# Patient Record
Sex: MALE | Race: WHITE | Employment: OTHER | URBAN - METROPOLITAN AREA
[De-identification: names, ages, dates, MRNs, and addresses within clinical notes are randomized per-mention and may not be internally consistent; named-entity substitution may affect disease eponyms.]

---

## 2017-03-17 PROBLEM — R33.9 URINARY RETENTION: Status: ACTIVE | Noted: 2017-03-17

## 2017-03-20 ENCOUNTER — TELEPHONE (OUTPATIENT)
Dept: PHARMACY | Facility: CLINIC | Age: 72
End: 2017-03-20

## 2017-03-20 PROBLEM — N40.1 BENIGN NON-NODULAR PROSTATIC HYPERPLASIA WITH LOWER URINARY TRACT SYMPTOMS: Status: ACTIVE | Noted: 2017-03-20

## 2017-03-20 PROBLEM — I50.21 ACUTE SYSTOLIC HEART FAILURE (HCC): Status: ACTIVE | Noted: 2017-03-20

## 2017-03-22 PROBLEM — I50.31 ACUTE DIASTOLIC HEART FAILURE (HCC): Status: ACTIVE | Noted: 2017-03-20

## 2017-03-22 PROBLEM — I44.7 LEFT BUNDLE BRANCH BLOCK: Status: ACTIVE | Noted: 2017-03-22

## 2017-04-20 PROBLEM — N45.1 EPIDIDYMITIS: Status: ACTIVE | Noted: 2017-04-20

## 2017-04-20 PROBLEM — L03.90 CELLULITIS: Status: ACTIVE | Noted: 2017-04-20

## 2017-04-26 ENCOUNTER — TELEPHONE (OUTPATIENT)
Dept: PHARMACY | Facility: CLINIC | Age: 72
End: 2017-04-26

## 2017-08-13 PROBLEM — R07.9 CHEST PAIN: Status: ACTIVE | Noted: 2017-08-13

## 2017-08-15 ENCOUNTER — TELEPHONE (OUTPATIENT)
Dept: PHARMACY | Facility: CLINIC | Age: 72
End: 2017-08-15

## 2017-09-03 PROBLEM — R33.9 URINARY RETENTION: Status: RESOLVED | Noted: 2017-03-17 | Resolved: 2017-09-03

## 2017-09-03 PROBLEM — N45.1 EPIDIDYMITIS: Status: RESOLVED | Noted: 2017-04-20 | Resolved: 2017-09-03

## 2017-09-03 PROBLEM — I50.31 ACUTE DIASTOLIC HEART FAILURE (HCC): Status: RESOLVED | Noted: 2017-03-20 | Resolved: 2017-09-03

## 2017-09-03 PROBLEM — L03.90 CELLULITIS: Status: RESOLVED | Noted: 2017-04-20 | Resolved: 2017-09-03

## 2017-09-03 PROBLEM — R07.9 CHEST PAIN: Status: RESOLVED | Noted: 2017-08-13 | Resolved: 2017-09-03

## 2017-09-03 PROBLEM — N40.1 BENIGN NON-NODULAR PROSTATIC HYPERPLASIA WITH LOWER URINARY TRACT SYMPTOMS: Status: RESOLVED | Noted: 2017-03-20 | Resolved: 2017-09-03

## 2017-11-15 PROBLEM — Z01.818 PRE-OP EXAM: Status: ACTIVE | Noted: 2017-11-15

## 2018-02-23 PROBLEM — Z01.818 PRE-OP EXAM: Status: RESOLVED | Noted: 2017-11-15 | Resolved: 2018-02-23

## 2018-03-18 ENCOUNTER — APPOINTMENT (OUTPATIENT)
Dept: GENERAL RADIOLOGY | Age: 73
End: 2018-03-18
Payer: MEDICARE

## 2018-03-18 ENCOUNTER — HOSPITAL ENCOUNTER (EMERGENCY)
Age: 73
Discharge: HOME | End: 2018-03-18
Attending: EMERGENCY MEDICINE
Payer: MEDICARE

## 2018-03-18 VITALS
DIASTOLIC BLOOD PRESSURE: 77 MMHG | BODY MASS INDEX: 30.07 KG/M2 | WEIGHT: 203 LBS | HEART RATE: 68 BPM | TEMPERATURE: 98.2 F | SYSTOLIC BLOOD PRESSURE: 157 MMHG | OXYGEN SATURATION: 97 % | HEIGHT: 69 IN

## 2018-03-18 DIAGNOSIS — I10 ESSENTIAL HYPERTENSION: ICD-10-CM

## 2018-03-18 DIAGNOSIS — R42 DIZZINESS: Primary | ICD-10-CM

## 2018-03-18 DIAGNOSIS — R19.5 GUAIAC POSITIVE STOOLS: ICD-10-CM

## 2018-03-18 LAB
ALBUMIN SERPL-MCNC: 4.1 G/DL (ref 3.5–5.2)
ALP BLD-CCNC: 101 U/L (ref 40–129)
ALT SERPL-CCNC: 29 U/L (ref 0–40)
ANION GAP SERPL CALCULATED.3IONS-SCNC: 12 MMOL/L (ref 7–16)
AST SERPL-CCNC: 34 U/L (ref 0–39)
BASOPHILS ABSOLUTE: 0.05 E9/L (ref 0–0.2)
BASOPHILS RELATIVE PERCENT: 0.6 % (ref 0–2)
BILIRUB SERPL-MCNC: 0.4 MG/DL (ref 0–1.2)
BUN BLDV-MCNC: 14 MG/DL (ref 8–23)
CALCIUM SERPL-MCNC: 9.7 MG/DL (ref 8.6–10.2)
CHLORIDE BLD-SCNC: 100 MMOL/L (ref 98–107)
CO2: 25 MMOL/L (ref 22–29)
CREAT SERPL-MCNC: 0.9 MG/DL (ref 0.7–1.2)
EKG ATRIAL RATE: 64 BPM
EKG P AXIS: 20 DEGREES
EKG P-R INTERVAL: 180 MS
EKG Q-T INTERVAL: 454 MS
EKG QRS DURATION: 144 MS
EKG QTC CALCULATION (BAZETT): 468 MS
EKG R AXIS: -69 DEGREES
EKG T AXIS: 34 DEGREES
EKG VENTRICULAR RATE: 64 BPM
EOSINOPHILS ABSOLUTE: 0.23 E9/L (ref 0.05–0.5)
EOSINOPHILS RELATIVE PERCENT: 2.6 % (ref 0–6)
GFR AFRICAN AMERICAN: >60
GFR NON-AFRICAN AMERICAN: >60 ML/MIN/1.73
GLUCOSE BLD-MCNC: 114 MG/DL (ref 74–109)
HCT VFR BLD CALC: 47.1 % (ref 37–54)
HEMOGLOBIN: 16.2 G/DL (ref 12.5–16.5)
IMMATURE GRANULOCYTES #: 0.03 E9/L
IMMATURE GRANULOCYTES %: 0.3 % (ref 0–5)
LACTIC ACID: 1.3 MMOL/L (ref 0.5–2.2)
LYMPHOCYTES ABSOLUTE: 3.33 E9/L (ref 1.5–4)
LYMPHOCYTES RELATIVE PERCENT: 38.1 % (ref 20–42)
MCH RBC QN AUTO: 31 PG (ref 26–35)
MCHC RBC AUTO-ENTMCNC: 34.4 % (ref 32–34.5)
MCV RBC AUTO: 90.2 FL (ref 80–99.9)
MONOCYTES ABSOLUTE: 0.75 E9/L (ref 0.1–0.95)
MONOCYTES RELATIVE PERCENT: 8.6 % (ref 2–12)
NEUTROPHILS ABSOLUTE: 4.34 E9/L (ref 1.8–7.3)
NEUTROPHILS RELATIVE PERCENT: 49.8 % (ref 43–80)
PDW BLD-RTO: 13.6 FL (ref 11.5–15)
PLATELET # BLD: 243 E9/L (ref 130–450)
PMV BLD AUTO: 10 FL (ref 7–12)
POTASSIUM SERPL-SCNC: 4.1 MMOL/L (ref 3.5–5)
RBC # BLD: 5.22 E12/L (ref 3.8–5.8)
SODIUM BLD-SCNC: 137 MMOL/L (ref 132–146)
TOTAL PROTEIN: 7.8 G/DL (ref 6.4–8.3)
TROPONIN: <0.01 NG/ML (ref 0–0.03)
WBC # BLD: 8.7 E9/L (ref 4.5–11.5)

## 2018-03-18 PROCEDURE — 80053 COMPREHEN METABOLIC PANEL: CPT

## 2018-03-18 PROCEDURE — 36415 COLL VENOUS BLD VENIPUNCTURE: CPT

## 2018-03-18 PROCEDURE — 85025 COMPLETE CBC W/AUTO DIFF WBC: CPT

## 2018-03-18 PROCEDURE — 99284 EMERGENCY DEPT VISIT MOD MDM: CPT

## 2018-03-18 PROCEDURE — 93005 ELECTROCARDIOGRAM TRACING: CPT | Performed by: EMERGENCY MEDICINE

## 2018-03-18 PROCEDURE — 83605 ASSAY OF LACTIC ACID: CPT

## 2018-03-18 PROCEDURE — 74022 RADEX COMPL AQT ABD SERIES: CPT

## 2018-03-18 PROCEDURE — 2580000003 HC RX 258: Performed by: EMERGENCY MEDICINE

## 2018-03-18 PROCEDURE — 84484 ASSAY OF TROPONIN QUANT: CPT

## 2018-03-18 PROCEDURE — 6370000000 HC RX 637 (ALT 250 FOR IP): Performed by: EMERGENCY MEDICINE

## 2018-03-18 RX ORDER — 0.9 % SODIUM CHLORIDE 0.9 %
1000 INTRAVENOUS SOLUTION INTRAVENOUS ONCE
Status: COMPLETED | OUTPATIENT
Start: 2018-03-18 | End: 2018-03-18

## 2018-03-18 RX ORDER — MECLIZINE HCL 12.5 MG/1
25 TABLET ORAL ONCE
Status: COMPLETED | OUTPATIENT
Start: 2018-03-18 | End: 2018-03-18

## 2018-03-18 RX ORDER — MECLIZINE HCL 12.5 MG/1
12.5 TABLET ORAL 3 TIMES DAILY PRN
Qty: 15 TABLET | Refills: 0 | Status: SHIPPED | OUTPATIENT
Start: 2018-03-18 | End: 2018-03-28

## 2018-03-18 RX ADMIN — MECLIZINE 25 MG: 12.5 TABLET ORAL at 15:20

## 2018-03-18 RX ADMIN — SODIUM CHLORIDE 1000 ML: 9 INJECTION, SOLUTION INTRAVENOUS at 15:19

## 2018-03-18 ASSESSMENT — ENCOUNTER SYMPTOMS
VISUAL CHANGE: 0
NAUSEA: 1
DIARRHEA: 0
COLOR CHANGE: 0
BLOOD IN STOOL: 1
VOMITING: 0
ABDOMINAL PAIN: 0
ABDOMINAL DISTENTION: 0
CHEST TIGHTNESS: 0
SHORTNESS OF BREATH: 0

## 2018-03-18 ASSESSMENT — PAIN DESCRIPTION - ORIENTATION: ORIENTATION: MID

## 2018-03-18 ASSESSMENT — PAIN SCALES - WONG BAKER: WONGBAKER_NUMERICALRESPONSE: 4

## 2018-03-18 ASSESSMENT — PAIN SCALES - GENERAL: PAINLEVEL_OUTOF10: 4

## 2018-03-18 ASSESSMENT — PAIN DESCRIPTION - LOCATION: LOCATION: RECTUM;BACK

## 2018-03-18 ASSESSMENT — PAIN DESCRIPTION - DESCRIPTORS: DESCRIPTORS: ACHING;THROBBING

## 2018-03-18 ASSESSMENT — PAIN DESCRIPTION - PAIN TYPE: TYPE: ACUTE PAIN

## 2018-03-18 NOTE — ED PROVIDER NOTES
normal  Axis: left  Ectopy: none  Conduction: normal  ST Segments: no acute change  T Waves: non specific changes  Q Waves: II, III and aVf    Clinical Impression: right bundle branch block and left anterior fascicular block no change when compared to previous on 11/15/17    [place]    Lab results from last week reviewed. Stool cultures without positive findings. 15:00 Patient was able to provide a stool. No gross blood visible. Stool is narrow in caliber and solid. No evidence of mucous. Stool was guaiac positive. He is able to ambulate to and from the bathroom without assist.  At this time, I'll give him a liter of IVF and a dose of meclizine as he complains of \"dizziness\" with position changes. ED Course        --------------------------------------------- PAST HISTORY ---------------------------------------------  Past Medical History:  has a past medical history of Arthritis; Atrial fibrillation ([facility] [address] Burning pain; Degenerative lumbar disc; Diabetes ([facility] [address] Herniated cervical disc; History of echocardiogram; Hyperlipidemia; Hypertension; and Urinary retention. Past Surgical History:  has a past surgical history that includes colectomy; Hemorrhoid surgery; Prostate surgery; Cataract removal; Upper gastrointestinal endoscopy; Colonoscopy; Nerve Block (Bilateral, 10 12 2015); Nerve Block (Bilateral, 10/22/15); Nerve Block (Bilateral, 11 02 2015); Nerve Block (Left, 12/2/15); Nerve Block (Left, 12 16 15); Nerve Block (Left, 12 28 2015); Nerve Block (Left, 1 20 16); Endoscopy, colon, diagnostic; ablation of dysrhythmic focus; lumbar fusion (03/06/2017); and Nasal sinus surgery (12/07/2017). Social History:  reports that he quit smoking about 41 years ago. He has quit using smokeless tobacco. He reports that he does not drink alcohol or use drugs.     Family History: family history includes Cancer in his brother; Diabetes in an other family member; Hypertension in an other family member; Kidney Disease in his brother and sister; Other in his father; Stroke in his mother. The patients home medications have been reviewed.     Allergies: Oxycodone; Gabapentin; Hydrocodone; Norco [hydrocodone-acetaminophen]; Sulfamethoxazole-trimethoprim; and Bactrim [sulfamethoxazole-trimethoprim]    -------------------------------------------------- RESULTS -------------------------------------------------  Labs:  Results for orders placed or performed during the hospital encounter of 03/18/18   CBC Auto Differential   Result Value Ref Range    WBC 8.7 4.5 - 11.5 E9/L    RBC 5.22 3.80 - 5.80 E12/L    Hemoglobin 16.2 12.5 - 16.5 g/dL    Hematocrit 47.1 37.0 - 54.0 %    MCV 90.2 80.0 - 99.9 fL    MCH 31.0 26.0 - 35.0 pg    MCHC 34.4 32.0 - 34.5 %    RDW 13.6 11.5 - 15.0 fL    Platelets 098 938 - 868 E9/L    MPV 10.0 7.0 - 12.0 fL    Neutrophils % 49.8 43.0 - 80.0 %    Immature Granulocytes % 0.3 0.0 - 5.0 %    Lymphocytes % 38.1 20.0 - 42.0 %    Monocytes % 8.6 2.0 - 12.0 %    Eosinophils % 2.6 0.0 - 6.0 %    Basophils % 0.6 0.0 - 2.0 %    Neutrophils # 4.34 1.80 - 7.30 E9/L    Immature Granulocytes # 0.03 E9/L    Lymphocytes # 3.33 1.50 - 4.00 E9/L    Monocytes # 0.75 0.10 - 0.95 E9/L    Eosinophils # 0.23 0.05 - 0.50 E9/L    Basophils # 0.05 0.00 - 0.20 E9/L   Comprehensive Metabolic Panel   Result Value Ref Range    Sodium 137 132 - 146 mmol/L    Potassium 4.1 3.5 - 5.0 mmol/L    Chloride 100 98 - 107 mmol/L    CO2 25 22 - 29 mmol/L    Anion Gap 12 7 - 16 mmol/L    Glucose 114 (H) 74 - 109 mg/dL    BUN 14 8 - 23 mg/dL    CREATININE 0.9 0.7 - 1.2 mg/dL    GFR Non-African American >60 >=60 mL/min/1.73    GFR African American >60     Calcium 9.7 8.6 - 10.2 mg/dL    Total Protein 7.8 6.4 - 8.3 g/dL    Alb 4.1 3.5 - 5.2 g/dL    Total Bilirubin 0.4 0.0 - 1.2 mg/dL    Alkaline Phosphatase 101 40 - 129 U/L    ALT 29 0 - 40 U/L    AST 34 0 - 39 U/L   Lactic Acid, Plasma   Result Value Ref Range    Lactic Acid 1.3

## 2018-03-23 ENCOUNTER — OFFICE VISIT (OUTPATIENT)
Dept: FAMILY MEDICINE CLINIC | Age: 73
End: 2018-03-23
Payer: MEDICARE

## 2018-03-23 VITALS
OXYGEN SATURATION: 96 % | SYSTOLIC BLOOD PRESSURE: 120 MMHG | WEIGHT: 209 LBS | BODY MASS INDEX: 30.86 KG/M2 | HEART RATE: 68 BPM | DIASTOLIC BLOOD PRESSURE: 80 MMHG

## 2018-03-23 DIAGNOSIS — R19.7 DIARRHEA, UNSPECIFIED TYPE: Primary | ICD-10-CM

## 2018-03-23 PROCEDURE — 1123F ACP DISCUSS/DSCN MKR DOCD: CPT | Performed by: FAMILY MEDICINE

## 2018-03-23 PROCEDURE — G8482 FLU IMMUNIZE ORDER/ADMIN: HCPCS | Performed by: FAMILY MEDICINE

## 2018-03-23 PROCEDURE — 3017F COLORECTAL CA SCREEN DOC REV: CPT | Performed by: FAMILY MEDICINE

## 2018-03-23 PROCEDURE — G8417 CALC BMI ABV UP PARAM F/U: HCPCS | Performed by: FAMILY MEDICINE

## 2018-03-23 PROCEDURE — G8427 DOCREV CUR MEDS BY ELIG CLIN: HCPCS | Performed by: FAMILY MEDICINE

## 2018-03-23 PROCEDURE — 99213 OFFICE O/P EST LOW 20 MIN: CPT | Performed by: FAMILY MEDICINE

## 2018-03-23 PROCEDURE — 1036F TOBACCO NON-USER: CPT | Performed by: FAMILY MEDICINE

## 2018-03-23 PROCEDURE — 4040F PNEUMOC VAC/ADMIN/RCVD: CPT | Performed by: FAMILY MEDICINE

## 2018-03-23 RX ORDER — DIPHENOXYLATE HYDROCHLORIDE AND ATROPINE SULFATE 2.5; .025 MG/1; MG/1
1 TABLET ORAL 4 TIMES DAILY PRN
Qty: 120 TABLET | Refills: 3 | Status: SHIPPED | OUTPATIENT
Start: 2018-03-23 | End: 2018-04-22

## 2018-03-24 ASSESSMENT — ENCOUNTER SYMPTOMS
WHEEZING: 0
SINUS PRESSURE: 1
BACK PAIN: 0
CHOKING: 0
SHORTNESS OF BREATH: 0
ABDOMINAL DISTENTION: 0
SINUS PAIN: 1
ABDOMINAL PAIN: 0
CONSTIPATION: 0
VOMITING: 0
EYE DISCHARGE: 0
EYE REDNESS: 0
ANAL BLEEDING: 0
DIARRHEA: 1
STRIDOR: 0
COUGH: 0
SORE THROAT: 0
FACIAL SWELLING: 0
RECTAL PAIN: 0
CHEST TIGHTNESS: 0
APNEA: 0
EYE PAIN: 0
PHOTOPHOBIA: 0
NAUSEA: 0
RHINORRHEA: 1
VOICE CHANGE: 0
BLOOD IN STOOL: 0
COLOR CHANGE: 0
TROUBLE SWALLOWING: 0
EYE ITCHING: 0

## 2018-03-24 NOTE — PROGRESS NOTES
Subjective:      Patient ID: Solange Flannery is a 74 y.o. male. States sinuses are still not clear. Complaining of large amounts of mucus in stool. He is requesting blood cultures because he has been having fever and chills and due to a remote history of sepsis he is worried. I assured him that this is not likely due to his lack of symptomology, however we will check. He is having again having loose mucoid stools , no blood in stools. No N/V/D/C . No urinary complaints. No SOB or Chest pain. He was unable to obtain medications for diabetes due to no  medication coverage. He does not tolerate metformin          Review of Systems   Constitutional: Positive for chills, fatigue and fever. Negative for activity change, appetite change, diaphoresis and unexpected weight change. HENT: Positive for postnasal drip, rhinorrhea, sinus pain and sinus pressure. Negative for congestion, dental problem, drooling, ear discharge, ear pain, facial swelling, hearing loss, mouth sores, nosebleeds, sneezing, sore throat, tinnitus, trouble swallowing and voice change. Eyes: Negative for photophobia, pain, discharge, redness, itching and visual disturbance. Respiratory: Negative for apnea, cough, choking, chest tightness, shortness of breath, wheezing and stridor. Cardiovascular: Negative for chest pain, palpitations and leg swelling. Gastrointestinal: Positive for diarrhea. Negative for abdominal distention, abdominal pain, anal bleeding, blood in stool, constipation, nausea, rectal pain and vomiting. Mucoid stools   Endocrine: Negative for cold intolerance, heat intolerance, polydipsia, polyphagia and polyuria. Genitourinary: Negative for decreased urine volume, difficulty urinating, discharge, dysuria, enuresis, flank pain, frequency, genital sores, hematuria, penile pain, penile swelling, scrotal swelling, testicular pain and urgency.    Musculoskeletal: Negative for arthralgias, back pain, gait problem, joint tablet Take 1 tablet by mouth every morning (before breakfast) 30 tablet 5    metoprolol succinate (TOPROL XL) 50 MG extended release tablet TAKE 1 TABLET BY MOUTH DAILY 90 tablet 1    lisinopril (PRINIVIL;ZESTRIL) 20 MG tablet TAKE 1 TABLET BY MOUTH DAILY 90 tablet 1    saxagliptin (ONGLYZA) 5 MG TABS tablet Take 1 tablet by mouth daily 30 tablet 5    alogliptin (NESINA) 25 MG TABS tablet Take 1 tablet by mouth daily 30 tablet 5    fluticasone (FLONASE) 50 MCG/ACT nasal spray 1 spray by Nasal route daily 1 Bottle 3    ipratropium (ATROVENT) 0.06 % nasal spray 2 sprays by Nasal route 3 times daily 1 Bottle 3    traMADol (ULTRAM) 50 MG tablet Take 2 tablets by mouth every 6 hours as needed for Pain . 120 tablet 5    simvastatin (ZOCOR) 40 MG tablet Take 1 tablet by mouth nightly 90 tablet 5    cyclobenzaprine (FLEXERIL) 10 MG tablet Take 1 tablet by mouth 3 times daily as needed for Muscle spasms 90 tablet 5    aspirin 81 MG tablet Take 81 mg by mouth daily      budesonide-formoterol (SYMBICORT) 160-4.5 MCG/ACT AERO Inhale 2 puffs into the lungs 2 times daily 1 Inhaler 3    oxybutynin (DITROPAN) 5 MG tablet Take 5 mg by mouth 3 times daily      psyllium (KONSYL) 28.3 % PACK Take 1 packet by mouth daily       No current facility-administered medications for this visit. Allergies   Allergen Reactions    Oxycodone Rash    Gabapentin Other (See Comments)     Constipation      Hydrocodone     Norco [Hydrocodone-Acetaminophen]      Causes sick feeling in head      Sulfamethoxazole-Trimethoprim     Bactrim [Sulfamethoxazole-Trimethoprim] Nausea Only       I have reviewed Markel's allergies, medications, problem list, medical, social and family history and have updated as needed in the electronic medical record. ROS: negative other what was mentioned above. Objective:   Physical Exam   Constitutional: He is oriented to person, place, and time. He appears well-developed and well-nourished.  No distress. HENT:   Head: Normocephalic and atraumatic. Right Ear: External ear normal.   Left Ear: External ear normal.   Nose: Nose normal.   Mouth/Throat: Oropharynx is clear and moist. No oropharyngeal exudate. +turb congestion and errythemia + thick green rhinorrhea   + thick green post nasal drip, mild posterior casimiro-phyangeal injection. Mild soft anterior cervical adenopathy    Eyes: Conjunctivae and EOM are normal. Pupils are equal, round, and reactive to light. Right eye exhibits no discharge. Left eye exhibits no discharge. No scleral icterus. Neck: Normal range of motion. Neck supple. No JVD present. No tracheal deviation present. No thyromegaly present. Cardiovascular: Normal rate, regular rhythm, normal heart sounds and intact distal pulses. Exam reveals no gallop and no friction rub. No murmur heard. Pulmonary/Chest: Effort normal and breath sounds normal. No stridor. No respiratory distress. He has no wheezes. He has no rales. He exhibits no tenderness. Abdominal: Soft. Bowel sounds are normal. He exhibits no distension and no mass. There is no tenderness. There is no rebound and no guarding. Genitourinary:   Genitourinary Comments: Deferred by patient   Musculoskeletal: Normal range of motion. He exhibits no edema or tenderness. Lymphadenopathy:     He has no cervical adenopathy. Neurological: He is alert and oriented to person, place, and time. He has normal reflexes. No cranial nerve deficit. He exhibits normal muscle tone. Coordination normal.   Skin: Skin is warm and dry. No rash noted. He is not diaphoretic. No erythema. No pallor. Psychiatric: He has a normal mood and affect. His behavior is normal. Judgment and thought content normal.   Nursing note and vitals reviewed. Assessment / Plan:      Bj Lee was seen today for follow-up and results.     Diagnoses and all orders for this visit:    Diarrhea, unspecified type  -     diphenoxylate-atropine (DIPHENATOL) 2.5-0.025 MG

## 2018-03-26 ASSESSMENT — PATIENT HEALTH QUESTIONNAIRE - PHQ9
1. LITTLE INTEREST OR PLEASURE IN DOING THINGS: 0
SUM OF ALL RESPONSES TO PHQ9 QUESTIONS 1 & 2: 0
2. FEELING DOWN, DEPRESSED OR HOPELESS: 0
SUM OF ALL RESPONSES TO PHQ QUESTIONS 1-9: 0

## 2018-04-11 ENCOUNTER — OFFICE VISIT (OUTPATIENT)
Dept: ENT CLINIC | Age: 73
End: 2018-04-11
Payer: MEDICARE

## 2018-04-11 VITALS
SYSTOLIC BLOOD PRESSURE: 136 MMHG | DIASTOLIC BLOOD PRESSURE: 79 MMHG | BODY MASS INDEX: 31.21 KG/M2 | HEART RATE: 67 BPM | WEIGHT: 210.7 LBS | HEIGHT: 69 IN

## 2018-04-11 DIAGNOSIS — J34.89 RHINORRHEA: ICD-10-CM

## 2018-04-11 DIAGNOSIS — G96.00 CSF LEAK: Primary | ICD-10-CM

## 2018-04-11 PROCEDURE — 1036F TOBACCO NON-USER: CPT | Performed by: OTOLARYNGOLOGY

## 2018-04-11 PROCEDURE — 4040F PNEUMOC VAC/ADMIN/RCVD: CPT | Performed by: OTOLARYNGOLOGY

## 2018-04-11 PROCEDURE — 1123F ACP DISCUSS/DSCN MKR DOCD: CPT | Performed by: OTOLARYNGOLOGY

## 2018-04-11 PROCEDURE — G8427 DOCREV CUR MEDS BY ELIG CLIN: HCPCS | Performed by: OTOLARYNGOLOGY

## 2018-04-11 PROCEDURE — 99213 OFFICE O/P EST LOW 20 MIN: CPT | Performed by: OTOLARYNGOLOGY

## 2018-04-11 PROCEDURE — G8417 CALC BMI ABV UP PARAM F/U: HCPCS | Performed by: OTOLARYNGOLOGY

## 2018-04-11 PROCEDURE — 3017F COLORECTAL CA SCREEN DOC REV: CPT | Performed by: OTOLARYNGOLOGY

## 2018-04-13 ENCOUNTER — HOSPITAL ENCOUNTER (OUTPATIENT)
Age: 73
Discharge: HOME | End: 2018-04-13
Payer: MEDICARE

## 2018-04-13 PROCEDURE — 89055 LEUKOCYTE ASSESSMENT FECAL: CPT

## 2018-04-13 PROCEDURE — 87324 CLOSTRIDIUM AG IA: CPT

## 2018-04-13 PROCEDURE — 87045 FECES CULTURE AEROBIC BACT: CPT

## 2018-04-14 ENCOUNTER — HOSPITAL ENCOUNTER (OUTPATIENT)
Age: 73
Discharge: HOME | End: 2018-04-14
Payer: MEDICARE

## 2018-04-14 LAB
ALBUMIN SERPL-MCNC: 4 G/DL (ref 3.5–5.2)
ALP BLD-CCNC: 93 U/L (ref 40–129)
ALT SERPL-CCNC: 18 U/L (ref 0–40)
ANION GAP SERPL CALCULATED.3IONS-SCNC: 11 MMOL/L (ref 7–16)
AST SERPL-CCNC: 20 U/L (ref 0–39)
BASOPHILS ABSOLUTE: 0.04 E9/L (ref 0–0.2)
BASOPHILS RELATIVE PERCENT: 0.5 % (ref 0–2)
BILIRUB SERPL-MCNC: 0.5 MG/DL (ref 0–1.2)
BUN BLDV-MCNC: 11 MG/DL (ref 8–23)
CALCIUM SERPL-MCNC: 9.8 MG/DL (ref 8.6–10.2)
CHLORIDE BLD-SCNC: 103 MMOL/L (ref 98–107)
CO2: 28 MMOL/L (ref 22–29)
CREAT SERPL-MCNC: 1.1 MG/DL (ref 0.7–1.2)
EOSINOPHILS ABSOLUTE: 0.23 E9/L (ref 0.05–0.5)
EOSINOPHILS RELATIVE PERCENT: 2.6 % (ref 0–6)
GFR AFRICAN AMERICAN: >60
GFR NON-AFRICAN AMERICAN: >60 ML/MIN/1.73
GLUCOSE FASTING: 138 MG/DL (ref 74–109)
HCT VFR BLD CALC: 49.6 % (ref 37–54)
HEMOGLOBIN: 16.7 G/DL (ref 12.5–16.5)
IMMATURE GRANULOCYTES #: 0.04 E9/L
IMMATURE GRANULOCYTES %: 0.5 % (ref 0–5)
LYMPHOCYTES ABSOLUTE: 2.86 E9/L (ref 1.5–4)
LYMPHOCYTES RELATIVE PERCENT: 32.4 % (ref 20–42)
MCH RBC QN AUTO: 31.3 PG (ref 26–35)
MCHC RBC AUTO-ENTMCNC: 33.7 % (ref 32–34.5)
MCV RBC AUTO: 92.9 FL (ref 80–99.9)
MONOCYTES ABSOLUTE: 0.79 E9/L (ref 0.1–0.95)
MONOCYTES RELATIVE PERCENT: 8.9 % (ref 2–12)
NEUTROPHILS ABSOLUTE: 4.88 E9/L (ref 1.8–7.3)
NEUTROPHILS RELATIVE PERCENT: 55.1 % (ref 43–80)
ORGANISM: ABNORMAL
PDW BLD-RTO: 13.7 FL (ref 11.5–15)
PLATELET # BLD: 205 E9/L (ref 130–450)
PMV BLD AUTO: 10.2 FL (ref 7–12)
POTASSIUM SERPL-SCNC: 5.2 MMOL/L (ref 3.5–5)
RBC # BLD: 5.34 E12/L (ref 3.8–5.8)
SODIUM BLD-SCNC: 142 MMOL/L (ref 132–146)
TOTAL PROTEIN: 7.5 G/DL (ref 6.4–8.3)
WBC # BLD: 8.8 E9/L (ref 4.5–11.5)
WHITE BLOOD CELLS (WBC), STOOL: NORMAL

## 2018-04-14 PROCEDURE — 80053 COMPREHEN METABOLIC PANEL: CPT

## 2018-04-14 PROCEDURE — 85025 COMPLETE CBC W/AUTO DIFF WBC: CPT

## 2018-04-14 PROCEDURE — 36415 COLL VENOUS BLD VENIPUNCTURE: CPT

## 2018-04-15 LAB — CULTURE, STOOL: NORMAL

## 2018-04-18 ENCOUNTER — TELEPHONE (OUTPATIENT)
Dept: ENT CLINIC | Age: 73
End: 2018-04-18

## 2018-04-19 ENCOUNTER — TELEPHONE (OUTPATIENT)
Dept: ENT CLINIC | Age: 73
End: 2018-04-19

## 2018-04-21 ENCOUNTER — HOSPITAL ENCOUNTER (OUTPATIENT)
Age: 73
Setting detail: SPECIMEN
Discharge: HOME | End: 2018-04-21
Payer: MEDICARE

## 2018-04-22 ENCOUNTER — HOSPITAL ENCOUNTER (OUTPATIENT)
Age: 73
Discharge: HOME | End: 2018-04-22
Payer: MEDICARE

## 2018-04-22 DIAGNOSIS — G96.00 CSF LEAK: ICD-10-CM

## 2018-04-22 PROCEDURE — 82232 ASSAY OF BETA-2 PROTEIN: CPT

## 2018-04-22 ASSESSMENT — ENCOUNTER SYMPTOMS
HEARTBURN: 0
COUGH: 0
BLURRED VISION: 0
SHORTNESS OF BREATH: 0
DOUBLE VISION: 0
VOMITING: 0

## 2018-04-24 DIAGNOSIS — G96.00 CSF LEAK: ICD-10-CM

## 2018-04-25 LAB — BETA-2 MICROGLOBULIN: 1.8 MG/L (ref 0.6–2.4)

## 2018-04-26 LAB — MISCELLANEOUS LAB TEST RESULT: NORMAL

## 2018-06-04 ENCOUNTER — OFFICE VISIT (OUTPATIENT)
Dept: FAMILY MEDICINE CLINIC | Age: 73
End: 2018-06-04
Payer: MEDICARE

## 2018-06-04 VITALS
DIASTOLIC BLOOD PRESSURE: 80 MMHG | WEIGHT: 208.4 LBS | SYSTOLIC BLOOD PRESSURE: 130 MMHG | BODY MASS INDEX: 30.78 KG/M2 | HEART RATE: 68 BPM | OXYGEN SATURATION: 97 %

## 2018-06-04 DIAGNOSIS — R73.01 IFG (IMPAIRED FASTING GLUCOSE): ICD-10-CM

## 2018-06-04 DIAGNOSIS — Z12.5 ENCOUNTER FOR SCREENING FOR MALIGNANT NEOPLASM OF PROSTATE: ICD-10-CM

## 2018-06-04 DIAGNOSIS — I10 BENIGN ESSENTIAL HTN: Primary | ICD-10-CM

## 2018-06-04 DIAGNOSIS — E78.2 MIXED HYPERLIPIDEMIA: ICD-10-CM

## 2018-06-04 PROCEDURE — G8427 DOCREV CUR MEDS BY ELIG CLIN: HCPCS | Performed by: FAMILY MEDICINE

## 2018-06-04 PROCEDURE — 1123F ACP DISCUSS/DSCN MKR DOCD: CPT | Performed by: FAMILY MEDICINE

## 2018-06-04 PROCEDURE — 3017F COLORECTAL CA SCREEN DOC REV: CPT | Performed by: FAMILY MEDICINE

## 2018-06-04 PROCEDURE — G8417 CALC BMI ABV UP PARAM F/U: HCPCS | Performed by: FAMILY MEDICINE

## 2018-06-04 PROCEDURE — 99213 OFFICE O/P EST LOW 20 MIN: CPT | Performed by: FAMILY MEDICINE

## 2018-06-04 PROCEDURE — 4040F PNEUMOC VAC/ADMIN/RCVD: CPT | Performed by: FAMILY MEDICINE

## 2018-06-04 PROCEDURE — 1036F TOBACCO NON-USER: CPT | Performed by: FAMILY MEDICINE

## 2018-06-04 RX ORDER — SIMVASTATIN 40 MG
20 TABLET ORAL NIGHTLY
Qty: 90 TABLET | Refills: 5 | Status: CANCELLED | OUTPATIENT
Start: 2018-06-04

## 2018-06-04 RX ORDER — LISINOPRIL 20 MG/1
20 TABLET ORAL DAILY
Qty: 90 TABLET | Refills: 11 | Status: SHIPPED | OUTPATIENT
Start: 2018-06-04 | End: 2019-02-11

## 2018-06-04 RX ORDER — METOPROLOL SUCCINATE 50 MG/1
50 TABLET, EXTENDED RELEASE ORAL DAILY
Qty: 90 TABLET | Refills: 11 | Status: SHIPPED | OUTPATIENT
Start: 2018-06-04 | End: 2019-07-16 | Stop reason: SDUPTHER

## 2018-06-04 RX ORDER — SIMVASTATIN 20 MG
20 TABLET ORAL NIGHTLY
Qty: 90 TABLET | Refills: 11 | Status: SHIPPED | OUTPATIENT
Start: 2018-06-04 | End: 2019-05-10

## 2018-06-08 ASSESSMENT — ENCOUNTER SYMPTOMS
VOMITING: 0
NAUSEA: 0
SORE THROAT: 0
ABDOMINAL PAIN: 0
COUGH: 0

## 2018-06-08 ASSESSMENT — PATIENT HEALTH QUESTIONNAIRE - PHQ9
SUM OF ALL RESPONSES TO PHQ QUESTIONS 1-9: 0
1. LITTLE INTEREST OR PLEASURE IN DOING THINGS: 0
SUM OF ALL RESPONSES TO PHQ9 QUESTIONS 1 & 2: 0
2. FEELING DOWN, DEPRESSED OR HOPELESS: 0

## 2018-06-22 ENCOUNTER — TELEPHONE (OUTPATIENT)
Dept: SURGERY | Age: 73
End: 2018-06-22

## 2018-06-22 ENCOUNTER — INITIAL CONSULT (OUTPATIENT)
Dept: SURGERY | Age: 73
End: 2018-06-22
Payer: MEDICARE

## 2018-06-22 VITALS
HEIGHT: 69 IN | RESPIRATION RATE: 12 BRPM | HEART RATE: 62 BPM | DIASTOLIC BLOOD PRESSURE: 83 MMHG | TEMPERATURE: 98.6 F | SYSTOLIC BLOOD PRESSURE: 166 MMHG | BODY MASS INDEX: 30.21 KG/M2 | WEIGHT: 204 LBS

## 2018-06-22 DIAGNOSIS — R19.7 DIARRHEA OF PRESUMED INFECTIOUS ORIGIN: ICD-10-CM

## 2018-06-22 PROCEDURE — 99204 OFFICE O/P NEW MOD 45 MIN: CPT | Performed by: SURGERY

## 2018-06-22 PROCEDURE — G8427 DOCREV CUR MEDS BY ELIG CLIN: HCPCS | Performed by: SURGERY

## 2018-06-22 PROCEDURE — G8417 CALC BMI ABV UP PARAM F/U: HCPCS | Performed by: SURGERY

## 2018-06-22 PROCEDURE — 3017F COLORECTAL CA SCREEN DOC REV: CPT | Performed by: SURGERY

## 2018-06-29 ENCOUNTER — HOSPITAL ENCOUNTER (OUTPATIENT)
Dept: GENERAL RADIOLOGY | Age: 73
Discharge: HOME | End: 2018-07-01
Payer: MEDICARE

## 2018-06-29 DIAGNOSIS — R19.7 DIARRHEA OF PRESUMED INFECTIOUS ORIGIN: ICD-10-CM

## 2018-06-29 PROCEDURE — 74270 X-RAY XM COLON 1CNTRST STD: CPT

## 2018-06-29 PROCEDURE — 2500000003 HC RX 250 WO HCPCS: Performed by: RADIOLOGY

## 2018-06-29 RX ADMIN — BARIUM SULFATE: 965 POWDER, FOR SUSPENSION RECTAL at 09:20

## 2018-07-05 ENCOUNTER — CARE COORDINATION (OUTPATIENT)
Dept: CARE COORDINATION | Age: 73
End: 2018-07-05

## 2018-07-10 ENCOUNTER — CARE COORDINATION (OUTPATIENT)
Dept: CARE COORDINATION | Age: 73
End: 2018-07-10

## 2018-07-13 ENCOUNTER — OFFICE VISIT (OUTPATIENT)
Dept: SURGERY | Age: 73
End: 2018-07-13
Payer: MEDICARE

## 2018-07-13 VITALS
TEMPERATURE: 98.6 F | HEIGHT: 69 IN | DIASTOLIC BLOOD PRESSURE: 80 MMHG | SYSTOLIC BLOOD PRESSURE: 142 MMHG | WEIGHT: 200 LBS | HEART RATE: 64 BPM | BODY MASS INDEX: 29.62 KG/M2 | RESPIRATION RATE: 12 BRPM

## 2018-07-13 DIAGNOSIS — R19.7 DIARRHEA OF PRESUMED INFECTIOUS ORIGIN: Primary | ICD-10-CM

## 2018-07-13 PROCEDURE — 1123F ACP DISCUSS/DSCN MKR DOCD: CPT | Performed by: SURGERY

## 2018-07-13 PROCEDURE — G8417 CALC BMI ABV UP PARAM F/U: HCPCS | Performed by: SURGERY

## 2018-07-13 PROCEDURE — 4040F PNEUMOC VAC/ADMIN/RCVD: CPT | Performed by: SURGERY

## 2018-07-13 PROCEDURE — G8427 DOCREV CUR MEDS BY ELIG CLIN: HCPCS | Performed by: SURGERY

## 2018-07-13 PROCEDURE — 99213 OFFICE O/P EST LOW 20 MIN: CPT | Performed by: SURGERY

## 2018-07-13 PROCEDURE — 1036F TOBACCO NON-USER: CPT | Performed by: SURGERY

## 2018-07-13 PROCEDURE — 3017F COLORECTAL CA SCREEN DOC REV: CPT | Performed by: SURGERY

## 2018-07-13 PROCEDURE — 1101F PT FALLS ASSESS-DOCD LE1/YR: CPT | Performed by: SURGERY

## 2018-07-13 RX ORDER — GLYCOPYRROLATE 1 MG/1
1 TABLET ORAL 3 TIMES DAILY
Qty: 90 TABLET | Refills: 3 | Status: SHIPPED | OUTPATIENT
Start: 2018-07-13 | End: 2018-08-03 | Stop reason: SDUPTHER

## 2018-07-13 NOTE — PROGRESS NOTES
extended release tablet Take 1 tablet by mouth daily  Jessie Hardy DO   lisinopril (PRINIVIL;ZESTRIL) 20 MG tablet Take 1 tablet by mouth daily  Jessie Hardy, DO   simvastatin (ZOCOR) 20 MG tablet Take 1 tablet by mouth nightly  Jessie Hardy DO   cromolyn (NASALCROM) 5.2 MG/ACT nasal spray 1 spray by Nasal route 3 times daily  Bao Morfin, DO   glimepiride (AMARYL) 2 MG tablet Take 1 tablet by mouth every morning (before breakfast)  Jessie Hardy, DO   fluticasone (FLONASE) 50 MCG/ACT nasal spray 1 spray by Nasal route daily  Jessie Hardy DO   ipratropium (ATROVENT) 0.06 % nasal spray 2 sprays by Nasal route 3 times daily  Samir Manrique, DO   traMADol (ULTRAM) 50 MG tablet Take 2 tablets by mouth every 6 hours as needed for Pain . Jessie Hardy DO   cyclobenzaprine (FLEXERIL) 10 MG tablet Take 1 tablet by mouth 3 times daily as needed for Muscle spasms  Jessie Hardy DO   aspirin 81 MG tablet Take 81 mg by mouth daily  Historical Provider, MD   budesonide-formoterol (SYMBICORT) 160-4.5 MCG/ACT AERO Inhale 2 puffs into the lungs 2 times daily  Pam Knapp PA-C   psyllium (KONSYL) 28.3 % PACK Take 1 packet by mouth daily  Historical Provider, MD       Social History:   TOBACCO:   reports that he quit smoking about 41 years ago. He has quit using smokeless tobacco.  ETOH:    reports that he does not drink alcohol. REVIEW OF SYSTEMS:  Respiratory: negative  Cardiovascular: negative  Gastrointestinal: diarrhea  Musculoskeletal:negative  Behavioral/Psych: negative    Physical exam:   VITALS:  Blood pressure (!) 142/80, pulse 64, temperature 98.6 °F (37 °C), temperature source Temporal, resp. rate 12, height 5' 9\" (1.753 m), weight 200 lb (90.7 kg).   General appearance: alert, appears stated age and cooperative  Head: Normocephalic, without obvious abnormality, atraumatic  Neck: no adenopathy, no carotid bruit, no JVD, supple, symmetrical, trachea midline and thyroid not enlarged, symmetric, no tenderness/mass/nodules  Lungs: clear to auscultation bilaterally  Heart: regular rate and rhythm,   Abdomen: soft, non tender; bowel sounds normal, small midline incision above the pubis and several small laparoscopic scars; no hernias palpable  Extremities: extremities normal, atraumatic, no cyanosis or edema    ASSESSMENT: diarrhea. Currently on Flagyl and Amoxicillin with no improvement after 3 months. Barium enema showed extensive diverticula of the entire left and sigmoid colon with a 5 cm blind pouch at the low rectal anastomosis. Diverticula pouches are seen on the blind pouch. Did not increase the Metamucil fiber. PLAN:   Stop the antibiotics  Robinol 1 mg  Need to take Metamucil, three large doses daily.     Signed: Dr. Jeanette Hubbard M.D.

## 2018-08-03 ENCOUNTER — OFFICE VISIT (OUTPATIENT)
Dept: SURGERY | Age: 73
End: 2018-08-03
Payer: MEDICARE

## 2018-08-03 VITALS
WEIGHT: 202 LBS | DIASTOLIC BLOOD PRESSURE: 76 MMHG | RESPIRATION RATE: 14 BRPM | HEART RATE: 65 BPM | BODY MASS INDEX: 29.92 KG/M2 | TEMPERATURE: 98.9 F | SYSTOLIC BLOOD PRESSURE: 145 MMHG | HEIGHT: 69 IN

## 2018-08-03 DIAGNOSIS — R19.7 DIARRHEA OF PRESUMED INFECTIOUS ORIGIN: Primary | ICD-10-CM

## 2018-08-03 PROCEDURE — 3017F COLORECTAL CA SCREEN DOC REV: CPT | Performed by: SURGERY

## 2018-08-03 PROCEDURE — 99213 OFFICE O/P EST LOW 20 MIN: CPT | Performed by: SURGERY

## 2018-08-03 PROCEDURE — G8428 CUR MEDS NOT DOCUMENT: HCPCS | Performed by: SURGERY

## 2018-08-03 PROCEDURE — 1123F ACP DISCUSS/DSCN MKR DOCD: CPT | Performed by: SURGERY

## 2018-08-03 PROCEDURE — 1101F PT FALLS ASSESS-DOCD LE1/YR: CPT | Performed by: SURGERY

## 2018-08-03 PROCEDURE — 4040F PNEUMOC VAC/ADMIN/RCVD: CPT | Performed by: SURGERY

## 2018-08-03 PROCEDURE — 1036F TOBACCO NON-USER: CPT | Performed by: SURGERY

## 2018-08-03 PROCEDURE — G8417 CALC BMI ABV UP PARAM F/U: HCPCS | Performed by: SURGERY

## 2018-08-03 RX ORDER — GLYCOPYRROLATE 1 MG/1
1 TABLET ORAL 3 TIMES DAILY
Qty: 90 TABLET | Refills: 3 | Status: SHIPPED | OUTPATIENT
Start: 2018-08-03 | End: 2019-02-08 | Stop reason: SDUPTHER

## 2018-08-03 NOTE — PROGRESS NOTES
diarrhea  Musculoskeletal:negative  Behavioral/Psych: negative    Physical exam:   VITALS:  Blood pressure (!) 145/76, pulse 65, temperature 98.9 °F (37.2 °C), temperature source Temporal, resp. rate 14, height 5' 9\" (1.753 m), weight 202 lb (91.6 kg). General appearance: alert, appears stated age and cooperative  Head: Normocephalic, without obvious abnormality, atraumatic  Neck: no adenopathy, no carotid bruit, no JVD, supple, symmetrical, trachea midline and thyroid not enlarged, symmetric, no tenderness/mass/nodules  Lungs: clear to auscultation bilaterally  Heart: regular rate and rhythm,   Abdomen: soft, non tender; bowel sounds normal, small midline incision above the pubis and several small laparoscopic scars; no hernias palpable  Extremities: extremities normal, atraumatic, no cyanosis or edema    ASSESSMENT: diarrhea and nasal discharge resolved on Robinul and Metamucil. PLAN:   Stay on Rominul and Metamucil. Keep the bowels soft, moving daily. Follow up 6 months, sooner if left sided pain occurs.     Signed: Dr. Martha Elkins M.D.

## 2018-09-18 ENCOUNTER — TELEPHONE (OUTPATIENT)
Dept: FAMILY MEDICINE CLINIC | Age: 73
End: 2018-09-18

## 2018-09-18 DIAGNOSIS — M51.36 DDD (DEGENERATIVE DISC DISEASE), LUMBAR: Chronic | ICD-10-CM

## 2018-09-18 DIAGNOSIS — M48.061 NEURAL FORAMINAL STENOSIS OF LUMBAR SPINE: Chronic | ICD-10-CM

## 2018-09-18 DIAGNOSIS — R20.2 PARESTHESIA OF BOTH LOWER EXTREMITIES: ICD-10-CM

## 2018-09-18 RX ORDER — CYCLOBENZAPRINE HCL 10 MG
10 TABLET ORAL 3 TIMES DAILY PRN
Qty: 90 TABLET | Refills: 5 | Status: SHIPPED | OUTPATIENT
Start: 2018-09-18 | End: 2018-09-19 | Stop reason: SDUPTHER

## 2018-09-19 ENCOUNTER — OFFICE VISIT (OUTPATIENT)
Dept: FAMILY MEDICINE CLINIC | Age: 73
End: 2018-09-19
Payer: MEDICARE

## 2018-09-19 VITALS
BODY MASS INDEX: 29.68 KG/M2 | HEART RATE: 65 BPM | OXYGEN SATURATION: 95 % | SYSTOLIC BLOOD PRESSURE: 118 MMHG | WEIGHT: 201 LBS | DIASTOLIC BLOOD PRESSURE: 70 MMHG

## 2018-09-19 DIAGNOSIS — K21.9 GASTROESOPHAGEAL REFLUX DISEASE WITHOUT ESOPHAGITIS: ICD-10-CM

## 2018-09-19 DIAGNOSIS — R20.2 PARESTHESIA OF BOTH LOWER EXTREMITIES: ICD-10-CM

## 2018-09-19 DIAGNOSIS — M51.16 LUMBAR DISC DISEASE WITH RADICULOPATHY: ICD-10-CM

## 2018-09-19 DIAGNOSIS — M48.061 NEURAL FORAMINAL STENOSIS OF LUMBAR SPINE: Chronic | ICD-10-CM

## 2018-09-19 DIAGNOSIS — M51.36 DDD (DEGENERATIVE DISC DISEASE), LUMBAR: Chronic | ICD-10-CM

## 2018-09-19 DIAGNOSIS — M50.00 CERVICAL DISC DISEASE WITH MYELOPATHY: Primary | ICD-10-CM

## 2018-09-19 PROCEDURE — 1036F TOBACCO NON-USER: CPT | Performed by: FAMILY MEDICINE

## 2018-09-19 PROCEDURE — 4040F PNEUMOC VAC/ADMIN/RCVD: CPT | Performed by: FAMILY MEDICINE

## 2018-09-19 PROCEDURE — 99214 OFFICE O/P EST MOD 30 MIN: CPT | Performed by: FAMILY MEDICINE

## 2018-09-19 PROCEDURE — G8427 DOCREV CUR MEDS BY ELIG CLIN: HCPCS | Performed by: FAMILY MEDICINE

## 2018-09-19 PROCEDURE — G8417 CALC BMI ABV UP PARAM F/U: HCPCS | Performed by: FAMILY MEDICINE

## 2018-09-19 PROCEDURE — G8510 SCR DEP NEG, NO PLAN REQD: HCPCS | Performed by: FAMILY MEDICINE

## 2018-09-19 PROCEDURE — 1101F PT FALLS ASSESS-DOCD LE1/YR: CPT | Performed by: FAMILY MEDICINE

## 2018-09-19 PROCEDURE — 1123F ACP DISCUSS/DSCN MKR DOCD: CPT | Performed by: FAMILY MEDICINE

## 2018-09-19 PROCEDURE — 3017F COLORECTAL CA SCREEN DOC REV: CPT | Performed by: FAMILY MEDICINE

## 2018-09-19 RX ORDER — CYCLOBENZAPRINE HCL 10 MG
10 TABLET ORAL 3 TIMES DAILY PRN
Qty: 90 TABLET | Refills: 5 | Status: SHIPPED
Start: 2018-09-19 | End: 2020-03-09

## 2018-09-19 RX ORDER — PANTOPRAZOLE SODIUM 40 MG/1
40 TABLET, DELAYED RELEASE ORAL DAILY
Qty: 30 TABLET | Refills: 5 | Status: SHIPPED | OUTPATIENT
Start: 2018-09-19 | End: 2019-07-16 | Stop reason: SDUPTHER

## 2018-09-19 ASSESSMENT — PATIENT HEALTH QUESTIONNAIRE - PHQ9
SUM OF ALL RESPONSES TO PHQ9 QUESTIONS 1 & 2: 0
1. LITTLE INTEREST OR PLEASURE IN DOING THINGS: 0
SUM OF ALL RESPONSES TO PHQ QUESTIONS 1-9: 0
1. LITTLE INTEREST OR PLEASURE IN DOING THINGS: 0
2. FEELING DOWN, DEPRESSED OR HOPELESS: 0
SUM OF ALL RESPONSES TO PHQ QUESTIONS 1-9: 0
SUM OF ALL RESPONSES TO PHQ9 QUESTIONS 1 & 2: 0
SUM OF ALL RESPONSES TO PHQ QUESTIONS 1-9: 0
SUM OF ALL RESPONSES TO PHQ QUESTIONS 1-9: 0
2. FEELING DOWN, DEPRESSED OR HOPELESS: 0

## 2018-09-20 ASSESSMENT — ENCOUNTER SYMPTOMS
HEARTBURN: 1
BACK PAIN: 1
SORE THROAT: 1
HOARSE VOICE: 1

## 2018-09-20 NOTE — PROGRESS NOTES
(before breakfast) 30 tablet 5    traMADol (ULTRAM) 50 MG tablet Take 2 tablets by mouth every 6 hours as needed for Pain . 120 tablet 5    aspirin 81 MG tablet Take 81 mg by mouth daily      psyllium (KONSYL) 28.3 % PACK Take 1 packet by mouth daily      cromolyn (NASALCROM) 5.2 MG/ACT nasal spray 1 spray by Nasal route 3 times daily 1 Bottle 3    fluticasone (FLONASE) 50 MCG/ACT nasal spray 1 spray by Nasal route daily 1 Bottle 3    ipratropium (ATROVENT) 0.06 % nasal spray 2 sprays by Nasal route 3 times daily 1 Bottle 3    budesonide-formoterol (SYMBICORT) 160-4.5 MCG/ACT AERO Inhale 2 puffs into the lungs 2 times daily 1 Inhaler 3     No current facility-administered medications on file prior to visit. Allergies   Allergen Reactions    Oxycodone Rash    Gabapentin Other (See Comments)     Constipation      Hydrocodone     Norco [Hydrocodone-Acetaminophen]      Causes sick feeling in head      Sulfamethoxazole-Trimethoprim     Bactrim [Sulfamethoxazole-Trimethoprim] Nausea Only       I have reviewed his allergies, medications, problem list, medical, social and family history and have updated as needed in the electronic medical record. Objective:     Physical Exam    Assessment / Plan:   Jillian Brower was seen today for neck pain. Diagnoses and all orders for this visit:    Cervical disc disease with myelopathy  -     Cancel: [facility] Lashanda Hinojosa DO (ALYSA)  -     [address] Jasmine    Lumbar disc disease with radiculopathy  -     Cancel: [facility] Lashanda Hinojosa DO (ALYSA)  -     [facility]    Gastroesophageal reflux disease without esophagitis  -     pantoprazole (PROTONIX) 40 MG tablet; Take 1 tablet by mouth daily    Neural foraminal stenosis of lumbar spine  -     cyclobenzaprine (FLEXERIL) 10 MG tablet;  Take 1 tablet by mouth 3 times daily as needed for Muscle spasms  -     Cancel: [facility]

## 2018-11-16 ENCOUNTER — TELEPHONE (OUTPATIENT)
Dept: ADMINISTRATIVE | Age: 73
End: 2018-11-16

## 2018-11-16 ENCOUNTER — HOSPITAL ENCOUNTER (EMERGENCY)
Age: 73
Discharge: HOME | End: 2018-11-16
Attending: EMERGENCY MEDICINE
Payer: MEDICARE

## 2018-11-16 ENCOUNTER — APPOINTMENT (OUTPATIENT)
Dept: ULTRASOUND IMAGING | Age: 73
End: 2018-11-16
Payer: MEDICARE

## 2018-11-16 ENCOUNTER — TELEPHONE (OUTPATIENT)
Dept: FAMILY MEDICINE CLINIC | Age: 73
End: 2018-11-16

## 2018-11-16 VITALS
WEIGHT: 208 LBS | RESPIRATION RATE: 15 BRPM | BODY MASS INDEX: 30.81 KG/M2 | DIASTOLIC BLOOD PRESSURE: 73 MMHG | HEART RATE: 68 BPM | TEMPERATURE: 98.8 F | HEIGHT: 69 IN | OXYGEN SATURATION: 97 % | SYSTOLIC BLOOD PRESSURE: 157 MMHG

## 2018-11-16 DIAGNOSIS — E11.42 DIABETIC PERIPHERAL NEUROPATHY (HCC): Primary | ICD-10-CM

## 2018-11-16 PROBLEM — K62.5 RECTAL BLEEDING: Status: ACTIVE | Noted: 2018-11-16

## 2018-11-16 PROCEDURE — 99284 EMERGENCY DEPT VISIT MOD MDM: CPT

## 2018-11-16 PROCEDURE — 93970 EXTREMITY STUDY: CPT

## 2018-11-16 ASSESSMENT — ENCOUNTER SYMPTOMS
VOMITING: 0
BACK PAIN: 0
ABDOMINAL PAIN: 0
CHEST TIGHTNESS: 0
CONSTIPATION: 0
DIARRHEA: 0
COUGH: 0
NAUSEA: 0
COLOR CHANGE: 0
RHINORRHEA: 0
EYE REDNESS: 0
STRIDOR: 0
SHORTNESS OF BREATH: 0
WHEEZING: 0
EYE PAIN: 0

## 2018-11-16 ASSESSMENT — PAIN SCALES - GENERAL: PAINLEVEL_OUTOF10: 8

## 2018-11-16 NOTE — ED NOTES
Patient's son came in shortly after the patient arrived in the ER and said her psychiatrist has been trying to get the patient stabilized on her psych meds but the patient hd to come off her lithium because of her kidney function. According to the son- the patient has been acting impulsively and talking strangely and he believes she is headed towards manpreet. The patient's son wants to talk to the patient about getting admitted to the psych unit to get stabilized on her medications. Patient was asked her thoughts on going to the psych unit and she said she would rather go to retirement and that her family is using coersion to control her because she wouldn't \"give her  a blow job\". Patient does not want to be discharged home because she said she doesn't feel safe, her  wants her insurance money and he is not giving her her medications- patient is now requesting to go to [facility] psych unit. Patient's son called her psychiatrist who made arrangements for the patient to go to [facility]. [facility] called to say that the patient needs a screener to make the recommendation for the patient to go to [facility]. Medical tests were then ordered to facilitate the patient going to [facility]. Jimy Bowser called and will come to the patient's bedside to speak with her and help screen for admission.  They said that if the patient decides to go to [facility] and they will accept her in the meantime- we need to call them back         HARMAN Pizarro - NANCY  11/16/18 2011

## 2018-11-16 NOTE — TELEPHONE ENCOUNTER
Pt called in saying he's having bilateral foot pain/numbness and discoloration with occ open wounds. Pt also says he's now having pain bilateral legs below the knees. Pt advised to go to the ER. Pt verbalized understanding and was agreeable.     Maryan Boone MA

## 2018-11-16 NOTE — ED PROVIDER NOTES
Diagnoses or Management Options  Diabetic peripheral neuropathy [facility]):   Diagnosis management comments: Patient's venous ultrasound was negative. This is likely diabetic neuropathy. Patient will be discharged home at this time with instructions to follow up with primary care doctor. Patient was understanding of dependence. Plan this time. Labs      Radiology      EKG Interpretation.            --------------------------------------------- PAST HISTORY ---------------------------------------------  Past Medical History:  has a past medical history of Arthritis; Atrial fibrillation ([facility] [address] Burning pain; Degenerative lumbar disc; Diabetes ([facility] [address] Herniated cervical disc; History of echocardiogram; Hyperlipidemia; Hypertension; and Urinary retention. Past Surgical History:  has a past surgical history that includes colectomy (2010 sigmoid); Hemorrhoid surgery (2008); Prostate surgery (2017); Cataract removal (2013); Colonoscopy (5/2018 [facility]); Nerve Block (Bilateral, 10 12 2015); Nerve Block (Bilateral, 10/22/15); Nerve Block (Bilateral, 11 02 2015); Nerve Block (Left, 12/2/15); Nerve Block (Left, 12 16 15); Nerve Block (Left, 12 28 2015); Nerve Block (Left, 1 20 16); ablation of dysrhythmic focus (1980); lumbar fusion (03/06/2017); Nasal sinus surgery (12/07/2017); and Upper gastrointestinal endoscopy (5/2018 [facility]). Social History:  reports that he quit smoking about 41 years ago. He smoked 0.00 packs per day for 0.00 years. He has quit using smokeless tobacco. He reports that he does not drink alcohol or use drugs. Family History: family history includes Cancer in his brother; Diabetes in an other family member; Hypertension in an other family member; Kidney Disease in his brother and sister; Other in his father; Stroke in his mother. The patients home medications have been reviewed.     Allergies: Oxycodone; Ciprofloxacin hcl; Gabapentin; Hydrocodone; Norco [hydrocodone-acetaminophen];

## 2018-11-20 ENCOUNTER — HOSPITAL ENCOUNTER (OUTPATIENT)
Age: 73
Discharge: HOME | End: 2018-11-22
Payer: MEDICARE

## 2018-11-20 ENCOUNTER — OFFICE VISIT (OUTPATIENT)
Dept: PAIN MANAGEMENT | Age: 73
End: 2018-11-20
Payer: MEDICARE

## 2018-11-20 VITALS
BODY MASS INDEX: 30.07 KG/M2 | HEART RATE: 70 BPM | HEIGHT: 69 IN | WEIGHT: 203 LBS | SYSTOLIC BLOOD PRESSURE: 138 MMHG | RESPIRATION RATE: 18 BRPM | DIASTOLIC BLOOD PRESSURE: 78 MMHG | TEMPERATURE: 98.6 F | OXYGEN SATURATION: 94 %

## 2018-11-20 DIAGNOSIS — E11.9 TYPE 2 DIABETES MELLITUS WITHOUT COMPLICATION, WITHOUT LONG-TERM CURRENT USE OF INSULIN (HCC): ICD-10-CM

## 2018-11-20 DIAGNOSIS — G89.4 CHRONIC PAIN SYNDROME: Primary | ICD-10-CM

## 2018-11-20 DIAGNOSIS — E55.9 VITAMIN D DEFICIENCY: ICD-10-CM

## 2018-11-20 DIAGNOSIS — M50.90 CERVICAL DISC DISORDER: ICD-10-CM

## 2018-11-20 DIAGNOSIS — M54.12 CERVICAL RADICULOPATHY: ICD-10-CM

## 2018-11-20 DIAGNOSIS — E11.9 TYPE 2 DIABETES MELLITUS WITHOUT COMPLICATION, WITHOUT LONG-TERM CURRENT USE OF INSULIN (HCC): Primary | ICD-10-CM

## 2018-11-20 LAB
ALBUMIN SERPL-MCNC: 4.7 G/DL (ref 3.5–5.2)
ALP BLD-CCNC: 94 U/L (ref 40–129)
ALT SERPL-CCNC: 42 U/L (ref 0–40)
ANION GAP SERPL CALCULATED.3IONS-SCNC: 13 MMOL/L (ref 7–16)
AST SERPL-CCNC: 42 U/L (ref 0–39)
BASOPHILS ABSOLUTE: 0.04 E9/L (ref 0–0.2)
BASOPHILS RELATIVE PERCENT: 0.5 % (ref 0–2)
BILIRUB SERPL-MCNC: 0.4 MG/DL (ref 0–1.2)
BUN BLDV-MCNC: 15 MG/DL (ref 8–23)
CALCIUM SERPL-MCNC: 9.6 MG/DL (ref 8.6–10.2)
CHLORIDE BLD-SCNC: 101 MMOL/L (ref 98–107)
CHOLESTEROL, TOTAL: 125 MG/DL (ref 0–199)
CO2: 22 MMOL/L (ref 22–29)
CREAT SERPL-MCNC: 1.2 MG/DL (ref 0.7–1.2)
EOSINOPHILS ABSOLUTE: 0.26 E9/L (ref 0.05–0.5)
EOSINOPHILS RELATIVE PERCENT: 3.4 % (ref 0–6)
FOLATE: >20 NG/ML (ref 4.8–24.2)
GFR AFRICAN AMERICAN: >60
GFR NON-AFRICAN AMERICAN: 59 ML/MIN/1.73
GLUCOSE BLD-MCNC: 90 MG/DL (ref 74–99)
HBA1C MFR BLD: 7.3 % (ref 4–5.6)
HCT VFR BLD CALC: 51 % (ref 37–54)
HDLC SERPL-MCNC: 40 MG/DL
HEMOGLOBIN: 17 G/DL (ref 12.5–16.5)
IMMATURE GRANULOCYTES #: 0.02 E9/L
IMMATURE GRANULOCYTES %: 0.3 % (ref 0–5)
LDL CHOLESTEROL CALCULATED: 53 MG/DL (ref 0–99)
LYMPHOCYTES ABSOLUTE: 2.97 E9/L (ref 1.5–4)
LYMPHOCYTES RELATIVE PERCENT: 39.2 % (ref 20–42)
MCH RBC QN AUTO: 30.4 PG (ref 26–35)
MCHC RBC AUTO-ENTMCNC: 33.3 % (ref 32–34.5)
MCV RBC AUTO: 91.1 FL (ref 80–99.9)
MICROALBUMIN UR-MCNC: <12 MG/L
MONOCYTES ABSOLUTE: 0.57 E9/L (ref 0.1–0.95)
MONOCYTES RELATIVE PERCENT: 7.5 % (ref 2–12)
NEUTROPHILS ABSOLUTE: 3.71 E9/L (ref 1.8–7.3)
NEUTROPHILS RELATIVE PERCENT: 49.1 % (ref 43–80)
PDW BLD-RTO: 13.2 FL (ref 11.5–15)
PLATELET # BLD: 228 E9/L (ref 130–450)
PMV BLD AUTO: 10.7 FL (ref 7–12)
POTASSIUM SERPL-SCNC: 4.5 MMOL/L (ref 3.5–5)
PROSTATE SPECIFIC ANTIGEN: 1.2 NG/ML (ref 0–4)
RBC # BLD: 5.6 E12/L (ref 3.8–5.8)
SODIUM BLD-SCNC: 136 MMOL/L (ref 132–146)
TOTAL PROTEIN: 7.9 G/DL (ref 6.4–8.3)
TRIGL SERPL-MCNC: 159 MG/DL (ref 0–149)
TSH SERPL DL<=0.05 MIU/L-ACNC: 3.25 UIU/ML (ref 0.27–4.2)
VITAMIN B-12: 544 PG/ML (ref 211–946)
VITAMIN D 25-HYDROXY: 26 NG/ML (ref 30–100)
VLDLC SERPL CALC-MCNC: 32 MG/DL
WBC # BLD: 7.6 E9/L (ref 4.5–11.5)

## 2018-11-20 PROCEDURE — 99203 OFFICE O/P NEW LOW 30 MIN: CPT | Performed by: PAIN MEDICINE

## 2018-11-20 PROCEDURE — 82607 VITAMIN B-12: CPT

## 2018-11-20 PROCEDURE — 80061 LIPID PANEL: CPT

## 2018-11-20 PROCEDURE — 85025 COMPLETE CBC W/AUTO DIFF WBC: CPT

## 2018-11-20 PROCEDURE — 99204 OFFICE O/P NEW MOD 45 MIN: CPT | Performed by: PAIN MEDICINE

## 2018-11-20 PROCEDURE — 84443 ASSAY THYROID STIM HORMONE: CPT

## 2018-11-20 PROCEDURE — 83036 HEMOGLOBIN GLYCOSYLATED A1C: CPT

## 2018-11-20 PROCEDURE — 82746 ASSAY OF FOLIC ACID SERUM: CPT

## 2018-11-20 PROCEDURE — 82044 UR ALBUMIN SEMIQUANTITATIVE: CPT

## 2018-11-20 PROCEDURE — 36415 COLL VENOUS BLD VENIPUNCTURE: CPT

## 2018-11-20 PROCEDURE — G0103 PSA SCREENING: HCPCS

## 2018-11-20 PROCEDURE — 80053 COMPREHEN METABOLIC PANEL: CPT

## 2018-11-20 PROCEDURE — 82306 VITAMIN D 25 HYDROXY: CPT

## 2018-11-20 NOTE — PROGRESS NOTES
[address]        [address] [facility], [address] [facility], [address] [place] [address]      Patient:  SANYA Elkins 1945    Date of Service:  18    Requesting Physician:  Bobby Lauren DO    Reason for Consult:      Patient presents with complaints of bilateral neck pain radiating into the LUE that started 7 years ago    HISTORY OF PRESENT ILLNESS:      Pain is constant with varying degress of intensity and is described as steady, intense. Pain does radiate to left arm. He  does not have numbness, tingling, weakness of the left arm and does not have bladder or bowel dysfunction. Alleviating factors include: rest.  Aggravating factors include:  nothing. He has been on anticoagulation medications to include ASA 81 mg and has not been on herbal supplements. He is diabetic. Imaging:   Cervical MRI 2015 -   At C3-C4, there is a moderate degenerative disc osteophyte complex   with spurring, greater on the left with severe left and moderate   to severe right foraminal stenosis. There is moderate central   canal stenosis measuring 6 mm in AP dimension.        At C4-C5 degenerative disc osteophyte complex, greater on the   right with moderate right and mild left foraminal stenosis with   mild AP canal stenosis measuring 9 mm.       At C5-C6, mild degenerative bulging and small protrusion noted   flattening the ventral thecal sac. There is minimal central canal   stenosis without foraminal stenosis. Previous treatments: Physical Therapy years ago and medications.       Past Medical History:   Diagnosis Date    Arthritis     Atrial fibrillation (HCC)     Burning pain     Degenerative lumbar disc     Diabetes ([address]     Herniated cervical disc     History of echocardiogram 2017    EF 60-65%    Hyperlipidemia     Hypertension     Urinary retention 3/17/2017       Past Surgical History:   Procedure Laterality Date    ABLATION OF

## 2018-11-20 NOTE — PROGRESS NOTES
Promise Velazquez presents to the [address] on 11/20/2018. Lulu Dutta is complaining of pain in the neck   . The pain is constant. The pain is described as aching. Pain is rated today at a 7 on the VAS scale. He took his last dose of tramadol (Ultram)  on a few days 6-7 days . OTC if needed for pain sometimes.   He has been on anticoagulation medications to include ASA and is managed by .Mabelene Check, DO.      /78   Pulse 70   Temp 98.6 °F (37 °C)   Resp 18   Ht 5' 9\" (1.753 m)   Wt 203 lb (92.1 kg)   SpO2 94%   BMI 29.98 kg/m²

## 2018-11-27 ENCOUNTER — CARE COORDINATION (OUTPATIENT)
Dept: FAMILY MEDICINE CLINIC | Age: 73
End: 2018-11-27

## 2018-11-28 ENCOUNTER — HOSPITAL ENCOUNTER (OUTPATIENT)
Dept: MRI IMAGING | Age: 73
Discharge: HOME | End: 2018-11-30
Payer: MEDICARE

## 2018-11-28 DIAGNOSIS — G89.4 CHRONIC PAIN SYNDROME: ICD-10-CM

## 2018-11-28 DIAGNOSIS — M54.12 CERVICAL RADICULOPATHY: ICD-10-CM

## 2018-11-28 DIAGNOSIS — M50.90 CERVICAL DISC DISORDER: ICD-10-CM

## 2018-11-28 PROCEDURE — 72141 MRI NECK SPINE W/O DYE: CPT

## 2018-11-29 ENCOUNTER — OFFICE VISIT (OUTPATIENT)
Dept: FAMILY MEDICINE CLINIC | Age: 73
End: 2018-11-29
Payer: MEDICARE

## 2018-11-29 VITALS
WEIGHT: 201 LBS | DIASTOLIC BLOOD PRESSURE: 80 MMHG | BODY MASS INDEX: 29.68 KG/M2 | OXYGEN SATURATION: 97 % | SYSTOLIC BLOOD PRESSURE: 132 MMHG | HEART RATE: 74 BPM

## 2018-11-29 DIAGNOSIS — E11.42 TYPE 2 DIABETES MELLITUS WITH DIABETIC POLYNEUROPATHY, WITHOUT LONG-TERM CURRENT USE OF INSULIN (HCC): Primary | ICD-10-CM

## 2018-11-29 PROBLEM — K62.5 RECTAL BLEEDING: Status: RESOLVED | Noted: 2018-11-16 | Resolved: 2018-11-29

## 2018-11-29 PROCEDURE — 1123F ACP DISCUSS/DSCN MKR DOCD: CPT | Performed by: FAMILY MEDICINE

## 2018-11-29 PROCEDURE — 4040F PNEUMOC VAC/ADMIN/RCVD: CPT | Performed by: FAMILY MEDICINE

## 2018-11-29 PROCEDURE — 2022F DILAT RTA XM EVC RTNOPTHY: CPT | Performed by: FAMILY MEDICINE

## 2018-11-29 PROCEDURE — 3017F COLORECTAL CA SCREEN DOC REV: CPT | Performed by: FAMILY MEDICINE

## 2018-11-29 PROCEDURE — G8484 FLU IMMUNIZE NO ADMIN: HCPCS | Performed by: FAMILY MEDICINE

## 2018-11-29 PROCEDURE — 3045F PR MOST RECENT HEMOGLOBIN A1C LEVEL 7.0-9.0%: CPT | Performed by: FAMILY MEDICINE

## 2018-11-29 PROCEDURE — 1036F TOBACCO NON-USER: CPT | Performed by: FAMILY MEDICINE

## 2018-11-29 PROCEDURE — G8417 CALC BMI ABV UP PARAM F/U: HCPCS | Performed by: FAMILY MEDICINE

## 2018-11-29 PROCEDURE — 99213 OFFICE O/P EST LOW 20 MIN: CPT | Performed by: FAMILY MEDICINE

## 2018-11-29 PROCEDURE — 1101F PT FALLS ASSESS-DOCD LE1/YR: CPT | Performed by: FAMILY MEDICINE

## 2018-11-29 PROCEDURE — G8427 DOCREV CUR MEDS BY ELIG CLIN: HCPCS | Performed by: FAMILY MEDICINE

## 2018-11-29 RX ORDER — DULOXETIN HYDROCHLORIDE 30 MG/1
30 CAPSULE, DELAYED RELEASE ORAL DAILY
Qty: 30 CAPSULE | Refills: 5 | Status: SHIPPED | OUTPATIENT
Start: 2018-11-29 | End: 2019-01-10 | Stop reason: SDUPTHER

## 2018-11-29 RX ORDER — GLIMEPIRIDE 2 MG/1
2 TABLET ORAL 2 TIMES DAILY
Qty: 60 TABLET | Refills: 5 | Status: SHIPPED | OUTPATIENT
Start: 2018-11-29 | End: 2019-06-03

## 2018-11-29 RX ORDER — FOLIC ACID 1 MG/1
1 TABLET ORAL DAILY
Qty: 30 TABLET | Refills: 5 | Status: SHIPPED | OUTPATIENT
Start: 2018-11-29 | End: 2019-07-16 | Stop reason: SDUPTHER

## 2018-11-29 ASSESSMENT — PATIENT HEALTH QUESTIONNAIRE - PHQ9
SUM OF ALL RESPONSES TO PHQ QUESTIONS 1-9: 0
SUM OF ALL RESPONSES TO PHQ9 QUESTIONS 1 & 2: 0
1. LITTLE INTEREST OR PLEASURE IN DOING THINGS: 0
2. FEELING DOWN, DEPRESSED OR HOPELESS: 0
SUM OF ALL RESPONSES TO PHQ QUESTIONS 1-9: 0

## 2018-12-01 PROBLEM — R19.7 DIARRHEA OF PRESUMED INFECTIOUS ORIGIN: Status: RESOLVED | Noted: 2018-06-22 | Resolved: 2018-12-01

## 2018-12-01 ASSESSMENT — ENCOUNTER SYMPTOMS
CONSTIPATION: 0
BLOOD IN STOOL: 0
VOICE CHANGE: 0
EYE REDNESS: 0
EYE ITCHING: 0
COLOR CHANGE: 0
ABDOMINAL DISTENTION: 0
PHOTOPHOBIA: 0
RECTAL PAIN: 0
RHINORRHEA: 0
FACIAL SWELLING: 0
SHORTNESS OF BREATH: 0
APNEA: 0
ABDOMINAL PAIN: 0
WHEEZING: 0
NAUSEA: 0
ANAL BLEEDING: 0
VOMITING: 0
COUGH: 0
TROUBLE SWALLOWING: 0
DIARRHEA: 0
BACK PAIN: 0
SINUS PRESSURE: 0
SORE THROAT: 0
EYE DISCHARGE: 0
CHEST TIGHTNESS: 0
EYE PAIN: 0
STRIDOR: 0
CHOKING: 0

## 2018-12-04 ENCOUNTER — TELEPHONE (OUTPATIENT)
Dept: PAIN MANAGEMENT | Age: 73
End: 2018-12-04

## 2018-12-04 ENCOUNTER — PREP FOR PROCEDURE (OUTPATIENT)
Dept: PAIN MANAGEMENT | Age: 73
End: 2018-12-04

## 2018-12-11 ENCOUNTER — HOSPITAL ENCOUNTER (OUTPATIENT)
Dept: OPERATING ROOM | Age: 73
Setting detail: OUTPATIENT SURGERY
Discharge: HOME | End: 2018-12-11
Attending: PAIN MEDICINE
Payer: MEDICARE

## 2018-12-11 ENCOUNTER — HOSPITAL ENCOUNTER (OUTPATIENT)
Age: 73
Setting detail: OUTPATIENT SURGERY
Discharge: HOME | End: 2018-12-11
Attending: PAIN MEDICINE | Admitting: PAIN MEDICINE
Payer: MEDICARE

## 2018-12-11 VITALS
TEMPERATURE: 98 F | OXYGEN SATURATION: 98 % | RESPIRATION RATE: 14 BRPM | DIASTOLIC BLOOD PRESSURE: 71 MMHG | HEART RATE: 64 BPM | SYSTOLIC BLOOD PRESSURE: 159 MMHG

## 2018-12-11 DIAGNOSIS — M54.12 RADICULOPATHY, CERVICAL: ICD-10-CM

## 2018-12-11 PROCEDURE — 62321 NJX INTERLAMINAR CRV/THRC: CPT | Performed by: PAIN MEDICINE

## 2018-12-11 PROCEDURE — 2500000003 HC RX 250 WO HCPCS: Performed by: PAIN MEDICINE

## 2018-12-11 PROCEDURE — 3600000005 HC SURGERY LEVEL 5 BASE: Performed by: PAIN MEDICINE

## 2018-12-11 PROCEDURE — 6360000004 HC RX CONTRAST MEDICATION: Performed by: PAIN MEDICINE

## 2018-12-11 PROCEDURE — 3209999900 FLUORO FOR SURGICAL PROCEDURES

## 2018-12-11 PROCEDURE — 2580000003 HC RX 258: Performed by: PAIN MEDICINE

## 2018-12-11 PROCEDURE — 2709999900 HC NON-CHARGEABLE SUPPLY: Performed by: PAIN MEDICINE

## 2018-12-11 PROCEDURE — 7100000011 HC PHASE II RECOVERY - ADDTL 15 MIN: Performed by: PAIN MEDICINE

## 2018-12-11 PROCEDURE — 6360000002 HC RX W HCPCS: Performed by: PAIN MEDICINE

## 2018-12-11 PROCEDURE — 7100000010 HC PHASE II RECOVERY - FIRST 15 MIN: Performed by: PAIN MEDICINE

## 2018-12-11 RX ORDER — LIDOCAINE HYDROCHLORIDE 5 MG/ML
INJECTION, SOLUTION INFILTRATION; INTRAVENOUS PRN
Status: DISCONTINUED | OUTPATIENT
Start: 2018-12-11 | End: 2018-12-11 | Stop reason: HOSPADM

## 2018-12-11 ASSESSMENT — PAIN - FUNCTIONAL ASSESSMENT: PAIN_FUNCTIONAL_ASSESSMENT: 0-10

## 2018-12-11 ASSESSMENT — PAIN SCALES - GENERAL
PAINLEVEL_OUTOF10: 0
PAINLEVEL_OUTOF10: 0

## 2018-12-11 ASSESSMENT — PAIN DESCRIPTION - DESCRIPTORS: DESCRIPTORS: ACHING;DISCOMFORT

## 2018-12-11 NOTE — OP NOTE
the epidural space , negative aspiration for blood and CSF was confirmed . Needle tip placement was confirmed by visualizing epidural spread of 0.5 ml of omnipaque 240 visualized in both AP and lateral live fluoroscopic views. Then after negative aspiration, a solution of PF lidocaine, 2 ml, and 40 mg DepoMedrol was easily injected. The needle was gently removed intact. The patient neck was cleaned and a Band-Aid was placed over the needle insertion point. Disposition the patient tolerated the procedure well and there were no complications . Vital signs remained stable throughout the procedure. The patient was escorted to the recovery area where they remained until discharge and written discharge instructions for the procedure were given. Plan: Julio Roca will return to our pain management center as scheduled.      Julia Tipton, DO

## 2018-12-11 NOTE — H&P
distress, and appears stated age    EYES: PERRLA, EOMI    LUNGS:  No increased work of breathing, no audible wheezing    CARDIOVASCULAR:  regular rate and rhythm    ABDOMEN:  Soft non tender non distended     EXTREMITIES: no signs of clubbing or cyanosis. MUSCULOSKELETAL: negative for flaccid muscle tone or spastic movements. SKIN: gross examination reveals no signs of rashes, or diaphoresis. NEURO: Cranial nerves II-XII grossly intact. No signs of agitated mood.        Assessment/Plan:    Left cervical and LUE pain for C7-T1 MARIXA #1

## 2018-12-18 ENCOUNTER — OFFICE VISIT (OUTPATIENT)
Dept: PAIN MANAGEMENT | Age: 73
End: 2018-12-18
Payer: MEDICARE

## 2018-12-18 ENCOUNTER — PREP FOR PROCEDURE (OUTPATIENT)
Dept: PAIN MANAGEMENT | Age: 73
End: 2018-12-18

## 2018-12-18 ENCOUNTER — TELEPHONE (OUTPATIENT)
Dept: PAIN MANAGEMENT | Age: 73
End: 2018-12-18

## 2018-12-18 VITALS
SYSTOLIC BLOOD PRESSURE: 150 MMHG | RESPIRATION RATE: 18 BRPM | WEIGHT: 200 LBS | BODY MASS INDEX: 29.53 KG/M2 | HEART RATE: 72 BPM | DIASTOLIC BLOOD PRESSURE: 70 MMHG | TEMPERATURE: 98.4 F

## 2018-12-18 DIAGNOSIS — M54.12 CERVICAL RADICULOPATHY: Chronic | ICD-10-CM

## 2018-12-18 DIAGNOSIS — M50.90 CERVICAL DISC DISORDER: Chronic | ICD-10-CM

## 2018-12-18 DIAGNOSIS — G89.4 CHRONIC PAIN SYNDROME: Primary | ICD-10-CM

## 2018-12-18 DIAGNOSIS — M54.12 CERVICAL RADICULOPATHY: Primary | Chronic | ICD-10-CM

## 2018-12-18 PROCEDURE — 99213 OFFICE O/P EST LOW 20 MIN: CPT | Performed by: PAIN MEDICINE

## 2018-12-18 NOTE — TELEPHONE ENCOUNTER
12-18-18-while Trey Baptiste was in for his appointment with Dr Quincy Beth, spoke to him about his aspirin, he will stop it 1-1-19. Pt shows an understanding to not take it before his procedure.     Ally Enriquez RN  [facility]

## 2019-01-08 ENCOUNTER — HOSPITAL ENCOUNTER (OUTPATIENT)
Dept: OPERATING ROOM | Age: 74
Setting detail: OUTPATIENT SURGERY
Discharge: HOME | End: 2019-01-08
Attending: PAIN MEDICINE
Payer: MEDICARE

## 2019-01-08 ENCOUNTER — HOSPITAL ENCOUNTER (OUTPATIENT)
Age: 74
Setting detail: OUTPATIENT SURGERY
Discharge: HOME | End: 2019-01-08
Attending: PAIN MEDICINE | Admitting: PAIN MEDICINE
Payer: MEDICARE

## 2019-01-08 VITALS
DIASTOLIC BLOOD PRESSURE: 57 MMHG | OXYGEN SATURATION: 98 % | HEART RATE: 60 BPM | RESPIRATION RATE: 14 BRPM | SYSTOLIC BLOOD PRESSURE: 136 MMHG | TEMPERATURE: 98.6 F

## 2019-01-08 DIAGNOSIS — M54.12 RADICULOPATHY, CERVICAL: ICD-10-CM

## 2019-01-08 PROCEDURE — 2709999900 HC NON-CHARGEABLE SUPPLY: Performed by: PAIN MEDICINE

## 2019-01-08 PROCEDURE — 2500000003 HC RX 250 WO HCPCS: Performed by: PAIN MEDICINE

## 2019-01-08 PROCEDURE — 6360000002 HC RX W HCPCS: Performed by: PAIN MEDICINE

## 2019-01-08 PROCEDURE — 3600000005 HC SURGERY LEVEL 5 BASE: Performed by: PAIN MEDICINE

## 2019-01-08 PROCEDURE — 6360000004 HC RX CONTRAST MEDICATION: Performed by: PAIN MEDICINE

## 2019-01-08 PROCEDURE — 3209999900 FLUORO FOR SURGICAL PROCEDURES

## 2019-01-08 PROCEDURE — 62321 NJX INTERLAMINAR CRV/THRC: CPT | Performed by: PAIN MEDICINE

## 2019-01-08 PROCEDURE — 2580000003 HC RX 258: Performed by: PAIN MEDICINE

## 2019-01-08 PROCEDURE — 7100000010 HC PHASE II RECOVERY - FIRST 15 MIN: Performed by: PAIN MEDICINE

## 2019-01-08 RX ORDER — LIDOCAINE HYDROCHLORIDE 5 MG/ML
INJECTION, SOLUTION INFILTRATION; INTRAVENOUS PRN
Status: DISCONTINUED | OUTPATIENT
Start: 2019-01-08 | End: 2019-01-08 | Stop reason: HOSPADM

## 2019-01-08 ASSESSMENT — PAIN DESCRIPTION - DESCRIPTORS: DESCRIPTORS: ACHING

## 2019-01-08 ASSESSMENT — PAIN - FUNCTIONAL ASSESSMENT: PAIN_FUNCTIONAL_ASSESSMENT: 0-10

## 2019-01-08 ASSESSMENT — PAIN SCALES - GENERAL
PAINLEVEL_OUTOF10: 0
PAINLEVEL_OUTOF10: 0

## 2019-01-10 ENCOUNTER — OFFICE VISIT (OUTPATIENT)
Dept: FAMILY MEDICINE CLINIC | Age: 74
End: 2019-01-10
Payer: MEDICARE

## 2019-01-10 VITALS
DIASTOLIC BLOOD PRESSURE: 78 MMHG | HEART RATE: 65 BPM | BODY MASS INDEX: 30.07 KG/M2 | OXYGEN SATURATION: 95 % | HEIGHT: 69 IN | RESPIRATION RATE: 18 BRPM | SYSTOLIC BLOOD PRESSURE: 122 MMHG | WEIGHT: 203 LBS

## 2019-01-10 DIAGNOSIS — E11.42 TYPE 2 DIABETES MELLITUS WITH DIABETIC POLYNEUROPATHY, WITHOUT LONG-TERM CURRENT USE OF INSULIN (HCC): ICD-10-CM

## 2019-01-10 LAB — HBA1C MFR BLD: 6.3 %

## 2019-01-10 PROCEDURE — 99214 OFFICE O/P EST MOD 30 MIN: CPT | Performed by: FAMILY MEDICINE

## 2019-01-10 PROCEDURE — 83036 HEMOGLOBIN GLYCOSYLATED A1C: CPT | Performed by: FAMILY MEDICINE

## 2019-01-10 PROCEDURE — 3017F COLORECTAL CA SCREEN DOC REV: CPT | Performed by: FAMILY MEDICINE

## 2019-01-10 PROCEDURE — 1101F PT FALLS ASSESS-DOCD LE1/YR: CPT | Performed by: FAMILY MEDICINE

## 2019-01-10 PROCEDURE — 4040F PNEUMOC VAC/ADMIN/RCVD: CPT | Performed by: FAMILY MEDICINE

## 2019-01-10 PROCEDURE — 2022F DILAT RTA XM EVC RTNOPTHY: CPT | Performed by: FAMILY MEDICINE

## 2019-01-10 PROCEDURE — 1036F TOBACCO NON-USER: CPT | Performed by: FAMILY MEDICINE

## 2019-01-10 PROCEDURE — 1123F ACP DISCUSS/DSCN MKR DOCD: CPT | Performed by: FAMILY MEDICINE

## 2019-01-10 PROCEDURE — G8484 FLU IMMUNIZE NO ADMIN: HCPCS | Performed by: FAMILY MEDICINE

## 2019-01-10 PROCEDURE — 3044F HG A1C LEVEL LT 7.0%: CPT | Performed by: FAMILY MEDICINE

## 2019-01-10 PROCEDURE — G8427 DOCREV CUR MEDS BY ELIG CLIN: HCPCS | Performed by: FAMILY MEDICINE

## 2019-01-10 PROCEDURE — G8417 CALC BMI ABV UP PARAM F/U: HCPCS | Performed by: FAMILY MEDICINE

## 2019-01-10 RX ORDER — DULOXETIN HYDROCHLORIDE 60 MG/1
60 CAPSULE, DELAYED RELEASE ORAL DAILY
Qty: 30 CAPSULE | Refills: 5 | Status: SHIPPED | OUTPATIENT
Start: 2019-01-10 | End: 2019-06-03

## 2019-01-10 ASSESSMENT — PATIENT HEALTH QUESTIONNAIRE - PHQ9
SUM OF ALL RESPONSES TO PHQ QUESTIONS 1-9: 0
SUM OF ALL RESPONSES TO PHQ QUESTIONS 1-9: 0
SUM OF ALL RESPONSES TO PHQ9 QUESTIONS 1 & 2: 0
1. LITTLE INTEREST OR PLEASURE IN DOING THINGS: 0
SUM OF ALL RESPONSES TO PHQ QUESTIONS 1-9: 0
1. LITTLE INTEREST OR PLEASURE IN DOING THINGS: 0
2. FEELING DOWN, DEPRESSED OR HOPELESS: 0
SUM OF ALL RESPONSES TO PHQ9 QUESTIONS 1 & 2: 0
SUM OF ALL RESPONSES TO PHQ QUESTIONS 1-9: 0
2. FEELING DOWN, DEPRESSED OR HOPELESS: 0

## 2019-01-12 ASSESSMENT — ENCOUNTER SYMPTOMS
CONSTIPATION: 0
SORE THROAT: 0
EYE ITCHING: 0
FACIAL SWELLING: 0
PHOTOPHOBIA: 0
EYE PAIN: 0
CHEST TIGHTNESS: 0
VOICE CHANGE: 0
BACK PAIN: 0
EYE REDNESS: 0
ABDOMINAL DISTENTION: 0
RHINORRHEA: 0
APNEA: 0
COLOR CHANGE: 0
TROUBLE SWALLOWING: 0
SHORTNESS OF BREATH: 0
COUGH: 0
SINUS PRESSURE: 0
BLOOD IN STOOL: 0
WHEEZING: 0
ANAL BLEEDING: 0
ABDOMINAL PAIN: 0
VOMITING: 0
EYE DISCHARGE: 0
NAUSEA: 0
CHOKING: 0
RECTAL PAIN: 0
STRIDOR: 0
DIARRHEA: 0

## 2019-01-23 ENCOUNTER — PREP FOR PROCEDURE (OUTPATIENT)
Dept: PAIN MANAGEMENT | Age: 74
End: 2019-01-23

## 2019-01-23 ENCOUNTER — OFFICE VISIT (OUTPATIENT)
Dept: PAIN MANAGEMENT | Age: 74
End: 2019-01-23
Payer: MEDICARE

## 2019-01-23 VITALS
OXYGEN SATURATION: 96 % | RESPIRATION RATE: 18 BRPM | DIASTOLIC BLOOD PRESSURE: 72 MMHG | BODY MASS INDEX: 29.47 KG/M2 | HEART RATE: 84 BPM | TEMPERATURE: 98.4 F | WEIGHT: 199 LBS | SYSTOLIC BLOOD PRESSURE: 120 MMHG | HEIGHT: 69 IN

## 2019-01-23 DIAGNOSIS — M54.12 CERVICAL RADICULOPATHY: ICD-10-CM

## 2019-01-23 DIAGNOSIS — G89.4 CHRONIC PAIN SYNDROME: Primary | ICD-10-CM

## 2019-01-23 DIAGNOSIS — M54.12 CERVICAL RADICULOPATHY: Primary | Chronic | ICD-10-CM

## 2019-01-23 DIAGNOSIS — M50.90 CERVICAL DISC DISORDER: ICD-10-CM

## 2019-01-23 PROCEDURE — 1101F PT FALLS ASSESS-DOCD LE1/YR: CPT | Performed by: PAIN MEDICINE

## 2019-01-23 PROCEDURE — 3017F COLORECTAL CA SCREEN DOC REV: CPT | Performed by: PAIN MEDICINE

## 2019-01-23 PROCEDURE — G8484 FLU IMMUNIZE NO ADMIN: HCPCS | Performed by: PAIN MEDICINE

## 2019-01-23 PROCEDURE — 99213 OFFICE O/P EST LOW 20 MIN: CPT | Performed by: PAIN MEDICINE

## 2019-01-23 PROCEDURE — 4040F PNEUMOC VAC/ADMIN/RCVD: CPT | Performed by: PAIN MEDICINE

## 2019-01-23 PROCEDURE — G8427 DOCREV CUR MEDS BY ELIG CLIN: HCPCS | Performed by: PAIN MEDICINE

## 2019-01-23 PROCEDURE — 1036F TOBACCO NON-USER: CPT | Performed by: PAIN MEDICINE

## 2019-01-23 PROCEDURE — 1123F ACP DISCUSS/DSCN MKR DOCD: CPT | Performed by: PAIN MEDICINE

## 2019-01-23 PROCEDURE — G8417 CALC BMI ABV UP PARAM F/U: HCPCS | Performed by: PAIN MEDICINE

## 2019-02-08 ENCOUNTER — OFFICE VISIT (OUTPATIENT)
Dept: SURGERY | Age: 74
End: 2019-02-08
Payer: MEDICARE

## 2019-02-08 VITALS
BODY MASS INDEX: 29.47 KG/M2 | SYSTOLIC BLOOD PRESSURE: 157 MMHG | HEIGHT: 69 IN | HEART RATE: 60 BPM | DIASTOLIC BLOOD PRESSURE: 82 MMHG | WEIGHT: 199 LBS

## 2019-02-08 DIAGNOSIS — K57.92 DIVERTICULITIS: Primary | ICD-10-CM

## 2019-02-08 PROCEDURE — 99213 OFFICE O/P EST LOW 20 MIN: CPT | Performed by: SURGERY

## 2019-02-08 PROCEDURE — 4040F PNEUMOC VAC/ADMIN/RCVD: CPT | Performed by: SURGERY

## 2019-02-08 PROCEDURE — G8417 CALC BMI ABV UP PARAM F/U: HCPCS | Performed by: SURGERY

## 2019-02-08 PROCEDURE — 1101F PT FALLS ASSESS-DOCD LE1/YR: CPT | Performed by: SURGERY

## 2019-02-08 PROCEDURE — G8484 FLU IMMUNIZE NO ADMIN: HCPCS | Performed by: SURGERY

## 2019-02-08 PROCEDURE — 1123F ACP DISCUSS/DSCN MKR DOCD: CPT | Performed by: SURGERY

## 2019-02-08 PROCEDURE — 3017F COLORECTAL CA SCREEN DOC REV: CPT | Performed by: SURGERY

## 2019-02-08 PROCEDURE — 1036F TOBACCO NON-USER: CPT | Performed by: SURGERY

## 2019-02-08 PROCEDURE — G8427 DOCREV CUR MEDS BY ELIG CLIN: HCPCS | Performed by: SURGERY

## 2019-02-08 RX ORDER — GLYCOPYRROLATE 1 MG/1
1 TABLET ORAL 3 TIMES DAILY
Qty: 90 TABLET | Refills: 3 | Status: SHIPPED | OUTPATIENT
Start: 2019-02-08 | End: 2019-02-08 | Stop reason: SDUPTHER

## 2019-02-08 RX ORDER — GLYCOPYRROLATE 1 MG/1
1 TABLET ORAL 3 TIMES DAILY
Qty: 274 TABLET | Refills: 3 | Status: SHIPPED | OUTPATIENT
Start: 2019-02-08 | End: 2020-01-17 | Stop reason: SDUPTHER

## 2019-02-11 ENCOUNTER — TELEPHONE (OUTPATIENT)
Dept: FAMILY MEDICINE CLINIC | Age: 74
End: 2019-02-11

## 2019-02-11 DIAGNOSIS — I10 HYPERTENSION, UNSPECIFIED TYPE: Primary | ICD-10-CM

## 2019-02-11 RX ORDER — LOSARTAN POTASSIUM 100 MG/1
100 TABLET ORAL DAILY
Qty: 30 TABLET | Refills: 5 | Status: SHIPPED | OUTPATIENT
Start: 2019-02-11 | End: 2019-03-13

## 2019-03-13 ENCOUNTER — OFFICE VISIT (OUTPATIENT)
Dept: FAMILY MEDICINE CLINIC | Age: 74
End: 2019-03-13
Payer: MEDICARE

## 2019-03-13 VITALS
HEART RATE: 69 BPM | RESPIRATION RATE: 18 BRPM | DIASTOLIC BLOOD PRESSURE: 80 MMHG | WEIGHT: 197 LBS | HEIGHT: 69 IN | SYSTOLIC BLOOD PRESSURE: 132 MMHG | OXYGEN SATURATION: 96 % | BODY MASS INDEX: 29.18 KG/M2

## 2019-03-13 DIAGNOSIS — E78.2 MIXED HYPERLIPIDEMIA: ICD-10-CM

## 2019-03-13 DIAGNOSIS — M51.37 DEGENERATION OF LUMBAR OR LUMBOSACRAL INTERVERTEBRAL DISC: ICD-10-CM

## 2019-03-13 DIAGNOSIS — E55.9 VITAMIN D DEFICIENCY: ICD-10-CM

## 2019-03-13 DIAGNOSIS — L30.8 OTHER ECZEMA: ICD-10-CM

## 2019-03-13 DIAGNOSIS — E11.42 TYPE 2 DIABETES MELLITUS WITH DIABETIC POLYNEUROPATHY, WITHOUT LONG-TERM CURRENT USE OF INSULIN (HCC): Primary | ICD-10-CM

## 2019-03-13 LAB — HBA1C MFR BLD: 5.8 %

## 2019-03-13 PROCEDURE — 99214 OFFICE O/P EST MOD 30 MIN: CPT | Performed by: FAMILY MEDICINE

## 2019-03-13 PROCEDURE — G8427 DOCREV CUR MEDS BY ELIG CLIN: HCPCS | Performed by: FAMILY MEDICINE

## 2019-03-13 PROCEDURE — 2022F DILAT RTA XM EVC RTNOPTHY: CPT | Performed by: FAMILY MEDICINE

## 2019-03-13 PROCEDURE — 4040F PNEUMOC VAC/ADMIN/RCVD: CPT | Performed by: FAMILY MEDICINE

## 2019-03-13 PROCEDURE — 1036F TOBACCO NON-USER: CPT | Performed by: FAMILY MEDICINE

## 2019-03-13 PROCEDURE — G8417 CALC BMI ABV UP PARAM F/U: HCPCS | Performed by: FAMILY MEDICINE

## 2019-03-13 PROCEDURE — 3017F COLORECTAL CA SCREEN DOC REV: CPT | Performed by: FAMILY MEDICINE

## 2019-03-13 PROCEDURE — G8484 FLU IMMUNIZE NO ADMIN: HCPCS | Performed by: FAMILY MEDICINE

## 2019-03-13 PROCEDURE — 83036 HEMOGLOBIN GLYCOSYLATED A1C: CPT | Performed by: FAMILY MEDICINE

## 2019-03-13 PROCEDURE — 1101F PT FALLS ASSESS-DOCD LE1/YR: CPT | Performed by: FAMILY MEDICINE

## 2019-03-13 PROCEDURE — 1123F ACP DISCUSS/DSCN MKR DOCD: CPT | Performed by: FAMILY MEDICINE

## 2019-03-13 PROCEDURE — 3044F HG A1C LEVEL LT 7.0%: CPT | Performed by: FAMILY MEDICINE

## 2019-03-13 RX ORDER — AMMONIUM LACTATE 12 G/100G
LOTION TOPICAL
Qty: 500 G | Refills: 5 | Status: SHIPPED | OUTPATIENT
Start: 2019-03-13 | End: 2019-07-16 | Stop reason: SDUPTHER

## 2019-03-13 RX ORDER — TRAMADOL HYDROCHLORIDE 50 MG/1
100 TABLET ORAL EVERY 6 HOURS PRN
Qty: 120 TABLET | Refills: 5 | Status: CANCELLED | OUTPATIENT
Start: 2019-03-13

## 2019-03-13 ASSESSMENT — ENCOUNTER SYMPTOMS
APNEA: 0
DIARRHEA: 0
ANAL BLEEDING: 0
CHEST TIGHTNESS: 0
COUGH: 0
TROUBLE SWALLOWING: 0
CONSTIPATION: 0
RHINORRHEA: 0
WHEEZING: 0
ABDOMINAL DISTENTION: 0
COLOR CHANGE: 0
SHORTNESS OF BREATH: 0
BLOOD IN STOOL: 0
STRIDOR: 0
VOMITING: 0
SINUS PRESSURE: 0
CHOKING: 0
NAUSEA: 0
ABDOMINAL PAIN: 0
VOICE CHANGE: 0
FACIAL SWELLING: 0
SORE THROAT: 0
RECTAL PAIN: 0

## 2019-03-13 ASSESSMENT — PATIENT HEALTH QUESTIONNAIRE - PHQ9
2. FEELING DOWN, DEPRESSED OR HOPELESS: 0
SUM OF ALL RESPONSES TO PHQ QUESTIONS 1-9: 0
SUM OF ALL RESPONSES TO PHQ QUESTIONS 1-9: 0
SUM OF ALL RESPONSES TO PHQ9 QUESTIONS 1 & 2: 0
1. LITTLE INTEREST OR PLEASURE IN DOING THINGS: 0

## 2019-04-11 ENCOUNTER — TELEPHONE (OUTPATIENT)
Dept: FAMILY MEDICINE CLINIC | Age: 74
End: 2019-04-11

## 2019-04-11 NOTE — TELEPHONE ENCOUNTER
Patient called he is having pains in both of his knees and his son told him that he reach that his Simvastatin may cause joint pain. He is asking if he should go off for 2 weeks and see if this will help or do you want to do a medication change.     Pharmacy [facility]

## 2019-04-12 NOTE — TELEPHONE ENCOUNTER
Yes. Stop medication. Let it wash out of system for a few weeks.  If improves I will start him on alternate medication

## 2019-05-03 NOTE — TELEPHONE ENCOUNTER
Pt stopped in the office to let us know that his knees are feeling about 50-75% better since stopping the simvastatin on 4.12.19. Pt wondering if you'd like to restart him on an alternate (something generic because he doesn't have Rx coverage. Please advise.

## 2019-05-06 RX ORDER — LOVASTATIN 20 MG/1
20 TABLET ORAL NIGHTLY
Qty: 30 TABLET | Refills: 5 | Status: SHIPPED | OUTPATIENT
Start: 2019-05-06 | End: 2019-05-20 | Stop reason: SINTOL

## 2019-05-10 ENCOUNTER — OFFICE VISIT (OUTPATIENT)
Dept: PAIN MANAGEMENT | Age: 74
End: 2019-05-10
Payer: MEDICARE

## 2019-05-10 ENCOUNTER — PREP FOR PROCEDURE (OUTPATIENT)
Dept: PAIN MANAGEMENT | Age: 74
End: 2019-05-10

## 2019-05-10 VITALS
TEMPERATURE: 98.3 F | RESPIRATION RATE: 18 BRPM | OXYGEN SATURATION: 95 % | HEIGHT: 68 IN | DIASTOLIC BLOOD PRESSURE: 68 MMHG | SYSTOLIC BLOOD PRESSURE: 130 MMHG | HEART RATE: 62 BPM | BODY MASS INDEX: 31.22 KG/M2 | WEIGHT: 206 LBS

## 2019-05-10 DIAGNOSIS — M54.12 CERVICAL RADICULOPATHY: Primary | Chronic | ICD-10-CM

## 2019-05-10 DIAGNOSIS — M54.12 CERVICAL RADICULOPATHY: Primary | ICD-10-CM

## 2019-05-10 DIAGNOSIS — M50.30 DDD (DEGENERATIVE DISC DISEASE), CERVICAL: Chronic | ICD-10-CM

## 2019-05-10 DIAGNOSIS — M50.30 DDD (DEGENERATIVE DISC DISEASE), CERVICAL: ICD-10-CM

## 2019-05-10 DIAGNOSIS — M50.90 CERVICAL DISC DISORDER: Chronic | ICD-10-CM

## 2019-05-10 PROCEDURE — 99215 OFFICE O/P EST HI 40 MIN: CPT | Performed by: ANESTHESIOLOGY

## 2019-05-10 PROCEDURE — 99204 OFFICE O/P NEW MOD 45 MIN: CPT | Performed by: ANESTHESIOLOGY

## 2019-05-10 NOTE — PROGRESS NOTES
INJECTION #2 performed by Toño Isaacs DO at [address] [place] [address]  2008    LUMBAR FUSION  03/06/2017    NASAL SINUS SURGERY  12/07/2017    Submucosal Resection of Inferior Turbinate    NERVE BLOCK Bilateral 10 12 2015    bilateral sacroiliac joint injection #1    NERVE BLOCK Bilateral 10/22/15    sacroiliac joint #2    NERVE BLOCK Bilateral 11 02 2015    Sacroiliac joint bilateral #3    NERVE BLOCK Left 12/2/15    lumbar transforaminal #1    NERVE BLOCK Left 12 16 15    NERVE BLOCK Left 12 28 2015    transforaminal nerve block left lumbar #3    NERVE BLOCK Left 1 20 16    radiofrequency     NERVE BLOCK  12/11/2018    C7-T1 epidural    NERVE BLOCK N/A 01/08/2019    cervical epidural steroid injection    PROSTATE SURGERY  2017    UPPER GASTROINTESTINAL ENDOSCOPY  5/2018 [facility]       Prior to Admission medications    Medication Sig Start Date End Date Taking? Authorizing Provider   lovastatin (MEVACOR) 20 MG tablet Take 1 tablet by mouth nightly 5/6/19  Yes Grace Conner DO   ammonium lactate (LAC-HYDRIN) 12 % lotion Apply topically daily.  3/13/19  Yes Grace Conner DO   glycopyrrolate (ROBINUL) 1 MG tablet Take 1 tablet by mouth 3 times daily 2/8/19  Yes Jacqueline Ch MD   DULoxetine (CYMBALTA) 60 MG extended release capsule Take 1 capsule by mouth daily 1/10/19  Yes Grace Conner DO   folic acid (FOLVITE) 1 MG tablet Take 1 tablet by mouth daily 11/29/18  Yes Grace Conner DO   glimepiride (AMARYL) 2 MG tablet Take 1 tablet by mouth 2 times daily 11/29/18  Yes Grace Conner DO   Psyllium (METAMUCIL) 28.3 % POWD Take 1 each by mouth 4 times daily   Yes Historical Provider, MD   pantoprazole (PROTONIX) 40 MG tablet Take 1 tablet by mouth daily  Patient taking differently: Take 40 mg by mouth daily as needed (Upset Stomach)  9/19/18  Yes Grace Conner DO   cyclobenzaprine (FLEXERIL) 10 MG tablet Take 1 tablet by mouth 3 times daily as needed for Muscle spasms 9/19/18 Yes Cait Feliciano, DO   metoprolol succinate (TOPROL XL) 50 MG extended release tablet Take 1 tablet by mouth daily 18  Yes Cait Feliciano, DO   traMADol (ULTRAM) 50 MG tablet Take 2 tablets by mouth every 6 hours as needed for Pain .  11/15/17  Yes Cait Feliciano, DO   aspirin 81 MG tablet Take 81 mg by mouth daily   Yes Historical Provider, MD       Allergies   Allergen Reactions    Oxycodone Rash    Ciprofloxacin Hcl Other (See Comments)     \"Heel/ tendon pain\"    Gabapentin Other (See Comments)     Constipation      Hydrocodone Nausea Only    Norco [Hydrocodone-Acetaminophen] Nausea Only and Other (See Comments)     Causes sick feeling in head      Sulfamethoxazole-Trimethoprim     Bactrim [Sulfamethoxazole-Trimethoprim] Nausea Only       Social History     Socioeconomic History    Marital status:      Spouse name: Not on file    Number of children: Not on file    Years of education: Not on file    Highest education level: Not on file   Occupational History    Not on file   Social Needs    Financial resource strain: Not on file    Food insecurity:     Worry: Not on file     Inability: Not on file    Transportation needs:     Medical: Not on file     Non-medical: Not on file   Tobacco Use    Smoking status: Former Smoker     Packs/day: 0.00     Years: 0.00     Pack years: 0.00     Last attempt to quit: 1977     Years since quittin.3    Smokeless tobacco: Former User    Tobacco comment: quit in    Substance and Sexual Activity    Alcohol use: No     Alcohol/week: 0.0 oz    Drug use: No    Sexual activity: Never   Lifestyle    Physical activity:     Days per week: Not on file     Minutes per session: Not on file    Stress: Not on file   Relationships    Social connections:     Talks on phone: Not on file     Gets together: Not on file     Attends [employer] service: Not on file     Active member of club or organization: Not on file     Attends meetings of clubs or organizations: Not on file     Relationship status: Not on file    Intimate partner violence:     Fear of current or ex partner: Not on file     Emotionally abused: Not on file     Physically abused: Not on file     Forced sexual activity: Not on file   Other Topics Concern    Not on file   Social History Narrative    Not on file       Family History   Problem Relation Age of Onset    Stroke Mother     Other Father     Kidney Disease Brother     Cancer Brother     Kidney Disease Sister     Hypertension Other     Diabetes Other        REVIEW OF SYSTEMS:     Patient specifically denies fever/chills, chest pain, shortness of breath, new bowel or bladder complaints. All other review of systems was negative. PHYSICAL EXAMINATION:      /68   Pulse 62   Temp 98.3 °F (36.8 °C)   Resp 18   Ht 5' 8\" (1.727 m)   Wt 206 lb (93.4 kg)   SpO2 95%   BMI 31.32 kg/m²     General:      General appearance:  Pleasant and well-hydrated, in no distress and A & O x3  Build:Overweight  Function:Moves about room without difficulty. HEENT:    Head:normocephalic, atraumatic  Pupils:regular, round, equal  Sclera: icterus absent    Lungs:    Breathing:normal breathing pattern     CVS:   RRR    Abdomen:    Shape:non-distended and normal  Tenderness:none  Guarding:none    Cervical spine:    Inspection:normal  Palpation:tenderness paravertebral muscles, tenderness trapezium, left, right and positive. Range of motion:abnormal mildly flexion, extension rotation bilateral and is  Painful. Upper extremity sensorimotor examination is intact  Deep tendon reflexes in bilateral upper extremity and lower extremity equal    Thoracic spine:    Spine inspection:normal   Palpation:non-tender midline and paraspinals, left and right. Range of motion:normal in flexion, extension rotation bilateral and is not painful.     Lumbar spine:    Spine inspection:normal   CVA tenderness:No   Palpation:tenderness paravertebral muscles, left, right and positive. Range of motion:abnormal mildly Lateral bending, flexion, extension rotation bilateral and is  painful. Musculoskeletal:    No  points  No SIJ signs    Extremities:    Tremors:None bilaterally upper and lower  Range of motion:Generally normal shoulders, pain with internal rotation of hips negative. Intact:Yes  Varicose veins:absent   Pulses:present Lt radial  Cyanosis:none  Edema:none x all 4 extremities    Knee:    NO Pain on ROM    Neurological:    Sensory:normal to light touch     Motor:   Right Grip5/5              Left Grip5/5               Right Bicep5/5           Left Bicep5/5              Right Triceps5/5       Left Triceps5/5          Right Deltoid5/5     Left Deltoid5/5                  Right Quadriceps5/5          Left Quadriceps5/5           Right Gastrocnemius5/5    Left Gastrocnemius5/5  Right Ant Tibialis5/5  Left Ant Tibialis5/5    Reflexes:    Bilateral equal 1-2+    Gait:normal    Dermatology:    Skin:no rashes or lesions noted    Assessment/Plan:     Diagnosis Orders   1. Cervical radiculopathy     2. Cervical disc disorder     3. DDD (degenerative disc disease), cervical       Neck pain and left upper extremity pain- Chronic    Failed conservative RX- Priro PT/ meds/ ongoing HEP. Had excellent pain relief from the prior cervical epidural steroid injection with  > 80% pain relief the last one done in Jan 2019. Now has recurrence of similar pain. Will plan cervical epidural steroid injection under fluoroscopy at C7-T1 Left paramedian approach. RBA discussed. Patient agreed to proceed. He has history of for diabetes. The implications of interventional procedures on diabetes and a blood sugar level was discussed with the patient. Continue PT/ HEP    Reviewed referral documents and imaging. Details of the image findings discussed with the patient.     Urine screen today- no    OARRS report reviewed- yes  Controlled Substances Monitoring:     MARISOL Monitoring 5/13/2019   Attestation The Prescription Monitoring Report for this patient was reviewed today.    Chronic Pain Routine Monitoring -     Patient encouraged to stay active and to lose weight    Treatment plan discussed with the patient including medication and procedure side effects       Red Vazquez MD

## 2019-05-13 ENCOUNTER — HOSPITAL ENCOUNTER (OUTPATIENT)
Dept: PHYSICAL THERAPY | Age: 74
Setting detail: THERAPIES SERIES
Discharge: HOME | End: 2019-05-13
Payer: MEDICARE

## 2019-05-13 PROCEDURE — 97162 PT EVAL MOD COMPLEX 30 MIN: CPT | Performed by: PHYSICAL THERAPIST

## 2019-05-13 PROCEDURE — 97110 THERAPEUTIC EXERCISES: CPT | Performed by: PHYSICAL THERAPIST

## 2019-05-13 NOTE — PROGRESS NOTES
[facility] OUTPATIENT REHABILITATION  PHYSICAL THERAPY INITIAL EVALUATION         Date:  2019   Patient: Reji Roth  : 1945  MRN: [id number]  Referring Provider: Alex Phelan MD  [address]     Medical Diagnosis:  Cervical disc disorder  M50.90,  Cervical radiculopathy  Onset date: 8-9 years ago  Mechanism of Injury:  Insidious onset 8-9 yrs ago  Chief complaint:  Constant neck pain that radiates into left shoulder.      SUBJECTIVE:     Past Medical History  Past Medical History:   Diagnosis Date    Arthritis     Atrial fibrillation ([address]     Burning pain     Degenerative lumbar disc     Diabetes ([address]     Herniated cervical disc     History of echocardiogram 2017    EF 60-65%    Hyperlipidemia     Hypertension     Urinary retention 3/17/2017     Past Surgical History:   Procedure Laterality Date    ABLATION OF DYSRHYTHMIC FOCUS      APPROX 30-35 YRS AGO    CATARACT REMOVAL      COLECTOMY  2010 sigmoid    COLONOSCOPY  2018 [place]    EPIDURAL STEROID INJECTION N/A 2018    C7-T1 EPIDURAL STEROID INJECTION performed by Caden Smith DO at [address] 2019    C7 - T1 EPIDURAL STEROID INJECTION #2 performed by Caden Smith DO at [address]      LUMBAR FUSION  2017    NASAL SINUS SURGERY  2017    Submucosal Resection of Inferior Turbinate    NERVE BLOCK Bilateral 10 12 2015    bilateral sacroiliac joint injection #1    NERVE BLOCK Bilateral 10/22/15    sacroiliac joint #2    NERVE BLOCK Bilateral 11 02 2015    Sacroiliac joint bilateral #3    NERVE BLOCK Left 12/2/15    lumbar transforaminal #1    NERVE BLOCK Left 12 16 15    NERVE BLOCK Left 12 28 2015    transforaminal nerve block left lumbar #3    NERVE BLOCK Left 1 20 16    radiofrequency     NERVE BLOCK  2018    C7-T1 epidural    NERVE BLOCK N/A 01/08/2019    cervical epidural steroid injection    PROSTATE SURGERY  2017    UPPER GASTROINTESTINAL ENDOSCOPY  5/2018 [facility]       Medications:   Current Outpatient Medications   Medication Sig Dispense Refill    lovastatin (MEVACOR) 20 MG tablet Take 1 tablet by mouth nightly 30 tablet 5    ammonium lactate (LAC-HYDRIN) 12 % lotion Apply topically daily. 500 g 5    glycopyrrolate (ROBINUL) 1 MG tablet Take 1 tablet by mouth 3 times daily 274 tablet 3    DULoxetine (CYMBALTA) 60 MG extended release capsule Take 1 capsule by mouth daily 30 capsule 5    folic acid (FOLVITE) 1 MG tablet Take 1 tablet by mouth daily 30 tablet 5    glimepiride (AMARYL) 2 MG tablet Take 1 tablet by mouth 2 times daily 60 tablet 5    Psyllium (METAMUCIL) 28.3 % POWD Take 1 each by mouth 4 times daily      pantoprazole (PROTONIX) 40 MG tablet Take 1 tablet by mouth daily (Patient taking differently: Take 40 mg by mouth daily as needed (Upset Stomach) ) 30 tablet 5    cyclobenzaprine (FLEXERIL) 10 MG tablet Take 1 tablet by mouth 3 times daily as needed for Muscle spasms 90 tablet 5    metoprolol succinate (TOPROL XL) 50 MG extended release tablet Take 1 tablet by mouth daily 90 tablet 11    traMADol (ULTRAM) 50 MG tablet Take 2 tablets by mouth every 6 hours as needed for Pain . 120 tablet 5    aspirin 81 MG tablet Take 81 mg by mouth daily       No current facility-administered medications for this encounter. Imaging results: No results found.     Pain:  Current: 8/10     Best: 5/10     Worst:10/10    Symptom Type/Quality: sharp  Location[de-identified] Neck: midline over the spine and left lateral neck with radiation to left neck base and shoulder     Behavior: condition is staying the same      Provoking Activities/Positions: Head movement                 Relieving Activitie/Positions: rest     Disturbed Sleep:  Yes [x]    No []    Occupation: none      Hobbies: TV watching    Patient Goals: Pain control    Medical Management for Massage/Fascial release   [] Work/Sport specific activities   [] Cold/hotpack  [] Vasocompression  [] Electrical Stimulation  [] Lumbar/Cervical Traction  [] Ultrasound   [] Iontophoresis: 4 mg/mL Dexamethasone Sodium Phosphate 40-80 mAmin    [x] Instruction in HEP      []  Medication allergies reviewed for use of Dexamethasone Sodium Phosphate 4mg/ml  with iontophoresis treatments. Patient is not allergic. Suggested Professional Referral: [x] No  [] Yes:     Patient Education:  [x] Plans/Goals, Risks/Benefits discussed  [x] Home exercise program  Method of Education: [x] Verbal  [x] Demo  [x] Written  Comprehension of Education:  [x] Verbalizes understanding. [x] Demonstrates understanding. [] Needs Review. [] Demonstrates/verbalizes understanding of HEP/Ed previously given. Frequency:   HEP only    Patient understands diagnosis/prognosis and consents to treatment, plan and goals: [x] Yes    [] No     Thank you for the opportunity to work with your patient. If you have questions or comments, please contact me at [phone]; fax: [phone]. Electronically signed by: Sofy Wallace PT         Please sign Physician's Certification and return to:  [address] [place], [place] [address] [address]  Dept: [phone]  Dept Fax: [phone] Certification / Comments     EVAL plus HEP only    I have reviewed the Plan of Care established for skilled therapy services and certify that the services are required and that they will be provided while the patient is under my care.     Physician's Comments/Revisions:               Physician's Printed Name:                                           [de-identified] Signature:                                                               Date:

## 2019-05-16 ENCOUNTER — TELEPHONE (OUTPATIENT)
Dept: PAIN MANAGEMENT | Age: 74
End: 2019-05-16

## 2019-05-16 ENCOUNTER — HOSPITAL ENCOUNTER (OUTPATIENT)
Age: 74
Discharge: HOME | End: 2019-05-16
Payer: MEDICARE

## 2019-05-16 DIAGNOSIS — M51.37 DEGENERATION OF LUMBAR OR LUMBOSACRAL INTERVERTEBRAL DISC: ICD-10-CM

## 2019-05-16 DIAGNOSIS — E55.9 VITAMIN D DEFICIENCY: ICD-10-CM

## 2019-05-16 DIAGNOSIS — L30.8 OTHER ECZEMA: ICD-10-CM

## 2019-05-16 DIAGNOSIS — E78.2 MIXED HYPERLIPIDEMIA: ICD-10-CM

## 2019-05-16 DIAGNOSIS — E11.42 TYPE 2 DIABETES MELLITUS WITH DIABETIC POLYNEUROPATHY, WITHOUT LONG-TERM CURRENT USE OF INSULIN (HCC): ICD-10-CM

## 2019-05-16 LAB
ALBUMIN SERPL-MCNC: 3.9 G/DL (ref 3.5–5.2)
ALP BLD-CCNC: 113 U/L (ref 40–129)
ALT SERPL-CCNC: 24 U/L (ref 0–40)
ANION GAP SERPL CALCULATED.3IONS-SCNC: 12 MMOL/L (ref 7–16)
AST SERPL-CCNC: 28 U/L (ref 0–39)
BASOPHILS ABSOLUTE: 0.05 E9/L (ref 0–0.2)
BASOPHILS RELATIVE PERCENT: 0.6 % (ref 0–2)
BILIRUB SERPL-MCNC: 0.4 MG/DL (ref 0–1.2)
BUN BLDV-MCNC: 15 MG/DL (ref 8–23)
CALCIUM SERPL-MCNC: 9.6 MG/DL (ref 8.6–10.2)
CHLORIDE BLD-SCNC: 104 MMOL/L (ref 98–107)
CHOLESTEROL, TOTAL: 130 MG/DL (ref 0–199)
CO2: 27 MMOL/L (ref 22–29)
CREAT SERPL-MCNC: 1.1 MG/DL (ref 0.7–1.2)
EOSINOPHILS ABSOLUTE: 0.26 E9/L (ref 0.05–0.5)
EOSINOPHILS RELATIVE PERCENT: 3.3 % (ref 0–6)
FOLATE: >20 NG/ML (ref 4.8–24.2)
GFR AFRICAN AMERICAN: >60
GFR NON-AFRICAN AMERICAN: >60 ML/MIN/1.73
GLUCOSE BLD-MCNC: 119 MG/DL (ref 74–99)
HBA1C MFR BLD: 6.1 % (ref 4–5.6)
HCT VFR BLD CALC: 49.2 % (ref 37–54)
HDLC SERPL-MCNC: 43 MG/DL
HEMOGLOBIN: 16.9 G/DL (ref 12.5–16.5)
IMMATURE GRANULOCYTES #: 0.03 E9/L
IMMATURE GRANULOCYTES %: 0.4 % (ref 0–5)
LDL CHOLESTEROL CALCULATED: 48 MG/DL (ref 0–99)
LYMPHOCYTES ABSOLUTE: 3.95 E9/L (ref 1.5–4)
LYMPHOCYTES RELATIVE PERCENT: 49.6 % (ref 20–42)
MCH RBC QN AUTO: 30.7 PG (ref 26–35)
MCHC RBC AUTO-ENTMCNC: 34.3 % (ref 32–34.5)
MCV RBC AUTO: 89.3 FL (ref 80–99.9)
MICROALBUMIN UR-MCNC: <12 MG/L
MONOCYTES ABSOLUTE: 0.61 E9/L (ref 0.1–0.95)
MONOCYTES RELATIVE PERCENT: 7.7 % (ref 2–12)
NEUTROPHILS ABSOLUTE: 3.07 E9/L (ref 1.8–7.3)
NEUTROPHILS RELATIVE PERCENT: 38.4 % (ref 43–80)
PDW BLD-RTO: 12.8 FL (ref 11.5–15)
PLATELET # BLD: 202 E9/L (ref 130–450)
PMV BLD AUTO: 10 FL (ref 7–12)
POTASSIUM SERPL-SCNC: 4.7 MMOL/L (ref 3.5–5)
RBC # BLD: 5.51 E12/L (ref 3.8–5.8)
SODIUM BLD-SCNC: 143 MMOL/L (ref 132–146)
TOTAL PROTEIN: 7.3 G/DL (ref 6.4–8.3)
TRIGL SERPL-MCNC: 195 MG/DL (ref 0–149)
TSH SERPL DL<=0.05 MIU/L-ACNC: 4.94 UIU/ML (ref 0.27–4.2)
VITAMIN B-12: 533 PG/ML (ref 211–946)
VITAMIN D 25-HYDROXY: 21 NG/ML (ref 30–100)
VLDLC SERPL CALC-MCNC: 39 MG/DL
WBC # BLD: 8 E9/L (ref 4.5–11.5)

## 2019-05-16 PROCEDURE — 80053 COMPREHEN METABOLIC PANEL: CPT

## 2019-05-16 PROCEDURE — 80061 LIPID PANEL: CPT

## 2019-05-16 PROCEDURE — 82607 VITAMIN B-12: CPT

## 2019-05-16 PROCEDURE — 84443 ASSAY THYROID STIM HORMONE: CPT

## 2019-05-16 PROCEDURE — 82746 ASSAY OF FOLIC ACID SERUM: CPT

## 2019-05-16 PROCEDURE — 83036 HEMOGLOBIN GLYCOSYLATED A1C: CPT

## 2019-05-16 PROCEDURE — 36415 COLL VENOUS BLD VENIPUNCTURE: CPT

## 2019-05-16 PROCEDURE — 82044 UR ALBUMIN SEMIQUANTITATIVE: CPT

## 2019-05-16 PROCEDURE — 82306 VITAMIN D 25 HYDROXY: CPT

## 2019-05-16 PROCEDURE — 85025 COMPLETE CBC W/AUTO DIFF WBC: CPT

## 2019-05-16 NOTE — TELEPHONE ENCOUNTER
5-16-19-upon chart review it is noted that Phyllis Crockett takes aspirin. Per order of dr Ahsan Bowers, he is to hold that for 7 days before his 5-28-19 procedure, no medical clearance needed. His last dose will be 5-20-19. He shows an understanding to not take it before his procedure.     Arlene Meraz RN  Pain Management

## 2019-05-20 ENCOUNTER — TELEPHONE (OUTPATIENT)
Dept: FAMILY MEDICINE CLINIC | Age: 74
End: 2019-05-20

## 2019-05-23 ENCOUNTER — APPOINTMENT (OUTPATIENT)
Dept: CT IMAGING | Age: 74
End: 2019-05-23
Payer: MEDICARE

## 2019-05-23 ENCOUNTER — TELEPHONE (OUTPATIENT)
Dept: FAMILY MEDICINE CLINIC | Age: 74
End: 2019-05-23

## 2019-05-23 ENCOUNTER — TELEPHONE (OUTPATIENT)
Dept: PAIN MANAGEMENT | Age: 74
End: 2019-05-23

## 2019-05-23 ENCOUNTER — HOSPITAL ENCOUNTER (EMERGENCY)
Age: 74
Discharge: HOME | End: 2019-05-23
Attending: EMERGENCY MEDICINE
Payer: MEDICARE

## 2019-05-23 VITALS
RESPIRATION RATE: 16 BRPM | BODY MASS INDEX: 28.88 KG/M2 | OXYGEN SATURATION: 94 % | HEART RATE: 69 BPM | TEMPERATURE: 98 F | HEIGHT: 69 IN | SYSTOLIC BLOOD PRESSURE: 173 MMHG | DIASTOLIC BLOOD PRESSURE: 89 MMHG | WEIGHT: 195 LBS

## 2019-05-23 DIAGNOSIS — K20.90 ESOPHAGITIS: Primary | ICD-10-CM

## 2019-05-23 LAB
ANION GAP SERPL CALCULATED.3IONS-SCNC: 11 MMOL/L (ref 7–16)
BASOPHILS ABSOLUTE: 0.05 E9/L (ref 0–0.2)
BASOPHILS RELATIVE PERCENT: 0.6 % (ref 0–2)
BUN BLDV-MCNC: 11 MG/DL (ref 8–23)
CALCIUM SERPL-MCNC: 9.5 MG/DL (ref 8.6–10.2)
CHLORIDE BLD-SCNC: 100 MMOL/L (ref 98–107)
CO2: 27 MMOL/L (ref 22–29)
CREAT SERPL-MCNC: 1.1 MG/DL (ref 0.7–1.2)
EKG ATRIAL RATE: 60 BPM
EKG P AXIS: 56 DEGREES
EKG P-R INTERVAL: 200 MS
EKG Q-T INTERVAL: 448 MS
EKG QRS DURATION: 126 MS
EKG QTC CALCULATION (BAZETT): 448 MS
EKG R AXIS: -76 DEGREES
EKG T AXIS: 40 DEGREES
EKG VENTRICULAR RATE: 60 BPM
EOSINOPHILS ABSOLUTE: 0.15 E9/L (ref 0.05–0.5)
EOSINOPHILS RELATIVE PERCENT: 1.9 % (ref 0–6)
GFR AFRICAN AMERICAN: >60
GFR NON-AFRICAN AMERICAN: >60 ML/MIN/1.73
GLUCOSE BLD-MCNC: 132 MG/DL (ref 74–99)
HCT VFR BLD CALC: 46.3 % (ref 37–54)
HEMOGLOBIN: 15.7 G/DL (ref 12.5–16.5)
IMMATURE GRANULOCYTES #: 0.02 E9/L
IMMATURE GRANULOCYTES %: 0.3 % (ref 0–5)
LYMPHOCYTES ABSOLUTE: 2.67 E9/L (ref 1.5–4)
LYMPHOCYTES RELATIVE PERCENT: 33.5 % (ref 20–42)
MCH RBC QN AUTO: 30.4 PG (ref 26–35)
MCHC RBC AUTO-ENTMCNC: 33.9 % (ref 32–34.5)
MCV RBC AUTO: 89.7 FL (ref 80–99.9)
MONOCYTES ABSOLUTE: 0.62 E9/L (ref 0.1–0.95)
MONOCYTES RELATIVE PERCENT: 7.8 % (ref 2–12)
NEUTROPHILS ABSOLUTE: 4.47 E9/L (ref 1.8–7.3)
NEUTROPHILS RELATIVE PERCENT: 55.9 % (ref 43–80)
PDW BLD-RTO: 12.9 FL (ref 11.5–15)
PLATELET # BLD: 190 E9/L (ref 130–450)
PMV BLD AUTO: 10.1 FL (ref 7–12)
POTASSIUM SERPL-SCNC: 5 MMOL/L (ref 3.5–5)
PRO-BNP: 360 PG/ML (ref 0–450)
RBC # BLD: 5.16 E12/L (ref 3.8–5.8)
SODIUM BLD-SCNC: 138 MMOL/L (ref 132–146)
TROPONIN: <0.01 NG/ML (ref 0–0.03)
WBC # BLD: 8 E9/L (ref 4.5–11.5)

## 2019-05-23 PROCEDURE — 6370000000 HC RX 637 (ALT 250 FOR IP): Performed by: PHYSICIAN ASSISTANT

## 2019-05-23 PROCEDURE — 6360000004 HC RX CONTRAST MEDICATION: Performed by: RADIOLOGY

## 2019-05-23 PROCEDURE — 84484 ASSAY OF TROPONIN QUANT: CPT

## 2019-05-23 PROCEDURE — 99285 EMERGENCY DEPT VISIT HI MDM: CPT

## 2019-05-23 PROCEDURE — 83880 ASSAY OF NATRIURETIC PEPTIDE: CPT

## 2019-05-23 PROCEDURE — 36415 COLL VENOUS BLD VENIPUNCTURE: CPT

## 2019-05-23 PROCEDURE — 93010 ELECTROCARDIOGRAM REPORT: CPT | Performed by: INTERNAL MEDICINE

## 2019-05-23 PROCEDURE — 93005 ELECTROCARDIOGRAM TRACING: CPT | Performed by: PHYSICIAN ASSISTANT

## 2019-05-23 PROCEDURE — 85025 COMPLETE CBC W/AUTO DIFF WBC: CPT

## 2019-05-23 PROCEDURE — 80048 BASIC METABOLIC PNL TOTAL CA: CPT

## 2019-05-23 PROCEDURE — 71275 CT ANGIOGRAPHY CHEST: CPT

## 2019-05-23 RX ORDER — IPRATROPIUM BROMIDE AND ALBUTEROL SULFATE 2.5; .5 MG/3ML; MG/3ML
1 SOLUTION RESPIRATORY (INHALATION) ONCE
Status: COMPLETED | OUTPATIENT
Start: 2019-05-23 | End: 2019-05-23

## 2019-05-23 RX ADMIN — IPRATROPIUM BROMIDE AND ALBUTEROL SULFATE 1 AMPULE: .5; 3 SOLUTION RESPIRATORY (INHALATION) at 10:54

## 2019-05-23 RX ADMIN — IOPAMIDOL 80 ML: 755 INJECTION, SOLUTION INTRAVENOUS at 11:26

## 2019-05-23 NOTE — ED NOTES
Discharge instructions given, medications and follow up instructions reviewed. Patient verbalized understanding, no other noted or stated problems at this time. Patient will follow up with physicians as directed.       Adama Hendricks RN  05/23/19 0557

## 2019-05-23 NOTE — ED NOTES
Pt in gown, on telemetry monitor and pulse ox.   Respiratory paged for treatment     Keisha Pierre RN  05/23/19 8319

## 2019-05-23 NOTE — ED NOTES
Nurse report given to [facility].       Mele Kaufman RN  05/23/19 0965

## 2019-05-23 NOTE — ED PROVIDER NOTES
ED Attending  CC: Liana       Department of Emergency Medicine   ED  Provider Note  Admit Date/RoomTime: 5/23/2019  9:52 AM  ED Room: 10/10  MRN: [id number]  Chief Complaint:   Cough (ONGOING ISSUES IS CURRENTLY ON INHALER AND NOT GETTING BETTER HAS BEEN IN CONTACT WITH PCP ABOUT THIS, BUT CAN'T GET INTO OFFICE UNTIL NEXT WEEK) and Other (STATED THAT HE FEELS LIKE ONLY ONE OUT OF EVERY 5 BREATHS HE TAKES GETS IN AND THE LAST TIME WE GAVE HIM A TREATMENT AND HE WAS BETTER)       History of Present Illness   Source of history provided by:  patient. History/Exam Limitations: none. Berto Alan is a 74 y.o. male who has a past medical history of:   Past Medical History:   Diagnosis Date    Arthritis     Atrial fibrillation ([address]     Burning pain     Degenerative lumbar disc     [address]     Herniated cervical disc     History of echocardiogram 03/16/2017    EF 60-65%    Hyperlipidemia     Hypertension     Urinary retention 3/17/2017    presents to the ED by private car for shortness of breath. Patient states it started 4-5 days ago. He states that he feels the need to cough but doesn't bring anything up with his cough. He states that he feels that he cannot take a deep breath and. He has no pain with this. Denies chest pain. He denies any vomiting or diarrhea. Nothing seems to make this better or worse. He states that he had something similar several years ago, states that he was given a a nebulizer treatment and made him feel better. He states that his PCP prescribed Spiriva which she has been using without any relief of his symptoms. He reports that he was a remote smoker, quit 30 years ago. He states that he also had a PE around that time. States it was after a surgery. Denies being on any current blood thinners. No recent surgery or travel. No calf pain or leg swelling. He has noticed.     ROS   Pertinent positives and negatives are stated within HPI, all other systems reviewed and are negative. Past Surgical History:   Procedure Laterality Date    ABLATION OF DYSRHYTHMIC FOCUS  1980    APPROX 30-35 YRS AGO    CATARACT REMOVAL  2013    COLECTOMY  2010 sigmoid    COLONOSCOPY  5/2018 [place]    EPIDURAL STEROID INJECTION N/A 12/11/2018    C7-T1 EPIDURAL STEROID INJECTION performed by Lara Coronado DO at [facility] N/A 1/8/2019    C7 - T1 EPIDURAL STEROID INJECTION #2 performed by Lara Coronado DO at [address] [place] [address]    LUMBAR FUSION  03/06/2017    NASAL SINUS SURGERY  12/07/2017    Submucosal Resection of Inferior Turbinate    NERVE BLOCK Bilateral 10 12 2015    bilateral sacroiliac joint injection #1    NERVE BLOCK Bilateral 10/22/15    sacroiliac joint #2    NERVE BLOCK Bilateral 11 02 2015    Sacroiliac joint bilateral #3    NERVE BLOCK Left 12/2/15    lumbar transforaminal #1    NERVE BLOCK Left 12 16 15    NERVE BLOCK Left 12 28 2015    transforaminal nerve block left lumbar #3    NERVE BLOCK Left 1 20 16    radiofrequency     NERVE BLOCK  12/11/2018    C7-T1 epidural    NERVE BLOCK N/A 01/08/2019    cervical epidural steroid injection    PROSTATE SURGERY  2017    UPPER GASTROINTESTINAL ENDOSCOPY  5/2018 [facility]   Social History:  reports that he quit smoking about 42 years ago. He smoked 0.00 packs per day for 0.00 years. He has quit using smokeless tobacco. He reports that he does not drink alcohol or use drugs. Family History: family history includes Cancer in his brother; Diabetes in an other family member; Hypertension in an other family member; Kidney Disease in his brother and sister; Other in his father; Stroke in his mother.    Allergies: Oxycodone; Simvastatin; Ciprofloxacin hcl; Gabapentin; Hydrocodone; Norco [hydrocodone-acetaminophen]; Sulfamethoxazole-trimethoprim; and Bactrim [sulfamethoxazole-trimethoprim]    Physical Exam Section   Oxygen Saturation Interpretation: Normal.   ED Triage Vitals BP Temp Temp src Pulse Resp SpO2 Height Weight   05/23/19 0957 05/23/19 0957 -- 05/23/19 0957 05/23/19 0957 05/23/19 0957 05/23/19 0950 05/23/19 0950   (!) 189/84 99.1 °F (37.3 °C)  61 18 93 % 5' 9\" (1.753 m) 195 lb (88.5 kg)       Physical Exam  · Constitutional/General: Alert and oriented x3, well appearing, non toxic. · HEENT:  NC/NT. PERRLA,  Airway patent. · Neck: Supple, full ROM, non tender to palpation in the midline, no stridor, no crepitus, no meningeal signs  · Respiratory: Lungs clear to auscultation bilaterally, no wheezes, rales, or rhonchi. Not in respiratory distress  · CV:  Regular rate. Regular rhythm. No murmurs, gallops, or rubs. 2+ distal pulses  · Chest: No chest wall tenderness  · GI:  Abdomen Soft, Non tender, Non distended. +BS. No rebound, guarding, or rigidity. No pulsatile masses. · Musculoskeletal: Moves all extremities x 4. Warm and well perfused, no clubbing, cyanosis, or edema. Capillary refill <3 seconds. 2+ dorsalis pedis pulses bilaterally. No pitting edema bilaterally. No calf tenderness, swelling or erythema bilaterally. · Integument: skin warm and dry. No rashes.    · Lymphatic: no lymphadenopathy noted  · Neurologic: GCS 15, no focal deficits, symmetric strength 5/5 in the upper and lower extremities bilaterally  · Psychiatric: Normal Affect      Lab / Imaging Results   (All laboratory and radiology results have been personally reviewed by myself)  Labs:  Results for orders placed or performed during the hospital encounter of 05/23/19   CBC Auto Differential   Result Value Ref Range    WBC 8.0 4.5 - 11.5 E9/L    RBC 5.16 3.80 - 5.80 E12/L    Hemoglobin 15.7 12.5 - 16.5 g/dL    Hematocrit 46.3 37.0 - 54.0 %    MCV 89.7 80.0 - 99.9 fL    MCH 30.4 26.0 - 35.0 pg    MCHC 33.9 32.0 - 34.5 %    RDW 12.9 11.5 - 15.0 fL    Platelets 935 071 - 848 E9/L    MPV 10.1 7.0 - 12.0 fL    Neutrophils % 55.9 43.0 - 80.0 %    Immature Granulocytes % 0.3 0.0 - 5.0 %    Lymphocytes % 33.5

## 2019-05-23 NOTE — TELEPHONE ENCOUNTER
5-24-19-received call from Macey Cruz, he is having trouble breathing, wants to cancel his 5-28-19 procedure until he feels better. He states he usually has to be on a medication regimen to help his breathing. He will call back to reschedule when he feels better. Call to scheduling.     Cisco Marsh RN  Pain Management

## 2019-05-23 NOTE — TELEPHONE ENCOUNTER
Yes, I will call in an antibiotic and a steroid. Tell him I have samples to replace symbicort because I understand its expensive.  I have Trelegy put aside

## 2019-05-24 ENCOUNTER — PREP FOR PROCEDURE (OUTPATIENT)
Dept: SURGERY | Age: 74
End: 2019-05-24

## 2019-05-24 ENCOUNTER — OFFICE VISIT (OUTPATIENT)
Dept: SURGERY | Age: 74
End: 2019-05-24
Payer: MEDICARE

## 2019-05-24 ENCOUNTER — HOSPITAL ENCOUNTER (EMERGENCY)
Age: 74
Discharge: HOME | DRG: 204 | End: 2019-05-24
Attending: EMERGENCY MEDICINE
Payer: MEDICARE

## 2019-05-24 ENCOUNTER — TELEPHONE (OUTPATIENT)
Dept: SURGERY | Age: 74
End: 2019-05-24

## 2019-05-24 VITALS
DIASTOLIC BLOOD PRESSURE: 76 MMHG | SYSTOLIC BLOOD PRESSURE: 168 MMHG | RESPIRATION RATE: 20 BRPM | BODY MASS INDEX: 28.88 KG/M2 | TEMPERATURE: 97.9 F | HEART RATE: 76 BPM | OXYGEN SATURATION: 99 % | WEIGHT: 195 LBS | HEIGHT: 69 IN

## 2019-05-24 VITALS
TEMPERATURE: 98.6 F | SYSTOLIC BLOOD PRESSURE: 156 MMHG | DIASTOLIC BLOOD PRESSURE: 83 MMHG | WEIGHT: 195 LBS | BODY MASS INDEX: 28.88 KG/M2 | HEART RATE: 69 BPM | HEIGHT: 69 IN

## 2019-05-24 DIAGNOSIS — K22.89 ESOPHAGEAL MASS: Primary | ICD-10-CM

## 2019-05-24 DIAGNOSIS — R05.9 COUGH: Primary | ICD-10-CM

## 2019-05-24 PROBLEM — L08.9 DIABETIC FOOT INFECTION (HCC): Status: ACTIVE | Noted: 2019-05-24

## 2019-05-24 PROBLEM — E11.628 DIABETIC FOOT INFECTION (HCC): Status: ACTIVE | Noted: 2019-05-24

## 2019-05-24 PROCEDURE — 1036F TOBACCO NON-USER: CPT | Performed by: SURGERY

## 2019-05-24 PROCEDURE — 4040F PNEUMOC VAC/ADMIN/RCVD: CPT | Performed by: SURGERY

## 2019-05-24 PROCEDURE — 3017F COLORECTAL CA SCREEN DOC REV: CPT | Performed by: SURGERY

## 2019-05-24 PROCEDURE — G8417 CALC BMI ABV UP PARAM F/U: HCPCS | Performed by: SURGERY

## 2019-05-24 PROCEDURE — 1123F ACP DISCUSS/DSCN MKR DOCD: CPT | Performed by: SURGERY

## 2019-05-24 PROCEDURE — 99284 EMERGENCY DEPT VISIT MOD MDM: CPT

## 2019-05-24 PROCEDURE — G8427 DOCREV CUR MEDS BY ELIG CLIN: HCPCS | Performed by: SURGERY

## 2019-05-24 PROCEDURE — 94640 AIRWAY INHALATION TREATMENT: CPT

## 2019-05-24 PROCEDURE — 6370000000 HC RX 637 (ALT 250 FOR IP): Performed by: EMERGENCY MEDICINE

## 2019-05-24 PROCEDURE — 99214 OFFICE O/P EST MOD 30 MIN: CPT | Performed by: SURGERY

## 2019-05-24 RX ORDER — IPRATROPIUM BROMIDE AND ALBUTEROL SULFATE 2.5; .5 MG/3ML; MG/3ML
1 SOLUTION RESPIRATORY (INHALATION) ONCE
Status: COMPLETED | OUTPATIENT
Start: 2019-05-24 | End: 2019-05-24

## 2019-05-24 RX ORDER — ALBUTEROL SULFATE 90 UG/1
2 AEROSOL, METERED RESPIRATORY (INHALATION) EVERY 4 HOURS PRN
Qty: 1 INHALER | Refills: 1 | Status: SHIPPED | OUTPATIENT
Start: 2019-05-24 | End: 2019-05-28 | Stop reason: SDUPTHER

## 2019-05-24 RX ORDER — SODIUM CHLORIDE, SODIUM LACTATE, POTASSIUM CHLORIDE, CALCIUM CHLORIDE 600; 310; 30; 20 MG/100ML; MG/100ML; MG/100ML; MG/100ML
INJECTION, SOLUTION INTRAVENOUS CONTINUOUS
Status: CANCELLED | OUTPATIENT
Start: 2019-05-24

## 2019-05-24 RX ADMIN — IPRATROPIUM BROMIDE AND ALBUTEROL SULFATE 1 AMPULE: .5; 3 SOLUTION RESPIRATORY (INHALATION) at 09:48

## 2019-05-24 ASSESSMENT — ENCOUNTER SYMPTOMS
CHEST TIGHTNESS: 0
SORE THROAT: 0
VOMITING: 0
DIARRHEA: 0
SHORTNESS OF BREATH: 1
BACK PAIN: 0
SINUS PAIN: 0
NAUSEA: 0
ABDOMINAL PAIN: 0
COUGH: 1

## 2019-05-24 NOTE — ED PROVIDER NOTES
This is a 73 yo M with a past medical history of diabetes and degenerative cervical disc disease presents for cough. He says he has had this for about a year and was seen in the department yesterday and given a breathing treatment and felt better. He also had a CT completed which showed some thickening of the distal esophagus suggestive of malignancy. This was discussed with GI and felt that the patient could go home with Protonix. Patient states he has been on Protonix for months and this was started by Dr. Tyrel Quiñones, he has a history of colon removal about 2 or 3 years ago. He asked a states that he went to see Dr. Tyrel Quiñones today who recommended coming into the hospital for him to do a scope on Monday. Review of Systems   Constitutional: Negative for activity change, chills, fatigue and fever. HENT: Negative for congestion, sinus pain and sore throat. Eyes: Negative for visual disturbance. Respiratory: Positive for cough and shortness of breath. Negative for chest tightness. Cardiovascular: Negative for chest pain, palpitations and leg swelling. Gastrointestinal: Negative for abdominal pain, diarrhea, nausea and vomiting. Genitourinary: Negative for dysuria and urgency. Musculoskeletal: Negative for back pain, neck pain and neck stiffness. Skin: Negative for rash. Neurological: Negative for dizziness, syncope and headaches. Psychiatric/Behavioral: Negative for confusion. Physical Exam   Constitutional: He is oriented to person, place, and time. He appears well-developed and well-nourished. No distress. HENT:   Head: Normocephalic and atraumatic. Nose: Nose normal.   Mouth/Throat: Oropharynx is clear and moist. No oropharyngeal exudate. Eyes: Pupils are equal, round, and reactive to light. Conjunctivae and EOM are normal.   Neck: Normal range of motion. Neck supple. Cardiovascular: Normal rate, regular rhythm, normal heart sounds and intact distal pulses. Pulmonary/Chest: Effort normal and breath sounds normal. No respiratory distress. He exhibits no tenderness. Abdominal: Soft. Bowel sounds are normal. He exhibits no distension. There is no tenderness. Musculoskeletal: Normal range of motion. He exhibits no edema. Neurological: He is alert and oriented to person, place, and time. No cranial nerve deficit or sensory deficit. He exhibits normal muscle tone. Skin: Skin is warm and dry. Capillary refill takes less than 2 seconds. He is not diaphoretic. Psychiatric: He has a normal mood and affect. His behavior is normal. Judgment and thought content normal.   Nursing note and vitals reviewed. Procedures    MDM  Number of Diagnoses or Management Options  Cough:   Diagnosis management comments: That presents with concerns of cough productive of clear sputum that has been worsening over the past year as well as some thickening of his distal esophagus concerning for possible malignancy. He was sent over by Dr. Regina Esteban for possible EGD on Monday. We'll place call to Dr. Regina Esteban to discuss. Breathing treatment ordered. Labs just done yesterday and he has no complaints, will not repeat at this time. Surgery recommends outpatient scope if we are not admitting the patient for another reason; discussed need for follow up with patient, he voices understanding. Encouraged him to start claritin or allegra and wrote script for albuterol. Return precautions discussed, he voices understanding and is discharged in NAD. ED Course as of May 24 1323   Fri May 24, 2019   0900 ATTENDING PROVIDER ATTESTATION:     I have personally performed and/or participated in the history, exam, medical decision making, and procedures and agree with all pertinent clinical information unless otherwise noted. I have also reviewed and agree with the past medical, family and social history unless otherwise noted.     I have discussed this patient in detail with the resident, and provided the instruction and education regarding patient here complaining of about a year and a half of postnasal drainage causing him to feel like he cannot take a deep breath. States that his lungs appear clear although breathing treatment helped him yesterday. Had an abnormal CTA chest yesterday showing no pulmonary embolus but showed some mild esophagitis, saw his surgeon today and told him he still has the same congestion and inability take a deep breath so he states his surgeon sent him over here again for evaluation, the symptoms were present for about a year and a half. He has seen [facility] for this and states that he quit seeing them as well because they did not fix him. .  My findings/plan: Patient is sitting up in the bed resting comfortable in no distress with mild postnasal drainage. No large nasal congestion at this time. Airway patent. No trismus or stridor. Posterior pharynx unremarkable otherwise. Lungs show some mild adventitial lung sounds with wheezing scattered with no respiratory distress. Patient is speaking in complete paragraphs with no signs of dyspnea. He has no pretibial edema or calf pain. [NC]   [id number] Call placed to Dr. lKaus Espinoza    [CW]   [phone] additional call. [CW]   3049 Case discussed with Dr. Klaus Espinoza, detailed overview given, he knows this patient well. No need to admit from his standpoint, he will consult on the patient is admitted in patient otherwise he is scheduling the patient outpatient for further evaluation. [NC]   3866 Dr. Klaus Espinoza will see the patient as an outpatient. [CW]   2238 Will recommend patient take claritin d, will order proair inhaler for him.     [CW]      ED Course User Index  [CW] Glenn Avina DO  [NC] Juan Luis Banda, DO       --------------------------------------------- PAST HISTORY ---------------------------------------------  Past Medical History:  has a past medical history of Arthritis, Atrial fibrillation ([address] Burning pain, Degenerative lumbar disc, Diabetes ([facility] Herniated cervical disc, History of echocardiogram, Hyperlipidemia, Hypertension, and Urinary retention. Past Surgical History:  has a past surgical history that includes colectomy (2010 sigmoid); Hemorrhoid surgery (2008); Prostate surgery (2017); Cataract removal (2013); Colonoscopy (5/2018 [facility]); Nerve Block (Bilateral, 10 12 2015); Nerve Block (Bilateral, 10/22/15); Nerve Block (Bilateral, 11 02 2015); Nerve Block (Left, 12/2/15); Nerve Block (Left, 12 16 15); Nerve Block (Left, 12 28 2015); Nerve Block (Left, 1 20 16); ablation of dysrhythmic focus (1980); lumbar fusion (03/06/2017); Nasal sinus surgery (12/07/2017); Upper gastrointestinal endoscopy (5/2018 [facility]); Nerve Block (12/11/2018); epidural steroid injection (N/A, 12/11/2018); Nerve Block (N/A, 01/08/2019); and epidural steroid injection (N/A, 1/8/2019). Social History:  reports that he quit smoking about 42 years ago. He smoked 0.00 packs per day for 0.00 years. He has quit using smokeless tobacco. He reports that he does not drink alcohol or use drugs. Family History: family history includes Cancer in his brother; Diabetes in an other family member; Hypertension in an other family member; Kidney Disease in his brother and sister; Other in his father; Stroke in his mother. The patients home medications have been reviewed. Allergies: Oxycodone; Simvastatin; Ciprofloxacin hcl; Gabapentin; Hydrocodone; Norco [hydrocodone-acetaminophen]; Sulfamethoxazole-trimethoprim; and Bactrim [sulfamethoxazole-trimethoprim]    -------------------------------------------------- RESULTS -------------------------------------------------  Labs:  No results found for this visit on 05/24/19.     Radiology:  No orders to display           ------------------------- NURSING NOTES AND VITALS REVIEWED ---------------------------  Date / Time Roomed:  5/24/2019  9:06 AM  ED Bed Assignment:  21/21    The nursing notes within the ED encounter and vital signs as below have been reviewed. BP (!) 168/76   Pulse 76   Temp 97.9 °F (36.6 °C) (Oral)   Resp 20   Ht 5' 9\" (1.753 m)   Wt 195 lb (88.5 kg)   SpO2 99%   BMI 28.80 kg/m²   Oxygen Saturation Interpretation: Normal      ------------------------------------------ PROGRESS NOTES ------------------------------------------  ED COURSE MEDICATIONS:                Medications   ipratropium-albuterol (DUONEB) nebulizer solution 1 ampule (1 ampule Inhalation Given 5/24/19 0948)       I have spoken with the patient and discussed todays results, in addition to providing specific details for the plan of care and counseling regarding the diagnosis and prognosis. Their questions are answered at this time and they are agreeable with the plan. I discussed at length with them reasons for immediate return here for re evaluation. They will followup with primary care by calling their office tomorrow. --------------------------------- ADDITIONAL PROVIDER NOTES ---------------------------------  At this time the patient is without objective evidence of an acute process requiring hospitalization or inpatient management. They have remained hemodynamically stable throughout their entire ED visit and are stable for discharge with outpatient follow-up. The plan has been discussed in detail and they are aware of the specific conditions for emergent return, as well as the importance of follow-up. Discharge Medication List as of 5/24/2019 11:46 AM      START taking these medications    Details   albuterol sulfate HFA (PROVENTIL HFA) 108 (90 Base) MCG/ACT inhaler Inhale 2 puffs into the lungs every 4 hours as needed for Wheezing, Disp-1 Inhaler, [id number]             Diagnosis:  1. Cough        Disposition:  Patient's disposition: Discharge to home  Patient's condition is stable.             Galina Cabrera,   Resident  05/24/19 9401

## 2019-05-24 NOTE — TELEPHONE ENCOUNTER
Per Dr. Rossy Toussaint, patient has been scheduled for EGD at [address] [place] [address] on 6/4/19. Surgery scheduling form faxed with confirmation, surgeon's calendar updated. Dr. Rossy Toussaint to enter orders. Follow up appointment scheduled for 6/7/19 at 0800. Patient instructed to go to ER for current SOB. Will follow up. Per Medicare A/B guidelines, no prior Rea Alicea is required for scheduled outpatient procedure (CPT 14689).      Electronically signed by Radha Cade RN on 5/24/2019 at 9:01 AM

## 2019-05-24 NOTE — PROGRESS NOTES
Tino Peterson  5/24/2019  Vasile Kaur              History and Physical  EGD 39933    Tino Peterson is a 74 y.o., male, Having severe acid reflux helped slightly with Protonix 40 mg daily. He was having excessive throat mucus production and mucus diarrhea year which improved with Robinul. The rectal mucus never came back but now the cough and chest pressure are back. ENT workup did not help, says there is no allergy or physical reason for the mucus discharge. Was in the ER yesterday with dyspnea, cough, chest pressure, breathing difficulty, CTA negative but did show distal esophageal swelling concerning for a malignancy. Dr. Favio Bar did an EGD last year and did not see any mass or esophageal disease. They did a nebulizer treatment and he was breathing better. Today he is not breathing well. CT scan abdomen 2017 showed a very low rectal anastomosis to a loop with the blind end 5 cm long. CT chest 2017 did not show esophageal swelling. Barium enema 2018 showed extensive diverticula of the entire left and sigmoid colon with a 5 cm blind pouch at the low rectal anastomosis. Diverticula pouches are seen on the blind pouch. He also has severe nasal sinus drainage. Both the nasal discharge and diarrhea resolved with Metamucil and Robinul 1 mg tid. Weight= 195 lb (88.5 kg) Body mass index is 28.8 kg/m². Lost 9 pounds over the past year.     Past Medical History:   Diagnosis Date    Arthritis     Atrial fibrillation ([address]     Burning pain     Degenerative lumbar disc     Diabetes ([address]     Herniated cervical disc     History of echocardiogram 03/16/2017    EF 60-65%    Hyperlipidemia     Hypertension     Urinary retention 3/17/2017       Past Surgical History:   Procedure Laterality Date    ABLATION OF DYSRHYTHMIC FOCUS  1980    APPROX 30-35 YRS AGO    CATARACT REMOVAL  2013    COLECTOMY  2010 sigmoid    COLONOSCOPY  5/2018 [facility]    EPIDURAL STEROID INJECTION N/A 12/11/2018 Ken, DO   metoprolol succinate (TOPROL XL) 50 MG extended release tablet Take 1 tablet by mouth daily Yes Lorin Everett, DO   SYMBICORT 160-4.5 MCG/ACT AERO INHALE 2 PUFFS INTO THE LUNGS TWO TIMES A DAY  Markel Rogers, DO   glimepiride (AMARYL) 2 MG tablet Take 1 tablet by mouth 2 times daily  Lorin Everett DO   traMADol (ULTRAM) 50 MG tablet Take 2 tablets by mouth every 6 hours as needed for Pain . Lorin Everett DO   aspirin 81 MG tablet Take 81 mg by mouth daily  Historical Provider, MD       Allergies: Oxycodone; Simvastatin; Ciprofloxacin hcl; Gabapentin; Hydrocodone; Norco [hydrocodone-acetaminophen]; Sulfamethoxazole-trimethoprim; and Bactrim [sulfamethoxazole-trimethoprim]   Social History:   TOBACCO:   reports that he quit smoking about 42 years ago. He smoked 0.00 packs per day for 0.00 years. He has quit using smokeless tobacco.  All smokers must join the free smoking cessation program and stop smoking for 3 months before having any Bariatric surgery. ETOH:    reports that he does not drink alcohol. Family History   Problem Relation Age of Onset    Stroke Mother     Other Father     Kidney Disease Brother     Cancer Brother     Kidney Disease Sister     Hypertension Other     Diabetes Other      Review of Systems:  Psychiatric:  depression and anxiety  Respiratory: cough, asthma  Cardiovascular: Htn  Gastrointestinal: excessive mucus  Musculoskeletal:negative  All others reviewed, negative    Physical Exam:   VITALS: Blood pressure (!) 156/83, pulse 69, temperature 98.6 °F (37 °C), temperature source Oral, height 5' 9\" (1.753 m), weight 195 lb (88.5 kg).   General appearance: alert, appears stated age and cooperative, does ambulate easily  Head: Normocephalic, without obvious abnormality, atraumatic  Eyes: PERRL  Ears/mouth/throat:  Ears clear, mouth normal, throat no redness  Neck: no adenopathy, no JVD, supple, symmetrical, trachea midline and thyroid not enlarged  Lungs: clear to auscultation bilaterally  Heart: regular rate and rhythm  Abdomen: soft, non-tender; bowel sounds normal; no masses,  no organomegaly  Extremities: extremities normal, atraumatic, no cyanosis or edema  Skin: no open wounds  Assessment:  Esophageal mass, swelling seen on CT, EGD recommended. Worsening cough, scratchy throat for the past 2-3 days, improved in the ER yesterday but today it is back. Plan: Will need to go back to the ER due to the dyspnea. We will schedule an EGD to check the esophageal mass.       Physician Signature: Electronically signed by Dr. Ilia Duvall MD    Send copy to Dung Dietrich DO

## 2019-05-26 ENCOUNTER — HOSPITAL ENCOUNTER (EMERGENCY)
Age: 74
Discharge: HOME | End: 2019-05-26
Payer: MEDICARE

## 2019-05-26 VITALS
TEMPERATURE: 97.6 F | DIASTOLIC BLOOD PRESSURE: 80 MMHG | OXYGEN SATURATION: 99 % | HEART RATE: 80 BPM | RESPIRATION RATE: 20 BRPM | SYSTOLIC BLOOD PRESSURE: 160 MMHG

## 2019-05-26 DIAGNOSIS — J01.10 ACUTE NON-RECURRENT FRONTAL SINUSITIS: Primary | ICD-10-CM

## 2019-05-26 PROCEDURE — 99212 OFFICE O/P EST SF 10 MIN: CPT

## 2019-05-26 RX ORDER — AMOXICILLIN AND CLAVULANATE POTASSIUM 875; 125 MG/1; MG/1
1 TABLET, FILM COATED ORAL 2 TIMES DAILY
Qty: 14 TABLET | Refills: 0 | Status: SHIPPED | OUTPATIENT
Start: 2019-05-26 | End: 2019-06-02

## 2019-05-26 NOTE — ED PROVIDER NOTES
--------------------------------------------- PAST HISTORY ---------------------------------------------  Past Medical History:  has a past medical history of Arthritis, Atrial fibrillation ([facility] Burning pain, Degenerative lumbar disc, Diabetes ([facility] Herniated cervical disc, History of echocardiogram, Hyperlipidemia, Hypertension, and Urinary retention. Past Surgical History:  has a past surgical history that includes colectomy (2010 sigmoid); Hemorrhoid surgery (2008); Prostate surgery (2017); Cataract removal (2013); Colonoscopy (5/2018 [facility]); Nerve Block (Bilateral, 10 12 2015); Nerve Block (Bilateral, 10/22/15); Nerve Block (Bilateral, 11 02 2015); Nerve Block (Left, 12/2/15); Nerve Block (Left, 12 16 15); Nerve Block (Left, 12 28 2015); Nerve Block (Left, 1 20 16); ablation of dysrhythmic focus (1980); lumbar fusion (03/06/2017); Nasal sinus surgery (12/07/2017); Upper gastrointestinal endoscopy (5/2018 [facility]); Nerve Block (12/11/2018); epidural steroid injection (N/A, 12/11/2018); Nerve Block (N/A, 01/08/2019); and epidural steroid injection (N/A, 1/8/2019). Social History:  reports that he quit smoking about 42 years ago. He smoked 0.00 packs per day for 0.00 years. He has quit using smokeless tobacco. He reports that he does not drink alcohol or use drugs. Family History: family history includes Cancer in his brother; Diabetes in an other family member; Hypertension in an other family member; Kidney Disease in his brother and sister; Other in his father; Stroke in his mother. The patients home medications have been reviewed. Allergies: Oxycodone; Simvastatin; Ciprofloxacin hcl; Gabapentin; Hydrocodone; Norco [hydrocodone-acetaminophen]; Sulfamethoxazole-trimethoprim; and Bactrim [sulfamethoxazole-trimethoprim]    -------------------------------------------------- RESULTS -------------------------------------------------  No results found for this visit on 05/26/19.   No orders to display       ------------------------- NURSING NOTES AND VITALS REVIEWED ---------------------------   The nursing notes within the ED encounter and vital signs as below have been reviewed. BP (!) 160/80   Pulse 80   Temp 97.6 °F (36.4 °C)   Resp 20   SpO2 99%   Oxygen Saturation Interpretation: Normal      ------------------------------------------ PROGRESS NOTES ------------------------------------------   I have spoken with the patient and discussed todays results, in addition to providing specific details for the plan of care and counseling regarding the diagnosis and prognosis. Their questions are answered at this time and they are agreeable with the plan.      --------------------------------- ADDITIONAL PROVIDER NOTES ---------------------------------     This patient is stable for discharge. I have shared the specific conditions for return, as well as the importance of follow-up. * NOTE: This report was transcribed using voice recognition software. Every effort was made to ensure accuracy; however, inadvertent computerized transcription errors may be present.    --------------------------------- IMPRESSION AND DISPOSITION ---------------------------------    IMPRESSION  1.  Acute non-recurrent frontal sinusitis        DISPOSITION  Disposition: Discharge to home  Patient condition is good         Karl Pearson PA-C  05/26/19 1123

## 2019-05-28 ENCOUNTER — TELEPHONE (OUTPATIENT)
Dept: FAMILY MEDICINE CLINIC | Age: 74
End: 2019-05-28

## 2019-05-28 ENCOUNTER — OFFICE VISIT (OUTPATIENT)
Dept: FAMILY MEDICINE CLINIC | Age: 74
End: 2019-05-28
Payer: MEDICARE

## 2019-05-28 VITALS
DIASTOLIC BLOOD PRESSURE: 80 MMHG | OXYGEN SATURATION: 98 % | SYSTOLIC BLOOD PRESSURE: 138 MMHG | HEART RATE: 75 BPM | BODY MASS INDEX: 29.18 KG/M2 | HEIGHT: 69 IN | RESPIRATION RATE: 18 BRPM | WEIGHT: 197 LBS

## 2019-05-28 DIAGNOSIS — R07.89 ATYPICAL CHEST PAIN: Primary | ICD-10-CM

## 2019-05-28 DIAGNOSIS — J98.9 REACTIVE AIRWAY DISEASE THAT IS NOT ASTHMA: ICD-10-CM

## 2019-05-28 PROCEDURE — 1111F DSCHRG MED/CURRENT MED MERGE: CPT | Performed by: FAMILY MEDICINE

## 2019-05-28 PROCEDURE — 1036F TOBACCO NON-USER: CPT | Performed by: FAMILY MEDICINE

## 2019-05-28 PROCEDURE — 4040F PNEUMOC VAC/ADMIN/RCVD: CPT | Performed by: FAMILY MEDICINE

## 2019-05-28 PROCEDURE — G8427 DOCREV CUR MEDS BY ELIG CLIN: HCPCS | Performed by: FAMILY MEDICINE

## 2019-05-28 PROCEDURE — 1123F ACP DISCUSS/DSCN MKR DOCD: CPT | Performed by: FAMILY MEDICINE

## 2019-05-28 PROCEDURE — G8417 CALC BMI ABV UP PARAM F/U: HCPCS | Performed by: FAMILY MEDICINE

## 2019-05-28 PROCEDURE — 99213 OFFICE O/P EST LOW 20 MIN: CPT | Performed by: FAMILY MEDICINE

## 2019-05-28 PROCEDURE — 3017F COLORECTAL CA SCREEN DOC REV: CPT | Performed by: FAMILY MEDICINE

## 2019-05-28 RX ORDER — ALBUTEROL SULFATE 90 UG/1
2 AEROSOL, METERED RESPIRATORY (INHALATION) EVERY 4 HOURS PRN
Qty: 1 INHALER | Refills: 5 | Status: SHIPPED | OUTPATIENT
Start: 2019-05-28 | End: 2019-07-16 | Stop reason: SDUPTHER

## 2019-05-28 ASSESSMENT — PATIENT HEALTH QUESTIONNAIRE - PHQ9
2. FEELING DOWN, DEPRESSED OR HOPELESS: 0
SUM OF ALL RESPONSES TO PHQ QUESTIONS 1-9: 0
SUM OF ALL RESPONSES TO PHQ QUESTIONS 1-9: 0
1. LITTLE INTEREST OR PLEASURE IN DOING THINGS: 0
SUM OF ALL RESPONSES TO PHQ9 QUESTIONS 1 & 2: 0

## 2019-05-29 ENCOUNTER — TELEPHONE (OUTPATIENT)
Dept: FAMILY MEDICINE CLINIC | Age: 74
End: 2019-05-29

## 2019-05-29 RX ORDER — ALBUTEROL SULFATE 90 UG/1
2 AEROSOL, METERED RESPIRATORY (INHALATION) EVERY 6 HOURS PRN
Qty: 1 INHALER | Refills: 3 | Status: SHIPPED | OUTPATIENT
Start: 2019-05-29 | End: 2019-06-03

## 2019-05-29 ASSESSMENT — ENCOUNTER SYMPTOMS
SORE THROAT: 0
CHOKING: 0
BLOOD IN STOOL: 0
NAUSEA: 0
SINUS PRESSURE: 0
SHORTNESS OF BREATH: 0
VOICE CHANGE: 0
COUGH: 1
ABDOMINAL PAIN: 0
ANAL BLEEDING: 0
DIARRHEA: 0
CHEST TIGHTNESS: 0
RHINORRHEA: 0
WHEEZING: 1
STRIDOR: 0
VOMITING: 0
RECTAL PAIN: 0
FACIAL SWELLING: 0
TROUBLE SWALLOWING: 0
COLOR CHANGE: 0
APNEA: 0
ABDOMINAL DISTENTION: 0
CONSTIPATION: 0

## 2019-05-29 NOTE — TELEPHONE ENCOUNTER
You offered Shayla Cashing a script for a nebulier .   He declined but is now asking if you will send one and supplies to [facility]

## 2019-05-30 DIAGNOSIS — J21.9 ACUTE BRONCHIOLITIS DUE TO UNSPECIFIED ORGANISM: ICD-10-CM

## 2019-05-30 DIAGNOSIS — J45.909 ASTHMA, UNSPECIFIED ASTHMA SEVERITY, UNSPECIFIED WHETHER COMPLICATED, UNSPECIFIED WHETHER PERSISTENT: Primary | ICD-10-CM

## 2019-05-30 NOTE — PROGRESS NOTES
Bibiana Horne is a 74 y.o. male  . Subjective:      Patient came in for referral to Dr Dominique Euceda cardiology. Has been having constant vague chest pain and has been to ER multiple times. States that albuterol inhaler works well. I will also set him up for a pulmonology consult considering the nature of his symptoms. This includes cough and wheezing . Diabetes   He presents for his follow-up diabetic visit. He has type 2 diabetes mellitus. His disease course has been improving. There are no hypoglycemic associated symptoms. Pertinent negatives for hypoglycemia include no nervousness/anxiousness or pallor. Associated symptoms include chest pain and foot paresthesias. Pertinent negatives for diabetes include no fatigue, no polydipsia, no polyphagia and no polyuria. There are no hypoglycemic complications. Symptoms are stable. Diabetic complications include peripheral neuropathy. Risk factors for coronary artery disease include dyslipidemia, diabetes mellitus and hypertension. Current diabetic treatment includes oral agent (dual therapy). He is compliant with treatment most of the time. His weight is stable. He is following a generally healthy diet. Meal planning includes avoidance of concentrated sweets. He participates in exercise intermittently. His home blood glucose trend is decreasing steadily. An ACE inhibitor/angiotensin II receptor blocker is being taken. He sees a podiatrist.Eye exam is current. Fatigue   This is a chronic problem. The current episode started more than 1 year ago. The problem occurs daily. The problem has been waxing and waning. Associated symptoms include chest pain and coughing. Pertinent negatives include no abdominal pain, chills, congestion, diaphoresis, fatigue, fever, nausea, rash, sore throat or vomiting. Nothing aggravates the symptoms. He has tried nothing for the symptoms. The treatment provided no relief.        Review of Systems   Constitutional: Negative for activity change, appetite change, chills, diaphoresis, fatigue, fever and unexpected weight change. HENT: Negative for congestion, dental problem, ear discharge, ear pain, facial swelling, hearing loss, mouth sores, nosebleeds, postnasal drip, rhinorrhea, sinus pressure, sneezing, sore throat, tinnitus, trouble swallowing and voice change. Respiratory: Positive for cough and wheezing. Negative for apnea, choking, chest tightness, shortness of breath and stridor. Cardiovascular: Positive for chest pain. Negative for palpitations and leg swelling. Gastrointestinal: Negative for abdominal distention, abdominal pain, anal bleeding, blood in stool, constipation, diarrhea, nausea, rectal pain and vomiting. Endocrine: Negative for cold intolerance, heat intolerance, polydipsia, polyphagia and polyuria. Skin: Negative for color change, pallor, rash and wound. Psychiatric/Behavioral: Negative for dysphoric mood, self-injury, sleep disturbance and suicidal ideas. The patient is not nervous/anxious.         Past Medical History:   Diagnosis Date    Arthritis     Atrial fibrillation (HCC)     Burning pain     Degenerative lumbar disc     Diabetes ([facility] [address]     Herniated cervical disc     History of echocardiogram 03/16/2017    EF 60-65%    Hyperlipidemia     Hypertension     Urinary retention 3/17/2017       Social History     Socioeconomic History    Marital status:      Spouse name: Not on file    Number of children: Not on file    Years of education: Not on file    Highest education level: Not on file   Occupational History    Not on file   Social Needs    Financial resource strain: Not on file    Food insecurity:     Worry: Not on file     Inability: Not on file    Transportation needs:     Medical: Not on file     Non-medical: Not on file   Tobacco Use    Smoking status: Former Smoker     Packs/day: 0.00     Years: 0.00     Pack years: 0.00     Last attempt to quit: 1/1/1977     Years since quitting: 42.4    Smokeless tobacco: Former User    Tobacco comment: quit in 1978   Substance and Sexual Activity    Alcohol use: No     Alcohol/week: 0.0 oz    Drug use: No    Sexual activity: Never   Lifestyle    Physical activity:     Days per week: Not on file     Minutes per session: Not on file    Stress: Not on file   Relationships    Social connections:     Talks on phone: Not on file     Gets together: Not on file     Attends Alevism service: Not on file     Active member of club or organization: Not on file     Attends meetings of clubs or organizations: Not on file     Relationship status: Not on file    Intimate partner violence:     Fear of current or ex partner: Not on file     Emotionally abused: Not on file     Physically abused: Not on file     Forced sexual activity: Not on file   Other Topics Concern    Not on file   Social History Narrative    Not on file       Family History   Problem Relation Age of Onset    Stroke Mother     Other Father     Kidney Disease Brother     Cancer Brother     Kidney Disease Sister     Hypertension Other     Diabetes Other        Current Outpatient Medications on File Prior to Visit   Medication Sig Dispense Refill    amoxicillin-clavulanate (AUGMENTIN) 875-125 MG per tablet Take 1 tablet by mouth 2 times daily for 7 days 14 tablet 0    lovastatin (MEVACOR) 10 MG tablet Take 20 mg by mouth nightly      SYMBICORT 160-4.5 MCG/ACT AERO INHALE 2 PUFFS INTO THE LUNGS TWO TIMES A DAY 10.2 g 2    ammonium lactate (LAC-HYDRIN) 12 % lotion Apply topically daily.  500 g 5    glycopyrrolate (ROBINUL) 1 MG tablet Take 1 tablet by mouth 3 times daily 274 tablet 3    DULoxetine (CYMBALTA) 60 MG extended release capsule Take 1 capsule by mouth daily 30 capsule 5    folic acid (FOLVITE) 1 MG tablet Take 1 tablet by mouth daily 30 tablet 5    glimepiride (AMARYL) 2 MG tablet Take 1 tablet by mouth 2 times daily 60 tablet 5    Psyllium (METAMUCIL) 28.3 % POWD Take 1 each by mouth 4 times daily      pantoprazole (PROTONIX) 40 MG tablet Take 1 tablet by mouth daily (Patient taking differently: Take 40 mg by mouth daily as needed (Upset Stomach) ) 30 tablet 5    cyclobenzaprine (FLEXERIL) 10 MG tablet Take 1 tablet by mouth 3 times daily as needed for Muscle spasms 90 tablet 5    metoprolol succinate (TOPROL XL) 50 MG extended release tablet Take 1 tablet by mouth daily 90 tablet 11    traMADol (ULTRAM) 50 MG tablet Take 2 tablets by mouth every 6 hours as needed for Pain . 120 tablet 5    aspirin 81 MG tablet Take 81 mg by mouth daily       No current facility-administered medications on file prior to visit. Allergies   Allergen Reactions    Oxycodone Rash    Simvastatin Other (See Comments)     Muscle weakness    Ciprofloxacin Hcl Other (See Comments)     \"Heel/ tendon pain\"    Gabapentin Other (See Comments)     Constipation      Hydrocodone Nausea Only    Norco [Hydrocodone-Acetaminophen] Nausea Only and Other (See Comments)     Causes sick feeling in head      Sulfamethoxazole-Trimethoprim     Bactrim [Sulfamethoxazole-Trimethoprim] Nausea Only       I have reviewed his allergies, medications, problem list, medical,social and family history and have updated as needed in the electronic medical record. Objective:     Physical Exam   Constitutional: He is oriented to person, place, and time. He appears well-developed and well-nourished. No distress. HENT:   Head: Normocephalic and atraumatic. Right Ear: External ear normal.   Left Ear: External ear normal.   Nose: Nose normal.   Mouth/Throat: Oropharynx is clear and moist. No oropharyngeal exudate. Eyes: Pupils are equal, round, and reactive to light. Conjunctivae and EOM are normal. Right eye exhibits no discharge. Left eye exhibits no discharge. No scleral icterus. Neck: Normal range of motion. Neck supple. No JVD present. No tracheal deviation present. No thyromegaly present.    Cardiovascular: Normal rate, regular rhythm, normal heart sounds and intact distal pulses. Exam reveals no gallop and no friction rub. No murmur heard. Pulmonary/Chest: Effort normal and breath sounds normal. No stridor. No respiratory distress. He has no wheezes. He has no rales. He exhibits no tenderness. Abdominal: Soft. Bowel sounds are normal. He exhibits no distension and no mass. There is no tenderness. There is no rebound and no guarding. Genitourinary:   Genitourinary Comments: Deferred by patient   Musculoskeletal: Normal range of motion. He exhibits no edema or tenderness. Lymphadenopathy:     He has no cervical adenopathy. Neurological: He is alert and oriented to person, place, and time. He has normal reflexes. He displays normal reflexes. No cranial nerve deficit. He exhibits normal muscle tone. Coordination normal.   Skin: Skin is warm and dry. No rash noted. He is not diaphoretic. No erythema. No pallor. Psychiatric: He has a normal mood and affect. His behavior is normal. Judgment and thought content normal.   Nursing note and vitals reviewed. Assessment / Plan:   Danette Waite was seen today for follow-up. Diagnoses and all orders for this visit:    Atypical chest pain  -     External Referral To Cardiology    Reactive airway disease that is not asthma  -     albuterol sulfate HFA (PROVENTIL HFA) 108 (90 Base) MCG/ACT inhaler; Inhale 2 puffs into the lungs every 4 hours as needed for Wheezing  -     [facility] ([facility]) - Tram Espino DO, Pulmonary, [place]        Reviewed healthmaintenance report. Patient is aware of deficiencies and suggested preventative tests.

## 2019-05-30 NOTE — PROGRESS NOTES
Per Dr Al Khan verbal order, orders sent to [facility] for a nebulizer and albuterol solution qid prn. Pt informed.

## 2019-06-03 NOTE — PROGRESS NOTES
[address] [facility]                                                                                                                    PRE OP INSTRUCTIONS FOR  Joie Robert        Date: 6/3/2019    Date of surgery: 6/4/19     Arrival Time: Hospital will call you between 5pm and 7pm with your final arrival time for surgery    1. Do not eat or drink anything after midnight prior to surgery. This includes no water, chewing gum, mints or ice chips. 2. Take the following medications with a small sip of water on the morning of Surgery: Metoprolol     3. Diabetics may take evening dose of insulin but none after midnight. If you feel symptomatic or low blood sugar morning of surgery drink 1-2 ounces of apple juice only. 4. Aspirin, Ibuprofen, Advil, Naproxen, Vitamin E and other Anti-inflammatory products should be stopped  before surgery  as directed by your physician. Take Tylenol only unless instructed otherwise by your surgeon. 5. Check with your Doctor regarding stopping Plavix, Coumadin, Lovenox, Eliquis, Effient, or other blood thinners. 6. Do not smoke,use illicit drugs and do not drink any alcoholic beverages 24 hours prior to surgery. 7. You may brush your teeth the morning of surgery. DO NOT SWALLOW WATER    8. You MUST make arrangements for a responsible adult to take you home after your surgery. You will not be allowed to leave alone or drive yourself home. It is strongly suggested someone stay with you the first 24 hrs. Your surgery will be cancelled if you do not have a ride home. 9. PEDIATRIC PATIENTS ONLY:  A parent/legal guardian must accompany a child scheduled for surgery and plan to stay at the hospital until the child is discharged. Please do not bring other children with you.     10. Please wear simple, loose fitting clothing to the hospital.  Maria Elena Beards not bring valuables (money, credit cards, checkbooks, etc.) Do not wear any makeup (including no eye makeup) or nail polish on your fingers or toes. 11. DO NOT wear any jewelry or piercings on day of surgery. All body piercing jewelry must be removed. 12. Shower the night before surgery with _x__Antibacterial soap /SALLY WIPES________    13. TOTAL JOINT REPLACEMENT/HYSTERECTOMY PATIENTS ONLY---Remember to bring Blood Bank bracelet to the hospital on the day of surgery. 14. If you have a Living Will and Durable Power of  for Healthcare, please bring in a copy. 15. If appropriate bring crutches, inspirex, WALKER, CANE etc... 12. Notify your Surgeon if you develop any illness between now and surgery time, cough, cold, fever, sore throat, nausea, vomiting, etc.  Please notify your surgeon if you experience dizziness, shortness of breath or blurred vision between now & the time of your surgery. 17. If you have _x__dentures, they will be removed before going to the OR; we will provide you a container. If you wear ___contact lenses or _x__glasses, they will be removed; please bring a case for them. 18. To provide excellent care visitors will be limited to 2 in the room at any given time. 19. Please bring picture ID and insurance card. 20. Sleep apnea patients need to bring CPAP AND SETTINGS to hospital on day of surgery. 21. During flu season no children under the age of 15 are permitted in the hospital for the safety of all patients. 22. Other                  Please call AMBULATORY CARE if you have any further questions.    [facility]     [address]

## 2019-06-04 ENCOUNTER — ANESTHESIA (OUTPATIENT)
Dept: ENDOSCOPY | Age: 74
End: 2019-06-04
Payer: MEDICARE

## 2019-06-04 ENCOUNTER — TELEPHONE (OUTPATIENT)
Dept: FAMILY MEDICINE CLINIC | Age: 74
End: 2019-06-04

## 2019-06-04 ENCOUNTER — HOSPITAL ENCOUNTER (OUTPATIENT)
Age: 74
Setting detail: OUTPATIENT SURGERY
Discharge: HOME | End: 2019-06-04
Attending: SURGERY | Admitting: SURGERY
Payer: MEDICARE

## 2019-06-04 ENCOUNTER — ANESTHESIA EVENT (OUTPATIENT)
Dept: ENDOSCOPY | Age: 74
End: 2019-06-04
Payer: MEDICARE

## 2019-06-04 VITALS
RESPIRATION RATE: 16 BRPM | OXYGEN SATURATION: 96 % | BODY MASS INDEX: 29.09 KG/M2 | TEMPERATURE: 97.1 F | DIASTOLIC BLOOD PRESSURE: 66 MMHG | SYSTOLIC BLOOD PRESSURE: 128 MMHG | HEART RATE: 57 BPM | HEIGHT: 69 IN | WEIGHT: 196.4 LBS

## 2019-06-04 VITALS
OXYGEN SATURATION: 96 % | DIASTOLIC BLOOD PRESSURE: 59 MMHG | SYSTOLIC BLOOD PRESSURE: 112 MMHG | RESPIRATION RATE: 20 BRPM

## 2019-06-04 PROBLEM — R13.19 ESOPHAGEAL DYSPHAGIA: Status: ACTIVE | Noted: 2019-06-04

## 2019-06-04 LAB — METER GLUCOSE: 111 MG/DL (ref 74–99)

## 2019-06-04 PROCEDURE — 2580000003 HC RX 258: Performed by: SURGERY

## 2019-06-04 PROCEDURE — 6360000002 HC RX W HCPCS: Performed by: NURSE ANESTHETIST, CERTIFIED REGISTERED

## 2019-06-04 PROCEDURE — 3609012400 HC EGD TRANSORAL BIOPSY SINGLE/MULTIPLE: Performed by: SURGERY

## 2019-06-04 PROCEDURE — 7100000011 HC PHASE II RECOVERY - ADDTL 15 MIN: Performed by: SURGERY

## 2019-06-04 PROCEDURE — 2709999900 HC NON-CHARGEABLE SUPPLY: Performed by: SURGERY

## 2019-06-04 PROCEDURE — 82962 GLUCOSE BLOOD TEST: CPT

## 2019-06-04 PROCEDURE — 7100000010 HC PHASE II RECOVERY - FIRST 15 MIN: Performed by: SURGERY

## 2019-06-04 PROCEDURE — 43239 EGD BIOPSY SINGLE/MULTIPLE: CPT | Performed by: SURGERY

## 2019-06-04 PROCEDURE — 3700000000 HC ANESTHESIA ATTENDED CARE: Performed by: SURGERY

## 2019-06-04 PROCEDURE — 3700000001 HC ADD 15 MINUTES (ANESTHESIA): Performed by: SURGERY

## 2019-06-04 PROCEDURE — 88305 TISSUE EXAM BY PATHOLOGIST: CPT

## 2019-06-04 RX ORDER — SODIUM CHLORIDE, SODIUM LACTATE, POTASSIUM CHLORIDE, CALCIUM CHLORIDE 600; 310; 30; 20 MG/100ML; MG/100ML; MG/100ML; MG/100ML
INJECTION, SOLUTION INTRAVENOUS CONTINUOUS
Status: DISCONTINUED | OUTPATIENT
Start: 2019-06-04 | End: 2019-06-04 | Stop reason: HOSPADM

## 2019-06-04 RX ORDER — PROPOFOL 10 MG/ML
INJECTION, EMULSION INTRAVENOUS PRN
Status: DISCONTINUED | OUTPATIENT
Start: 2019-06-04 | End: 2019-06-04 | Stop reason: SDUPTHER

## 2019-06-04 RX ADMIN — PROPOFOL 50 MG: 10 INJECTION, EMULSION INTRAVENOUS at 11:53

## 2019-06-04 RX ADMIN — SODIUM CHLORIDE, POTASSIUM CHLORIDE, SODIUM LACTATE AND CALCIUM CHLORIDE: 600; 310; 30; 20 INJECTION, SOLUTION INTRAVENOUS at 11:48

## 2019-06-04 RX ADMIN — PROPOFOL 50 MG: 10 INJECTION, EMULSION INTRAVENOUS at 11:57

## 2019-06-04 RX ADMIN — PROPOFOL 50 MG: 10 INJECTION, EMULSION INTRAVENOUS at 11:58

## 2019-06-04 RX ADMIN — PROPOFOL 100 MG: 10 INJECTION, EMULSION INTRAVENOUS at 11:52

## 2019-06-04 ASSESSMENT — PAIN SCALES - GENERAL
PAINLEVEL_OUTOF10: 0
PAINLEVEL_OUTOF10: 0

## 2019-06-04 NOTE — OP NOTE
Cynthia Reymundo    Operative Report  DATE OF PROCEDURE: 6/4/2019    SURGEON: Dr. Manuel Rizvi Ege: none    PREOPERATIVE DIAGNOSES:    DDD (degenerative disc disease), cervical M50.30    Cervical radiculopathy M54.12    Type 2 diabetes mellitus with diabetic polyneuropathy, without long-term current use of insulin (HCC) E11.42    Chronic pain syndrome G89.4    Diabetic foot infection (HCC) E11.628, L08.9    Esophageal dysphagia R13.10     POSTOPERATIVE DIAGNOSES:    Normal esophagus, stomach and duodenum, no esophageal mass or swelling    OPERATION:    EGD esophagogastroduodenoscopy    with biopsy                 [id number]     ANESTHESIA: LMAC. CONSENT AND INDICATIONS:  This is a 74y.o. year old male who had a CT scan read as distal esophageal swelling concerning for cancer. EGD last year normal. He needs to have an EGD as part of the work up. I have discussed with the patient the indication, alternatives, and the possible risks and/or complications of the planned procedure and the anesthesia methods. The patient and/or patient representative appear to understand and agree to proceed. Monitoring and Safety: The patient was placed on a cardiac monitor and vital signs, pulse oximetry and level of consciousness were continuously evaluated throughout the procedure. The patient was closely monitored until recovery from the medications was complete and the patient had returned to baseline status. Anaesthesia was present at all times during the procedure. OPERATIONS: The patient was placed on the table and sedated by anaesthesia. Bite block was placed. A lubricated scope was easily passed into the upper esophagus which looked normal. The distal esophagus looked normal. The scope was passed into the stomach and retroflexed. There was no hiatal hernia. The scope was passed down toward the pylorus. The antral mucosa was normal, no gastritis.  The scope

## 2019-06-04 NOTE — TELEPHONE ENCOUNTER
Msg received that pt is scheduled with Dr Ashley Rosario on 7.15.19 at 11:15 am.  Called pt to inform him and there was no answer. Will try again later.

## 2019-06-04 NOTE — H&P
Eran Yarbrough  6/4/2019  [address]               History and Physical    Eran Yarbrough is a 74 y.o., male, having severe acid reflux helped slightly with Protonix 40 mg daily. He was having excessive throat mucus production and mucus diarrhea year which improved with Robinul. The rectal mucus never came back but now the cough and chest pressure are back. ENT workup did not help, says there is no allergy or physical reason for the mucus discharge. Was in the ER yesterday with dyspnea, cough, chest pressure, breathing difficulty, CTA negative but did show distal esophageal swelling concerning for a malignancy. Dr. Simone Pritchett did an EGD last year and did not see any mass or esophageal disease. They did a nebulizer treatment and he was breathing better. Today he is not breathing well. CT scan abdomen 2017 showed a very low rectal anastomosis to a loop with the blind end 5 cm long. CT chest 2017 did not show esophageal swelling. Barium enema 2018 showed extensive diverticula of the entire left and sigmoid colon with a 5 cm blind pouch at the low rectal anastomosis. Diverticula pouches are seen on the blind pouch. He also has severe nasal sinus drainage. Both the nasal discharge and diarrhea resolved with Metamucil and Robinul 1 mg tid. Weight= 196 lb 6.4 oz (89.1 kg) Body mass index is 29 kg/m². Lost 9 pounds over the past year.     Past Medical History:   Diagnosis Date    Arthritis     Atrial fibrillation (HCC)     Burning pain     Degenerative lumbar disc     Diabetes ([facility] [address]     Herniated cervical disc     History of echocardiogram 03/16/2017    EF 60-65%    Hx of blood clots     Hyperlipidemia     Hypertension     Urinary retention 3/17/2017       Past Surgical History:   Procedure Laterality Date    ABLATION OF DYSRHYTHMIC FOCUS  1980    APPROX 30-35 YRS AGO    CATARACT REMOVAL  2013    COLECTOMY  2010 sigmoid    COL[facility]COPY  5/2018 [facility]    ENDOSCOPY, COLON, DIAGNOSTIC      EPIDURAL STEROID INJECTION N/A 12/11/2018    C7-T1 EPIDURAL STEROID INJECTION performed by Caden Smith DO at [address] 1/8/2019    C7 - T1 EPIDURAL STEROID INJECTION #2 performed by Caden Smith DO at [address]  2008    LUMBAR FUSION  03/06/2017    NASAL SINUS SURGERY  12/07/2017    Submucosal Resection of Inferior Turbinate    NERVE BLOCK Bilateral 10 12 2015    bilateral sacroiliac joint injection #1    NERVE BLOCK Bilateral 10/22/15    sacroiliac joint #2    NERVE BLOCK Bilateral 11 02 2015    Sacroiliac joint bilateral #3    NERVE BLOCK Left 12/2/15    lumbar transforaminal #1    NERVE BLOCK Left 12 16 15    NERVE BLOCK Left 12 28 2015    transforaminal nerve block left lumbar #3    NERVE BLOCK Left 1 20 16    radiofrequency     NERVE BLOCK  12/11/2018    C7-T1 epidural    NERVE BLOCK N/A 01/08/2019    cervical epidural steroid injection    OTHER SURGICAL HISTORY  1943    had a feeding tube as an infant, scar    PROSTATE SURGERY  2017    UPPER GASTROINTESTINAL ENDOSCOPY  5/2018 [facility] Medications  Prior to Visit Medications    Medication Sig Taking? Authorizing Provider   albuterol (PROVENTIL) (5 MG/ML) 0.5% nebulizer solution Take 1 mL by nebulization 4 times daily as needed for Wheezing Yes Lorin Everett DO   albuterol sulfate HFA (PROVENTIL HFA) 108 (90 Base) MCG/ACT inhaler Inhale 2 puffs into the lungs every 4 hours as needed for Wheezing Yes Lorin Everett DO   lovastatin (MEVACOR) 10 MG tablet Take 20 mg by mouth nightly Yes Historical Provider, MD   ammonium lactate (LAC-HYDRIN) 12 % lotion Apply topically daily.  Yes Lorin Everett DO   glycopyrrolate (ROBINUL) 1 MG tablet Take 1 tablet by mouth 3 times daily Yes Barak Lopez MD   folic acid (FOLVITE) 1 MG tablet Take 1 tablet by mouth daily Yes Lorin Everett DO   Psyllium (METAMUCIL) 28.3 % POWD Take 1 each by mouth 4 times daily Yes Historical Provider, MD   pantoprazole (PROTONIX) 40 MG tablet Take 1 tablet by mouth daily  Patient taking differently: Take 40 mg by mouth daily as needed (Upset Stomach)  Yes Ally Anna DO   cyclobenzaprine (FLEXERIL) 10 MG tablet Take 1 tablet by mouth 3 times daily as needed for Muscle spasms Yes Ally Anna DO   metoprolol succinate (TOPROL XL) 50 MG extended release tablet Take 1 tablet by mouth daily Yes Ally Anna DO   traMADol (ULTRAM) 50 MG tablet Take 2 tablets by mouth every 6 hours as needed for Pain . Yes Ally Anna DO   aspirin 81 MG tablet Take 81 mg by mouth daily Yes Historical Provider, MD       Allergies: Oxycodone; Simvastatin; Ciprofloxacin hcl; Gabapentin; Hydrocodone; Norco [hydrocodone-acetaminophen]; Sulfamethoxazole-trimethoprim; and Bactrim [sulfamethoxazole-trimethoprim]   Social History:   TOBACCO:   reports that he quit smoking about 42 years ago. He smoked 0.00 packs per day for 0.00 years. He has quit using smokeless tobacco.  All smokers must join the free smoking cessation program and stop smoking for 3 months before having any Bariatric surgery. ETOH:    reports that he does not drink alcohol. Family History   Problem Relation Age of Onset    Stroke Mother     Other Father     Kidney Disease Brother     Cancer Brother     Kidney Disease Sister     Hypertension Other     Diabetes Other      Review of Systems:  Psychiatric:  depression and anxiety  Respiratory: cough, asthma  Cardiovascular: Htn  Gastrointestinal: excessive mucus  Musculoskeletal:negative  All others reviewed, negative    Physical Exam:   VITALS: Blood pressure (!) 165/75, pulse 58, temperature 98.4 °F (36.9 °C), temperature source Tympanic, resp. rate 14, height 5' 9\" (1.753 m), weight 196 lb 6.4 oz (89.1 kg).   General appearance: alert, appears stated age and cooperative, does ambulate easily  Head: Normocephalic, without obvious abnormality, atraumatic  Eyes: PERRL  Ears/mouth/throat:  Ears clear, mouth normal, throat no redness  Neck: no adenopathy, no JVD, supple, symmetrical, trachea midline and thyroid not enlarged  Lungs: clear to auscultation bilaterally  Heart: regular rate and rhythm  Abdomen: soft, non-tender; bowel sounds normal; no masses,  no organomegaly  Extremities: extremities normal, atraumatic, no cyanosis or edema  Skin: no open wounds  Assessment:  Esophageal mass, swelling seen on CT, EGD recommended. Worsening cough, scratchy throat for the past 2-3 days, improved in the ER yesterday but today it is back. Plan: We will schedule an EGD to check the esophageal mass.       Physician Signature: Electronically signed by Dr. Jennifer Broderick MD    Send copy to Chadd Scanlon DO

## 2019-06-04 NOTE — ANESTHESIA PRE PROCEDURE
Department of Anesthesiology  Preprocedure Note       Name:  Maryanne Szymanski   Age:  74 y.o.  :  1945                                          MRN:  [id number]         Date:  2019      Surgeon: Demi Isaac):  Dilshad Young MD    Procedure: EGD ESOPHAGOGASTRODUODENOSCOPY (N/A )    Medications prior to admission:   Prior to Admission medications    Medication Sig Start Date End Date Taking? Authorizing Provider   albuterol (PROVENTIL) (5 MG/ML) 0.5% nebulizer solution Take 1 mL by nebulization 4 times daily as needed for Wheezing 19  Yes Rafi Hung DO   albuterol sulfate HFA (PROVENTIL HFA) 108 (90 Base) MCG/ACT inhaler Inhale 2 puffs into the lungs every 4 hours as needed for Wheezing 19 Yes Markel Rogers, DO   lovastatin (MEVACOR) 10 MG tablet Take 20 mg by mouth nightly   Yes Historical Provider, MD   ammonium lactate (LAC-HYDRIN) 12 % lotion Apply topically daily. 3/13/19  Yes Rafi Hung DO   glycopyrrolate (ROBINUL) 1 MG tablet Take 1 tablet by mouth 3 times daily 19  Yes Dilshad Young MD   folic acid (FOLVITE) 1 MG tablet Take 1 tablet by mouth daily 18  Yes Rafi Hung DO   Psyllium (METAMUCIL) 28.3 % POWD Take 1 each by mouth 4 times daily   Yes Historical Provider, MD   pantoprazole (PROTONIX) 40 MG tablet Take 1 tablet by mouth daily  Patient taking differently: Take 40 mg by mouth daily as needed (Upset Stomach)  18  Yes Rafi Hung DO   cyclobenzaprine (FLEXERIL) 10 MG tablet Take 1 tablet by mouth 3 times daily as needed for Muscle spasms 18  Yes Rafi Hnug DO   metoprolol succinate (TOPROL XL) 50 MG extended release tablet Take 1 tablet by mouth daily 18  Yes Rafi Hung DO   traMADol (ULTRAM) 50 MG tablet Take 2 tablets by mouth every 6 hours as needed for Pain .  11/15/17  Yes Rafi Hung DO   aspirin 81 MG tablet Take 81 mg by mouth daily   Yes Historical Provider, MD       Current medications:    Current Facility-Administered Medications   Medication Dose Route Frequency Provider Last Rate Last Dose    lactated ringers infusion   Intravenous Continuous Kel Caceres MD           Allergies:     Allergies   Allergen Reactions    Oxycodone Rash    Simvastatin Other (See Comments)     Muscle weakness    Ciprofloxacin Hcl Other (See Comments)     \"Heel/ tendon pain\"    Gabapentin Other (See Comments)     Constipation      Hydrocodone Nausea Only    Norco [Hydrocodone-Acetaminophen] Nausea Only and Other (See Comments)     Causes sick feeling in head      Sulfamethoxazole-Trimethoprim     Bactrim [Sulfamethoxazole-Trimethoprim] Nausea Only       Problem List:    Patient Active Problem List   Diagnosis Code    DDD (degenerative disc disease), cervical M50.30    Cervical radiculopathy M54.12    Type 2 diabetes mellitus with diabetic polyneuropathy, without long-term current use of insulin ([facility]) E11.42    Chronic pain syndrome G89.4    Diabetic foot infection ([facility] E11.628, L08.9       Past Medical History:        Diagnosis Date    Arthritis     Atrial fibrillation ([address]     Burning pain     Degenerative lumbar disc     Diabetes ([facility]     Herniated cervical disc     History of echocardiogram 03/16/2017    EF 60-65%    Hx of blood clots     Hyperlipidemia     Hypertension     Urinary retention 3/17/2017       Past Surgical History:        Procedure Laterality Date    ABLATION OF DYSRHYTHMIC FOCUS  1980    APPROX 30-35 YRS AGO    CATARACT REMOVAL  2013    COLECTOMY  2010 sigmoid    COL[facility]OSCOPY  5/2018 [facility]    ENDOSCOPY, COLON, DIAGNOSTIC      EPIDURAL STEROID INJECTION N/A 12/11/2018    C7-T1 EPIDURAL STEROID INJECTION performed by Uriah Quesada DO at [place] [id number] N/A 1/8/2019    C7 - T1 EPIDURAL STEROID INJECTION #2 performed by Uriah Quesada DO at [address] [place] [address]  2008    LUMBAR FUSION  03/06/2017    NASAL SINUS kg/m².    CBC:   Lab Results   Component Value Date    WBC 8.0 05/23/2019    RBC 5.16 05/23/2019    HGB 15.7 05/23/2019    HCT 46.3 05/23/2019    MCV 89.7 05/23/2019    RDW 12.9 05/23/2019     05/23/2019       CMP:   Lab Results   Component Value Date     05/23/2019    K 5.0 05/23/2019     05/23/2019    CO2 27 05/23/2019    BUN 11 05/23/2019    CREATININE 1.1 05/23/2019    GFRAA >60 05/23/2019    LABGLOM >60 05/23/2019    GLUCOSE 132 05/23/2019    PROT 7.3 05/16/2019    CALCIUM 9.5 05/23/2019    BILITOT 0.4 05/16/2019    ALKPHOS 113 05/16/2019    AST 28 05/16/2019    ALT 24 05/16/2019       POC Tests: No results for input(s): POCGLU, POCNA, POCK, POCCL, POCBUN, POCHEMO, POCHCT in the last 72 hours. Coags:   Lab Results   Component Value Date    PROTIME 13.9 03/15/2017    INR 1.3 03/15/2017    APTT 25.5 03/15/2017       HCG (If Applicable): No results found for: PREGTESTUR, PREGSERUM, HCG, HCGQUANT     ABGs: No results found for: PHART, PO2ART, ZOV8MWO, TVU4IOQ, BEART, Y8GRIPMP     Type & Screen (If Applicable):  No results found for: [facility]    Anesthesia Evaluation  Patient summary reviewed  Airway: Mallampati: III  TM distance: >3 FB   Neck ROM: full  Mouth opening: > = 3 FB Dental: normal exam         Pulmonary:Negative Pulmonary ROS and normal exam                               Cardiovascular:    (+) hypertension:, dysrhythmias: atrial fibrillation, hyperlipidemia         Beta Blocker:  Dose within 24 Hrs         Neuro/Psych:   (+) neuromuscular disease:,             GI/Hepatic/Renal: Neg GI/Hepatic/Renal ROS            Endo/Other:    (+) DiabetesType II DM, , .                 Abdominal:           Vascular:   + DVT, . Anesthesia Plan      MAC     ASA 3       Induction: intravenous. Anesthetic plan and risks discussed with patient. Plan discussed with CRNA.                   Harrison Lang MD   6/4/2019

## 2019-06-14 ENCOUNTER — OFFICE VISIT (OUTPATIENT)
Dept: SURGERY | Age: 74
End: 2019-06-14
Payer: MEDICARE

## 2019-06-14 VITALS
SYSTOLIC BLOOD PRESSURE: 150 MMHG | HEIGHT: 69 IN | HEART RATE: 73 BPM | TEMPERATURE: 98.4 F | OXYGEN SATURATION: 97 % | BODY MASS INDEX: 28.73 KG/M2 | DIASTOLIC BLOOD PRESSURE: 75 MMHG | RESPIRATION RATE: 18 BRPM | WEIGHT: 194 LBS

## 2019-06-14 DIAGNOSIS — R13.19 ESOPHAGEAL DYSPHAGIA: Primary | ICD-10-CM

## 2019-06-14 PROCEDURE — 1123F ACP DISCUSS/DSCN MKR DOCD: CPT | Performed by: SURGERY

## 2019-06-14 PROCEDURE — 1036F TOBACCO NON-USER: CPT | Performed by: SURGERY

## 2019-06-14 PROCEDURE — 4040F PNEUMOC VAC/ADMIN/RCVD: CPT | Performed by: SURGERY

## 2019-06-14 PROCEDURE — G8427 DOCREV CUR MEDS BY ELIG CLIN: HCPCS | Performed by: SURGERY

## 2019-06-14 PROCEDURE — 99213 OFFICE O/P EST LOW 20 MIN: CPT | Performed by: SURGERY

## 2019-06-14 PROCEDURE — G8417 CALC BMI ABV UP PARAM F/U: HCPCS | Performed by: SURGERY

## 2019-06-14 PROCEDURE — 3017F COLORECTAL CA SCREEN DOC REV: CPT | Performed by: SURGERY

## 2019-06-14 RX ORDER — AMLODIPINE BESYLATE 5 MG/1
5 TABLET ORAL DAILY
Refills: 3 | COMMUNITY
Start: 2019-06-05 | End: 2020-01-21 | Stop reason: SDUPTHER

## 2019-06-14 RX ORDER — GLIMEPIRIDE 2 MG/1
2 TABLET ORAL
Refills: 5 | COMMUNITY
Start: 2019-06-06 | End: 2020-02-18 | Stop reason: SDUPTHER

## 2019-06-14 RX ORDER — LOVASTATIN 20 MG/1
TABLET ORAL
Refills: 5 | COMMUNITY
Start: 2019-06-06 | End: 2019-07-16 | Stop reason: SDUPTHER

## 2019-06-14 NOTE — PROGRESS NOTES
Denise Del Valle  6/14/2019  [address] [place] [address] of ice visit    Denise Del Valle is a 74 y.o., male, post EGD 6/4/19 for an esophageal mass, swelling seen on CT. No mass was seen on EGD, no hiatal hernia, no mucus. Still taking Protonix 40 mg daily for throat burning and excessive throat mucus production and mucus diarrhea year which improved with Robinul. The rectal mucus never came back but the cough and chest pressure came back. ENT workup did not help, says there is no allergy or physical reason for the mucus discharge. Was in the ER with dyspnea, cough, chest pressure, breathing difficulty which helped with a nebulizer treatment and Albuterol as needed. History: CT scan abdomen 2017 showed a very low rectal anastomosis to a loop with the blind end 5 cm long. CT chest 2017 did not show esophageal swelling. Barium enema 2018 showed extensive diverticula of the entire left and sigmoid colon with a 5 cm blind pouch at the low rectal anastomosis. Diverticula pouches are seen on the blind pouch. He also has severe nasal sinus drainage. Both the nasal discharge and diarrhea resolved with Metamucil and Robinul 1 mg tid. Weight= 194 lb (88 kg) Body mass index is 28.65 kg/m². Lost 10 pounds over the past year.     Past Medical History:   Diagnosis Date    Arthritis     Atrial fibrillation (HCC)     Burning pain     Degenerative lumbar disc     Diabetes ([address]     Herniated cervical disc     History of echocardiogram 03/16/2017    EF 60-65%    Hx of blood clots     Hyperlipidemia     Hypertension     Urinary retention 3/17/2017       Past Surgical History:   Procedure Laterality Date    ABLATION OF DYSRHYTHMIC FOCUS  1980    APPROX 30-35 YRS AGO    CATARACT REMOVAL  2013    COLECTOMY  2010 sigmoid    COLONOSCOPY  5/2018 [facility]    ENDOSCOPY, COLON, DIAGNOSTIC      EPIDURAL STEROID INJECTION N/A 12/11/2018    C7-T1 EPIDURAL STEROID INJECTION performed by Mariela Go Chet Ambriz DO at [facility] N/A 1/8/2019    C7 - T1 EPIDURAL STEROID INJECTION #2 performed by Pau Galeas DO at [facility]  2008    LUMBAR FUSION  03/06/2017    NASAL SINUS SURGERY  12/07/2017    Submucosal Resection of Inferior Turbinate    NERVE BLOCK Bilateral 10 12 2015    bilateral sacroiliac joint injection #1    NERVE BLOCK Bilateral 10/22/15    sacroiliac joint #2    NERVE BLOCK Bilateral 11 02 2015    Sacroiliac joint bilateral #3    NERVE BLOCK Left 12/2/15    lumbar transforaminal #1    NERVE BLOCK Left 12 16 15    NERVE BLOCK Left 12 28 2015    transforaminal nerve block left lumbar #3    NERVE BLOCK Left 1 20 16    radiofrequency     NERVE BLOCK  12/11/2018    C7-T1 epidural    NERVE BLOCK N/A 01/08/2019    cervical epidural steroid injection    OTHER SURGICAL HISTORY  1945    had a feeding tube as an infant, scar    PROSTATE SURGERY  2017    UPPER GASTROINTESTINAL ENDOSCOPY  5/2018 [facility]    UPPER GASTROINTESTINAL ENDOSCOPY N/A 6/4/2019    EGD BIOPSY performed by Rai Beltrán MD at [address] [place] [address] Medications  Prior to Visit Medications    Medication Sig Taking?  Authorizing Provider   amLODIPine (NORVASC) 5 MG tablet TAKE ONE (1) TABLET BY MOUTH ONCE DAILY FOR 30 DAYS Yes Historical Provider, MD   glimepiride (AMARYL) 2 MG tablet TAKE 1 TABLET BY MOUTH 2 TIMES DAILY Yes Historical Provider, MD   lovastatin (MEVACOR) 20 MG tablet TAKE 1 TABLET BY MOUTH NIGHTLY Yes Historical Provider, MD   albuterol (PROVENTIL) (5 MG/ML) 0.5% nebulizer solution Take 1 mL by nebulization 4 times daily as needed for Wheezing Yes Larena Axe, DO   albuterol sulfate HFA (PROVENTIL HFA) 108 (90 Base) MCG/ACT inhaler Inhale 2 puffs into the lungs every 4 hours as needed for Wheezing Yes Larena Axe, DO   lovastatin (MEVACOR) 10 MG tablet Take 20 mg by mouth nightly Yes Historical Provider, MD   ammonium Blood pressure (!) 150/75, pulse 73, temperature 98.4 °F (36.9 °C), temperature source Oral, resp. rate 18, height 5' 9\" (1.753 m), weight 194 lb (88 kg), SpO2 97 %. General appearance: alert, appears stated age and cooperative, does ambulate easily  Head: Normocephalic, without obvious abnormality, atraumatic  Eyes: PERRL  Ears/mouth/throat:  Ears clear, mouth normal, throat no redness  Neck: no adenopathy, no JVD, supple, symmetrical, trachea midline and thyroid not enlarged  Lungs: clear to auscultation bilaterally  Heart: regular rate and rhythm  Abdomen: soft, non-tender; bowel sounds normal; no masses,  no organomegaly  Extremities: extremities normal, atraumatic, no cyanosis or edema  Skin: no open wounds    Assessment:  No esophageal mass or swelling seen on EGD. He says he is feeling and breathing much better since starting the Nebulizer treatment.     Plan:  Call if needed      Physician Signature: Electronically signed by Dr. Glenn Valerio MD    Send copy to Cara Cary DO

## 2019-06-20 DIAGNOSIS — M25.561 CHRONIC PAIN OF BOTH KNEES: Primary | ICD-10-CM

## 2019-06-20 DIAGNOSIS — G89.29 CHRONIC PAIN OF BOTH KNEES: Primary | ICD-10-CM

## 2019-06-20 DIAGNOSIS — M25.562 CHRONIC PAIN OF BOTH KNEES: Primary | ICD-10-CM

## 2019-06-21 ENCOUNTER — TELEPHONE (OUTPATIENT)
Dept: FAMILY MEDICINE CLINIC | Age: 74
End: 2019-06-21

## 2019-06-21 NOTE — TELEPHONE ENCOUNTER
Patient called and would like the results of his xrays from yesterday,  Please advise   [phone]    Last Appointment   5/28/2019  Next Appointment  7/16/2019

## 2019-07-06 ENCOUNTER — HOSPITAL ENCOUNTER (EMERGENCY)
Age: 74
Discharge: HOME | End: 2019-07-06
Payer: MEDICARE

## 2019-07-06 VITALS
HEART RATE: 66 BPM | TEMPERATURE: 98.2 F | OXYGEN SATURATION: 97 % | DIASTOLIC BLOOD PRESSURE: 82 MMHG | SYSTOLIC BLOOD PRESSURE: 152 MMHG | RESPIRATION RATE: 18 BRPM

## 2019-07-06 DIAGNOSIS — J01.10 ACUTE NON-RECURRENT FRONTAL SINUSITIS: Primary | ICD-10-CM

## 2019-07-06 PROCEDURE — 99212 OFFICE O/P EST SF 10 MIN: CPT

## 2019-07-06 RX ORDER — AMOXICILLIN AND CLAVULANATE POTASSIUM 875; 125 MG/1; MG/1
1 TABLET, FILM COATED ORAL 2 TIMES DAILY
Qty: 14 TABLET | Refills: 0 | Status: SHIPPED | OUTPATIENT
Start: 2019-07-06 | End: 2019-07-13

## 2019-07-15 ENCOUNTER — TELEPHONE (OUTPATIENT)
Dept: FAMILY MEDICINE CLINIC | Age: 74
End: 2019-07-15

## 2019-07-15 NOTE — TELEPHONE ENCOUNTER
Patient is overdue for DM eye exam. After speaking with him, I called the last office he had an eye exam at (Dr. Regulo Carmona), their records show his last exam was 2017.

## 2019-07-16 ENCOUNTER — HOSPITAL ENCOUNTER (OUTPATIENT)
Age: 74
Discharge: HOME | End: 2019-07-18
Payer: MEDICARE

## 2019-07-16 ENCOUNTER — OFFICE VISIT (OUTPATIENT)
Dept: FAMILY MEDICINE CLINIC | Age: 74
End: 2019-07-16
Payer: MEDICARE

## 2019-07-16 VITALS
SYSTOLIC BLOOD PRESSURE: 124 MMHG | HEART RATE: 61 BPM | HEIGHT: 69 IN | OXYGEN SATURATION: 97 % | WEIGHT: 200 LBS | BODY MASS INDEX: 29.62 KG/M2 | RESPIRATION RATE: 18 BRPM | DIASTOLIC BLOOD PRESSURE: 80 MMHG

## 2019-07-16 DIAGNOSIS — R20.2 PARESTHESIA OF BOTH LOWER EXTREMITIES: ICD-10-CM

## 2019-07-16 DIAGNOSIS — M48.061 NEURAL FORAMINAL STENOSIS OF LUMBAR SPINE: Chronic | ICD-10-CM

## 2019-07-16 DIAGNOSIS — E11.42 TYPE 2 DIABETES MELLITUS WITH DIABETIC POLYNEUROPATHY, WITHOUT LONG-TERM CURRENT USE OF INSULIN (HCC): ICD-10-CM

## 2019-07-16 DIAGNOSIS — K21.9 GASTROESOPHAGEAL REFLUX DISEASE WITHOUT ESOPHAGITIS: ICD-10-CM

## 2019-07-16 DIAGNOSIS — M51.37 DEGENERATION OF LUMBAR OR LUMBOSACRAL INTERVERTEBRAL DISC: ICD-10-CM

## 2019-07-16 DIAGNOSIS — M51.36 DDD (DEGENERATIVE DISC DISEASE), LUMBAR: Chronic | ICD-10-CM

## 2019-07-16 DIAGNOSIS — I10 BENIGN ESSENTIAL HTN: ICD-10-CM

## 2019-07-16 DIAGNOSIS — E55.9 VITAMIN D DEFICIENCY: Primary | ICD-10-CM

## 2019-07-16 DIAGNOSIS — E11.628 DIABETIC FOOT INFECTION (HCC): ICD-10-CM

## 2019-07-16 DIAGNOSIS — L30.8 OTHER ECZEMA: ICD-10-CM

## 2019-07-16 DIAGNOSIS — J98.9 REACTIVE AIRWAY DISEASE THAT IS NOT ASTHMA: ICD-10-CM

## 2019-07-16 DIAGNOSIS — Z00.00 ROUTINE GENERAL MEDICAL EXAMINATION AT A HEALTH CARE FACILITY: ICD-10-CM

## 2019-07-16 DIAGNOSIS — E55.9 VITAMIN D DEFICIENCY: ICD-10-CM

## 2019-07-16 DIAGNOSIS — L08.9 DIABETIC FOOT INFECTION (HCC): ICD-10-CM

## 2019-07-16 LAB
HBA1C MFR BLD: 6.1 % (ref 4–5.6)
TSH SERPL DL<=0.05 MIU/L-ACNC: 3.2 UIU/ML (ref 0.27–4.2)
VITAMIN D 25-HYDROXY: 24 NG/ML (ref 30–100)

## 2019-07-16 PROCEDURE — G0438 PPPS, INITIAL VISIT: HCPCS | Performed by: FAMILY MEDICINE

## 2019-07-16 PROCEDURE — 83036 HEMOGLOBIN GLYCOSYLATED A1C: CPT

## 2019-07-16 PROCEDURE — 84443 ASSAY THYROID STIM HORMONE: CPT

## 2019-07-16 PROCEDURE — 3017F COLORECTAL CA SCREEN DOC REV: CPT | Performed by: FAMILY MEDICINE

## 2019-07-16 PROCEDURE — 36415 COLL VENOUS BLD VENIPUNCTURE: CPT

## 2019-07-16 PROCEDURE — 82306 VITAMIN D 25 HYDROXY: CPT

## 2019-07-16 PROCEDURE — 3044F HG A1C LEVEL LT 7.0%: CPT | Performed by: FAMILY MEDICINE

## 2019-07-16 PROCEDURE — 1123F ACP DISCUSS/DSCN MKR DOCD: CPT | Performed by: FAMILY MEDICINE

## 2019-07-16 PROCEDURE — 4040F PNEUMOC VAC/ADMIN/RCVD: CPT | Performed by: FAMILY MEDICINE

## 2019-07-16 RX ORDER — ALBUTEROL SULFATE 90 UG/1
2 AEROSOL, METERED RESPIRATORY (INHALATION) EVERY 4 HOURS PRN
Qty: 1 INHALER | Refills: 5 | Status: SHIPPED
Start: 2019-07-16 | End: 2020-02-18 | Stop reason: SDUPTHER

## 2019-07-16 RX ORDER — AMMONIUM LACTATE 12 G/100G
LOTION TOPICAL
Qty: 500 G | Refills: 5 | Status: SHIPPED
Start: 2019-07-16 | End: 2020-02-18 | Stop reason: ALTCHOICE

## 2019-07-16 RX ORDER — PANTOPRAZOLE SODIUM 40 MG/1
40 TABLET, DELAYED RELEASE ORAL DAILY
Qty: 30 TABLET | Refills: 5 | Status: SHIPPED
Start: 2019-07-16 | End: 2020-02-18 | Stop reason: ALTCHOICE

## 2019-07-16 RX ORDER — TRAMADOL HYDROCHLORIDE 50 MG/1
100 TABLET ORAL EVERY 6 HOURS PRN
Qty: 120 TABLET | Refills: 5 | Status: SHIPPED | OUTPATIENT
Start: 2019-07-16 | End: 2019-08-15

## 2019-07-16 RX ORDER — FOLIC ACID 1 MG/1
1 TABLET ORAL DAILY
Qty: 30 TABLET | Refills: 5 | Status: SHIPPED
Start: 2019-07-16 | End: 2020-09-22 | Stop reason: SDUPTHER

## 2019-07-16 RX ORDER — LOVASTATIN 20 MG/1
TABLET ORAL
Qty: 90 TABLET | Refills: 5 | Status: SHIPPED | OUTPATIENT
Start: 2019-07-16 | End: 2019-09-17

## 2019-07-16 RX ORDER — METOPROLOL SUCCINATE 50 MG/1
50 TABLET, EXTENDED RELEASE ORAL DAILY
Qty: 90 TABLET | Refills: 11 | Status: ON HOLD
Start: 2019-07-16 | End: 2020-02-20 | Stop reason: HOSPADM

## 2019-07-16 ASSESSMENT — PATIENT HEALTH QUESTIONNAIRE - PHQ9
SUM OF ALL RESPONSES TO PHQ QUESTIONS 1-9: 0
SUM OF ALL RESPONSES TO PHQ QUESTIONS 1-9: 0

## 2019-07-16 ASSESSMENT — LIFESTYLE VARIABLES: HOW OFTEN DO YOU HAVE A DRINK CONTAINING ALCOHOL: 0

## 2019-07-17 NOTE — PROGRESS NOTES
Loneliness, Social Isolation, Stress or Anger?: (!) New or Increased Pain  Do you get the social and emotional support that you need?: Yes  Do you have a Living Will?: (!) No  General Health Risk Interventions:  · Poor self-assessment of health status: patient advised to follow-up in this office for further evaluation and treatment of cardiac issues within 1 week(s)    Health Habits/Nutrition:  Health Habits/Nutrition  Do you exercise for at least 20 minutes 2-3 times per week?: (!) No  Have you lost any weight without trying in the past 3 months?: No  Do you eat fewer than 2 meals per day?: No  Have you seen a dentist within the past year?: (!) No  Body mass index is 29.53 kg/m².   Health Habits/Nutrition Interventions:  · Inadequate physical activity:  patient agrees to exercise for at least 150 minutes/week    Hearing/Vision:  No exam data present  Hearing/Vision  Do you or your family notice any trouble with your hearing?: No  Do you have difficulty driving, watching TV, or doing any of your daily activities because of your eyesight?: No  Have you had an eye exam within the past year?: (!) No  Hearing/Vision Interventions:  · Hearing concerns:  patient declines any further evaluation/treatment for hearing issues    Personalized Preventive Plan   Current Health Maintenance Status  Immunization History   Administered Date(s) Administered    Influenza Virus Vaccine 09/20/2018    Influenza, Kamini Navarro, 3 Years and older, IM (Fluzone 3 yrs and older or Afluria 5 yrs and older) 09/15/2017    Influenza, Triv, 3 Years and older, IM (Afluria (5 yrs and older) 09/03/2016    Pneumococcal Conjugate 13-valent (Patricia Joel) 07/27/2017    Pneumococcal Polysaccharide ([id number]) 07/02/2012        Health Maintenance   Topic Date Due    Diabetic retinal exam  10/07/1953    DTaP/Tdap/Td vaccine (1 - Tdap) 10/07/1962    Shingles Vaccine (1 of 2) 10/07/1993    Annual Wellness Visit (AWV)  07/27/2018    Flu vaccine (1) 09/01/2019

## 2019-08-09 LAB
ALBUMIN SERPL-MCNC: ABNORMAL G/DL
ALP BLD-CCNC: ABNORMAL U/L
ALT SERPL-CCNC: ABNORMAL U/L
ANION GAP SERPL CALCULATED.3IONS-SCNC: 10 MMOL/L
AST SERPL-CCNC: ABNORMAL U/L
BASOPHILS ABSOLUTE: 0 /ΜL
BASOPHILS RELATIVE PERCENT: 0.3 %
BILIRUB SERPL-MCNC: ABNORMAL MG/DL (ref 0.1–1.4)
BUN BLDV-MCNC: 11 MG/DL
CALCIUM SERPL-MCNC: ABNORMAL MG/DL
CHLORIDE BLD-SCNC: 109 MMOL/L
CO2: 26 MMOL/L
CREAT SERPL-MCNC: 1.07 MG/DL
EOSINOPHILS ABSOLUTE: 0.2 /ΜL
EOSINOPHILS RELATIVE PERCENT: 2.1 %
GFR CALCULATED: >60
GLUCOSE BLD-MCNC: 110 MG/DL
GLUCOSE BLD-MCNC: 142 MG/DL
HCT VFR BLD CALC: 46.8 % (ref 41–53)
HEMOGLOBIN: 15.9 G/DL (ref 13.5–17.5)
LYMPHOCYTES ABSOLUTE: 2.9 /ΜL
LYMPHOCYTES RELATIVE PERCENT: 28.1 %
MCH RBC QN AUTO: 30.6 PG
MCHC RBC AUTO-ENTMCNC: 34 G/DL
MCV RBC AUTO: 89.9 FL
MONOCYTES ABSOLUTE: 0.7 /ΜL
MONOCYTES RELATIVE PERCENT: 6.6 %
NEUTROPHILS ABSOLUTE: 6.6 /ΜL
NEUTROPHILS RELATIVE PERCENT: 62.9 %
PLATELET # BLD: 183 K/ΜL
PMV BLD AUTO: 8.5 FL
POTASSIUM SERPL-SCNC: 4.2 MMOL/L
RBC # BLD: 5.2 10^6/ΜL
SODIUM BLD-SCNC: 141 MMOL/L
TOTAL PROTEIN: ABNORMAL
WBC # BLD: 10.5 10^3/ML

## 2019-09-16 ENCOUNTER — TELEPHONE (OUTPATIENT)
Dept: FAMILY MEDICINE CLINIC | Age: 74
End: 2019-09-16

## 2019-09-16 RX ORDER — ISOSORBIDE MONONITRATE 30 MG/1
30 TABLET, EXTENDED RELEASE ORAL DAILY
COMMUNITY
End: 2020-02-18 | Stop reason: ALTCHOICE

## 2019-09-16 RX ORDER — CLOPIDOGREL BISULFATE 75 MG/1
75 TABLET ORAL DAILY
COMMUNITY
End: 2020-09-08 | Stop reason: SDUPTHER

## 2019-09-17 RX ORDER — PRAVASTATIN SODIUM 10 MG
10 TABLET ORAL DAILY
Qty: 30 TABLET | Refills: 5 | Status: SHIPPED | OUTPATIENT
Start: 2019-09-17 | End: 2020-02-03 | Stop reason: SDUPTHER

## 2019-10-08 ENCOUNTER — HOSPITAL ENCOUNTER (EMERGENCY)
Age: 74
Discharge: HOME | End: 2019-10-08
Attending: EMERGENCY MEDICINE
Payer: MEDICARE

## 2019-10-08 ENCOUNTER — TELEPHONE (OUTPATIENT)
Dept: CARDIOLOGY CLINIC | Age: 74
End: 2019-10-08

## 2019-10-08 VITALS
SYSTOLIC BLOOD PRESSURE: 125 MMHG | HEART RATE: 85 BPM | WEIGHT: 191 LBS | TEMPERATURE: 98.3 F | OXYGEN SATURATION: 96 % | HEIGHT: 69 IN | BODY MASS INDEX: 28.29 KG/M2 | RESPIRATION RATE: 16 BRPM | DIASTOLIC BLOOD PRESSURE: 70 MMHG

## 2019-10-08 DIAGNOSIS — E08.41 DIABETIC MONONEUROPATHY ASSOCIATED WITH DIABETES MELLITUS DUE TO UNDERLYING CONDITION (HCC): Primary | ICD-10-CM

## 2019-10-08 LAB
CHP ED QC CHECK: NORMAL
GLUCOSE BLD-MCNC: 106 MG/DL
METER GLUCOSE: 106 MG/DL (ref 74–99)

## 2019-10-08 PROCEDURE — 99283 EMERGENCY DEPT VISIT LOW MDM: CPT

## 2019-10-08 PROCEDURE — 82962 GLUCOSE BLOOD TEST: CPT

## 2019-10-08 ASSESSMENT — PAIN DESCRIPTION - LOCATION: LOCATION: ANKLE;FOOT

## 2019-10-08 ASSESSMENT — PAIN DESCRIPTION - DESCRIPTORS: DESCRIPTORS: NUMBNESS

## 2019-10-08 ASSESSMENT — PAIN DESCRIPTION - PAIN TYPE: TYPE: ACUTE PAIN;CHRONIC PAIN

## 2019-10-08 ASSESSMENT — PAIN SCALES - GENERAL: PAINLEVEL_OUTOF10: 8

## 2019-10-08 ASSESSMENT — PAIN DESCRIPTION - FREQUENCY: FREQUENCY: CONTINUOUS

## 2019-10-09 ENCOUNTER — TELEPHONE (OUTPATIENT)
Dept: CARDIOLOGY CLINIC | Age: 74
End: 2019-10-09

## 2019-10-29 ENCOUNTER — APPOINTMENT (OUTPATIENT)
Dept: CT IMAGING | Age: 74
End: 2019-10-29
Payer: MEDICARE

## 2019-10-29 ENCOUNTER — HOSPITAL ENCOUNTER (EMERGENCY)
Age: 74
Discharge: HOME | End: 2019-10-29
Payer: MEDICARE

## 2019-10-29 VITALS
SYSTOLIC BLOOD PRESSURE: 130 MMHG | HEART RATE: 82 BPM | RESPIRATION RATE: 18 BRPM | OXYGEN SATURATION: 99 % | TEMPERATURE: 98.5 F | DIASTOLIC BLOOD PRESSURE: 74 MMHG

## 2019-10-29 DIAGNOSIS — G89.29 ACUTE EXACERBATION OF CHRONIC LOW BACK PAIN: Primary | ICD-10-CM

## 2019-10-29 DIAGNOSIS — R10.30 LOWER ABDOMINAL PAIN: ICD-10-CM

## 2019-10-29 DIAGNOSIS — M54.50 ACUTE EXACERBATION OF CHRONIC LOW BACK PAIN: Primary | ICD-10-CM

## 2019-10-29 LAB
ALBUMIN SERPL-MCNC: 3.5 G/DL (ref 3.5–5.2)
ALP BLD-CCNC: 99 U/L (ref 40–129)
ALT SERPL-CCNC: 17 U/L (ref 0–40)
ANION GAP SERPL CALCULATED.3IONS-SCNC: 9 MMOL/L (ref 7–16)
AST SERPL-CCNC: 19 U/L (ref 0–39)
BASOPHILS ABSOLUTE: 0.04 E9/L (ref 0–0.2)
BASOPHILS RELATIVE PERCENT: 0.4 % (ref 0–2)
BILIRUB SERPL-MCNC: 0.6 MG/DL (ref 0–1.2)
BILIRUBIN URINE: NEGATIVE
BLOOD, URINE: NEGATIVE
BUN BLDV-MCNC: 10 MG/DL (ref 8–23)
CALCIUM SERPL-MCNC: 8.7 MG/DL (ref 8.6–10.2)
CHLORIDE BLD-SCNC: 102 MMOL/L (ref 98–107)
CLARITY: CLEAR
CO2: 24 MMOL/L (ref 22–29)
COLOR: YELLOW
CREAT SERPL-MCNC: 1 MG/DL (ref 0.7–1.2)
EOSINOPHILS ABSOLUTE: 0.11 E9/L (ref 0.05–0.5)
EOSINOPHILS RELATIVE PERCENT: 1 % (ref 0–6)
GFR AFRICAN AMERICAN: >60
GFR NON-AFRICAN AMERICAN: >60 ML/MIN/1.73
GLUCOSE BLD-MCNC: 109 MG/DL (ref 74–99)
GLUCOSE URINE: NEGATIVE MG/DL
HCT VFR BLD CALC: 46 % (ref 37–54)
HEMOGLOBIN: 16 G/DL (ref 12.5–16.5)
IMMATURE GRANULOCYTES #: 0.05 E9/L
IMMATURE GRANULOCYTES %: 0.5 % (ref 0–5)
KETONES, URINE: NEGATIVE MG/DL
LACTIC ACID: 1.3 MMOL/L (ref 0.5–2.2)
LEUKOCYTE ESTERASE, URINE: NEGATIVE
LYMPHOCYTES ABSOLUTE: 2.61 E9/L (ref 1.5–4)
LYMPHOCYTES RELATIVE PERCENT: 24.5 % (ref 20–42)
MCH RBC QN AUTO: 31 PG (ref 26–35)
MCHC RBC AUTO-ENTMCNC: 34.8 % (ref 32–34.5)
MCV RBC AUTO: 89.1 FL (ref 80–99.9)
MONOCYTES ABSOLUTE: 0.91 E9/L (ref 0.1–0.95)
MONOCYTES RELATIVE PERCENT: 8.5 % (ref 2–12)
NEUTROPHILS ABSOLUTE: 6.93 E9/L (ref 1.8–7.3)
NEUTROPHILS RELATIVE PERCENT: 65.1 % (ref 43–80)
NITRITE, URINE: NEGATIVE
PDW BLD-RTO: 13.1 FL (ref 11.5–15)
PH UA: 5.5 (ref 5–9)
PLATELET # BLD: 226 E9/L (ref 130–450)
PMV BLD AUTO: 10.6 FL (ref 7–12)
POTASSIUM SERPL-SCNC: 3.8 MMOL/L (ref 3.5–5)
PROTEIN UA: NEGATIVE MG/DL
RBC # BLD: 5.16 E12/L (ref 3.8–5.8)
REASON FOR REJECTION: NORMAL
REJECTED TEST: NORMAL
SODIUM BLD-SCNC: 135 MMOL/L (ref 132–146)
SPECIFIC GRAVITY UA: <=1.005 (ref 1–1.03)
TOTAL PROTEIN: 6.7 G/DL (ref 6.4–8.3)
UROBILINOGEN, URINE: 0.2 E.U./DL
WBC # BLD: 10.7 E9/L (ref 4.5–11.5)

## 2019-10-29 PROCEDURE — 87088 URINE BACTERIA CULTURE: CPT

## 2019-10-29 PROCEDURE — 99284 EMERGENCY DEPT VISIT MOD MDM: CPT

## 2019-10-29 PROCEDURE — 36415 COLL VENOUS BLD VENIPUNCTURE: CPT

## 2019-10-29 PROCEDURE — 83605 ASSAY OF LACTIC ACID: CPT

## 2019-10-29 PROCEDURE — 81003 URINALYSIS AUTO W/O SCOPE: CPT

## 2019-10-29 PROCEDURE — 74177 CT ABD & PELVIS W/CONTRAST: CPT

## 2019-10-29 PROCEDURE — 6360000004 HC RX CONTRAST MEDICATION: Performed by: RADIOLOGY

## 2019-10-29 PROCEDURE — 80053 COMPREHEN METABOLIC PANEL: CPT

## 2019-10-29 PROCEDURE — 2580000003 HC RX 258: Performed by: PHYSICIAN ASSISTANT

## 2019-10-29 PROCEDURE — 85025 COMPLETE CBC W/AUTO DIFF WBC: CPT

## 2019-10-29 RX ORDER — PREDNISONE 10 MG/1
TABLET ORAL
Qty: 20 TABLET | Refills: 0 | Status: SHIPPED | OUTPATIENT
Start: 2019-10-29 | End: 2019-10-31

## 2019-10-29 RX ORDER — TRAMADOL HYDROCHLORIDE 50 MG/1
50 TABLET ORAL EVERY 6 HOURS PRN
Qty: 20 TABLET | Refills: 0 | Status: SHIPPED | OUTPATIENT
Start: 2019-10-29 | End: 2020-05-29 | Stop reason: SDUPTHER

## 2019-10-29 RX ORDER — 0.9 % SODIUM CHLORIDE 0.9 %
500 INTRAVENOUS SOLUTION INTRAVENOUS ONCE
Status: COMPLETED | OUTPATIENT
Start: 2019-10-29 | End: 2019-10-29

## 2019-10-29 RX ADMIN — IOHEXOL 50 ML: 240 INJECTION, SOLUTION INTRATHECAL; INTRAVASCULAR; INTRAVENOUS; ORAL at 13:15

## 2019-10-29 RX ADMIN — IOPAMIDOL 80 ML: 755 INJECTION, SOLUTION INTRAVENOUS at 13:15

## 2019-10-29 RX ADMIN — SODIUM CHLORIDE 500 ML: 9 INJECTION, SOLUTION INTRAVENOUS at 12:10

## 2019-10-29 ASSESSMENT — PAIN DESCRIPTION - LOCATION: LOCATION: ABDOMEN;BACK

## 2019-10-29 ASSESSMENT — PAIN SCALES - GENERAL: PAINLEVEL_OUTOF10: 8

## 2019-10-29 ASSESSMENT — PAIN DESCRIPTION - PAIN TYPE: TYPE: ACUTE PAIN

## 2019-10-31 ENCOUNTER — OFFICE VISIT (OUTPATIENT)
Dept: CARDIOLOGY CLINIC | Age: 74
End: 2019-10-31
Payer: MEDICARE

## 2019-10-31 VITALS
DIASTOLIC BLOOD PRESSURE: 64 MMHG | SYSTOLIC BLOOD PRESSURE: 126 MMHG | WEIGHT: 197 LBS | BODY MASS INDEX: 29.18 KG/M2 | HEIGHT: 69 IN | HEART RATE: 69 BPM

## 2019-10-31 DIAGNOSIS — I45.10 RIGHT BUNDLE BRANCH BLOCK (RBBB) ON ELECTROCARDIOGRAM (ECG): ICD-10-CM

## 2019-10-31 DIAGNOSIS — E11.42 TYPE 2 DIABETES MELLITUS WITH DIABETIC POLYNEUROPATHY, WITHOUT LONG-TERM CURRENT USE OF INSULIN (HCC): ICD-10-CM

## 2019-10-31 DIAGNOSIS — Z95.5 STATUS POST CORONARY ARTERY STENT PLACEMENT: ICD-10-CM

## 2019-10-31 DIAGNOSIS — E78.5 HYPERLIPIDEMIA, UNSPECIFIED HYPERLIPIDEMIA TYPE: ICD-10-CM

## 2019-10-31 DIAGNOSIS — Z98.890 H/O CARDIAC RADIOFREQUENCY ABLATION: ICD-10-CM

## 2019-10-31 DIAGNOSIS — I25.10 CORONARY ARTERY DISEASE INVOLVING NATIVE CORONARY ARTERY OF NATIVE HEART WITHOUT ANGINA PECTORIS: Primary | ICD-10-CM

## 2019-10-31 DIAGNOSIS — I48.91 ATRIAL FIBRILLATION, UNSPECIFIED TYPE (HCC): ICD-10-CM

## 2019-10-31 PROBLEM — D64.9 ANEMIA: Status: ACTIVE | Noted: 2019-10-31

## 2019-10-31 PROBLEM — E03.9 HYPOTHYROIDISM: Status: ACTIVE | Noted: 2019-10-31

## 2019-10-31 PROBLEM — R13.19 ESOPHAGEAL DYSPHAGIA: Status: RESOLVED | Noted: 2019-06-04 | Resolved: 2019-10-31

## 2019-10-31 LAB — URINE CULTURE, ROUTINE: NORMAL

## 2019-10-31 PROCEDURE — 99214 OFFICE O/P EST MOD 30 MIN: CPT | Performed by: INTERNAL MEDICINE

## 2019-10-31 PROCEDURE — 93000 ELECTROCARDIOGRAM COMPLETE: CPT | Performed by: INTERNAL MEDICINE

## 2019-11-01 ENCOUNTER — HOSPITAL ENCOUNTER (OUTPATIENT)
Age: 74
Discharge: HOME | End: 2019-11-01
Payer: MEDICARE

## 2019-11-01 DIAGNOSIS — I25.10 CORONARY ARTERY DISEASE INVOLVING NATIVE CORONARY ARTERY OF NATIVE HEART WITHOUT ANGINA PECTORIS: ICD-10-CM

## 2019-11-01 LAB
CHOLESTEROL, TOTAL: 153 MG/DL (ref 0–199)
HDLC SERPL-MCNC: 58 MG/DL
LDL CHOLESTEROL CALCULATED: 75 MG/DL (ref 0–99)
TRIGL SERPL-MCNC: 101 MG/DL (ref 0–149)
VLDLC SERPL CALC-MCNC: 20 MG/DL

## 2019-11-01 PROCEDURE — 36415 COLL VENOUS BLD VENIPUNCTURE: CPT

## 2019-11-01 PROCEDURE — 80061 LIPID PANEL: CPT

## 2019-12-10 ENCOUNTER — HOSPITAL ENCOUNTER (OUTPATIENT)
Age: 74
Discharge: HOME | End: 2019-12-12
Payer: MEDICARE

## 2019-12-10 PROCEDURE — 36415 COLL VENOUS BLD VENIPUNCTURE: CPT

## 2019-12-10 PROCEDURE — 84153 ASSAY OF PSA TOTAL: CPT

## 2019-12-11 LAB
PROSTATE SPECIFIC ANTIGEN: 1.52 NG/ML (ref 0–4)
PROSTATE SPECIFIC ANTIGEN: ABNORMAL NG/ML (ref 0–4)

## 2020-01-13 ENCOUNTER — APPOINTMENT (OUTPATIENT)
Dept: ULTRASOUND IMAGING | Age: 75
End: 2020-01-13
Payer: MEDICARE

## 2020-01-13 ENCOUNTER — HOSPITAL ENCOUNTER (EMERGENCY)
Age: 75
Discharge: HOME | End: 2020-01-13
Attending: EMERGENCY MEDICINE
Payer: MEDICARE

## 2020-01-13 VITALS
OXYGEN SATURATION: 97 % | HEIGHT: 68 IN | SYSTOLIC BLOOD PRESSURE: 149 MMHG | RESPIRATION RATE: 12 BRPM | WEIGHT: 197 LBS | BODY MASS INDEX: 29.86 KG/M2 | TEMPERATURE: 98.3 F | DIASTOLIC BLOOD PRESSURE: 68 MMHG | HEART RATE: 57 BPM

## 2020-01-13 LAB
ANION GAP SERPL CALCULATED.3IONS-SCNC: 13 MMOL/L (ref 7–16)
BASOPHILS ABSOLUTE: 0.04 E9/L (ref 0–0.2)
BASOPHILS RELATIVE PERCENT: 0.7 % (ref 0–2)
BUN BLDV-MCNC: 12 MG/DL (ref 8–23)
CALCIUM SERPL-MCNC: 9.8 MG/DL (ref 8.6–10.2)
CHLORIDE BLD-SCNC: 102 MMOL/L (ref 98–107)
CO2: 23 MMOL/L (ref 22–29)
CREAT SERPL-MCNC: 0.9 MG/DL (ref 0.7–1.2)
EOSINOPHILS ABSOLUTE: 0.12 E9/L (ref 0.05–0.5)
EOSINOPHILS RELATIVE PERCENT: 2 % (ref 0–6)
GFR AFRICAN AMERICAN: >60
GFR NON-AFRICAN AMERICAN: >60 ML/MIN/1.73
GLUCOSE BLD-MCNC: 99 MG/DL (ref 74–99)
HCT VFR BLD CALC: 46 % (ref 37–54)
HEMOGLOBIN: 16.3 G/DL (ref 12.5–16.5)
IMMATURE GRANULOCYTES #: 0.01 E9/L
IMMATURE GRANULOCYTES %: 0.2 % (ref 0–5)
LYMPHOCYTES ABSOLUTE: 1.83 E9/L (ref 1.5–4)
LYMPHOCYTES RELATIVE PERCENT: 29.9 % (ref 20–42)
MCH RBC QN AUTO: 30.4 PG (ref 26–35)
MCHC RBC AUTO-ENTMCNC: 35.4 % (ref 32–34.5)
MCV RBC AUTO: 85.8 FL (ref 80–99.9)
MONOCYTES ABSOLUTE: 0.51 E9/L (ref 0.1–0.95)
MONOCYTES RELATIVE PERCENT: 8.3 % (ref 2–12)
NEUTROPHILS ABSOLUTE: 3.62 E9/L (ref 1.8–7.3)
NEUTROPHILS RELATIVE PERCENT: 58.9 % (ref 43–80)
PDW BLD-RTO: 13.2 FL (ref 11.5–15)
PLATELET # BLD: 187 E9/L (ref 130–450)
PMV BLD AUTO: 10 FL (ref 7–12)
POTASSIUM SERPL-SCNC: 3.9 MMOL/L (ref 3.5–5)
RBC # BLD: 5.36 E12/L (ref 3.8–5.8)
SODIUM BLD-SCNC: 138 MMOL/L (ref 132–146)
TROPONIN: <0.01 NG/ML (ref 0–0.03)
WBC # BLD: 6.1 E9/L (ref 4.5–11.5)

## 2020-01-13 PROCEDURE — 80048 BASIC METABOLIC PNL TOTAL CA: CPT

## 2020-01-13 PROCEDURE — 36415 COLL VENOUS BLD VENIPUNCTURE: CPT

## 2020-01-13 PROCEDURE — 85025 COMPLETE CBC W/AUTO DIFF WBC: CPT

## 2020-01-13 PROCEDURE — 93005 ELECTROCARDIOGRAM TRACING: CPT | Performed by: EMERGENCY MEDICINE

## 2020-01-13 PROCEDURE — 93970 EXTREMITY STUDY: CPT

## 2020-01-13 PROCEDURE — 99284 EMERGENCY DEPT VISIT MOD MDM: CPT

## 2020-01-13 PROCEDURE — 84484 ASSAY OF TROPONIN QUANT: CPT

## 2020-01-13 ASSESSMENT — PAIN DESCRIPTION - PAIN TYPE: TYPE: ACUTE PAIN

## 2020-01-13 ASSESSMENT — PAIN DESCRIPTION - DESCRIPTORS: DESCRIPTORS: ACHING;STABBING

## 2020-01-13 ASSESSMENT — PAIN DESCRIPTION - LOCATION: LOCATION: LEG

## 2020-01-13 ASSESSMENT — PAIN DESCRIPTION - ORIENTATION: ORIENTATION: LEFT;RIGHT

## 2020-01-13 ASSESSMENT — PAIN SCALES - GENERAL: PAINLEVEL_OUTOF10: 8

## 2020-01-13 NOTE — ED PROVIDER NOTES
cranial nerve deficit. Procedures     [facility]       EKG: Sinus bradycardia, rate 58, leftward axis, right bundle branch block, no ST elevations or depressions, no T wave abnormalities. --------------------------------------------- PAST HISTORY ---------------------------------------------  Past Medical History:  has a past medical history of Abnormal computed tomography angiography of heart, Arthritis, Atrial fibrillation ([facility] Burning pain, CAD (coronary artery disease), Degenerative lumbar disc, Diabetes ([facility] Herniated cervical disc, History of echocardiogram, Hx of blood clots, Hyperlipidemia, Hypertension, Stented coronary artery, and Urinary retention. Past Surgical History:  has a past surgical history that includes colectomy (2010 sigmoid); Hemorrhoid surgery (2008); Prostate surgery (2017); Cataract removal (2013); Colonoscopy (5/2018 [facility]); Nerve Block (Bilateral, 10 12 2015); Nerve Block (Bilateral, 10/22/15); Nerve Block (Bilateral, 11 02 2015); Nerve Block (Left, 12/2/15); Nerve Block (Left, 12 16 15); Nerve Block (Left, 12 28 2015); Nerve Block (Left, 1 20 16); ablation of dysrhythmic focus (1980); lumbar fusion (03/06/2017); Nasal sinus surgery (12/07/2017); Upper gastrointestinal endoscopy (5/2018 [facility]); Nerve Block (12/11/2018); epidural steroid injection (N/A, 12/11/2018); Nerve Block (N/A, 01/08/2019); epidural steroid injection (N/A, 1/8/2019); eye surgery; Endoscopy, colon, diagnostic; other surgical history (1945); Upper gastrointestinal endoscopy (N/A, 6/4/2019); Diagnostic Cardiac Cath Lab Procedure; and Percutaneous Transluminal Coronary Angio (09/30/2019). Social History:  reports that he quit smoking about 43 years ago. He smoked 0.00 packs per day for 16.00 years. He has quit using smokeless tobacco. He reports that he does not drink alcohol or use drugs.     Family History: family history includes Arrhythmia in his brother; Cancer in his brother; Diabetes in an Interval 188 ms    QRS Duration 144 ms    Q-T Interval 460 ms    QTc Calculation (Bazett) 451 ms    P Axis 29 degrees    R Axis -80 degrees    T Axis 19 degrees       Radiology:  US DUP LOWER EXTREMITIES BILATERAL VENOUS   Final Result   No sonographic evidence of bilateral lower extremity deep   venous thrombosis.             ------------------------- NURSING NOTES AND VITALS REVIEWED ---------------------------  Date / Time Roomed:  1/13/2020  8:55 AM  ED Bed Assignment:  02/02    The nursing notes within the ED encounter and vital signs as below have been reviewed. BP (!) 149/68   Pulse 57   Temp 98.3 °F (36.8 °C) (Oral)   Resp 12   Ht 5' 8\" (1.727 m)   Wt 197 lb (89.4 kg)   SpO2 97%   BMI 29.95 kg/m²   Oxygen Saturation Interpretation: Normal      ------------------------------------------ PROGRESS NOTES ------------------------------------------  I have spoken with the patient and discussed todays results, in addition to providing specific details for the plan of care and counseling regarding the diagnosis and prognosis. Their questions are answered at this time and they are agreeable with the plan. I discussed at length with them reasons for immediate return here for re evaluation. They will followup with primary care by calling their office tomorrow. Patient not having any dizziness or lightheadedness or chest pain or shortness of breath. Only complaining of his legs bothering him. He also has a history of sciatica which he did not initially disclose. He had prior neurosurgery by Dr. Rory Kasper. No urinary retention, no loss of bowel or bladder control, no saddle anesthesia.      --------------------------------- ADDITIONAL PROVIDER NOTES ---------------------------------  At this time the patient is without objective evidence of an acute process requiring hospitalization or inpatient management.   They have remained hemodynamically stable throughout their entire ED visit and are stable for discharge with outpatient follow-up. The plan has been discussed in detail and they are aware of the specific conditions for emergent return, as well as the importance of follow-up. New Prescriptions    No medications on file       Diagnosis:  1. Diabetic polyneuropathy associated with type 2 diabetes mellitus ([facility] [address]        Disposition:  Patient's disposition: Discharge to home  Patient's condition is stable.              Candie Melo DO  01/13/20 1206

## 2020-01-15 ENCOUNTER — HOSPITAL ENCOUNTER (OUTPATIENT)
Dept: GENERAL RADIOLOGY | Age: 75
Discharge: HOME | End: 2020-01-17
Payer: MEDICARE

## 2020-01-15 ENCOUNTER — HOSPITAL ENCOUNTER (OUTPATIENT)
Age: 75
Discharge: HOME | End: 2020-01-17
Payer: MEDICARE

## 2020-01-15 LAB
EKG ATRIAL RATE: 58 BPM
EKG P AXIS: 29 DEGREES
EKG P-R INTERVAL: 188 MS
EKG Q-T INTERVAL: 460 MS
EKG QRS DURATION: 144 MS
EKG QTC CALCULATION (BAZETT): 451 MS
EKG R AXIS: -80 DEGREES
EKG T AXIS: 19 DEGREES
EKG VENTRICULAR RATE: 58 BPM

## 2020-01-15 PROCEDURE — 72100 X-RAY EXAM L-S SPINE 2/3 VWS: CPT

## 2020-01-17 ENCOUNTER — OFFICE VISIT (OUTPATIENT)
Dept: SURGERY | Age: 75
End: 2020-01-17
Payer: MEDICARE

## 2020-01-17 VITALS
BODY MASS INDEX: 29.86 KG/M2 | TEMPERATURE: 98.1 F | RESPIRATION RATE: 17 BRPM | DIASTOLIC BLOOD PRESSURE: 72 MMHG | WEIGHT: 197 LBS | HEIGHT: 68 IN | SYSTOLIC BLOOD PRESSURE: 122 MMHG | HEART RATE: 72 BPM

## 2020-01-17 PROCEDURE — 1036F TOBACCO NON-USER: CPT | Performed by: SURGERY

## 2020-01-17 PROCEDURE — 4040F PNEUMOC VAC/ADMIN/RCVD: CPT | Performed by: SURGERY

## 2020-01-17 PROCEDURE — G8427 DOCREV CUR MEDS BY ELIG CLIN: HCPCS | Performed by: SURGERY

## 2020-01-17 PROCEDURE — G8484 FLU IMMUNIZE NO ADMIN: HCPCS | Performed by: SURGERY

## 2020-01-17 PROCEDURE — 1123F ACP DISCUSS/DSCN MKR DOCD: CPT | Performed by: SURGERY

## 2020-01-17 PROCEDURE — 99213 OFFICE O/P EST LOW 20 MIN: CPT | Performed by: SURGERY

## 2020-01-17 PROCEDURE — G8417 CALC BMI ABV UP PARAM F/U: HCPCS | Performed by: SURGERY

## 2020-01-17 RX ORDER — GLYCOPYRROLATE 1 MG/1
1 TABLET ORAL 2 TIMES DAILY
Qty: 720 TABLET | Refills: 0 | Status: SHIPPED | OUTPATIENT
Start: 2020-01-17 | End: 2020-01-24 | Stop reason: SDUPTHER

## 2020-01-17 NOTE — PROGRESS NOTES
Zonia Burdick  1/17/2020  Maribel Dave office visit    Zonia Burdick is a 66 y.o., male on large doses of fiber to keep the bowels soft and moving daily, the fiber stopped his diarrhea and rectal mucus discharge with the help of Rubinul three times a day. He is complaining of severe foot and calf itching and numbness, he says the calf skin gets red and itches. He felt like the burning was caused by the fiber, his medical doctor thinks it's the diabetes or a back nerve impingement. Had three heart stents placed at [facility] by Dr. Rafael Orona 10/2019, says the dyspnea, cough and chest pressure resolved, no longer using Albuterol. History:  EGD 6/4/19 for an esophageal mass, swelling seen on CT. No mass was seen on EGD, no hiatal hernia, no mucus. Still taking Protonix 40 mg daily for throat burning and excessive throat mucus production and mucus diarrhea year which improved with Robinul. CT scan abdomen 2017 showed a very low rectal anastomosis to a loop with the blind end 5 cm long. CT chest 2017 did not show esophageal swelling. Barium enema 2018 showed extensive diverticula of the entire left and sigmoid colon with a 5 cm blind pouch at the low rectal anastomosis. Diverticula pouches are seen on the blind pouch. He also has severe nasal sinus drainage. Both the nasal discharge and diarrhea resolved with Metamucil and Robinul 1 mg tid. Weight= 197 lb (89.4 kg) Body mass index is 29.95 kg/m². Past Medical History:   Diagnosis Date    Abnormal computed tomography angiography of heart 07/19/2019    Calcified plaque proximal RCA with 50-70% stenosis, proximal LAD 50% stenosis, mild LAD 30-50% stenosis, groundglass infiltrates, area of consoldidation left lung base posteriorly.       Arthritis     Atrial fibrillation (HCC)     Burning pain     CAD (coronary artery disease)     Degenerative lumbar disc     Diabetes ([address]     Herniated cervical disc     History of echocardiogram 03/16/2017    EF 60-65%    Hx of blood clots     Hyperlipidemia     Hypertension     Stented coronary artery     Urinary retention 3/17/2017       Past Surgical History:   Procedure Laterality Date    ABLATION OF DYSRHYTHMIC FOCUS  1980    APPROX 30-35 YRS AGO    CATARACT REMOVAL  2013    COLECTOMY  2010 sigmoid    [facility]COPY  [facility]/2018 [facility]    DIAGNOSTIC CARDIAC CATH LAB PROCEDURE      ENDOSCOPY, COLON, DIAGNOSTIC      EPIDURAL STEROID INJECTION N/A 12/11/2018    C7-T1 EPIDURAL STEROID INJECTION performed by Asim So DO at [address]  1/8/2019    C7 - T1 EPIDURAL STEROID INJECTION #2 performed by Asim So DO at [facility]  2008    LUMBAR FUSION  03/06/2017    NASAL SINUS SURGERY  12/07/2017    Submucosal Resection of Inferior Turbinate    NERVE BLOCK Bilateral 10 12 2015    bilateral sacroiliac joint injection #1    NERVE BLOCK Bilateral 10/22/15    sacroiliac joint #2    NERVE BLOCK Bilateral 11 02 2015    Sacroiliac joint bilateral #3    NERVE BLOCK Left 12/2/15    lumbar transforaminal #1    NERVE BLOCK Left 12 16 15    NERVE BLOCK Left 12 28 2015    transforaminal nerve block left lumbar #3    NERVE BLOCK Left 1 20 16    radiofrequency     NERVE BLOCK  12/11/2018    C7-T1 epidural    NERVE BLOCK N/A 01/08/2019    cervical epidural steroid injection    OTHER SURGICAL HISTORY  1945    had a feeding tube as an infant, scar    PROSTATE SURGERY  2017    PTCA  09/30/2019    2.5 x 38 mm Resolute Lito RAFI mid OM1, 2.25 x 8 mm Resolute Lito RAFI ostium of dx    UPPER GASTROINTESTINAL ENDOSCOPY  5/2018 [facility]    UPPER GASTROINTESTINAL ENDOSCOPY N/A 6/4/2019    EGD BIOPSY performed by Earl Sterling MD at [address] [place] [address] Medications  Prior to Visit Medications    Medication Sig Taking?  Authorizing Provider   pravastatin (PRAVACHOL) 10 MG tablet Take 1 tablet by smoking cessation program and stop smoking for 3 months before having any Bariatric surgery. ETOH:    reports no history of alcohol use. Family History   Problem Relation Age of Onset   Rucker Stroke Mother     Other Father     Kidney Disease Brother     Arrhythmia Brother     Cancer Brother     Kidney Disease Sister     Hypertension Other     Diabetes Other      Review of Systems:  Psychiatric:  depression and anxiety  Respiratory: cough, asthma  Cardiovascular: Htn  Gastrointestinal: excessive mucus  Musculoskeletal:negative  All others reviewed, negative    Physical Exam:   VITALS: Blood pressure 122/72, pulse 72, temperature 98.1 °F (36.7 °C), resp. rate 17, height 5' 8\" (1.727 m), weight 197 lb (89.4 kg). General appearance: alert, appears stated age and cooperative, does ambulate easily  Head: Normocephalic, without obvious abnormality, atraumatic  Eyes: PERRL  Ears/mouth/throat:  Ears clear, mouth normal, throat no redness  Neck: no adenopathy, no JVD, supple, symmetrical, trachea midline and thyroid not enlarged  Lungs: clear to auscultation bilaterally  Heart: regular rate and rhythm  Abdomen: soft, non-tender; bowel sounds normal; no masses,  no organomegaly  Extremities: extremities normal, atraumatic, no cyanosis or edema  Skin: no open wounds    Assessment:  He says he is feeling and breathing much better since the heart stents were placed. Plan:  Stay on fiber three times a day. Ok to take Robinul twice a day.   Call if needed      Physician Signature: Electronically signed by Dr. Breanna Reinoso MD    Send copy to Bina Jung DO

## 2020-01-21 RX ORDER — AMLODIPINE BESYLATE 5 MG/1
5 TABLET ORAL DAILY
Qty: 90 TABLET | Refills: 3 | Status: ON HOLD
Start: 2020-01-21 | End: 2020-02-20 | Stop reason: HOSPADM

## 2020-01-24 ENCOUNTER — TELEPHONE (OUTPATIENT)
Dept: CARDIOLOGY CLINIC | Age: 75
End: 2020-01-24

## 2020-01-24 RX ORDER — GLYCOPYRROLATE 1 MG/1
1 TABLET ORAL 3 TIMES DAILY
Qty: 270 TABLET | Refills: 3 | Status: SHIPPED
Start: 2020-01-24 | End: 2020-09-22 | Stop reason: SDUPTHER

## 2020-01-27 ENCOUNTER — TELEPHONE (OUTPATIENT)
Dept: SURGERY | Age: 75
End: 2020-01-27

## 2020-02-03 ENCOUNTER — HOSPITAL ENCOUNTER (OUTPATIENT)
Age: 75
Discharge: HOME | End: 2020-02-03
Payer: MEDICARE

## 2020-02-03 LAB
BUN BLDV-MCNC: 16 MG/DL (ref 8–23)
CREAT SERPL-MCNC: 1.2 MG/DL (ref 0.7–1.2)
GFR AFRICAN AMERICAN: >60
GFR NON-AFRICAN AMERICAN: 59 ML/MIN/1.73

## 2020-02-03 PROCEDURE — 82565 ASSAY OF CREATININE: CPT

## 2020-02-03 PROCEDURE — 36415 COLL VENOUS BLD VENIPUNCTURE: CPT

## 2020-02-03 PROCEDURE — 84520 ASSAY OF UREA NITROGEN: CPT

## 2020-02-03 RX ORDER — PRAVASTATIN SODIUM 10 MG
10 TABLET ORAL DAILY
Qty: 90 TABLET | Refills: 1 | Status: SHIPPED
Start: 2020-02-03 | End: 2020-02-18 | Stop reason: ALTCHOICE

## 2020-02-07 ENCOUNTER — HOSPITAL ENCOUNTER (OUTPATIENT)
Dept: MRI IMAGING | Age: 75
Discharge: HOME | End: 2020-02-09
Payer: MEDICARE

## 2020-02-07 PROCEDURE — 6360000004 HC RX CONTRAST MEDICATION: Performed by: RADIOLOGY

## 2020-02-07 PROCEDURE — A9577 INJ MULTIHANCE: HCPCS | Performed by: RADIOLOGY

## 2020-02-07 PROCEDURE — 72158 MRI LUMBAR SPINE W/O & W/DYE: CPT

## 2020-02-07 RX ADMIN — GADOBENATE DIMEGLUMINE 18 ML: 529 INJECTION, SOLUTION INTRAVENOUS at 08:55

## 2020-02-18 ENCOUNTER — HOSPITAL ENCOUNTER (INPATIENT)
Age: 75
LOS: 2 days | Discharge: ANOTHER HOSPITAL | DRG: 303 | End: 2020-02-20
Attending: EMERGENCY MEDICINE | Admitting: INTERNAL MEDICINE
Payer: MEDICARE

## 2020-02-18 ENCOUNTER — APPOINTMENT (OUTPATIENT)
Dept: GENERAL RADIOLOGY | Age: 75
DRG: 303 | End: 2020-02-18
Payer: MEDICARE

## 2020-02-18 ENCOUNTER — TELEPHONE (OUTPATIENT)
Dept: FAMILY MEDICINE CLINIC | Age: 75
End: 2020-02-18

## 2020-02-18 ENCOUNTER — APPOINTMENT (OUTPATIENT)
Dept: CT IMAGING | Age: 75
DRG: 303 | End: 2020-02-18
Payer: MEDICARE

## 2020-02-18 PROBLEM — R07.9 CHEST PAIN: Status: ACTIVE | Noted: 2020-02-18

## 2020-02-18 LAB
ADENOVIRUS BY PCR: NOT DETECTED
ANION GAP SERPL CALCULATED.3IONS-SCNC: 13 MMOL/L (ref 7–16)
APTT: 25.4 SEC (ref 24.5–35.1)
APTT: 28.3 SEC (ref 24.5–35.1)
APTT: 78.9 SEC (ref 24.5–35.1)
BASOPHILS ABSOLUTE: 0.04 E9/L (ref 0–0.2)
BASOPHILS RELATIVE PERCENT: 0.6 % (ref 0–2)
BORDETELLA PARAPERTUSSIS BY PCR: NOT DETECTED
BORDETELLA PERTUSSIS BY PCR: NOT DETECTED
BUN BLDV-MCNC: 13 MG/DL (ref 8–23)
CALCIUM SERPL-MCNC: 9.6 MG/DL (ref 8.6–10.2)
CHLAMYDOPHILIA PNEUMONIAE BY PCR: NOT DETECTED
CHLORIDE BLD-SCNC: 104 MMOL/L (ref 98–107)
CK MB: 1.8 NG/ML (ref 0–7.7)
CK MB: 1.9 NG/ML (ref 0–7.7)
CK MB: 2 NG/ML (ref 0–7.7)
CO2: 22 MMOL/L (ref 22–29)
CORONAVIRUS 229E BY PCR: NOT DETECTED
CORONAVIRUS HKU1 BY PCR: NOT DETECTED
CORONAVIRUS NL63 BY PCR: NOT DETECTED
CORONAVIRUS OC43 BY PCR: NOT DETECTED
CREAT SERPL-MCNC: 1.1 MG/DL (ref 0.7–1.2)
D DIMER: 289 NG/ML DDU
EOSINOPHILS ABSOLUTE: 0.14 E9/L (ref 0.05–0.5)
EOSINOPHILS RELATIVE PERCENT: 2.2 % (ref 0–6)
GFR AFRICAN AMERICAN: >60
GFR NON-AFRICAN AMERICAN: >60 ML/MIN/1.73
GLUCOSE BLD-MCNC: 116 MG/DL (ref 74–99)
HBA1C MFR BLD: 6 % (ref 4–5.6)
HCT VFR BLD CALC: 45.1 % (ref 37–54)
HEMOGLOBIN: 16.2 G/DL (ref 12.5–16.5)
HUMAN METAPNEUMOVIRUS BY PCR: NOT DETECTED
HUMAN RHINOVIRUS/ENTEROVIRUS BY PCR: NOT DETECTED
IMMATURE GRANULOCYTES #: 0.02 E9/L
IMMATURE GRANULOCYTES %: 0.3 % (ref 0–5)
INFLUENZA A BY PCR: NOT DETECTED
INFLUENZA B BY PCR: NOT DETECTED
INR BLD: 1
LYMPHOCYTES ABSOLUTE: 2.26 E9/L (ref 1.5–4)
LYMPHOCYTES RELATIVE PERCENT: 35.9 % (ref 20–42)
MCH RBC QN AUTO: 30.5 PG (ref 26–35)
MCHC RBC AUTO-ENTMCNC: 35.9 % (ref 32–34.5)
MCV RBC AUTO: 84.8 FL (ref 80–99.9)
METER GLUCOSE: 114 MG/DL (ref 74–99)
METER GLUCOSE: 156 MG/DL (ref 74–99)
MONOCYTES ABSOLUTE: 0.47 E9/L (ref 0.1–0.95)
MONOCYTES RELATIVE PERCENT: 7.5 % (ref 2–12)
MYCOPLASMA PNEUMONIAE BY PCR: NOT DETECTED
NEUTROPHILS ABSOLUTE: 3.37 E9/L (ref 1.8–7.3)
NEUTROPHILS RELATIVE PERCENT: 53.5 % (ref 43–80)
PARAINFLUENZA VIRUS 1 BY PCR: NOT DETECTED
PARAINFLUENZA VIRUS 2 BY PCR: NOT DETECTED
PARAINFLUENZA VIRUS 3 BY PCR: NOT DETECTED
PARAINFLUENZA VIRUS 4 BY PCR: NOT DETECTED
PDW BLD-RTO: 13.2 FL (ref 11.5–15)
PLATELET # BLD: 175 E9/L (ref 130–450)
PMV BLD AUTO: 10 FL (ref 7–12)
POTASSIUM REFLEX MAGNESIUM: 3.6 MMOL/L (ref 3.5–5)
PRO-BNP: 234 PG/ML (ref 0–450)
PROCALCITONIN: 0.04 NG/ML (ref 0–0.08)
PROTHROMBIN TIME: 11.3 SEC (ref 9.3–12.4)
RBC # BLD: 5.32 E12/L (ref 3.8–5.8)
REASON FOR REJECTION: NORMAL
REASON FOR REJECTION: NORMAL
REJECTED TEST: NORMAL
REJECTED TEST: NORMAL
RESPIRATORY SYNCYTIAL VIRUS BY PCR: NOT DETECTED
SODIUM BLD-SCNC: 139 MMOL/L (ref 132–146)
TROPONIN: <0.01 NG/ML (ref 0–0.03)
WBC # BLD: 6.3 E9/L (ref 4.5–11.5)

## 2020-02-18 PROCEDURE — 6360000002 HC RX W HCPCS: Performed by: INTERNAL MEDICINE

## 2020-02-18 PROCEDURE — 85025 COMPLETE CBC W/AUTO DIFF WBC: CPT

## 2020-02-18 PROCEDURE — 99222 1ST HOSP IP/OBS MODERATE 55: CPT | Performed by: INTERNAL MEDICINE

## 2020-02-18 PROCEDURE — 80048 BASIC METABOLIC PNL TOTAL CA: CPT

## 2020-02-18 PROCEDURE — 99285 EMERGENCY DEPT VISIT HI MDM: CPT

## 2020-02-18 PROCEDURE — 6370000000 HC RX 637 (ALT 250 FOR IP): Performed by: INTERNAL MEDICINE

## 2020-02-18 PROCEDURE — 0100U HC RESPIRPTHGN MULT REV TRANS & AMP PRB TECH 21 TRGT: CPT

## 2020-02-18 PROCEDURE — 6370000000 HC RX 637 (ALT 250 FOR IP): Performed by: EMERGENCY MEDICINE

## 2020-02-18 PROCEDURE — 71275 CT ANGIOGRAPHY CHEST: CPT

## 2020-02-18 PROCEDURE — 6360000002 HC RX W HCPCS: Performed by: NURSE PRACTITIONER

## 2020-02-18 PROCEDURE — 6360000004 HC RX CONTRAST MEDICATION: Performed by: RADIOLOGY

## 2020-02-18 PROCEDURE — 36415 COLL VENOUS BLD VENIPUNCTURE: CPT

## 2020-02-18 PROCEDURE — 84484 ASSAY OF TROPONIN QUANT: CPT

## 2020-02-18 PROCEDURE — 87450 HC DIRECT STREP B ANTIGEN: CPT

## 2020-02-18 PROCEDURE — 2580000003 HC RX 258: Performed by: EMERGENCY MEDICINE

## 2020-02-18 PROCEDURE — 83036 HEMOGLOBIN GLYCOSYLATED A1C: CPT

## 2020-02-18 PROCEDURE — 87081 CULTURE SCREEN ONLY: CPT

## 2020-02-18 PROCEDURE — 82962 GLUCOSE BLOOD TEST: CPT

## 2020-02-18 PROCEDURE — 82552 ASSAY OF CPK IN BLOOD: CPT

## 2020-02-18 PROCEDURE — 71046 X-RAY EXAM CHEST 2 VIEWS: CPT

## 2020-02-18 PROCEDURE — 85610 PROTHROMBIN TIME: CPT

## 2020-02-18 PROCEDURE — 82550 ASSAY OF CK (CPK): CPT

## 2020-02-18 PROCEDURE — 84145 PROCALCITONIN (PCT): CPT

## 2020-02-18 PROCEDURE — 6370000000 HC RX 637 (ALT 250 FOR IP): Performed by: NURSE PRACTITIONER

## 2020-02-18 PROCEDURE — 93005 ELECTROCARDIOGRAM TRACING: CPT | Performed by: EMERGENCY MEDICINE

## 2020-02-18 PROCEDURE — 2580000003 HC RX 258: Performed by: NURSE PRACTITIONER

## 2020-02-18 PROCEDURE — 94640 AIRWAY INHALATION TREATMENT: CPT

## 2020-02-18 PROCEDURE — 83880 ASSAY OF NATRIURETIC PEPTIDE: CPT

## 2020-02-18 PROCEDURE — 85378 FIBRIN DEGRADE SEMIQUANT: CPT

## 2020-02-18 PROCEDURE — 2500000003 HC RX 250 WO HCPCS: Performed by: INTERNAL MEDICINE

## 2020-02-18 PROCEDURE — 85730 THROMBOPLASTIN TIME PARTIAL: CPT

## 2020-02-18 PROCEDURE — 2000000000 HC ICU R&B

## 2020-02-18 PROCEDURE — 82553 CREATINE MB FRACTION: CPT

## 2020-02-18 PROCEDURE — APPSS60 APP SPLIT SHARED TIME 46-60 MINUTES: Performed by: PHYSICIAN ASSISTANT

## 2020-02-18 RX ORDER — GLIMEPIRIDE 2 MG/1
2 TABLET ORAL
Status: DISCONTINUED | OUTPATIENT
Start: 2020-02-19 | End: 2020-02-20 | Stop reason: HOSPADM

## 2020-02-18 RX ORDER — DEXTROSE MONOHYDRATE 50 MG/ML
100 INJECTION, SOLUTION INTRAVENOUS PRN
Status: DISCONTINUED | OUTPATIENT
Start: 2020-02-18 | End: 2020-02-20 | Stop reason: HOSPADM

## 2020-02-18 RX ORDER — NICOTINE POLACRILEX 4 MG
15 LOZENGE BUCCAL PRN
Status: DISCONTINUED | OUTPATIENT
Start: 2020-02-18 | End: 2020-02-20 | Stop reason: HOSPADM

## 2020-02-18 RX ORDER — PANTOPRAZOLE SODIUM 40 MG/1
40 TABLET, DELAYED RELEASE ORAL DAILY PRN
Status: DISCONTINUED | OUTPATIENT
Start: 2020-02-18 | End: 2020-02-20 | Stop reason: HOSPADM

## 2020-02-18 RX ORDER — ALBUTEROL SULFATE 90 UG/1
2 AEROSOL, METERED RESPIRATORY (INHALATION) EVERY 4 HOURS PRN
Qty: 1 INHALER | Refills: 5 | Status: SHIPPED
Start: 2020-02-18 | End: 2020-05-28

## 2020-02-18 RX ORDER — ONDANSETRON 2 MG/ML
4 INJECTION INTRAMUSCULAR; INTRAVENOUS EVERY 6 HOURS PRN
Status: DISCONTINUED | OUTPATIENT
Start: 2020-02-18 | End: 2020-02-20 | Stop reason: HOSPADM

## 2020-02-18 RX ORDER — AMLODIPINE BESYLATE 5 MG/1
10 TABLET ORAL DAILY
Status: DISCONTINUED | OUTPATIENT
Start: 2020-02-18 | End: 2020-02-20 | Stop reason: HOSPADM

## 2020-02-18 RX ORDER — ALBUTEROL SULFATE 2.5 MG/3ML
2.5 SOLUTION RESPIRATORY (INHALATION) EVERY 4 HOURS PRN
Status: DISCONTINUED | OUTPATIENT
Start: 2020-02-18 | End: 2020-02-20 | Stop reason: HOSPADM

## 2020-02-18 RX ORDER — POTASSIUM CHLORIDE 7.45 MG/ML
10 INJECTION INTRAVENOUS PRN
Status: DISCONTINUED | OUTPATIENT
Start: 2020-02-18 | End: 2020-02-20 | Stop reason: HOSPADM

## 2020-02-18 RX ORDER — CLOPIDOGREL BISULFATE 75 MG/1
75 TABLET ORAL DAILY
Status: DISCONTINUED | OUTPATIENT
Start: 2020-02-18 | End: 2020-02-18 | Stop reason: SDUPTHER

## 2020-02-18 RX ORDER — PRAVASTATIN SODIUM 10 MG
10 TABLET ORAL NIGHTLY
Status: DISCONTINUED | OUTPATIENT
Start: 2020-02-18 | End: 2020-02-20 | Stop reason: HOSPADM

## 2020-02-18 RX ORDER — SODIUM CHLORIDE 0.9 % (FLUSH) 0.9 %
10 SYRINGE (ML) INJECTION PRN
Status: DISCONTINUED | OUTPATIENT
Start: 2020-02-18 | End: 2020-02-20 | Stop reason: HOSPADM

## 2020-02-18 RX ORDER — ACETAMINOPHEN 325 MG/1
650 TABLET ORAL EVERY 4 HOURS PRN
Status: DISCONTINUED | OUTPATIENT
Start: 2020-02-18 | End: 2020-02-20 | Stop reason: HOSPADM

## 2020-02-18 RX ORDER — ASPIRIN 81 MG/1
81 TABLET, CHEWABLE ORAL DAILY
Status: DISCONTINUED | OUTPATIENT
Start: 2020-02-19 | End: 2020-02-20 | Stop reason: HOSPADM

## 2020-02-18 RX ORDER — BUDESONIDE 0.5 MG/2ML
0.5 INHALANT ORAL 2 TIMES DAILY
Status: DISCONTINUED | OUTPATIENT
Start: 2020-02-18 | End: 2020-02-20 | Stop reason: HOSPADM

## 2020-02-18 RX ORDER — 0.9 % SODIUM CHLORIDE 0.9 %
250 INTRAVENOUS SOLUTION INTRAVENOUS ONCE
Status: COMPLETED | OUTPATIENT
Start: 2020-02-18 | End: 2020-02-18

## 2020-02-18 RX ORDER — METOPROLOL SUCCINATE 25 MG/1
50 TABLET, EXTENDED RELEASE ORAL DAILY
Status: DISCONTINUED | OUTPATIENT
Start: 2020-02-18 | End: 2020-02-18 | Stop reason: SDUPTHER

## 2020-02-18 RX ORDER — AMMONIUM LACTATE 12 G/100G
225 LOTION TOPICAL DAILY PRN
Status: DISCONTINUED | OUTPATIENT
Start: 2020-02-18 | End: 2020-02-20 | Stop reason: HOSPADM

## 2020-02-18 RX ORDER — IPRATROPIUM BROMIDE AND ALBUTEROL SULFATE 2.5; .5 MG/3ML; MG/3ML
1 SOLUTION RESPIRATORY (INHALATION) ONCE
Status: COMPLETED | OUTPATIENT
Start: 2020-02-18 | End: 2020-02-18

## 2020-02-18 RX ORDER — DEXTROSE MONOHYDRATE 25 G/50ML
12.5 INJECTION, SOLUTION INTRAVENOUS PRN
Status: DISCONTINUED | OUTPATIENT
Start: 2020-02-18 | End: 2020-02-20 | Stop reason: HOSPADM

## 2020-02-18 RX ORDER — METOPROLOL SUCCINATE 25 MG/1
50 TABLET, EXTENDED RELEASE ORAL 2 TIMES DAILY
Status: DISCONTINUED | OUTPATIENT
Start: 2020-02-18 | End: 2020-02-20 | Stop reason: HOSPADM

## 2020-02-18 RX ORDER — SODIUM CHLORIDE 0.9 % (FLUSH) 0.9 %
10 SYRINGE (ML) INJECTION EVERY 12 HOURS SCHEDULED
Status: DISCONTINUED | OUTPATIENT
Start: 2020-02-18 | End: 2020-02-20 | Stop reason: HOSPADM

## 2020-02-18 RX ORDER — HEPARIN SODIUM 1000 [USP'U]/ML
2000 INJECTION, SOLUTION INTRAVENOUS; SUBCUTANEOUS PRN
Status: DISCONTINUED | OUTPATIENT
Start: 2020-02-18 | End: 2020-02-20 | Stop reason: HOSPADM

## 2020-02-18 RX ORDER — HEPARIN SODIUM 1000 [USP'U]/ML
4000 INJECTION, SOLUTION INTRAVENOUS; SUBCUTANEOUS ONCE
Status: COMPLETED | OUTPATIENT
Start: 2020-02-18 | End: 2020-02-18

## 2020-02-18 RX ORDER — FOLIC ACID 1 MG/1
1 TABLET ORAL DAILY
Status: DISCONTINUED | OUTPATIENT
Start: 2020-02-18 | End: 2020-02-20 | Stop reason: HOSPADM

## 2020-02-18 RX ORDER — CLOPIDOGREL BISULFATE 75 MG/1
75 TABLET ORAL DAILY
Status: DISCONTINUED | OUTPATIENT
Start: 2020-02-19 | End: 2020-02-20 | Stop reason: HOSPADM

## 2020-02-18 RX ORDER — HEPARIN SODIUM 1000 [USP'U]/ML
4000 INJECTION, SOLUTION INTRAVENOUS; SUBCUTANEOUS PRN
Status: DISCONTINUED | OUTPATIENT
Start: 2020-02-18 | End: 2020-02-20 | Stop reason: HOSPADM

## 2020-02-18 RX ORDER — AMMONIUM LACTATE 12 G/100G
1 LOTION TOPICAL DAILY PRN
COMMUNITY

## 2020-02-18 RX ORDER — FENTANYL CITRATE 50 UG/ML
25 INJECTION, SOLUTION INTRAMUSCULAR; INTRAVENOUS
Status: DISCONTINUED | OUTPATIENT
Start: 2020-02-18 | End: 2020-02-20 | Stop reason: HOSPADM

## 2020-02-18 RX ORDER — ASPIRIN 81 MG/1
81 TABLET, CHEWABLE ORAL DAILY
Status: DISCONTINUED | OUTPATIENT
Start: 2020-02-18 | End: 2020-02-18

## 2020-02-18 RX ORDER — NITROGLYCERIN 20 MG/100ML
5 INJECTION INTRAVENOUS CONTINUOUS
Status: DISCONTINUED | OUTPATIENT
Start: 2020-02-18 | End: 2020-02-20 | Stop reason: HOSPADM

## 2020-02-18 RX ORDER — HEPARIN SODIUM 10000 [USP'U]/100ML
11.6 INJECTION, SOLUTION INTRAVENOUS CONTINUOUS
Status: DISCONTINUED | OUTPATIENT
Start: 2020-02-18 | End: 2020-02-20 | Stop reason: HOSPADM

## 2020-02-18 RX ORDER — POTASSIUM CHLORIDE 20 MEQ/1
40 TABLET, EXTENDED RELEASE ORAL PRN
Status: DISCONTINUED | OUTPATIENT
Start: 2020-02-18 | End: 2020-02-20 | Stop reason: HOSPADM

## 2020-02-18 RX ORDER — AMLODIPINE BESYLATE 5 MG/1
5 TABLET ORAL DAILY
Status: DISCONTINUED | OUTPATIENT
Start: 2020-02-18 | End: 2020-02-18 | Stop reason: SDUPTHER

## 2020-02-18 RX ORDER — ASPIRIN 81 MG/1
324 TABLET, CHEWABLE ORAL ONCE
Status: COMPLETED | OUTPATIENT
Start: 2020-02-18 | End: 2020-02-18

## 2020-02-18 RX ORDER — CYCLOBENZAPRINE HCL 10 MG
10 TABLET ORAL 3 TIMES DAILY PRN
Status: DISCONTINUED | OUTPATIENT
Start: 2020-02-18 | End: 2020-02-20 | Stop reason: HOSPADM

## 2020-02-18 RX ORDER — ARFORMOTEROL TARTRATE 15 UG/2ML
15 SOLUTION RESPIRATORY (INHALATION) 2 TIMES DAILY
Status: DISCONTINUED | OUTPATIENT
Start: 2020-02-18 | End: 2020-02-20 | Stop reason: HOSPADM

## 2020-02-18 RX ORDER — ASPIRIN 81 MG/1
81 TABLET, CHEWABLE ORAL DAILY
Status: DISCONTINUED | OUTPATIENT
Start: 2020-02-18 | End: 2020-02-18 | Stop reason: SDUPTHER

## 2020-02-18 RX ORDER — PRAVASTATIN SODIUM 10 MG
10 TABLET ORAL NIGHTLY
COMMUNITY
End: 2020-08-03

## 2020-02-18 RX ORDER — MAGNESIUM SULFATE 1 G/100ML
1 INJECTION INTRAVENOUS PRN
Status: DISCONTINUED | OUTPATIENT
Start: 2020-02-18 | End: 2020-02-20 | Stop reason: HOSPADM

## 2020-02-18 RX ORDER — PANTOPRAZOLE SODIUM 40 MG/1
40 TABLET, DELAYED RELEASE ORAL DAILY PRN
COMMUNITY
End: 2020-09-22 | Stop reason: SDUPTHER

## 2020-02-18 RX ORDER — GLIMEPIRIDE 2 MG/1
2 TABLET ORAL
Qty: 30 TABLET | Refills: 5 | Status: SHIPPED
Start: 2020-02-18 | End: 2020-09-22 | Stop reason: SDUPTHER

## 2020-02-18 RX ORDER — ALBUTEROL SULFATE 90 UG/1
2 AEROSOL, METERED RESPIRATORY (INHALATION) EVERY 4 HOURS PRN
Status: DISCONTINUED | OUTPATIENT
Start: 2020-02-18 | End: 2020-02-18 | Stop reason: CLARIF

## 2020-02-18 RX ADMIN — IOPAMIDOL 80 ML: 755 INJECTION, SOLUTION INTRAVENOUS at 13:22

## 2020-02-18 RX ADMIN — FOLIC ACID 1 MG: 1 TABLET ORAL at 18:11

## 2020-02-18 RX ADMIN — INSULIN LISPRO 1 UNITS: 100 INJECTION, SOLUTION INTRAVENOUS; SUBCUTANEOUS at 18:10

## 2020-02-18 RX ADMIN — ASPIRIN 81 MG 324 MG: 81 TABLET ORAL at 12:00

## 2020-02-18 RX ADMIN — SODIUM CHLORIDE 250 ML: 9 INJECTION, SOLUTION INTRAVENOUS at 12:25

## 2020-02-18 RX ADMIN — NITROGLYCERIN 5 MCG/MIN: 20 INJECTION INTRAVENOUS at 15:09

## 2020-02-18 RX ADMIN — BUDESONIDE 500 MCG: 0.5 INHALANT RESPIRATORY (INHALATION) at 15:36

## 2020-02-18 RX ADMIN — METOPROLOL SUCCINATE 50 MG: 25 TABLET, FILM COATED, EXTENDED RELEASE ORAL at 20:21

## 2020-02-18 RX ADMIN — Medication 10 ML: at 20:20

## 2020-02-18 RX ADMIN — ARFORMOTEROL TARTRATE 15 MCG: 15 SOLUTION RESPIRATORY (INHALATION) at 15:35

## 2020-02-18 RX ADMIN — HEPARIN SODIUM 4000 UNITS: 1000 INJECTION INTRAVENOUS; SUBCUTANEOUS at 15:08

## 2020-02-18 RX ADMIN — IPRATROPIUM BROMIDE AND ALBUTEROL SULFATE 1 AMPULE: .5; 3 SOLUTION RESPIRATORY (INHALATION) at 11:51

## 2020-02-18 RX ADMIN — PRAVASTATIN SODIUM 10 MG: 10 TABLET ORAL at 20:37

## 2020-02-18 RX ADMIN — HEPARIN SODIUM AND DEXTROSE 11.6 UNITS/KG/HR: 10000; 5 INJECTION INTRAVENOUS at 15:08

## 2020-02-18 RX ADMIN — ALBUTEROL SULFATE 2.5 MG: 2.5 SOLUTION RESPIRATORY (INHALATION) at 15:33

## 2020-02-18 ASSESSMENT — PAIN DESCRIPTION - LOCATION
LOCATION: CHEST
LOCATION: CHEST

## 2020-02-18 ASSESSMENT — ENCOUNTER SYMPTOMS
ABDOMINAL PAIN: 0
VOMITING: 0
NAUSEA: 0
CHEST TIGHTNESS: 0
SINUS PAIN: 0
SORE THROAT: 0
DIARRHEA: 0
BACK PAIN: 0
SHORTNESS OF BREATH: 1
COUGH: 1

## 2020-02-18 ASSESSMENT — PAIN DESCRIPTION - DESCRIPTORS: DESCRIPTORS: OTHER (COMMENT)

## 2020-02-18 ASSESSMENT — PAIN SCALES - GENERAL
PAINLEVEL_OUTOF10: 8
PAINLEVEL_OUTOF10: 0
PAINLEVEL_OUTOF10: 6

## 2020-02-18 ASSESSMENT — PAIN DESCRIPTION - PAIN TYPE
TYPE: ACUTE PAIN
TYPE: ACUTE PAIN

## 2020-02-18 ASSESSMENT — PAIN DESCRIPTION - FREQUENCY
FREQUENCY: CONTINUOUS
FREQUENCY: CONTINUOUS

## 2020-02-18 NOTE — H&P
scar    PROSTATE SURGERY  2017    PTCA  09/30/2019    2.5 x 38 mm Resolute [place] RAFI mid OM1, 2.25 x 8 mm Resolute [place] RAFI ostium of dx    UPPER GASTROINTESTINAL ENDOSCOPY  5/2018 [facility]    UPPER GASTROINTESTINAL ENDOSCOPY N/A 6/4/2019    EGD BIOPSY performed by Alexis Jang MD at [address] [facility] Hx:  Family History   Problem Relation Age of Onset    Stroke Mother     Other Father     Kidney Disease Brother     Arrhythmia Brother     Cancer Brother     Kidney Disease Sister     Hypertension Other     Diabetes Other        HOME MEDICATIONS:  Prior to Admission medications    Medication Sig Start Date End Date Taking?  Authorizing Provider   glimepiride (AMARYL) 2 MG tablet Take 1 tablet by mouth every morning (before breakfast) 2/18/20  Yes Jaret Patel,    albuterol (PROVENTIL) (5 MG/ML) 0.5% nebulizer solution Take 1 mL by nebulization 4 times daily as needed for Wheezing 2/18/20  Yes Jaret Patel,    albuterol sulfate HFA (PROVENTIL HFA) 108 (90 Base) MCG/ACT inhaler Inhale 2 puffs into the lungs every 4 hours as needed for Wheezing 2/18/20 2/17/21 Yes Markel Rogers,    ammonium lactate (LAC-HYDRIN) 12 % lotion Apply 1 g topically daily as needed for Dry Skin   Yes Historical Provider, MD   pantoprazole (PROTONIX) 40 MG tablet Take 40 mg by mouth daily as needed (Reflux)   Yes Historical Provider, MD   pravastatin (PRAVACHOL) 10 MG tablet Take 10 mg by mouth nightly   Yes Historical Provider, MD   [employer] FIBER GUMMIES PO Take 2 tablets by mouth daily   Yes Historical Provider, MD   Calcium Polycarbophil (RA FIBER-TAB PO) Take 2 tablets by mouth 2 times daily   Yes Historical Provider, MD   influenza virus trivalent vaccine (FLUZONE) injection Inject 0.5 mLs into the muscle once Given 9/2019   Yes Historical Provider, MD   glycopyrrolate (ROBINUL) 1 MG tablet Take 1 tablet by mouth 3 times daily 1/24/20 1/23/21 Yes Alexis Jang MD   amLODIPine (NORVASC) 5 MG tablet Take 1 or organization: Not on file     Attends meetings of clubs or organizations: Not on file     Relationship status: Not on file    Intimate partner violence:     Fear of current or ex partner: Not on file     Emotionally abused: Not on file     Physically abused: Not on file     Forced sexual activity: Not on file   Other Topics Concern    Not on file   Social History Narrative    Not on file       ROS:  General:   Denies chills, fatigue, fever, malaise, night sweats or weight loss    Psychological:   Denies anxiety, disorientation or hallucinations    ENT:    Denies epistaxis, headaches, vertigo or visual changes    Cardiovascular:   Admits to chest pain as described above. Denies irregular heartbeats, or palpitations. No paroxysmal nocturnal dyspnea. Respiratory:   Admits to chest congestion and scant sputum production with cough. Denies shortness of breath, hemoptysis, or wheezing. No orthopnea. Gastrointestinal:   Denies nausea, vomiting, diarrhea, or constipation. Admits to indigestion or gas/bloating, denies abdominal pain. Denies change in bowel habits or stools. Genito-Urinary:    Denies any urgency, frequency, hematuria. Voiding without difficulty. Musculoskeletal:   Denies joint pain, joint stiffness, joint swelling or muscle pain    Neurology:    Denies any headache or focal neurological deficits. No weakness or paresthesia. Derm:    Denies any rashes, ulcers, or excoriations. Denies bruising. Extremities:   Denies any lower extremity swelling or edema. PHYSICAL EXAM:  VITALS:  Vitals:    02/18/20 1122   BP: (!) 167/73   Pulse: 78   Resp: 24   Temp: 98.2 °F (36.8 °C)   SpO2: 95%         CONSTITUTIONAL:    Awake, alert, cooperative, no apparent distress, and appears stated age    EYES:    PERRL, EOMI, sclera clear, conjunctiva normal    ENT:    Normocephalic, atraumatic, sinuses nontender on palpation. External ears without lesions.  Oral pharynx with moist mucus membranes. Tonsils without erythema or exudates. NECK:    Supple, symmetrical, trachea midline, no adenopathy, thyroid symmetric, not enlarged and no tenderness, skin normal, no bruits, no JVD    HEMATOLOGIC/LYMPHATICS:    No cervical lymphadenopathy and no supraclavicular lymphadenopathy    LUNGS:    Symmetric. No increased work of breathing, mildly diminished air exchange, clear to auscultation bilaterally, no wheezes, rhonchi, or rales,     CARDIOVASCULAR:    Normal apical impulse, regular rate and rhythm, normal S1 and S2, no S3 or S4, and no murmur noted    ABDOMEN:    Obese contour, normal bowel sounds, soft, non-distended, non-tender, no masses palpated, no hepatosplenomegaly, no rebound or guarding elicited on palpation     MUSCULOSKELETAL:    There is no redness, warmth, or swelling of the joints. Full range of motion noted. Motor strength is 5 out of 5 all extremities bilaterally. Tone is normal.    NEUROLOGIC:    Awake, alert, oriented to name, place and time. Cranial nerves II-XII are grossly intact. Motor is 5 out of 5 bilaterally. SKIN:    No bruising or bleeding. No redness, warmth, or swelling    EXTREMITIES:    Peripheral pulses present. No edema, cyanosis, or swelling.     LABORATORY DATA:  CBC with Differential:    Lab Results   Component Value Date    WBC 6.3 02/18/2020    RBC 5.32 02/18/2020    HGB 16.2 02/18/2020    HCT 45.1 02/18/2020     02/18/2020    MCV 84.8 02/18/2020    MCH 30.5 02/18/2020    MCHC 35.9 02/18/2020    RDW 13.2 02/18/2020    NRBC 0.5 04/25/2017    METASPCT 0.5 04/21/2017    LYMPHOPCT 35.9 02/18/2020    MONOPCT 7.5 02/18/2020    MYELOPCT 0.5 04/25/2017    EOSPCT 2.1 08/09/2019    BASOPCT 0.6 02/18/2020    MONOSABS 0.47 02/18/2020    LYMPHSABS 2.26 02/18/2020    EOSABS 0.14 02/18/2020    BASOSABS 0.04 02/18/2020     BMP:    Lab Results   Component Value Date     02/18/2020    K 3.6 02/18/2020     02/18/2020    CO2 22 02/18/2020    BUN 13

## 2020-02-18 NOTE — ED NOTES
Supervisor in Department, call ICU- no accepting Intensivist.  Call to Dr. Calloway.       Marco Antonio Jimenes, RN  02/18/20 2034

## 2020-02-18 NOTE — CONSULTS
Inpatient Cardiology Consultation      Reason for Consult:  Chest pain    Consulting Physician: Dr. Karoline Bowser    Requesting Physician:  Dr. Sarath Ochoa    Date of Consultation: 2/18/2020    HISTORY OF PRESENT ILLNESS:   Patient is a 66year-old WM who follows with Dr. Karoline Bowser outpatient. 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 He presents to [facility] with complaints of chest discomfort. Patient states that over the past few days he has been noticing chest discomfort. The chest discomfort worsened yesterday evening, so he decided to come to the ED for further evaluation. He states that chest pain is located on the left side of his chest with no radiation. He describes it as a burning sensation. He states it felt like indigestion, but was not related to meals. The pain has been constant, waxing and waning in severity. Denies associated nausea, vomiting, dyspnea, diaphoresis, dizziness, palpitations, near syncope, syncope, orthopnea, paroxysmal nocturnal dyspnea, or orthopnea. He does note of dry cough and congestion. States yesterday evening the pain was a 8/10 -- it is currently a 2/10 on interview/exam this afternoon. Patient does admit that this is his anginal equivalent, and he felt the same pain prior to his last cardiac work-up with abnormal coronary CTA and left heart catheterization in September 2019 requiring stent placement. He states he has been doing pretty well since his coronary stent placements in September, and notes that his chest pain initially completed resolved after the procedure. He states that nitroglycerin improves the pain. Sometimes related to exertion, sometimes not. Sometimes worsened by deep inspiration.  No other specific aggravating or alleviating factors regarding the chest [facility]    EPIDURAL STEROID INJECTION N/A 1/8/2019    C7 - T1 EPIDURAL STEROID INJECTION #2 performed by Arslan Velasco DO at [address] [facility]  2008    LUMBAR FUSION  03/06/2017    NASAL SINUS SURGERY  12/07/2017    Submucosal Resection of Inferior Turbinate    NERVE BLOCK Bilateral 10 12 2015    bilateral sacroiliac joint injection #1    NERVE BLOCK Bilateral 10/22/15    sacroiliac joint #2    NERVE BLOCK Bilateral 11 02 2015    Sacroiliac joint bilateral #3    NERVE BLOCK Left 12/2/15    lumbar transforaminal #1    NERVE BLOCK Left 12 16 15    NERVE BLOCK Left 12 28 2015    transforaminal nerve block left lumbar #3    NERVE BLOCK Left 1 20 16    radiofrequency     NERVE BLOCK  12/11/2018    C7-T1 epidural    NERVE BLOCK N/A 01/08/2019    cervical epidural steroid injection    OTHER SURGICAL HISTORY  1943    had a feeding tube as an infant, scar    PROSTATE SURGERY  2017    PTCA  09/30/2019    2.5 x 38 mm Resolute [place] RAFI mid OM1, 2.25 x 8 mm Resolute [place] RAFI ostium of dx    UPPER GASTROINTESTINAL ENDOSCOPY  5/2018 [facility]    UPPER GASTROINTESTINAL ENDOSCOPY N/A 6/4/2019    EGD BIOPSY performed by Israel Verdugo MD at [address]:  Prior to Admission medications    Medication Sig Start Date End Date Taking?  Authorizing Provider   glimepiride (AMARYL) 2 MG tablet Take 1 tablet by mouth every morning (before breakfast) 2/18/20  Yes Stefany Rodriguez,    albuterol (PROVENTIL) (5 MG/ML) 0.5% nebulizer solution Take 1 mL by nebulization 4 times daily as needed for Wheezing 2/18/20  Yes Stefany Rodriguez DO   albuterol sulfate HFA (PROVENTIL HFA) 108 (90 Base) MCG/ACT inhaler Inhale 2 puffs into the lungs every 4 hours as needed for Wheezing 2/18/20 2/17/21 Yes Markel Rogers, DO   ammonium lactate (LAC-HYDRIN) 12 % lotion Apply 1 g topically daily as needed for Dry Skin   Yes Historical Provider, MD   pantoprazole (PROTONIX) 40 MG tablet Take 40 mg by mouth daily as needed (Reflux)   Yes Historical Provider, MD   pravastatin (PRAVACHOL) 10 MG tablet Take 10 mg by mouth nightly   Yes Historical Provider, MD   [employer] FIB[employer] GUMMIES PO Take 2 tablets by mouth daily   Yes Historical Provider, MD   Calcium Polycarbophil (RA FIBER-TAB PO) Take 2 tablets by mouth 2 times daily   Yes Historical Provider, MD   influenza virus trivalent vaccine (FLUZONE) injection Inject 0.5 mLs into the muscle once Given 9/2019   Yes Historical Provider, MD   glycopyrrolate (ROBINUL) 1 MG tablet Take 1 tablet by mouth 3 times daily 1/24/20 1/23/21 Yes Israel Verdugo MD   amLODIPine (NORVASC) 5 MG tablet Take 1 tablet by mouth daily 1/21/20  Yes Leeroy Gupta,    clopidogrel (PLAVIX) 75 MG tablet Take 75 mg by mouth daily   Yes Historical Provider, MD   folic acid (FOLVITE) 1 MG tablet Take 1 tablet by mouth daily 7/16/19  Yes Stefany Rodriguez,    metoprolol succinate (TOPROL XL) 50 MG extended release tablet Take 1 tablet by mouth daily 7/16/19  Yes Stefany Rodriguez DO   cyclobenzaprine (FLEXERIL) 10 MG tablet Take 1 tablet by mouth 3 times daily as needed for Muscle spasms 9/19/18  Yes Stefany Rodriguez,    aspirin 81 MG tablet Take 81 mg by mouth Daily with lunch    Yes Historical Provider, MD       CURRENT MEDICATIONS:      Current Facility-Administered Medications:     ammonium lactate (LAC-HYDRIN) 12 % lotion 1 Bottle, 225 g, Topical, Daily PRN, Norvel Risen, APRN - CNP    cyclobenzaprine (FLEXERIL) tablet 10 mg, 10 mg, Oral, TID PRN, Norvel Risen, APRN - CNP    folic acid (FOLVITE) tablet 1 mg, 1 mg, Oral, Daily, Norvel Risen, APRN - CNP    [START ON 2/19/2020] glimepiride (AMARYL) tablet 2 mg, 2 mg, Oral, QAM AC, Norvel Risen, APRN - CNP    pantoprazole (PROTONIX) tablet 40 mg, 40 mg, Oral, Daily PRN, Norvel Risen, APRN - CNP    pravastatin (PRAVACHOL) tablet 10 mg, 10 mg, Oral, Nightly, Norvel Risen, APRN - CNP    sodium chloride flush 0.9 % injection 10 mL, 10 mL, Intravenous, 2 times per day, Thurl Hoit, APRN - CNP    sodium chloride flush 0.9 % injection 10 mL, 10 mL, Intravenous, PRN, Thurl Hoit, APRN - CNP    magnesium hydroxide (MILK OF MAGNESIA) 400 MG/5ML suspension 30 mL, 30 mL, Oral, Daily PRN, Thurl Hoit, APRN - CNP    ondansetron [facility]) injection 4 mg, 4 mg, Intravenous, Q6H PRN, Thurl Hoit, APRN - CNP    glucose (GLUTOSE) 40 % oral gel 15 g, 15 g, Oral, PRN, Thurl Hoit, APRN - CNP    dextrose 50 % IV solution, 12.5 g, Intravenous, PRN, Thurl Hoit, APRN - CNP    glucagon (rDNA) injection 1 mg, 1 mg, Intramuscular, PRN, Thurl Hoit, APRN - CNP    dextrose 5 % solution, 100 mL/hr, Intravenous, PRN, Thurl Hoit, APRN - CNP    magnesium sulfate 1 g in dextrose 5% 100 mL IVPB, 1 g, Intravenous, PRN, Thurl Hoit, APRN - CNP    potassium chloride (KLOR-CON M) extended release tablet 40 mEq, 40 mEq, Oral, PRN **OR** potassium bicarb-citric acid (EFFER-K) effervescent tablet 40 mEq, 40 mEq, Oral, PRN **OR** potassium chloride 10 mEq/100 mL IVPB (Peripheral Line), 10 mEq, Intravenous, PRN, Thurl Hoit, APRN - CNP    acetaminophen (TYLENOL) tablet 650 mg, 650 mg, Oral, Q4H PRN, Thurl Hoit, APRN - CNP    insulin lispro (HUMALOG) injection vial 0-6 Units, 0-6 Units, Subcutaneous, TID WC, Thurl Hoit, APRN - CNP    insulin lispro (HUMALOG) injection vial 0-3 Units, 0-3 Units, Subcutaneous, Nightly, Thurl Hoit, APRN - CNP    budesonide (PULMICORT) nebulizer suspension 500 mcg, 0.5 mg, Nebulization, BID, Thurl Hoit, APRN - CNP    Arformoterol Tartrate (BROVANA) nebulizer solution 15 mcg, 15 mcg, Nebulization, BID, HARMAN Chau - CNP    nitroGLYCERIN 50 mg in dextrose 5% 250 mL infusion, 5 mcg/min, Intravenous, Continuous, Andreia Razor, DO    heparin (porcine) injection 4,000 Units, 4,000 Units, Intravenous, Once, Andreia Razor, DO    heparin (porcine) injection 4,000 Units, 4,000 Units, Intravenous, PRN, Katie Sergeant, DO    heparin (porcine) injection 2,000 Units, 2,000 Units, Intravenous, PRN, Katie Sergeant, DO    heparin 25,000 units in dextrose 5% 250 mL infusion, 11.6 Units/kg/hr, Intravenous, Continuous, Katie Sergeant, DO    fentaNYL (SUBLIMAZE) injection 25 mcg, 25 mcg, Intravenous, Q2H PRN, Katei Sergeant, DO    metoprolol succinate (TOPROL XL) extended release tablet 50 mg, 50 mg, Oral, BID, Katie Sergeant, DO    amLODIPine (NORVASC) tablet 10 mg, 10 mg, Oral, Daily, Katie Sergeant, DO    [START ON 2/19/2020] clopidogrel (PLAVIX) tablet 75 mg, 75 mg, Oral, Daily, Katie Sergeant, DO    [START ON 2/19/2020] aspirin chewable tablet 81 mg, 81 mg, Oral, Daily, Katie Sergeant, DO    albuterol (PROVENTIL) nebulizer solution 2.5 mg, 2.5 mg, Nebulization, Q4H PRN, Niharika Dacosta, APRN - CNP    Current Outpatient Medications:     glimepiride (AMARYL) 2 MG tablet, Take 1 tablet by mouth every morning (before breakfast), Disp: 30 tablet, Rfl: 5    albuterol (PROVENTIL) (5 MG/ML) 0.5% nebulizer solution, Take 1 mL by nebulization 4 times daily as needed for Wheezing, Disp: 120 each, Rfl: 5    albuterol sulfate HFA (PROVENTIL HFA) 108 (90 Base) MCG/ACT inhaler, Inhale 2 puffs into the lungs every 4 hours as needed for Wheezing, Disp: 1 Inhaler, Rfl: 5    ammonium lactate (LAC-HYDRIN) 12 % lotion, Apply 1 g topically daily as needed for Dry Skin, Disp: , Rfl:     pantoprazole (PROTONIX) 40 MG tablet, Take 40 mg by mouth daily as needed (Reflux), Disp: , Rfl:     pravastatin (PRAVACHOL) 10 MG tablet, Take 10 mg by mouth nightly, Disp: , Rfl:     [employer] FIBER GUMMIES PO, Take 2 tablets by mouth daily, Disp: , Rfl:     Calcium Polycarbophil (RA FIBER-TAB PO), Take 2 tablets by mouth 2 times daily, Disp: , Rfl:     influenza virus trivalent vaccine (FLUZONE) injection, Inject 0.5 mLs into the muscle once Given 9/2019, Disp: , Rfl:     glycopyrrolate (ROBINUL) 1 MG tablet, Take 1 tablet by mouth 3 history and risk factors. 5. Cycle cardiac enzymes and daily EKGs. 6. Will reassess tomorrow. Recommend admission to ICU. The above case was discussed in detail with Dr. Marguerite Fabian. Above assessment and plan made in collaboration with Dr. Marguerite Fabian. Further recommendations and assessment/plan as per Dr. Marguerite Fbaian. Electronically signed by Brian Dunn PA-C on 2/18/2020 at 3:10 PM       CARDIOLOGY ATTENDING ATTESTATION: I have personally interviewed the patient and obtained key portions of the history and physical necessary for medical decision making. I have also performed my own review of systems and physical exam. All labs and diagnostic testing results have been reviewed personally. My personal assessment and plan as noted below. All orders and medical decision making was done by me. REVIEW OF SYSTEMS:     · · Constitutional: Denies fevers, chills, night sweats, and generalized fatigue. Denies significant weight loss or weight gain. · · HEENT: Denies headaches, nose bleeds, rhinorrhea, sore throat. Denies blurred vision. Denies dysphagia, odynophagia. · · Musculoskeletal: Denies falls, pain to BLE with ambulation. Denies muscle weakness. · · Neurological: Denies dizziness and lightheadedness, numbness and tingling. Denies focal neurological deficits. · · Cardiovascular: +chest pain. Denies palpitations, diaphoresis. Denies syncope. Denies PND, orthopnea, peripheral edema. · · Respiratory: +dry cough. Denies shortness of breath at rest or with exertion. Denies hemoptysis. · · Gastrointestinal: Denies nausea/vomiting, diarrhea and constipation, black/bloody, and tarry stools. · · Genitourinary: Denies dysuria and hematuria. · · Hematologic: Denies excessive bruising or bleeding. · · Endocrine: Denies excessive thirst. Denies intolerance to hot and cold.       PHYSICAL EXAM:   /63   Pulse 74   Temp 97.3 °F (36.3 °C) (Oral)   Resp 16   Ht 5' 9\" (1.753 m)   Wt 188 lb (85.3 kg)   SpO2 93% BMI 27.76 kg/m²   CONST:  Well developed, well nourished WM who appears stated age. Awake, alert, cooperative, no apparent distress  HEENT:   Head- Normocephalic, atraumatic   Eyes- Conjunctivae pink, anicteric  Throat- Oral mucosa pink and moist  Neck-  No stridor, trachea midline, no jugular venous distention. No adenopathy   CHEST: Chest symmetrical and non-tender to palpation. No accessory muscle use or intercostal retractions  RESPIRATORY: Lung sounds - clear throughout fields   CARDIOVASCULAR:     No carotid bruit  Heart Inspection- shows no noted pulsations  Heart Palpation- no heaves or thrills; PMI is non-displaced   Heart Ausculation- Regular rate and rhythm, no murmur. No s3, s4 or rub   PV: Trace lower extremity edema. No varicosities. Pedal pulses palpable, no clubbing or cyanosis   ABDOMEN: Soft, non-tender to light palpation. Bowel sounds present. No palpable masses no organomegaly; no abdominal bruit  MS: Good muscle strength and tone. No atrophy or abnormal movements. : Deferred  SKIN: Warm and dry no statis dermatitis or ulcers   NEURO / PSYCH: Oriented to person, place and time. Speech clear and appropriate. Follows all commands. Pleasant affect         ASSESSMENT:  1. Possible unstable angina in a patient with chest pain (noted to be his anginal equivalent) with history of CAD and recent PCI/RAFI placement x 2 (OM1 and diagonal, with patent prior stent in the RCA) in September 2019, as well as multiple risk factors. Chest pain has typical and atypical characteristics. He admits to compliance with his daily aspirin and Plavix therapy. 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 First troponin is negative. Chest CTA negative for pulmonary embolism. 2. Hypertension, uncontrolled. 3. Hyperlipidemia, on statin therapy. 4. Former tobacco abuse. 5. Diabetes mellitus type II. 6. GERD. 7. History of SVT with ablation done 20+ years ago. 8. Mild valvular heart disease. PLAN:  1. Recent 2D echocardiogram in Oct 2019 from [facility] reviewed in detail. 2. Start IV heparin and IV nitroglycerin drips at this time. 3. Continue aspirin and Plavix therapy. Continue beta-blockade and statin. 4. Check lipid panel. 5. Continue to cycle cardiac enzymes and EKGs. 6. Will reassess in AM for further recommendations on ischemic heart disease work-up. Starla Goldberg Jannie Pancake, [facility], [id number] [facility]

## 2020-02-18 NOTE — ED PROVIDER NOTES
This is a 75-year-old male with a past medical history of diabetes and CAD presents over concerns of chest pain and shortness of breath that have been worsening over the past 3 days. He states whenever he tries to take a deep breath he feels like he gets a cramping pain in the middle of his chest.  He states it goes away but then he notices that again whenever he takes a breath. He does not really notice a change in exertion. He denies any radiation of the pain. He had stents placed in October by Dr. Marisela Higgins. No history of PE, DVT, clotting disorder. He is not on anticoagulation. He states he is compliant with his aspirin and Plavix. No recent travel or recent surgery. He quit smoking 30 years ago. The history is provided by the patient. No  was used. Review of Systems   Constitutional: Negative for activity change, chills, fatigue and fever. HENT: Negative for congestion, sinus pain and sore throat. Eyes: Negative for visual disturbance. Respiratory: Positive for cough and shortness of breath. Negative for chest tightness. Cardiovascular: Positive for chest pain. Negative for palpitations and leg swelling. Gastrointestinal: Negative for abdominal pain, diarrhea, nausea and vomiting. Genitourinary: Negative for dysuria and urgency. Musculoskeletal: Negative for back pain, neck pain and neck stiffness. Skin: Negative for rash. Neurological: Negative for dizziness, syncope and headaches. Psychiatric/Behavioral: Negative for confusion. Physical Exam  Vitals signs and nursing note reviewed. Constitutional:       General: He is not in acute distress. Appearance: Normal appearance. He is well-developed. He is obese. He is not diaphoretic. HENT:      Head: Normocephalic and atraumatic. Nose: Nose normal.      Mouth/Throat:      Mouth: Mucous membranes are moist.      Pharynx: No oropharyngeal exudate.    Eyes:      Conjunctiva/sclera: Conjunctivae normal.      Pupils: Pupils are equal, round, and reactive to light. Neck:      Musculoskeletal: Normal range of motion and neck supple. Cardiovascular:      Rate and Rhythm: Normal rate and regular rhythm. Pulses: Normal pulses. Heart sounds: Normal heart sounds. Pulmonary:      Effort: Pulmonary effort is normal. No respiratory distress. Breath sounds: Normal breath sounds. Chest:      Chest wall: No tenderness. Abdominal:      General: Bowel sounds are normal. There is no distension. Palpations: Abdomen is soft. Tenderness: There is no abdominal tenderness. Musculoskeletal: Normal range of motion. Skin:     General: Skin is warm and dry. Capillary Refill: Capillary refill takes less than 2 seconds. Neurological:      General: No focal deficit present. Mental Status: He is alert and oriented to person, place, and time. Mental status is at baseline. Cranial Nerves: No cranial nerve deficit. Sensory: No sensory deficit. Motor: No abnormal muscle tone. Psychiatric:         Behavior: Behavior normal.         Thought Content: Thought content normal.         Judgment: Judgment normal.          Procedures     MDM  Number of Diagnoses or Management Options  Chest pain, unspecified type:   Diagnosis management comments: This is a 57-year-old male presenting with concerns of chest pain and shortness of breath. He is tachypneic, otherwise vitals and exam are unremarkable. He does have newly inverted T waves in aVL and V3. He has a history of CAD and diabetes. Will obtain work-up to evaluate for ACS and PE and plan to admit for further monitoring. He is given asa. EKG shows evidence of inverted T waves. Troponin is normal.  D-dimer is elevated, CTA negative for PE. On frequent reassessment the patient is resting comfortably, states the pain comes and goes but he is usually asymptomatic on my reevaluations.   Discussed with PCP, initial decision is to admit to telemetry. Cardiology later evaluates the patient and decides to start him on nitro and heparin and admit to ICU for unstable angina. Discussed with Dr. Carlton Reeves, discussed with intensivist who states he does not need to be consulted as this is cardiology that is putting the patient in the unit. Patient has remained stable throughout emergency department stay. Amount and/or Complexity of Data Reviewed  Clinical lab tests: reviewed and ordered  Tests in the radiology section of CPT®: reviewed and ordered  Tests in the medicine section of CPT®: reviewed         ED Course as of Feb 18 1556 Tue Feb 18, 2020   1141   [address] [place] [address]:     I have personally performed and/or participated in the history, exam, medical decision making, and procedures and agree with all pertinent clinical information unless otherwise noted. I have also reviewed and agree with the past medical, family and social history unless otherwise noted. I have discussed this patient in detail with the resident and provided the instruction and education regarding the evidence-based evaluation and treatment of [unfilled]  History: patient presents with complaint of ongoing chest discomfort that is worse with exertion and taking a deep breath. He admits to occasional lightheadedness. No palpations, nausea or diaphoresis. He had 3 cardiac stents placed in the last year. He feels the pain is the same as prior to the stents. 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 Physical exam reveals anxious. Heart RRR, lungs CTA, abdomen is soft and nontender. No peripheral edema, tenderness or erythema. My plan: Symptomatic and supportive care. Will do cardiac evaluation and consult his cardiologist, Dr Ike Claude.     Electronically signed by Celestina Lee DO on 2/18/20 at 11:49 AM          [JS]   1650 D-Dimer, Quant: 289 [CW]   7170 EGY ordered    [CW]   [phone] Potassium reflex Magnesium 3.6 3.5 - 5.0 mmol/L    Chloride 104 98 - 107 mmol/L    CO2 22 22 - 29 mmol/L    Anion Gap 13 7 - 16 mmol/L    Glucose 116 (H) 74 - 99 mg/dL    BUN 13 8 - 23 mg/dL    CREATININE 1.1 0.7 - 1.2 mg/dL    GFR Non-African American >60 >=60 mL/min/1.73    GFR African American >60     Calcium 9.6 8.6 - 10.2 mg/dL   Troponin   Result Value Ref Range    Troponin <0.01 0.00 - 0.03 ng/mL   Brain Natriuretic Peptide   Result Value Ref Range    Pro- 0 - 450 pg/mL   Protime-INR   Result Value Ref Range    Protime 11.3 9.3 - 12.4 sec    INR 1.0    APTT   Result Value Ref Range    aPTT 25.4 24.5 - 35.1 sec   D-Dimer, Quantitative   Result Value Ref Range    D-Dimer, Quant 289 ng/mL DDU   EKG 12 Lead   Result Value Ref Range    Ventricular Rate 80 BPM    Atrial Rate 80 BPM    P-R Interval 182 ms    QRS Duration 138 ms    Q-T Interval 418 ms    QTc Calculation (Bazett) 482 ms    P Axis 95 degrees    R Axis -75 degrees    T Axis 68 degrees       RADIOLOGY:    All Radiology results interpreted by Radiologist unless otherwise noted. CTA PULMONARY W CONTRAST   Final Result   1. No evidence of pulmonary embolism. XR CHEST STANDARD (2 VW)   Final Result   No acute cardiopulmonary disease. EKG:  This EKG is signed and interpreted by ED Physician. Time:  1121  Rate: 80  Rhythm: Sinus. Interpretation: nsr, rbbb, lad, twi avl v3. Comparison: newly inverted t waves.     Well's Criteria for Pulmonary Embolism  Clinical Signs and Symptoms of DVT? no   PE Is #1 Diagnosis, or Equally Likely no   Heart Rate > 100? no   Immobilization at least 3 days, or Surgery in the previous 4 weeks? no   Previous, objectively diagnosed PE or DVT? no   Hemoptysis? no   Malignancy w/ Treatment within 6 mo, or palliative? no   Total  0     <2 points= low risk (3.4%)  2-6 points= moderate risk (27.8%)  >6 points= high risk (78.4%)    The HEART Risk Score for Acute Coronary Syndrome  Age <46 years, 55-63, 65+ ?  2   > 2 Risk Factors for CAD?*, 1-2, 0  2   History: slight, moderate, highly suspicious  1   EKG: Normal, nonspecific repolarization changes, ST depression   1   Troponin; low, 1-3x normal  Limit, 3x+  0   Total HEART Score:  6     *Risk factors as follows:                  - History/Family history of CAD    - Hypertension      - Hyperlipidemia     - Diabetes mellitus  - Current smoker  - Obesity    Predicts 6-week risk of major adverse cardiac event. Low Score (0-3 points), risk of MACE of 0.9-1.7%. Moderate Score (4-6 points), risk of MACE of 12-16.6%. High Score (7-10 points), risk of MACE of 50-65%.        ---------------------------- NURSING NOTES AND VITALS REVIEWED -------------------------   The nursing notes within the ED encounter and vital signs as below have been reviewed.    /71   Pulse 68   Temp 98.2 °F (36.8 °C) (Oral)   Resp 11   Ht 5' 9\" (1.753 m)   Wt 190 lb (86.2 kg)   SpO2 100%   BMI 28.06 kg/m²   Oxygen Saturation Interpretation: Normal      ------------------------------------------PROGRESS NOTES -------------------------------------------    ED COURSE MEDICATIONS:                Medications   ammonium lactate (LAC-HYDRIN) 12 % lotion 1 Bottle (has no administration in time range)   cyclobenzaprine (FLEXERIL) tablet 10 mg (has no administration in time range)   folic acid (FOLVITE) tablet 1 mg (has no administration in time range)   glimepiride (AMARYL) tablet 2 mg (has no administration in time range)   pantoprazole (PROTONIX) tablet 40 mg (has no administration in time range)   pravastatin (PRAVACHOL) tablet 10 mg (has no administration in time range)   sodium chloride flush 0.9 % injection 10 mL (has no administration in time range)   sodium chloride flush 0.9 % injection 10 mL (has no administration in time range)   magnesium hydroxide (MILK OF MAGNESIA) 400 MG/5ML suspension 30 mL (has no administration in time range)   ondansetron (ZOFRAN) injection 4 mg (has no albuterol (PROVENTIL) nebulizer solution 2.5 mg (2.5 mg Nebulization Given 2/18/20 1533)   ipratropium-albuterol (DUONEB) nebulizer solution 1 ampule (1 ampule Inhalation Given 2/18/20 1151)   aspirin chewable tablet 324 mg (324 mg Oral Given 2/18/20 1200)   0.9 % sodium chloride bolus (0 mLs Intravenous Stopped 2/18/20 1417)   iopamidol (ISOVUE-370) 76 % injection 80 mL (80 mLs Intravenous Given 2/18/20 1322)   heparin (porcine) injection 4,000 Units (4,000 Units Intravenous Given 2/18/20 1508)           COUNSELING:   I have spoken with the patient and discussed todays results, in addition to providing specific details for the plan of care and counseling regarding the diagnosis and prognosis.     --------------------------------------- IMPRESSION & DISPOSITION --------------------------------     IMPRESSION(s):  1. Chest pain, unspecified type        This patient's ED course included: a personal history and physicial examination    This patient has remained hemodynamically stable during their ED course. DISPOSITION:  Disposition: Admit to CCU/ICU. Patient condition is stable. END OF PROVIDER NOTE.                   Merrill Shah DO  Resident  02/18/20 8387

## 2020-02-19 ENCOUNTER — APPOINTMENT (OUTPATIENT)
Dept: NUCLEAR MEDICINE | Age: 75
DRG: 303 | End: 2020-02-19
Payer: MEDICARE

## 2020-02-19 LAB
ANION GAP SERPL CALCULATED.3IONS-SCNC: 11 MMOL/L (ref 7–16)
APTT: 92.2 SEC (ref 24.5–35.1)
BUN BLDV-MCNC: 11 MG/DL (ref 8–23)
CALCIUM SERPL-MCNC: 9.2 MG/DL (ref 8.6–10.2)
CHLORIDE BLD-SCNC: 107 MMOL/L (ref 98–107)
CHOLESTEROL, TOTAL: 128 MG/DL (ref 0–199)
CK MB: 2 NG/ML (ref 0–7.7)
CO2: 24 MMOL/L (ref 22–29)
CREAT SERPL-MCNC: 1 MG/DL (ref 0.7–1.2)
EKG ATRIAL RATE: 80 BPM
EKG P AXIS: 95 DEGREES
EKG P-R INTERVAL: 182 MS
EKG Q-T INTERVAL: 418 MS
EKG QRS DURATION: 138 MS
EKG QTC CALCULATION (BAZETT): 482 MS
EKG R AXIS: -75 DEGREES
EKG T AXIS: 68 DEGREES
EKG VENTRICULAR RATE: 80 BPM
GFR AFRICAN AMERICAN: >60
GFR NON-AFRICAN AMERICAN: >60 ML/MIN/1.73
GLUCOSE BLD-MCNC: 127 MG/DL (ref 74–99)
HCT VFR BLD CALC: 42.4 % (ref 37–54)
HDLC SERPL-MCNC: 45 MG/DL
HEMOGLOBIN: 14.9 G/DL (ref 12.5–16.5)
L. PNEUMOPHILA SEROGP 1 UR AG: NORMAL
LDL CHOLESTEROL CALCULATED: 58 MG/DL (ref 0–99)
LV EF: 70 %
LVEF MODALITY: NORMAL
MCH RBC QN AUTO: 30.6 PG (ref 26–35)
MCHC RBC AUTO-ENTMCNC: 35.1 % (ref 32–34.5)
MCV RBC AUTO: 87.1 FL (ref 80–99.9)
METER GLUCOSE: 121 MG/DL (ref 74–99)
METER GLUCOSE: 184 MG/DL (ref 74–99)
METER GLUCOSE: 66 MG/DL (ref 74–99)
METER GLUCOSE: 73 MG/DL (ref 74–99)
METER GLUCOSE: 89 MG/DL (ref 74–99)
METER GLUCOSE: 90 MG/DL (ref 74–99)
PDW BLD-RTO: 13.4 FL (ref 11.5–15)
PLATELET # BLD: 156 E9/L (ref 130–450)
PMV BLD AUTO: 9.8 FL (ref 7–12)
POTASSIUM REFLEX MAGNESIUM: 3.7 MMOL/L (ref 3.5–5)
RBC # BLD: 4.87 E12/L (ref 3.8–5.8)
SODIUM BLD-SCNC: 142 MMOL/L (ref 132–146)
STREP PNEUMONIAE ANTIGEN, URINE: NORMAL
TRIGL SERPL-MCNC: 123 MG/DL (ref 0–149)
TROPONIN: <0.01 NG/ML (ref 0–0.03)
VLDLC SERPL CALC-MCNC: 25 MG/DL
WBC # BLD: 6 E9/L (ref 4.5–11.5)

## 2020-02-19 PROCEDURE — 84484 ASSAY OF TROPONIN QUANT: CPT

## 2020-02-19 PROCEDURE — 94640 AIRWAY INHALATION TREATMENT: CPT

## 2020-02-19 PROCEDURE — 4A02XM4 MEASUREMENT OF CARDIAC TOTAL ACTIVITY, EXTERNAL APPROACH: ICD-10-PCS | Performed by: INTERNAL MEDICINE

## 2020-02-19 PROCEDURE — 6370000000 HC RX 637 (ALT 250 FOR IP): Performed by: NURSE PRACTITIONER

## 2020-02-19 PROCEDURE — 82552 ASSAY OF CPK IN BLOOD: CPT

## 2020-02-19 PROCEDURE — A9500 TC99M SESTAMIBI: HCPCS | Performed by: RADIOLOGY

## 2020-02-19 PROCEDURE — 6360000002 HC RX W HCPCS: Performed by: NURSE PRACTITIONER

## 2020-02-19 PROCEDURE — 2000000000 HC ICU R&B

## 2020-02-19 PROCEDURE — 2580000003 HC RX 258: Performed by: NURSE PRACTITIONER

## 2020-02-19 PROCEDURE — 85730 THROMBOPLASTIN TIME PARTIAL: CPT

## 2020-02-19 PROCEDURE — 6360000002 HC RX W HCPCS: Performed by: INTERNAL MEDICINE

## 2020-02-19 PROCEDURE — 93017 CV STRESS TEST TRACING ONLY: CPT

## 2020-02-19 PROCEDURE — 82553 CREATINE MB FRACTION: CPT

## 2020-02-19 PROCEDURE — 80061 LIPID PANEL: CPT

## 2020-02-19 PROCEDURE — 3E073KZ INTRODUCTION OF OTHER DIAGNOSTIC SUBSTANCE INTO CORONARY ARTERY, PERCUTANEOUS APPROACH: ICD-10-PCS | Performed by: INTERNAL MEDICINE

## 2020-02-19 PROCEDURE — 1200000000 HC SEMI PRIVATE

## 2020-02-19 PROCEDURE — 6360000002 HC RX W HCPCS: Performed by: PHYSICIAN ASSISTANT

## 2020-02-19 PROCEDURE — 36415 COLL VENOUS BLD VENIPUNCTURE: CPT

## 2020-02-19 PROCEDURE — 99232 SBSQ HOSP IP/OBS MODERATE 35: CPT | Performed by: INTERNAL MEDICINE

## 2020-02-19 PROCEDURE — 85027 COMPLETE CBC AUTOMATED: CPT

## 2020-02-19 PROCEDURE — 82550 ASSAY OF CK (CPK): CPT

## 2020-02-19 PROCEDURE — 2500000003 HC RX 250 WO HCPCS: Performed by: INTERNAL MEDICINE

## 2020-02-19 PROCEDURE — 6370000000 HC RX 637 (ALT 250 FOR IP): Performed by: INTERNAL MEDICINE

## 2020-02-19 PROCEDURE — 82962 GLUCOSE BLOOD TEST: CPT

## 2020-02-19 PROCEDURE — 3430000000 HC RX DIAGNOSTIC RADIOPHARMACEUTICAL: Performed by: RADIOLOGY

## 2020-02-19 PROCEDURE — 80048 BASIC METABOLIC PNL TOTAL CA: CPT

## 2020-02-19 PROCEDURE — 93016 CV STRESS TEST SUPVJ ONLY: CPT | Performed by: INTERNAL MEDICINE

## 2020-02-19 PROCEDURE — 78452 HT MUSCLE IMAGE SPECT MULT: CPT

## 2020-02-19 PROCEDURE — 93018 CV STRESS TEST I&R ONLY: CPT | Performed by: INTERNAL MEDICINE

## 2020-02-19 RX ORDER — IPRATROPIUM BROMIDE AND ALBUTEROL SULFATE 2.5; .5 MG/3ML; MG/3ML
1 SOLUTION RESPIRATORY (INHALATION) EVERY 4 HOURS
Qty: 360 ML | Refills: 5 | Status: ON HOLD
Start: 2020-02-19 | End: 2020-03-01 | Stop reason: HOSPADM

## 2020-02-19 RX ADMIN — FOLIC ACID 1 MG: 1 TABLET ORAL at 10:05

## 2020-02-19 RX ADMIN — AMLODIPINE BESYLATE 10 MG: 5 TABLET ORAL at 10:04

## 2020-02-19 RX ADMIN — MAGNESIUM HYDROXIDE 30 ML: 400 SUSPENSION ORAL at 06:31

## 2020-02-19 RX ADMIN — HEPARIN SODIUM AND DEXTROSE 11.6 UNITS/KG/HR: 10000; 5 INJECTION INTRAVENOUS at 14:19

## 2020-02-19 RX ADMIN — ALBUTEROL SULFATE 2.5 MG: 2.5 SOLUTION RESPIRATORY (INHALATION) at 10:13

## 2020-02-19 RX ADMIN — ARFORMOTEROL TARTRATE 15 MCG: 15 SOLUTION RESPIRATORY (INHALATION) at 07:00

## 2020-02-19 RX ADMIN — Medication 30 MILLICURIE: at 13:53

## 2020-02-19 RX ADMIN — METOPROLOL SUCCINATE 50 MG: 25 TABLET, FILM COATED, EXTENDED RELEASE ORAL at 10:04

## 2020-02-19 RX ADMIN — INSULIN LISPRO 1 UNITS: 100 INJECTION, SOLUTION INTRAVENOUS; SUBCUTANEOUS at 18:07

## 2020-02-19 RX ADMIN — Medication 10 MILLICURIE: at 10:51

## 2020-02-19 RX ADMIN — Medication 10 ML: at 10:05

## 2020-02-19 RX ADMIN — BUDESONIDE 500 MCG: 0.5 INHALANT RESPIRATORY (INHALATION) at 19:05

## 2020-02-19 RX ADMIN — ASPIRIN 81 MG 81 MG: 81 TABLET ORAL at 11:01

## 2020-02-19 RX ADMIN — PRAVASTATIN SODIUM 10 MG: 10 TABLET ORAL at 20:24

## 2020-02-19 RX ADMIN — Medication 10 ML: at 20:58

## 2020-02-19 RX ADMIN — CLOPIDOGREL BISULFATE 75 MG: 75 TABLET ORAL at 11:01

## 2020-02-19 RX ADMIN — GLIMEPIRIDE 2 MG: 2 TABLET ORAL at 06:31

## 2020-02-19 RX ADMIN — METOPROLOL SUCCINATE 50 MG: 25 TABLET, FILM COATED, EXTENDED RELEASE ORAL at 20:24

## 2020-02-19 RX ADMIN — ARFORMOTEROL TARTRATE 15 MCG: 15 SOLUTION RESPIRATORY (INHALATION) at 19:05

## 2020-02-19 RX ADMIN — NITROGLYCERIN 5 MCG/MIN: 20 INJECTION INTRAVENOUS at 14:41

## 2020-02-19 RX ADMIN — BUDESONIDE 500 MCG: 0.5 INHALANT RESPIRATORY (INHALATION) at 07:00

## 2020-02-19 RX ADMIN — REGADENOSON 0.4 MG: 0.08 INJECTION, SOLUTION INTRAVENOUS at 13:47

## 2020-02-19 ASSESSMENT — PAIN DESCRIPTION - PAIN TYPE
TYPE: ACUTE PAIN

## 2020-02-19 ASSESSMENT — PAIN DESCRIPTION - LOCATION
LOCATION: CHEST

## 2020-02-19 ASSESSMENT — PAIN SCALES - GENERAL
PAINLEVEL_OUTOF10: 0
PAINLEVEL_OUTOF10: 0
PAINLEVEL_OUTOF10: 1
PAINLEVEL_OUTOF10: 0
PAINLEVEL_OUTOF10: 2
PAINLEVEL_OUTOF10: 0
PAINLEVEL_OUTOF10: 0
PAINLEVEL_OUTOF10: 1
PAINLEVEL_OUTOF10: 0

## 2020-02-19 NOTE — PROGRESS NOTES
no labored breathing  HEENT:  PERRL; EOMI; sclera clear; buccal mucosa moist  Neck:  supple; trachea midline; no thyromegaly; no JVD; no bruits  Heart:  rhythm regular; rate controlled; no murmurs  Lungs:  symmetrical; clear to auscultation bilaterally; no wheezes; no rhonchi; no rales  Abdomen:  soft, non-tender, non-distended; bowel sounds positive; no organomegaly or masses; no pain on palpation  Extremities:  peripheral pulses present; no peripheral edema; no ulcers  Neurologic:  alert and oriented x 3; no focal deficit; cranial nerves grossly intact  Skin:  no petechia; no hemorrhage; no wounds    Medications  Scheduled Meds    folic acid  1 mg Oral Daily    glimepiride  2 mg Oral QAM AC    pravastatin  10 mg Oral Nightly    sodium chloride flush  10 mL Intravenous 2 times per day    insulin lispro  0-6 Units Subcutaneous TID WC    insulin lispro  0-3 Units Subcutaneous Nightly    budesonide  0.5 mg Nebulization BID    Arformoterol Tartrate  15 mcg Nebulization BID    metoprolol succinate  50 mg Oral BID    amLODIPine  10 mg Oral Daily    clopidogrel  75 mg Oral Daily    aspirin  81 mg Oral Daily     Infusion Meds    dextrose      nitroGLYCERIN 5 mcg/min (02/18/20 0219)    heparin (porcine) 11.6 Units/kg/hr (02/18/20 4236)     PRN Meds ammonium lactate, cyclobenzaprine, pantoprazole, sodium chloride flush, magnesium hydroxide, ondansetron, glucose, dextrose, glucagon (rDNA), dextrose, magnesium sulfate, potassium chloride **OR** potassium alternative oral replacement **OR** potassium chloride, acetaminophen, heparin (porcine), heparin (porcine), fentanNYL, albuterol    Laboratory Data  Recent Results (from the past 24 hour(s))   EKG 12 Lead    Collection Time: 02/18/20 11:21 AM   Result Value Ref Range    Ventricular Rate 80 BPM    Atrial Rate 80 BPM    P-R Interval 182 ms    QRS Duration 138 ms    Q-T Interval 418 ms    QTc Calculation (Bazett) 482 ms    P Axis 95 degrees    R Axis -75 degrees T Axis 68 degrees   CBC Auto Differential    Collection Time: 02/18/20 11:35 AM   Result Value Ref Range    WBC 6.3 4.5 - 11.5 E9/L    RBC 5.32 3.80 - 5.80 E12/L    Hemoglobin 16.2 12.5 - 16.5 g/dL    Hematocrit 45.1 37.0 - 54.0 %    MCV 84.8 80.0 - 99.9 fL    MCH 30.5 26.0 - 35.0 pg    MCHC 35.9 (H) 32.0 - 34.5 %    RDW 13.2 11.5 - 15.0 fL    Platelets 010 233 - 052 E9/L    MPV 10.0 7.0 - 12.0 fL    Neutrophils % 53.5 43.0 - 80.0 %    Immature Granulocytes % 0.3 0.0 - 5.0 %    Lymphocytes % 35.9 20.0 - 42.0 %    Monocytes % 7.5 2.0 - 12.0 %    Eosinophils % 2.2 0.0 - 6.0 %    Basophils % 0.6 0.0 - 2.0 %    Neutrophils Absolute 3.37 1.80 - 7.30 E9/L    Immature Granulocytes # 0.02 E9/L    Lymphocytes Absolute 2.26 1.50 - 4.00 E9/L    Monocytes Absolute 0.47 0.10 - 0.95 E9/L    Eosinophils Absolute 0.14 0.05 - 0.50 E9/L    Basophils Absolute 0.04 0.00 - 0.20 I1/L   Basic Metabolic Panel w/ Reflex to MG    Collection Time: 02/18/20 11:35 AM   Result Value Ref Range    Sodium 139 132 - 146 mmol/L    Potassium reflex Magnesium 3.6 3.5 - 5.0 mmol/L    Chloride 104 98 - 107 mmol/L    CO2 22 22 - 29 mmol/L    Anion Gap 13 7 - 16 mmol/L    Glucose 116 (H) 74 - 99 mg/dL    BUN 13 8 - 23 mg/dL    CREATININE 1.1 0.7 - 1.2 mg/dL    GFR Non-African American >60 >=60 mL/min/1.73    GFR African American >60     Calcium 9.6 8.6 - 10.2 mg/dL   Troponin    Collection Time: 02/18/20 11:35 AM   Result Value Ref Range    Troponin <0.01 0.00 - 0.03 ng/mL   Brain Natriuretic Peptide    Collection Time: 02/18/20 11:35 AM   Result Value Ref Range    Pro- 0 - 450 pg/mL   Protime-INR    Collection Time: 02/18/20 11:35 AM   Result Value Ref Range    Protime 11.3 9.3 - 12.4 sec    INR 1.0    APTT    Collection Time: 02/18/20 11:35 AM   Result Value Ref Range    aPTT 25.4 24.5 - 35.1 sec   D-Dimer, Quantitative    Collection Time: 02/18/20 11:35 AM   Result Value Ref Range    D-Dimer, Quant 289 ng/mL DDU   Procalcitonin    Collection Vw)    Result Date: 2/18/2020  Reading location: 200 Indication: Shortness of breath, chest pain Comparison: Prior chest radiograph from 3/18/2018 Technique: Chest PA and lateral radiographs were obtained. Findings: The cardiomediastinal silhouette is stable in size and contours. Bilateral lungs and costophrenic angles are clear. There is no evidence of pneumothorax. No acute cardiopulmonary disease. Mri Lumbar Spine W Wo Contrast    Result Date: 2/8/2020  LOCATION: 200 EXAM: MRI LUMBAR SPINE W WO CONTRAST. COMPARISON: None. HISTORY: Low back pain. TECHNIQUE: Multiplanar T1 and T2-weighted MRI images of the lumbar spine were performed. CONTRAST: 18 mL of gadolinium based contrast was administered. FINDINGS: Postsurgical fusion at L4-5 is seen with transpedicular screws and connecting rods. An interbody spacer is also noted. T12-L1: Normal T12-L1. L1-L2: Normal L1-L2. L2-L3: Facet hypertrophy with a mild bulge. No canal or foraminal stenosis. L3-L4: Facet hypertrophy noted with a noncompressive bulge. There is moderate to severe foraminal stenosis. Mild canal stenosis noted. L4-L5: Postsurgical changes with laminectomies noted. Assessment for abnormal enhancement is limited due to metallic artifact. There is no canal stenosis. There is moderate foraminal stenosis. L5-S1: Severe facet hypertrophy noted with moderate to severe foraminal and moderate canal stenosis. SOFT TISSUES: The visualized paravertebral soft tissues are within normal limits. 1. Postsurgical fusion at L4-5. 2. No compressive disc herniations visualized. A recurrent herniation is poorly assessed at the L4-5 level due to metallic artifact. 3. Multilevel foraminal stenosis due to facet hypertrophy as discussed.      Cta Pulmonary W Contrast    Result Date: 2/18/2020  LOCATION:200 EXAM: CTA PULMONARY W CONTRAST COMPARISON: May 23, 2019 HISTORY:  Elevated d-dimer TECHNIQUE: Axial CT images are obtained of the chest.  Coronal and sagittal reconstructions were obtained with 3-D maximum intensity projection (MIP) reconstructed images. These were performed on a separate workstation with concurrent supervision for detailed evaluation of the pulmonary arteries. CONTRAST: 80 mL Isovue-370 intravenous contrast. FINDINGS: SUPPORT DEVICES: None LUNGS/CENTRAL AIRWAYS/PLEURA: 7 mm nodule seen in the subpleural anterior right upper lobe, unchanged from May 2019. PULMONARY ARTERIES: Evaluation is adequate for pulmonary embolus. No filling defects identified to the subsegmental level. HEART/PERICARDIUM/GREAT VESSELS: Cardiac size is normal.  There is no pericardial effusion. The great vessels of the chest are normal in caliber. LYMPH NODES: No thoracic adenopathy by size criteria. NECK BASE/CHEST WALL/DIAPHRAGM: No soft tissue lesions or diaphragmatic abnormality. UPPER ABDOMEN: Limited images through the upper abdomen are unremarkable. OSSEOUS STRUCTURES: No suspicious lytic or blastic lesions. No fractures. 1. No evidence of pulmonary embolism. Microbiology  Respiratory panel negative        Assessment and Plan  Patient is a 66 y.o. male who presented with chest pain. 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 Currently being treated with IV nirtoglycerin and IV heparin.  -Troponins have been negative  -Comorbid conditions will be addressed during hospitalization.    -Low-dose sliding scale insulin and consistent carbohydrate diet will be utilized. · Routine labs in AM.  · DVT prophylaxis. Heparin drip  · Please see orders for further management and care.     The plan was discussed with Dr. Radha Arroyo    7:03 AM  2/19/2020

## 2020-02-19 NOTE — PROGRESS NOTES
PCI/RAFI placement x 2 (OM1 and diagonal, with patent prior stent in the RCA) in September 2019, as well as multiple risk factors. Chest pain has typical and atypical characteristics. He admits to compliance with his daily aspirin and Plavix therapy. 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 First troponin is negative. Chest CTA negative for pulmonary embolism. 2. Hypertension, uncontrolled. 3. Hyperlipidemia, on statin therapy. 4. Former tobacco abuse. 5. Diabetes mellitus type II. 6. GERD. 7. History of SVT with ablation done 20+ years ago. 8. Mild valvular heart disease.           PLAN:  Planning to proceed with left heart catheterization in a.m.         Electronically signed by Simona Thapa DO on 2/19/2020 at 5:35 PM

## 2020-02-19 NOTE — PLAN OF CARE
Problem: Pain:  Goal: Pain level will decrease  Description  Pain level will decrease  Outcome: Met This Shift     Problem: Bleeding:  Goal: Will show no signs and symptoms of excessive bleeding  Description  Will show no signs and symptoms of excessive bleeding  Outcome: Met This Shift     Problem: Falls - Risk of:  Goal: Will remain free from falls  Description  Will remain free from falls  Outcome: Met This Shift

## 2020-02-20 ENCOUNTER — HOSPITAL ENCOUNTER (OUTPATIENT)
Age: 75
Setting detail: OBSERVATION
Discharge: HOME | End: 2020-02-21
Attending: INTERNAL MEDICINE | Admitting: INTERNAL MEDICINE
Payer: MEDICARE

## 2020-02-20 ENCOUNTER — HOSPITAL ENCOUNTER (OUTPATIENT)
Age: 75
Discharge: HOME | End: 2020-02-20
Payer: MEDICARE

## 2020-02-20 VITALS
HEIGHT: 69 IN | BODY MASS INDEX: 27.85 KG/M2 | OXYGEN SATURATION: 94 % | WEIGHT: 188 LBS | HEART RATE: 61 BPM | SYSTOLIC BLOOD PRESSURE: 120 MMHG | TEMPERATURE: 97.3 F | RESPIRATION RATE: 15 BRPM | DIASTOLIC BLOOD PRESSURE: 84 MMHG

## 2020-02-20 PROBLEM — I25.10 CAD IN NATIVE ARTERY: Status: ACTIVE | Noted: 2020-02-20

## 2020-02-20 LAB
ABO/RH: NORMAL
ANION GAP SERPL CALCULATED.3IONS-SCNC: 12 MMOL/L (ref 7–16)
ANTIBODY SCREEN: NORMAL
BUN BLDV-MCNC: 11 MG/DL (ref 8–23)
CALCIUM SERPL-MCNC: 9.3 MG/DL (ref 8.6–10.2)
CHLORIDE BLD-SCNC: 103 MMOL/L (ref 98–107)
CO2: 22 MMOL/L (ref 22–29)
CREAT SERPL-MCNC: 1 MG/DL (ref 0.7–1.2)
GFR AFRICAN AMERICAN: >60
GFR NON-AFRICAN AMERICAN: >60 ML/MIN/1.73
GLUCOSE BLD-MCNC: 129 MG/DL (ref 74–99)
HCT VFR BLD CALC: 43.3 % (ref 37–54)
HEMOGLOBIN: 15.1 G/DL (ref 12.5–16.5)
MCH RBC QN AUTO: 30.4 PG (ref 26–35)
MCHC RBC AUTO-ENTMCNC: 34.9 % (ref 32–34.5)
MCV RBC AUTO: 87.3 FL (ref 80–99.9)
METER GLUCOSE: 106 MG/DL (ref 74–99)
METER GLUCOSE: 126 MG/DL (ref 74–99)
MRSA CULTURE ONLY: NORMAL
PDW BLD-RTO: 13.9 FL (ref 11.5–15)
PLATELET # BLD: 175 E9/L (ref 130–450)
PMV BLD AUTO: 10.7 FL (ref 7–12)
POTASSIUM REFLEX MAGNESIUM: 5.3 MMOL/L (ref 3.5–5)
RBC # BLD: 4.96 E12/L (ref 3.8–5.8)
SODIUM BLD-SCNC: 137 MMOL/L (ref 132–146)
WBC # BLD: 5.9 E9/L (ref 4.5–11.5)

## 2020-02-20 PROCEDURE — 6360000002 HC RX W HCPCS

## 2020-02-20 PROCEDURE — G0378 HOSPITAL OBSERVATION PER HR: HCPCS

## 2020-02-20 PROCEDURE — 2500000003 HC RX 250 WO HCPCS

## 2020-02-20 PROCEDURE — A0426 ALS 1: HCPCS

## 2020-02-20 PROCEDURE — 2580000003 HC RX 258: Performed by: NURSE PRACTITIONER

## 2020-02-20 PROCEDURE — 6370000000 HC RX 637 (ALT 250 FOR IP): Performed by: INTERNAL MEDICINE

## 2020-02-20 PROCEDURE — 94667 MNPJ CHEST WALL 1ST: CPT

## 2020-02-20 PROCEDURE — C1769 GUIDE WIRE: HCPCS

## 2020-02-20 PROCEDURE — 86900 BLOOD TYPING SEROLOGIC ABO: CPT

## 2020-02-20 PROCEDURE — 6360000002 HC RX W HCPCS: Performed by: NURSE PRACTITIONER

## 2020-02-20 PROCEDURE — 86901 BLOOD TYPING SEROLOGIC RH(D): CPT

## 2020-02-20 PROCEDURE — 82962 GLUCOSE BLOOD TEST: CPT

## 2020-02-20 PROCEDURE — C1725 CATH, TRANSLUMIN NON-LASER: HCPCS

## 2020-02-20 PROCEDURE — 2709999900 HC NON-CHARGEABLE SUPPLY

## 2020-02-20 PROCEDURE — 93454 CORONARY ARTERY ANGIO S&I: CPT | Performed by: INTERNAL MEDICINE

## 2020-02-20 PROCEDURE — C1894 INTRO/SHEATH, NON-LASER: HCPCS

## 2020-02-20 PROCEDURE — 36415 COLL VENOUS BLD VENIPUNCTURE: CPT

## 2020-02-20 PROCEDURE — 86850 RBC ANTIBODY SCREEN: CPT

## 2020-02-20 PROCEDURE — 94640 AIRWAY INHALATION TREATMENT: CPT

## 2020-02-20 PROCEDURE — 80048 BASIC METABOLIC PNL TOTAL CA: CPT

## 2020-02-20 PROCEDURE — G0379 DIRECT REFER HOSPITAL OBSERV: HCPCS

## 2020-02-20 PROCEDURE — 85027 COMPLETE CBC AUTOMATED: CPT

## 2020-02-20 PROCEDURE — 2580000003 HC RX 258: Performed by: INTERNAL MEDICINE

## 2020-02-20 PROCEDURE — A0425 GROUND MILEAGE: HCPCS

## 2020-02-20 PROCEDURE — 6370000000 HC RX 637 (ALT 250 FOR IP): Performed by: NURSE PRACTITIONER

## 2020-02-20 RX ORDER — IPRATROPIUM BROMIDE AND ALBUTEROL SULFATE 2.5; .5 MG/3ML; MG/3ML
1 SOLUTION RESPIRATORY (INHALATION) EVERY 4 HOURS
Status: DISCONTINUED | OUTPATIENT
Start: 2020-02-20 | End: 2020-02-21 | Stop reason: HOSPADM

## 2020-02-20 RX ORDER — NICOTINE POLACRILEX 4 MG
15 LOZENGE BUCCAL PRN
Status: DISCONTINUED | OUTPATIENT
Start: 2020-02-20 | End: 2020-02-21 | Stop reason: HOSPADM

## 2020-02-20 RX ORDER — ALBUTEROL SULFATE 2.5 MG/3ML
5 SOLUTION RESPIRATORY (INHALATION) 4 TIMES DAILY PRN
Status: DISCONTINUED | OUTPATIENT
Start: 2020-02-20 | End: 2020-02-21 | Stop reason: HOSPADM

## 2020-02-20 RX ORDER — SODIUM CHLORIDE 0.9 % (FLUSH) 0.9 %
10 SYRINGE (ML) INJECTION PRN
Status: DISCONTINUED | OUTPATIENT
Start: 2020-02-20 | End: 2020-02-21 | Stop reason: HOSPADM

## 2020-02-20 RX ORDER — CYCLOBENZAPRINE HCL 10 MG
10 TABLET ORAL 3 TIMES DAILY PRN
Status: DISCONTINUED | OUTPATIENT
Start: 2020-02-20 | End: 2020-02-21 | Stop reason: HOSPADM

## 2020-02-20 RX ORDER — AMLODIPINE BESYLATE 5 MG/1
5 TABLET ORAL DAILY
Status: DISCONTINUED | OUTPATIENT
Start: 2020-02-20 | End: 2020-02-20 | Stop reason: SDUPTHER

## 2020-02-20 RX ORDER — AMLODIPINE BESYLATE 5 MG/1
5 TABLET ORAL DAILY
Qty: 90 TABLET | Refills: 3 | Status: ON HOLD
Start: 2020-02-20 | End: 2020-02-21 | Stop reason: HOSPADM

## 2020-02-20 RX ORDER — AMLODIPINE BESYLATE 5 MG/1
5 TABLET ORAL DAILY
Status: DISCONTINUED | OUTPATIENT
Start: 2020-02-20 | End: 2020-02-21 | Stop reason: HOSPADM

## 2020-02-20 RX ORDER — SODIUM CHLORIDE 0.9 % (FLUSH) 0.9 %
10 SYRINGE (ML) INJECTION EVERY 12 HOURS SCHEDULED
Status: DISCONTINUED | OUTPATIENT
Start: 2020-02-20 | End: 2020-02-21 | Stop reason: HOSPADM

## 2020-02-20 RX ORDER — ASPIRIN 81 MG/1
81 TABLET, CHEWABLE ORAL
Status: DISCONTINUED | OUTPATIENT
Start: 2020-02-20 | End: 2020-02-21 | Stop reason: HOSPADM

## 2020-02-20 RX ORDER — ACETAMINOPHEN 325 MG/1
650 TABLET ORAL EVERY 4 HOURS PRN
Status: DISCONTINUED | OUTPATIENT
Start: 2020-02-20 | End: 2020-02-21 | Stop reason: HOSPADM

## 2020-02-20 RX ORDER — PANTOPRAZOLE SODIUM 40 MG/1
40 TABLET, DELAYED RELEASE ORAL DAILY PRN
Status: DISCONTINUED | OUTPATIENT
Start: 2020-02-20 | End: 2020-02-21 | Stop reason: HOSPADM

## 2020-02-20 RX ORDER — DEXTROSE MONOHYDRATE 25 G/50ML
12.5 INJECTION, SOLUTION INTRAVENOUS PRN
Status: DISCONTINUED | OUTPATIENT
Start: 2020-02-20 | End: 2020-02-21 | Stop reason: HOSPADM

## 2020-02-20 RX ORDER — ALBUTEROL SULFATE 90 UG/1
2 AEROSOL, METERED RESPIRATORY (INHALATION) EVERY 4 HOURS PRN
Status: DISCONTINUED | OUTPATIENT
Start: 2020-02-20 | End: 2020-02-21 | Stop reason: HOSPADM

## 2020-02-20 RX ORDER — ISOSORBIDE MONONITRATE 60 MG/1
60 TABLET, EXTENDED RELEASE ORAL DAILY
Qty: 90 TABLET | Refills: 2 | Status: ON HOLD
Start: 2020-02-20 | End: 2020-03-01 | Stop reason: HOSPADM

## 2020-02-20 RX ORDER — ISOSORBIDE MONONITRATE 60 MG/1
60 TABLET, EXTENDED RELEASE ORAL DAILY
Status: DISCONTINUED | OUTPATIENT
Start: 2020-02-20 | End: 2020-02-21 | Stop reason: HOSPADM

## 2020-02-20 RX ORDER — FOLIC ACID 1 MG/1
1 TABLET ORAL DAILY
Status: DISCONTINUED | OUTPATIENT
Start: 2020-02-20 | End: 2020-02-21 | Stop reason: HOSPADM

## 2020-02-20 RX ORDER — GLIMEPIRIDE 2 MG/1
2 TABLET ORAL
Status: DISCONTINUED | OUTPATIENT
Start: 2020-02-21 | End: 2020-02-21 | Stop reason: HOSPADM

## 2020-02-20 RX ORDER — CLOPIDOGREL BISULFATE 75 MG/1
75 TABLET ORAL DAILY
Status: DISCONTINUED | OUTPATIENT
Start: 2020-02-20 | End: 2020-02-21 | Stop reason: HOSPADM

## 2020-02-20 RX ORDER — METOPROLOL SUCCINATE 50 MG/1
50 TABLET, EXTENDED RELEASE ORAL 2 TIMES DAILY
Status: DISCONTINUED | OUTPATIENT
Start: 2020-02-20 | End: 2020-02-21 | Stop reason: HOSPADM

## 2020-02-20 RX ORDER — DEXTROSE MONOHYDRATE 50 MG/ML
100 INJECTION, SOLUTION INTRAVENOUS PRN
Status: DISCONTINUED | OUTPATIENT
Start: 2020-02-20 | End: 2020-02-21 | Stop reason: HOSPADM

## 2020-02-20 RX ORDER — AMLODIPINE BESYLATE 10 MG/1
10 TABLET ORAL DAILY
Qty: 30 TABLET | Refills: 0 | Status: SHIPPED
Start: 2020-02-20 | End: 2020-02-20 | Stop reason: HOSPADM

## 2020-02-20 RX ORDER — METOPROLOL SUCCINATE 50 MG/1
50 TABLET, EXTENDED RELEASE ORAL 2 TIMES DAILY
Qty: 60 TABLET | Refills: 0 | Status: SHIPPED | OUTPATIENT
Start: 2020-02-20 | End: 2020-02-25 | Stop reason: SDUPTHER

## 2020-02-20 RX ORDER — PRAVASTATIN SODIUM 20 MG
10 TABLET ORAL NIGHTLY
Status: DISCONTINUED | OUTPATIENT
Start: 2020-02-20 | End: 2020-02-21 | Stop reason: HOSPADM

## 2020-02-20 RX ORDER — ALPRAZOLAM 0.25 MG/1
0.5 TABLET ORAL 3 TIMES DAILY PRN
Status: DISCONTINUED | OUTPATIENT
Start: 2020-02-20 | End: 2020-02-21 | Stop reason: HOSPADM

## 2020-02-20 RX ADMIN — BUDESONIDE 500 MCG: 0.5 INHALANT RESPIRATORY (INHALATION) at 06:42

## 2020-02-20 RX ADMIN — PRAVASTATIN SODIUM 10 MG: 20 TABLET ORAL at 21:02

## 2020-02-20 RX ADMIN — AMLODIPINE BESYLATE 10 MG: 5 TABLET ORAL at 08:31

## 2020-02-20 RX ADMIN — CLOPIDOGREL BISULFATE 75 MG: 75 TABLET ORAL at 08:31

## 2020-02-20 RX ADMIN — FOLIC ACID 1 MG: 1 TABLET ORAL at 08:32

## 2020-02-20 RX ADMIN — Medication 10 ML: at 08:31

## 2020-02-20 RX ADMIN — METOPROLOL SUCCINATE 50 MG: 25 TABLET, FILM COATED, EXTENDED RELEASE ORAL at 08:31

## 2020-02-20 RX ADMIN — ARFORMOTEROL TARTRATE 15 MCG: 15 SOLUTION RESPIRATORY (INHALATION) at 06:42

## 2020-02-20 RX ADMIN — ASPIRIN 81 MG 81 MG: 81 TABLET ORAL at 08:32

## 2020-02-20 RX ADMIN — METOPROLOL SUCCINATE 50 MG: 50 TABLET, EXTENDED RELEASE ORAL at 21:01

## 2020-02-20 RX ADMIN — SODIUM CHLORIDE, PRESERVATIVE FREE 10 ML: 5 INJECTION INTRAVENOUS at 21:03

## 2020-02-20 ASSESSMENT — PAIN SCALES - GENERAL
PAINLEVEL_OUTOF10: 0
PAINLEVEL_OUTOF10: 5
PAINLEVEL_OUTOF10: 0
PAINLEVEL_OUTOF10: 5
PAINLEVEL_OUTOF10: 0

## 2020-02-20 ASSESSMENT — PAIN DESCRIPTION - DESCRIPTORS: DESCRIPTORS: ACHING;DULL;PRESSURE

## 2020-02-20 ASSESSMENT — PAIN DESCRIPTION - FREQUENCY: FREQUENCY: INTERMITTENT

## 2020-02-20 ASSESSMENT — PAIN DESCRIPTION - ONSET: ONSET: SUDDEN

## 2020-02-20 ASSESSMENT — PAIN DESCRIPTION - PAIN TYPE: TYPE: ACUTE PAIN

## 2020-02-20 ASSESSMENT — PAIN DESCRIPTION - LOCATION
LOCATION: CHEST
LOCATION: CHEST

## 2020-02-20 NOTE — CARE COORDINATION
2/19/2020  transition of care:   Pt presented to icu with chest pain, currently on heparin and nitro gtt. Pt lives in a mobile home with his wife. He is a  - however per Marcel Mario he has never signed up for benefits. He has no DME , no SNF history but he has had [facility] in the past.  He sees Dr. Analilia Mendez yearly and is due in July 2020 to see him again. He retired from [employer] where he worked as a . He goes to [employer]. No immediate needs identified at this time. CM/SS to follow.   MAIK   Electronically signed by REI Springer on 2/19/2020 at 10:57 AM
2/20/2020 0901  note: Per ,pt to be transferred to [facility] this am for cardiac cath.  Kira DALE
returning back home after discharge. Denies any needs at this time. CTN left business card at bedside with contact information and will remain available for any further needed assistance. Follow Up  Future Appointments   Date Time Provider Shabbir Martínez   2/21/2020 11:30 AM Susanne Olivas [facility]   5/4/2020  8:00 AM Susanne Olivas [facility]       Health Maintenance  There are no preventive care reminders to display for this patient.     Yuko Walls, APRN

## 2020-02-20 NOTE — DISCHARGE SUMMARY
Medication List      START taking these medications    ipratropium-albuterol 0.5-2.5 (3) MG/3ML Soln nebulizer solution  Commonly known as:  DUONEB  Inhale 3 mLs into the lungs every 4 hours        CHANGE how you take these medications    amLODIPine 10 MG tablet  Commonly known as:  NORVASC  Take 1 tablet by mouth daily  What changed:    · medication strength  · how much to take     metoprolol succinate 50 MG extended release tablet  Commonly known as:  TOPROL XL  Take 1 tablet by mouth 2 times daily  What changed:  when to take this        CONTINUE taking these medications    * albuterol (5 MG/ML) 0.5% nebulizer solution  Commonly known as:  PROVENTIL  Take 1 mL by nebulization 4 times daily as needed for Wheezing     * albuterol sulfate  (90 Base) MCG/ACT inhaler  Commonly known as:  Proventil HFA  Inhale 2 puffs into the lungs every 4 hours as needed for Wheezing     ammonium lactate 12 % lotion  Commonly known as:  LAC-HYDRIN     aspirin 81 MG tablet     clopidogrel 75 MG tablet  Commonly known as:  PLAVIX     [employer] FIBER GUMMIES PO     cyclobenzaprine 10 MG tablet  Commonly known as:  FLEXERIL  Take 1 tablet by mouth 3 times daily as needed for Muscle spasms     folic acid 1 MG tablet  Commonly known as:  FOLVITE  Take 1 tablet by mouth daily     glimepiride 2 MG tablet  Commonly known as:  AMARYL  Take 1 tablet by mouth every morning (before breakfast)     glycopyrrolate 1 MG tablet  Commonly known as:  Robinul  Take 1 tablet by mouth 3 times daily     influenza virus trivalent vaccine injection  Commonly known as:  FLUZONE     pantoprazole 40 MG tablet  Commonly known as:  PROTONIX     pravastatin 10 MG tablet  Commonly known as:  PRAVACHOL     RA FIBER-TAB PO         * This list has 2 medication(s) that are the same as other medications prescribed for you. Read the directions carefully, and ask your doctor or other care provider to review them with you.                Where to Get Your Medications These medications were sent to [address] [place] [address] [facility] [phone] - [phone]  [facility] [address]    Phone:  [phone]   · amLODIPine 10 MG tablet  · ipratropium-albuterol 0.5-2.5 (3) MG/3ML Soln nebulizer solution  · metoprolol succinate 50 MG extended release tablet         Activity: activity as tolerated    Diet: cardiac diet    Wound Care: none needed    Follow-up:    · This patient is instructed to follow-up with his primary care physician. · Patient is instructed to follow-up with the consults listed above as directed by them. · They are instructed to resume home medications and take new medications as indicated in the list above. · If the patient has a recurrence of symptoms, they are instructed to go to the ED. Preparing for this patient's discharge, including paperwork, orders, instructions, and meeting with patient did require > 30 minutes.     Antonio Nelson DO  PGYIII  8:52 AM  2/20/2020

## 2020-02-20 NOTE — PROCEDURES
distally and has no significant disease. 3.  The right coronary artery arises normally from the right sinus of  Valsalva. There was an area of stenosis in the mid segment estimated at  80%, responded to IC nitroglycerin, suspect vasospasm, there is patent  stent in the RCA. At the end of the procedure, the catheter and the wire were pulled out  from the body, the sheath was pulled out from the body, and a Vasc band  was applied to the right wrist area with effective hemostasis. COMPLICATIONS:  None. ESTIMATED TOTAL BLOOD LOSS:  10-15 mL. MEDICATIONS USED DURING THE PROCEDURE:  We used intraarterial verapamil  to prevent vasospasm, we used intraarterial heparin for anticoagulation. CONSCIOUS SEDATION:  We used Versed and fentanyl for conscious sedation. First dose was given at 1345, procedure ended at 1406. There was _____  minutes of direct face-to-face supervision during conscious sedation  administration. Total fluoroscopy time was 1.7 minutes. Total contrast used was 50 mL. IMPRESSION:  1. Stenosis of the mid RCA distal to the previously deployed stent that  was around 80%, reduced to 20% to 30% after IC nitroglycerin, suspect  vasospasm. 2.  Patent stents in diagonal branch with 40% proximal LAD disease. 3.  Patent stent in OM branch and no significant disease in left  circumflex artery. 4.  Patent stent in RCA. RECOMMENDATIONS:  We will start the patient on isosorbide, continue  dual-antiplatelet therapy, continue aggressive coronary artery disease  risk factor modifications. The patient will be observed for two hours  and discharge home if he meets discharge criteria.         Dmitri Cr MD    D: 02/20/2020 14:29:17       T: 02/20/2020 15:23:55     [email or web address]  Job#: [id number]     Doc#: [id number]    CC:  DO Brittni Aguillon

## 2020-02-20 NOTE — DISCHARGE INSTR - COC
Continuity of Care Form    Patient Name: Valerie Kim   :  1945  MRN:  [id number]    Admit date:  2020  Discharge date:  ***    Code Status Order: Full Code   Advance Directives:   Advance Care Flowsheet Documentation     Date/Time Healthcare Directive Type of Healthcare Directive Copy in [address] Agent's Name Healthcare Agent's Phone Number    [phone]  No, patient does not have an advance directive for healthcare treatment -- -- -- -- --          Admitting Physician:  Helen Robles DO  PCP: Tj Negro DO    Discharging Nurse: [facility] Unit/Room#: IC03/IC03-01  Discharging Unit Phone Number: ***    Emergency Contact:   Extended Emergency Contact Information  Primary Emergency Contact: Kristina Mills  Address: [address] [place] [address] Phone: [phone]  Mobile Phone: [phone]  Relation: Spouse   needed?  No  Secondary Emergency Contact: Malaika Naranjo  Address: [address] [place] [address] Phone: [phone]  Relation: Child    Past Surgical History:  Past Surgical History:   Procedure Laterality Date    ABLATION OF DYSRHYTHMIC FOCUS      APPROX 30-35 YRS AGO    CATARACT REMOVAL      COLECTOMY   sigmoid    COLONOSCOPY  2018 [facility]    CORONARY ANGIOPLASTY WITH STENT PLACEMENT      DIAGNOSTIC CARDIAC CATH LAB PROCEDURE      ENDOSCOPY, COLON, DIAGNOSTIC      EPIDURAL STEROID INJECTION N/A 2018    C7-T1 EPIDURAL STEROID INJECTION performed by Ailyn Jha DO at [place] [address] N/A 2019    C7 - T1 EPIDURAL STEROID INJECTION #2 performed by Ailyn Jha DO at [address] [place] [address]      LUMBAR FUSION  2017    NASAL SINUS SURGERY  2017    Submucosal Resection of Inferior Turbinate    NERVE BLOCK Bilateral 10 12 2015    bilateral sacroiliac joint injection #1    NERVE BLOCK Bilateral 10/22/15    sacroiliac joint #2    NERVE BLOCK Bilateral 11 02 2015    Sacroiliac joint bilateral #3    NERVE BLOCK Left 12/2/15    lumbar transforaminal #1    NERVE BLOCK Left 12 16 15    NERVE BLOCK Left 12 28 2015    transforaminal nerve block left lumbar #3    NERVE BLOCK Left 1 20 16    radiofrequency     NERVE BLOCK  12/11/2018    C7-T1 epidural    NERVE BLOCK N/A 01/08/2019    cervical epidural steroid injection    OTHER SURGICAL HISTORY  1945    had a feeding tube as an infant, scar    PROSTATE SURGERY  2017    PTCA  09/30/2019    2.5 x 38 mm Resolute [place] RAFI mid OM1, 2.25 x 8 mm Resolute Lito RAFI ostium of dx    UPPER GASTROINTESTINAL ENDOSCOPY  5/2018 [facility]    UPPER GASTROINTESTINAL ENDOSCOPY N/A 6/4/2019    EGD BIOPSY performed by Merritt Delarosa MD at [facility] ENDOSCOPY       Immunization History:   Immunization History   Administered Date(s) Administered    Influenza Virus Vaccine 09/20/2018    Influenza, Breonna Jhaveriler, IM, (6 mo and older Fluzone, Flulaval, Fluarix and 3 yrs and older Afluria) 09/15/2017    Influenza, Triv, 3 Years and older, IM (Afluria (5 yrs and older) 09/03/2016    Pneumococcal Conjugate 13-valent ([id number]) 07/27/2017    Pneumococcal Polysaccharide ([id number]) 07/02/2012       Active Problems:  Patient Active Problem List   Diagnosis Code    DDD (degenerative disc disease), cervical M50.30    Cervical radiculopathy M54.12    Type 2 diabetes mellitus with diabetic polyneuropathy, without long-term current use of insulin ([facility]) E11.42    Chronic pain syndrome G89.4    Diabetic foot infection ([facility] [address] E11.628, L08.9    Hypothyroidism E03.9    Anemia D64.9    Chest pain R07.9       Isolation/Infection:   Isolation          No Isolation        Patient Infection Status     None to display          Nurse Assessment:  Last Vital Signs: /84   Pulse 61   Temp 97.3 °F (36.3 °C)

## 2020-02-20 NOTE — PLAN OF CARE
Problem: Pain:  Goal: Pain level will decrease  Description  Pain level will decrease  2/20/2020 0044 by Sravanthi Castanon RN  Outcome: Met This Shift  2/19/2020 1714 by Lidia Villa RN  Outcome: Met This Shift     Problem: Bleeding:  Goal: Will show no signs and symptoms of excessive bleeding  Description  Will show no signs and symptoms of excessive bleeding  2/20/2020 0044 by Sravanthi Castanon RN  Outcome: Met This Shift  2/19/2020 1714 by Lidia Villa RN  Outcome: Met This Shift     Problem: Falls - Risk of:  Goal: Will remain free from falls  Description  Will remain free from falls  2/20/2020 0044 by Sravanthi Castanon RN  Outcome: Met This Shift  2/19/2020 1714 by Lidia Villa RN  Outcome: Met This Shift  Goal: Absence of physical injury  Description  Absence of physical injury  2/19/2020 1714 by Lidia Villa RN  Outcome: Met This Shift

## 2020-02-20 NOTE — PROGRESS NOTES
Patient transferred to [facility] cath lab via Mobile Intensive at 1030. Report called to SUYAPA Hand. Belongings sent with patient (dentures, clothing, shoes, etc.). Family notified by patient. sensory intact

## 2020-02-21 VITALS
BODY MASS INDEX: 27.85 KG/M2 | RESPIRATION RATE: 16 BRPM | WEIGHT: 188 LBS | HEIGHT: 69 IN | SYSTOLIC BLOOD PRESSURE: 125 MMHG | TEMPERATURE: 97.4 F | HEART RATE: 64 BPM | OXYGEN SATURATION: 97 % | DIASTOLIC BLOOD PRESSURE: 63 MMHG

## 2020-02-21 LAB — METER GLUCOSE: 113 MG/DL (ref 74–99)

## 2020-02-21 PROCEDURE — 99217 PR OBSERVATION CARE DISCHARGE MANAGEMENT: CPT | Performed by: INTERNAL MEDICINE

## 2020-02-21 PROCEDURE — 6370000000 HC RX 637 (ALT 250 FOR IP): Performed by: INTERNAL MEDICINE

## 2020-02-21 PROCEDURE — G0378 HOSPITAL OBSERVATION PER HR: HCPCS

## 2020-02-21 PROCEDURE — 2580000003 HC RX 258: Performed by: INTERNAL MEDICINE

## 2020-02-21 PROCEDURE — 82962 GLUCOSE BLOOD TEST: CPT

## 2020-02-21 PROCEDURE — 94640 AIRWAY INHALATION TREATMENT: CPT

## 2020-02-21 RX ADMIN — GLIMEPIRIDE 2 MG: 2 TABLET ORAL at 06:37

## 2020-02-21 RX ADMIN — METOPROLOL SUCCINATE 50 MG: 50 TABLET, EXTENDED RELEASE ORAL at 09:27

## 2020-02-21 RX ADMIN — IPRATROPIUM BROMIDE AND ALBUTEROL SULFATE 3 ML: 2.5; .5 SOLUTION RESPIRATORY (INHALATION) at 07:29

## 2020-02-21 RX ADMIN — CLOPIDOGREL 75 MG: 75 TABLET, FILM COATED ORAL at 09:26

## 2020-02-21 RX ADMIN — FOLIC ACID 1 MG: 1 TABLET ORAL at 09:26

## 2020-02-21 RX ADMIN — ISOSORBIDE MONONITRATE 60 MG: 60 TABLET ORAL at 09:26

## 2020-02-21 RX ADMIN — IPRATROPIUM BROMIDE AND ALBUTEROL SULFATE 3 ML: 2.5; .5 SOLUTION RESPIRATORY (INHALATION) at 11:14

## 2020-02-21 RX ADMIN — SODIUM CHLORIDE, PRESERVATIVE FREE 10 ML: 5 INJECTION INTRAVENOUS at 09:28

## 2020-02-21 RX ADMIN — ASPIRIN 81 MG 81 MG: 81 TABLET ORAL at 11:42

## 2020-02-21 ASSESSMENT — PAIN SCALES - GENERAL
PAINLEVEL_OUTOF10: 0

## 2020-02-21 NOTE — PROGRESS NOTES
Physician Discharge Summary     Patient ID:  Bernardo Pulido  [id number]  72 y.o.  1945    Admit date: 2/20/2020    Discharge date and time: 2/21/2020    Admitting Physician: Shirley Polanco MD     Discharge Physician: Shirley Polanco MD    Admission Diagnoses: CAD in native artery [I25.10]    Discharge Diagnoses: CAD, significant right coronary vasospasm    Admission Condition: good    Discharged Condition: good    Indication for Admission: Patient had cardiac catheterization yesterday, was supposed to be discharged however there was difficulty controlling bleeding from the right radial access, patient was admitted to the hospital over night for observation. Hospital Course: Did well postprocedure, on the day of discharge he denies any cardiac complaints. Consults: none    Significant Diagnostic Studies: angiography: IMPRESSION:  1. Stenosis of the mid RCA distal to the previously deployed stent that  was around 80%, reduced to 20% to 30% after IC nitroglycerin, suspect  vasospasm. 2.  Patent stents in diagonal branch with 40% proximal LAD disease. 3.  Patent stent in OM branch and no significant disease in left  circumflex artery. 4.  Patent stent in RCA.     Outstanding Order Results     Date and Time Order Name Status Description    2/20/2020 1429 Cardiac Catheterization Preliminary     2/19/2020 0420 CK ISOENZYMES In process     2/18/2020 2207 CK isoenzymes In process     2/18/2020 1520 CK ISOENZYMES In process             Discharge Exam:  /64   Pulse 62   Temp 98.6 °F (37 °C) (Temporal)   Resp 16   Ht 5' 9\" (1.753 m)   Wt 188 lb (85.3 kg)   SpO2 94%   BMI 27.76 kg/m²   CONSTITUTIONAL:  awake, alert, cooperative, no apparent distress, and appears stated age  HEAD:  normocepalic, without obvious abnormality, atraumatic  NECK:  Supple, symmetrical, trachea midline, no adenopathy, thyroid symmetric, not enlarged and no tenderness, skin normal  LUNGS:  No increased work of breathing, good air exchange, clear to auscultation bilaterally, no crackles or wheezing  CARDIOVASCULAR:  Normal apical impulse, regular rate and rhythm, normal S1 and S2, no S3 or S4, and no murmur noted, no edema, no JVD, no carotid bruit. ABDOMEN:  Soft, nontender, no masses, no hepatomegaly, no splenomegaly, BS+  MUSCULOSKELETAL:  No clubbing no cyanosis. there is no redness, warmth, or swelling of the joints  full range of motion noted  NEUROLOGIC:  Alert, awake,oriented x3  SKIN:  no bruising or bleeding, normal skin color, texture, turgor and no redness, warmth, or swelling      Disposition: home    Patient Instructions:    [facility]   Home Medication Instructions FEA:[id number]    Printed on:02/21/20 5226   Medication Information                      albuterol (PROVENTIL) (5 MG/ML) 0.5% nebulizer solution  Take 1 mL by nebulization 4 times daily as needed for Wheezing             albuterol sulfate HFA (PROVENTIL HFA) 108 (90 Base) MCG/ACT inhaler  Inhale 2 puffs into the lungs every 4 hours as needed for Wheezing             ammonium lactate (LAC-HYDRIN) 12 % lotion  Apply 1 g topically daily as needed for Dry Skin             aspirin 81 MG tablet  Take 81 mg by mouth Daily with lunch              clopidogrel (PLAVIX) 75 MG tablet  Take 75 mg by mouth daily             [employer] FIBER GUMMIES PO  Take 2 tablets by mouth daily             cyclobenzaprine (FLEXERIL) 10 MG tablet  Take 1 tablet by mouth 3 times daily as needed for Muscle spasms             folic acid (FOLVITE) 1 MG tablet  Take 1 tablet by mouth daily             glimepiride (AMARYL) 2 MG tablet  Take 1 tablet by mouth every morning (before breakfast)             glycopyrrolate (ROBINUL) 1 MG tablet  Take 1 tablet by mouth 3 times daily             ipratropium-albuterol (DUONEB) 0.5-2.5 (3) MG/3ML SOLN nebulizer solution  Inhale 3 mLs into the lungs every 4 hours             isosorbide mononitrate (IMDUR) 60 MG extended release tablet  Take 1 tablet by mouth

## 2020-02-21 NOTE — CARE COORDINATION
Transition of care: Cardiac cath done 02/21/20. Met with pt briefly in room. Pt is from home. Independent with ADLs. Denies any needs for home. Discharge order on chart.

## 2020-02-21 NOTE — PROGRESS NOTES
CLINICAL PHARMACY: DISCHARGE MED RECONCILIATION/REVIEW    [facility]) [facility] Patient?: Yes  Total # of Interventions Recommended: 0   -   Total # Interventions Accepted: 0  Intervention Severity:   - Level 1 Intervention Present?: No   - Level 2 #: 0   - Level 3 #: 0   Time Spent (min): 15    Additional Documentation: Chart reviewed. No major medication issues noted.

## 2020-02-22 ENCOUNTER — CARE COORDINATION (OUTPATIENT)
Dept: CASE MANAGEMENT | Age: 75
End: 2020-02-22

## 2020-02-22 LAB
% CKMB: 0 % (ref 0–4)
CK-BB: 0 % (ref 0–0)
CK-MACRO TYPE I: 0 % (ref 0–0)
CK-MACRO TYPE II: 0 % (ref 0–0)
CK-MM: 100 % (ref 96–100)
TOTAL CK: 52 U/L (ref 20–200)
TOTAL CK: 53 U/L (ref 20–200)
TOTAL CK: 60 U/L (ref 20–200)

## 2020-02-22 NOTE — CARE COORDINATION
has a band aid  to right wrist cath site and is having no problems with area. CTN reviewed post-cath radial approach instructions as per AVS with patient. Patient verbalizes no questions at this time.  -Patient declined full med review,  1111F not  entered. CTN able to confirm patient has stopped his Norvasc as per AVS.  -Patient denies any needs, questions, or concerns at this time. Patient declines any further CTN calls. CTN to sign off.     Follow Up  Future Appointments   Date Time Provider Shabbir Martínez   5/4/2020  8:00 AM Diana Matthews DO [facility]       Lacey Roque RN

## 2020-02-25 ENCOUNTER — TELEPHONE (OUTPATIENT)
Dept: FAMILY MEDICINE CLINIC | Age: 75
End: 2020-02-25

## 2020-02-25 ENCOUNTER — TELEPHONE (OUTPATIENT)
Dept: ADMINISTRATIVE | Age: 75
End: 2020-02-25

## 2020-02-25 RX ORDER — METOPROLOL SUCCINATE 50 MG/1
50 TABLET, EXTENDED RELEASE ORAL 2 TIMES DAILY
Qty: 60 TABLET | Refills: 0 | Status: ON HOLD
Start: 2020-02-25 | End: 2020-03-01 | Stop reason: HOSPADM

## 2020-02-25 NOTE — TELEPHONE ENCOUNTER
Pt presented in the office after recent hospital stay. He wanted to make sure that he medication list was updated and to ask that Dr. Annette Cummings send a revised Rx in for the change. Changed at the hospital-Metoprolol Succinate 50 mg BID     Pt also states he would like an Rx sent for isosorbide 30 mg. This is on his active medication list as 60 mg. Please advise.      Last OV 7/16/2019  Next OV

## 2020-02-29 ENCOUNTER — HOSPITAL ENCOUNTER (OUTPATIENT)
Age: 75
Setting detail: OBSERVATION
Discharge: HOME | End: 2020-03-01
Attending: EMERGENCY MEDICINE | Admitting: INTERNAL MEDICINE
Payer: MEDICARE

## 2020-02-29 ENCOUNTER — APPOINTMENT (OUTPATIENT)
Dept: GENERAL RADIOLOGY | Age: 75
End: 2020-02-29
Payer: MEDICARE

## 2020-02-29 PROBLEM — R07.9 CHEST PAIN WITH MODERATE RISK FOR CARDIAC ETIOLOGY: Status: ACTIVE | Noted: 2020-02-29

## 2020-02-29 LAB
ANION GAP SERPL CALCULATED.3IONS-SCNC: 14 MMOL/L (ref 7–16)
BASOPHILS ABSOLUTE: 0.03 E9/L (ref 0–0.2)
BASOPHILS RELATIVE PERCENT: 0.4 % (ref 0–2)
BUN BLDV-MCNC: 14 MG/DL (ref 8–23)
CALCIUM SERPL-MCNC: 9.5 MG/DL (ref 8.6–10.2)
CHLORIDE BLD-SCNC: 100 MMOL/L (ref 98–107)
CO2: 25 MMOL/L (ref 22–29)
CREAT SERPL-MCNC: 1.1 MG/DL (ref 0.7–1.2)
EKG ATRIAL RATE: 56 BPM
EKG P AXIS: 73 DEGREES
EKG P-R INTERVAL: 178 MS
EKG Q-T INTERVAL: 466 MS
EKG QRS DURATION: 146 MS
EKG QTC CALCULATION (BAZETT): 449 MS
EKG R AXIS: -76 DEGREES
EKG T AXIS: 56 DEGREES
EKG VENTRICULAR RATE: 56 BPM
EOSINOPHILS ABSOLUTE: 0.19 E9/L (ref 0.05–0.5)
EOSINOPHILS RELATIVE PERCENT: 2.8 % (ref 0–6)
GFR AFRICAN AMERICAN: >60
GFR NON-AFRICAN AMERICAN: >60 ML/MIN/1.73
GLUCOSE BLD-MCNC: 94 MG/DL (ref 74–99)
HCT VFR BLD CALC: 45.3 % (ref 37–54)
HEMOGLOBIN: 16 G/DL (ref 12.5–16.5)
IMMATURE GRANULOCYTES #: 0.02 E9/L
IMMATURE GRANULOCYTES %: 0.3 % (ref 0–5)
LYMPHOCYTES ABSOLUTE: 2.78 E9/L (ref 1.5–4)
LYMPHOCYTES RELATIVE PERCENT: 40.6 % (ref 20–42)
MCH RBC QN AUTO: 31.2 PG (ref 26–35)
MCHC RBC AUTO-ENTMCNC: 35.3 % (ref 32–34.5)
MCV RBC AUTO: 88.3 FL (ref 80–99.9)
METER GLUCOSE: 79 MG/DL (ref 74–99)
METER GLUCOSE: 95 MG/DL (ref 74–99)
MONOCYTES ABSOLUTE: 0.59 E9/L (ref 0.1–0.95)
MONOCYTES RELATIVE PERCENT: 8.6 % (ref 2–12)
NEUTROPHILS ABSOLUTE: 3.23 E9/L (ref 1.8–7.3)
NEUTROPHILS RELATIVE PERCENT: 47.3 % (ref 43–80)
PDW BLD-RTO: 13.7 FL (ref 11.5–15)
PLATELET # BLD: 192 E9/L (ref 130–450)
PMV BLD AUTO: 10.6 FL (ref 7–12)
POTASSIUM REFLEX MAGNESIUM: 3.8 MMOL/L (ref 3.5–5)
PRO-BNP: 153 PG/ML (ref 0–450)
RBC # BLD: 5.13 E12/L (ref 3.8–5.8)
SODIUM BLD-SCNC: 139 MMOL/L (ref 132–146)
TROPONIN: <0.01 NG/ML (ref 0–0.03)
WBC # BLD: 6.8 E9/L (ref 4.5–11.5)

## 2020-02-29 PROCEDURE — 6370000000 HC RX 637 (ALT 250 FOR IP): Performed by: STUDENT IN AN ORGANIZED HEALTH CARE EDUCATION/TRAINING PROGRAM

## 2020-02-29 PROCEDURE — 94640 AIRWAY INHALATION TREATMENT: CPT

## 2020-02-29 PROCEDURE — G0378 HOSPITAL OBSERVATION PER HR: HCPCS

## 2020-02-29 PROCEDURE — 99285 EMERGENCY DEPT VISIT HI MDM: CPT

## 2020-02-29 PROCEDURE — 80048 BASIC METABOLIC PNL TOTAL CA: CPT

## 2020-02-29 PROCEDURE — 99215 OFFICE O/P EST HI 40 MIN: CPT | Performed by: INTERNAL MEDICINE

## 2020-02-29 PROCEDURE — 36415 COLL VENOUS BLD VENIPUNCTURE: CPT

## 2020-02-29 PROCEDURE — 6370000000 HC RX 637 (ALT 250 FOR IP): Performed by: INTERNAL MEDICINE

## 2020-02-29 PROCEDURE — 96372 THER/PROPH/DIAG INJ SC/IM: CPT

## 2020-02-29 PROCEDURE — 6370000000 HC RX 637 (ALT 250 FOR IP): Performed by: NURSE PRACTITIONER

## 2020-02-29 PROCEDURE — 6360000002 HC RX W HCPCS: Performed by: NURSE PRACTITIONER

## 2020-02-29 PROCEDURE — 85025 COMPLETE CBC W/AUTO DIFF WBC: CPT

## 2020-02-29 PROCEDURE — 82962 GLUCOSE BLOOD TEST: CPT

## 2020-02-29 PROCEDURE — 83880 ASSAY OF NATRIURETIC PEPTIDE: CPT

## 2020-02-29 PROCEDURE — 84484 ASSAY OF TROPONIN QUANT: CPT

## 2020-02-29 PROCEDURE — 2580000003 HC RX 258: Performed by: NURSE PRACTITIONER

## 2020-02-29 PROCEDURE — 71045 X-RAY EXAM CHEST 1 VIEW: CPT

## 2020-02-29 PROCEDURE — 93005 ELECTROCARDIOGRAM TRACING: CPT | Performed by: STUDENT IN AN ORGANIZED HEALTH CARE EDUCATION/TRAINING PROGRAM

## 2020-02-29 RX ORDER — CYCLOBENZAPRINE HCL 10 MG
10 TABLET ORAL 3 TIMES DAILY PRN
Status: DISCONTINUED | OUTPATIENT
Start: 2020-02-29 | End: 2020-03-01 | Stop reason: HOSPADM

## 2020-02-29 RX ORDER — PROMETHAZINE HYDROCHLORIDE 25 MG/1
12.5 TABLET ORAL EVERY 6 HOURS PRN
Status: DISCONTINUED | OUTPATIENT
Start: 2020-02-29 | End: 2020-03-01 | Stop reason: HOSPADM

## 2020-02-29 RX ORDER — GLYCOPYRROLATE 1 MG/1
1 TABLET ORAL 3 TIMES DAILY
Status: DISCONTINUED | OUTPATIENT
Start: 2020-02-29 | End: 2020-02-29

## 2020-02-29 RX ORDER — ASPIRIN 81 MG/1
81 TABLET, CHEWABLE ORAL DAILY
Status: DISCONTINUED | OUTPATIENT
Start: 2020-02-29 | End: 2020-03-01 | Stop reason: HOSPADM

## 2020-02-29 RX ORDER — ISOSORBIDE MONONITRATE 60 MG/1
60 TABLET, EXTENDED RELEASE ORAL DAILY
Status: DISCONTINUED | OUTPATIENT
Start: 2020-02-29 | End: 2020-02-29

## 2020-02-29 RX ORDER — METOPROLOL SUCCINATE 50 MG/1
50 TABLET, EXTENDED RELEASE ORAL 2 TIMES DAILY
Status: DISCONTINUED | OUTPATIENT
Start: 2020-02-29 | End: 2020-02-29

## 2020-02-29 RX ORDER — NITROGLYCERIN 0.4 MG/1
0.4 TABLET SUBLINGUAL EVERY 5 MIN PRN
Status: DISCONTINUED | OUTPATIENT
Start: 2020-02-29 | End: 2020-03-01 | Stop reason: HOSPADM

## 2020-02-29 RX ORDER — METOPROLOL SUCCINATE 50 MG/1
50 TABLET, EXTENDED RELEASE ORAL DAILY
Status: DISCONTINUED | OUTPATIENT
Start: 2020-03-01 | End: 2020-03-01 | Stop reason: HOSPADM

## 2020-02-29 RX ORDER — ACETAMINOPHEN 325 MG/1
650 TABLET ORAL EVERY 6 HOURS PRN
Status: DISCONTINUED | OUTPATIENT
Start: 2020-02-29 | End: 2020-03-01 | Stop reason: HOSPADM

## 2020-02-29 RX ORDER — PANTOPRAZOLE SODIUM 40 MG/1
40 TABLET, DELAYED RELEASE ORAL DAILY PRN
Status: DISCONTINUED | OUTPATIENT
Start: 2020-02-29 | End: 2020-03-01 | Stop reason: HOSPADM

## 2020-02-29 RX ORDER — POTASSIUM CHLORIDE 20 MEQ/1
40 TABLET, EXTENDED RELEASE ORAL PRN
Status: DISCONTINUED | OUTPATIENT
Start: 2020-02-29 | End: 2020-03-01 | Stop reason: HOSPADM

## 2020-02-29 RX ORDER — ACETAMINOPHEN 650 MG/1
650 SUPPOSITORY RECTAL EVERY 6 HOURS PRN
Status: DISCONTINUED | OUTPATIENT
Start: 2020-02-29 | End: 2020-03-01 | Stop reason: HOSPADM

## 2020-02-29 RX ORDER — DEXTROSE MONOHYDRATE 50 MG/ML
100 INJECTION, SOLUTION INTRAVENOUS PRN
Status: DISCONTINUED | OUTPATIENT
Start: 2020-02-29 | End: 2020-03-01 | Stop reason: HOSPADM

## 2020-02-29 RX ORDER — MAGNESIUM SULFATE IN WATER 40 MG/ML
2 INJECTION, SOLUTION INTRAVENOUS PRN
Status: DISCONTINUED | OUTPATIENT
Start: 2020-02-29 | End: 2020-03-01 | Stop reason: HOSPADM

## 2020-02-29 RX ORDER — SODIUM CHLORIDE 0.9 % (FLUSH) 0.9 %
10 SYRINGE (ML) INJECTION PRN
Status: DISCONTINUED | OUTPATIENT
Start: 2020-02-29 | End: 2020-03-01 | Stop reason: HOSPADM

## 2020-02-29 RX ORDER — SODIUM CHLORIDE 0.9 % (FLUSH) 0.9 %
10 SYRINGE (ML) INJECTION EVERY 12 HOURS SCHEDULED
Status: DISCONTINUED | OUTPATIENT
Start: 2020-02-29 | End: 2020-03-01 | Stop reason: HOSPADM

## 2020-02-29 RX ORDER — GLIMEPIRIDE 4 MG/1
2 TABLET ORAL
Status: DISCONTINUED | OUTPATIENT
Start: 2020-03-01 | End: 2020-03-01 | Stop reason: HOSPADM

## 2020-02-29 RX ORDER — ONDANSETRON 2 MG/ML
4 INJECTION INTRAMUSCULAR; INTRAVENOUS EVERY 6 HOURS PRN
Status: DISCONTINUED | OUTPATIENT
Start: 2020-02-29 | End: 2020-03-01 | Stop reason: HOSPADM

## 2020-02-29 RX ORDER — FOLIC ACID 1 MG/1
1 TABLET ORAL DAILY
Status: DISCONTINUED | OUTPATIENT
Start: 2020-02-29 | End: 2020-03-01 | Stop reason: HOSPADM

## 2020-02-29 RX ORDER — ISOSORBIDE MONONITRATE 60 MG/1
120 TABLET, EXTENDED RELEASE ORAL DAILY
Status: DISCONTINUED | OUTPATIENT
Start: 2020-02-29 | End: 2020-03-01 | Stop reason: HOSPADM

## 2020-02-29 RX ORDER — AMLODIPINE BESYLATE 5 MG/1
5 TABLET ORAL DAILY
Status: DISCONTINUED | OUTPATIENT
Start: 2020-02-29 | End: 2020-03-01 | Stop reason: HOSPADM

## 2020-02-29 RX ORDER — PRAVASTATIN SODIUM 20 MG
10 TABLET ORAL NIGHTLY
Status: DISCONTINUED | OUTPATIENT
Start: 2020-02-29 | End: 2020-03-01 | Stop reason: HOSPADM

## 2020-02-29 RX ORDER — POTASSIUM CHLORIDE 7.45 MG/ML
10 INJECTION INTRAVENOUS PRN
Status: DISCONTINUED | OUTPATIENT
Start: 2020-02-29 | End: 2020-03-01 | Stop reason: HOSPADM

## 2020-02-29 RX ORDER — ASPIRIN 325 MG
325 TABLET ORAL ONCE
Status: COMPLETED | OUTPATIENT
Start: 2020-02-29 | End: 2020-02-29

## 2020-02-29 RX ORDER — DEXTROSE MONOHYDRATE 25 G/50ML
12.5 INJECTION, SOLUTION INTRAVENOUS PRN
Status: DISCONTINUED | OUTPATIENT
Start: 2020-02-29 | End: 2020-03-01 | Stop reason: HOSPADM

## 2020-02-29 RX ORDER — CLOPIDOGREL BISULFATE 75 MG/1
75 TABLET ORAL DAILY
Status: DISCONTINUED | OUTPATIENT
Start: 2020-02-29 | End: 2020-03-01 | Stop reason: HOSPADM

## 2020-02-29 RX ORDER — NICOTINE POLACRILEX 4 MG
15 LOZENGE BUCCAL PRN
Status: DISCONTINUED | OUTPATIENT
Start: 2020-02-29 | End: 2020-03-01 | Stop reason: HOSPADM

## 2020-02-29 RX ORDER — ISOSORBIDE MONONITRATE 60 MG/1
60 TABLET, EXTENDED RELEASE ORAL ONCE
Status: COMPLETED | OUTPATIENT
Start: 2020-02-29 | End: 2020-02-29

## 2020-02-29 RX ADMIN — FOLIC ACID 1 MG: 1 TABLET ORAL at 16:41

## 2020-02-29 RX ADMIN — NITROGLYCERIN 0.4 MG: 0.4 TABLET SUBLINGUAL at 10:55

## 2020-02-29 RX ADMIN — ISOSORBIDE MONONITRATE 60 MG: 60 TABLET, EXTENDED RELEASE ORAL at 16:29

## 2020-02-29 RX ADMIN — NITROGLYCERIN 0.4 MG: 0.4 TABLET SUBLINGUAL at 10:28

## 2020-02-29 RX ADMIN — AMLODIPINE BESYLATE 5 MG: 5 TABLET ORAL at 16:29

## 2020-02-29 RX ADMIN — PANTOPRAZOLE SODIUM 40 MG: 40 TABLET, DELAYED RELEASE ORAL at 20:58

## 2020-02-29 RX ADMIN — ASPIRIN 325 MG ORAL TABLET 325 MG: 325 PILL ORAL at 09:13

## 2020-02-29 RX ADMIN — NITROGLYCERIN 0.4 MG: 0.4 TABLET SUBLINGUAL at 09:14

## 2020-02-29 RX ADMIN — ALBUTEROL SULFATE 5 MG: 2.5 SOLUTION RESPIRATORY (INHALATION) at 15:12

## 2020-02-29 RX ADMIN — ENOXAPARIN SODIUM 40 MG: 40 INJECTION SUBCUTANEOUS at 16:29

## 2020-02-29 RX ADMIN — Medication 10 ML: at 20:58

## 2020-02-29 RX ADMIN — NITROGLYCERIN 0.4 MG: 0.4 TABLET SUBLINGUAL at 09:39

## 2020-02-29 RX ADMIN — PRAVASTATIN SODIUM 10 MG: 20 TABLET ORAL at 20:56

## 2020-02-29 ASSESSMENT — PAIN SCALES - GENERAL
PAINLEVEL_OUTOF10: 8
PAINLEVEL_OUTOF10: 0

## 2020-02-29 ASSESSMENT — ENCOUNTER SYMPTOMS
NAUSEA: 0
VOMITING: 0
SHORTNESS OF BREATH: 0
WHEEZING: 0
COUGH: 0
ABDOMINAL PAIN: 0
ALLERGIC/IMMUNOLOGIC NEGATIVE: 1

## 2020-02-29 ASSESSMENT — PAIN DESCRIPTION - ORIENTATION: ORIENTATION: MID

## 2020-02-29 ASSESSMENT — PAIN DESCRIPTION - DESCRIPTORS: DESCRIPTORS: CONSTANT

## 2020-02-29 ASSESSMENT — PAIN DESCRIPTION - FREQUENCY: FREQUENCY: CONTINUOUS

## 2020-02-29 ASSESSMENT — PAIN DESCRIPTION - LOCATION: LOCATION: CHEST

## 2020-02-29 NOTE — H&P
Department of Internal Medicine  History and Physical    PCP: Dr. Елена Marroquin Physician: Dr. Viviana Estrada  Consultants: Dr. Higgins Gains:  Chest pain    HISTORY OF PRESENT ILLNESS:    Oscar Johnson presented to [facility] 2/29 for evaluation of Chest pain. He was recently transferred from [address] [place] [address] to [facility] for further cardiac evaluation. He underwent a cardiac catheterization 2/20 that showed a patent stent in his RCA with only moderate disease otherwise. His pain was felt to be related to demand ischemia at that time. He was uptitrated on his beta-blocker and calcium channel blocker was discontinued. His Imdur was uptitrated as well as Prinzmetal angina was considered. Unfortunately, he continued to have ongoing chest discomfort. He denies any activity limitations or significant shortness of breath. No diaphoresis, nausea or indigestion reported. Denies any recent sick contacts or present symptoms of illness including fevers or chills. Intake of food and fluids is normal.  He has been taking his medications as directed. Denies cough or chest congestion at this time. Denies abdominal complaints, palpitations or blood in the stools/black tarry stools. Denies any urinary or genital complaints. Emergency department evaluation undertaken was negative for acute coronary syndrome. Troponin was negative and EKG was unchanged. The remainder of his diagnostic evaluation did not show any acute findings. Patient's cardiologist was consulted from the emergency department and recommended admission for observation and medication optimization. PAST MEDICAL Hx:  Past Medical History:   Diagnosis Date    Abnormal computed tomography angiography of heart 07/19/2019    Calcified plaque proximal RCA with 50-70% stenosis, proximal LAD 50% stenosis, mild LAD 30-50% stenosis, groundglass infiltrates, area of consoldidation left lung base posteriorly.       Arthritis     Atrial fibrillation ([facility] [address] UPPER GASTROINTESTINAL ENDOSCOPY  5/2018 [facility]    UPPER GASTROINTESTINAL ENDOSCOPY N/A 6/4/2019    EGD BIOPSY performed by Marylu Bell MD at [address] Hx:  Family History   Problem Relation Age of Onset   Marsha Card Stroke Mother     Other Father     Kidney Disease Brother     Arrhythmia Brother     Cancer Brother     Kidney Disease Sister     Hypertension Other     Diabetes Other        HOME MEDICATIONS:  Prior to Admission medications    Medication Sig Start Date End Date Taking?  Authorizing Provider   metoprolol succinate (TOPROL XL) 50 MG extended release tablet Take 1 tablet by mouth 2 times daily 2/25/20   Alverna Talita, DO   isosorbide mononitrate (IMDUR) 60 MG extended release tablet Take 1 tablet by mouth daily 2/20/20   France Montgomery MD   ipratropium-albuterol (DUONEB) 0.5-2.5 (3) MG/3ML SOLN nebulizer solution Inhale 3 mLs into the lungs every 4 hours 2/19/20   Alverna Talita, DO   glimepiride (AMARYL) 2 MG tablet Take 1 tablet by mouth every morning (before breakfast) 2/18/20   Alverna Talita, DO   albuterol (PROVENTIL) (5 MG/ML) 0.5% nebulizer solution Take 1 mL by nebulization 4 times daily as needed for Wheezing 2/18/20   Alverna Talita, DO   albuterol sulfate HFA (PROVENTIL HFA) 108 (90 Base) MCG/ACT inhaler Inhale 2 puffs into the lungs every 4 hours as needed for Wheezing 2/18/20 2/17/21  Alverna Talita, DO   ammonium lactate (LAC-HYDRIN) 12 % lotion Apply 1 g topically daily as needed for Dry Skin    Historical Provider, MD   pantoprazole (PROTONIX) 40 MG tablet Take 40 mg by mouth daily as needed (Reflux)    Historical Provider, MD   pravastatin (PRAVACHOL) 10 MG tablet Take 10 mg by mouth nightly    Historical Provider, MD   [employer] FIBER GUMMIES PO Take 2 tablets by mouth daily    Historical Provider, MD   glycopyrrolate (ROBINUL) 1 MG tablet Take 1 tablet by mouth 3 times daily 1/24/20 1/23/21  Marylu Bell MD   clopidogrel (PLAVIX) 75 MG tablet Take 75 mg by mouth recent negative heart cath  2. Diabetes mellitus type 2 without long-term use of insulin   3. Hyperlipidemia  4. Essential hypertension   5. Hyperlipidemia  6. Chronic low back pain with DDD of the lumbar spine    PLAN:  Adair Evans was recently transferred from [address] [place] [address] to [facility] for further cardiac evaluation. He underwent a cardiac catheterization that showed a patent stent in his RCA with only moderate disease otherwise. His pain was felt to be related to demand ischemia at that time. He was uptitrated on his beta-blocker and calcium channel blocker was discontinued. His Imdur was uptitrated as well as Prinzmetal angina was considered. His emergency department evaluation was without evidence of acute coronary syndrome. Cardiology was consulted from the emergency department for their recommendations. He will be continued on the present regimen as well, however he may benefit from further medical optimization for coronary artery vasospasms prior to discharge. Cardiology consultation will be obtained in the hospital.  Comorbid condition will be addressed during hospitalization. See additional orders for details.      NEEL Petty  12:08 PM  2/29/2020    Electronically signed by HARMAN Petty CNP on 2/29/20 at 12:08 PM

## 2020-02-29 NOTE — ED PROVIDER NOTES
1571/[id number]    The nursing notes within the ED encounter and vital signs as below have been reviewed. Patient Vitals for the past 24 hrs:   BP Temp Temp src Pulse Resp SpO2 Height Weight   02/29/20 1450 (!) 157/72 98.2 °F (36.8 °C) Oral 50 18 97 % 5' 9\" (1.753 m) 185 lb 8 oz (84.1 kg)   02/29/20 1355 (!) 149/69 -- -- 50 15 97 % -- --   02/29/20 1056 136/64 -- -- 57 11 96 % -- --   02/29/20 1028 (!) 145/68 -- -- 54 16 95 % -- --   02/29/20 0935 118/64 -- -- 62 18 95 % -- --   02/29/20 0838 (!) 184/86 98 °F (36.7 °C) Oral 60 20 95 % 5' 9\" (1.753 m) 185 lb (83.9 kg)               Counseling:  I have spoken with the patient/family members and discussed todays results, in addition to providing specific details for the plan of care and counseling regarding the diagnosis and prognosis. Their questions are answered at this time and they are agreeable with the plan of admission. This patient has remained hemodynamically stable during their ED course. Diagnosis:  1. Unstable angina (HCC)        Disposition:  Patient's disposition: Admit  Patient's condition is serious. NOTE:  This report was transcribed using voice recognition software. Efforts were made to ensure accuracy; however, inadvertent computerized transcription errors may be present.             Violetta Regalado, DO  Resident  02/29/20 0834

## 2020-03-01 VITALS
BODY MASS INDEX: 27.74 KG/M2 | DIASTOLIC BLOOD PRESSURE: 64 MMHG | TEMPERATURE: 98 F | WEIGHT: 187.3 LBS | HEIGHT: 69 IN | OXYGEN SATURATION: 93 % | RESPIRATION RATE: 16 BRPM | HEART RATE: 76 BPM | SYSTOLIC BLOOD PRESSURE: 126 MMHG

## 2020-03-01 LAB
ANION GAP SERPL CALCULATED.3IONS-SCNC: 11 MMOL/L (ref 7–16)
BUN BLDV-MCNC: 14 MG/DL (ref 8–23)
CALCIUM SERPL-MCNC: 9.5 MG/DL (ref 8.6–10.2)
CHLORIDE BLD-SCNC: 101 MMOL/L (ref 98–107)
CO2: 24 MMOL/L (ref 22–29)
CREAT SERPL-MCNC: 1.1 MG/DL (ref 0.7–1.2)
GFR AFRICAN AMERICAN: >60
GFR NON-AFRICAN AMERICAN: >60 ML/MIN/1.73
GLUCOSE BLD-MCNC: 119 MG/DL (ref 74–99)
HCT VFR BLD CALC: 42 % (ref 37–54)
HEMOGLOBIN: 14.6 G/DL (ref 12.5–16.5)
MCH RBC QN AUTO: 30.6 PG (ref 26–35)
MCHC RBC AUTO-ENTMCNC: 34.8 % (ref 32–34.5)
MCV RBC AUTO: 88.1 FL (ref 80–99.9)
METER GLUCOSE: 126 MG/DL (ref 74–99)
PDW BLD-RTO: 14 FL (ref 11.5–15)
PLATELET # BLD: 179 E9/L (ref 130–450)
PMV BLD AUTO: 10.4 FL (ref 7–12)
POTASSIUM REFLEX MAGNESIUM: 4 MMOL/L (ref 3.5–5)
RBC # BLD: 4.77 E12/L (ref 3.8–5.8)
SODIUM BLD-SCNC: 136 MMOL/L (ref 132–146)
TROPONIN: <0.01 NG/ML (ref 0–0.03)
WBC # BLD: 6.6 E9/L (ref 4.5–11.5)

## 2020-03-01 PROCEDURE — 2580000003 HC RX 258: Performed by: NURSE PRACTITIONER

## 2020-03-01 PROCEDURE — 85027 COMPLETE CBC AUTOMATED: CPT

## 2020-03-01 PROCEDURE — 84484 ASSAY OF TROPONIN QUANT: CPT

## 2020-03-01 PROCEDURE — 6370000000 HC RX 637 (ALT 250 FOR IP): Performed by: NURSE PRACTITIONER

## 2020-03-01 PROCEDURE — 6360000002 HC RX W HCPCS: Performed by: NURSE PRACTITIONER

## 2020-03-01 PROCEDURE — G0378 HOSPITAL OBSERVATION PER HR: HCPCS

## 2020-03-01 PROCEDURE — 96372 THER/PROPH/DIAG INJ SC/IM: CPT

## 2020-03-01 PROCEDURE — 82962 GLUCOSE BLOOD TEST: CPT

## 2020-03-01 PROCEDURE — 80048 BASIC METABOLIC PNL TOTAL CA: CPT

## 2020-03-01 PROCEDURE — 6370000000 HC RX 637 (ALT 250 FOR IP): Performed by: INTERNAL MEDICINE

## 2020-03-01 PROCEDURE — 36415 COLL VENOUS BLD VENIPUNCTURE: CPT

## 2020-03-01 RX ORDER — ISOSORBIDE MONONITRATE 120 MG/1
120 TABLET, EXTENDED RELEASE ORAL DAILY
Qty: 30 TABLET | Refills: 0 | Status: SHIPPED | OUTPATIENT
Start: 2020-03-01 | End: 2020-03-03

## 2020-03-01 RX ORDER — AMLODIPINE BESYLATE 5 MG/1
5 TABLET ORAL DAILY
Qty: 30 TABLET | Refills: 0 | Status: SHIPPED | OUTPATIENT
Start: 2020-03-01 | End: 2020-03-09

## 2020-03-01 RX ORDER — METOPROLOL SUCCINATE 50 MG/1
50 TABLET, EXTENDED RELEASE ORAL DAILY
Qty: 30 TABLET | Refills: 0 | Status: SHIPPED | OUTPATIENT
Start: 2020-03-01 | End: 2020-07-30 | Stop reason: SDUPTHER

## 2020-03-01 RX ADMIN — CLOPIDOGREL BISULFATE 75 MG: 75 TABLET ORAL at 10:02

## 2020-03-01 RX ADMIN — ENOXAPARIN SODIUM 40 MG: 40 INJECTION SUBCUTANEOUS at 10:08

## 2020-03-01 RX ADMIN — Medication 10 ML: at 10:10

## 2020-03-01 RX ADMIN — METOPROLOL SUCCINATE 50 MG: 50 TABLET, EXTENDED RELEASE ORAL at 10:07

## 2020-03-01 RX ADMIN — ISOSORBIDE MONONITRATE 120 MG: 60 TABLET, EXTENDED RELEASE ORAL at 10:01

## 2020-03-01 RX ADMIN — FOLIC ACID 1 MG: 1 TABLET ORAL at 10:07

## 2020-03-01 RX ADMIN — GLIMEPIRIDE 2 MG: 4 TABLET ORAL at 06:16

## 2020-03-01 RX ADMIN — ASPIRIN 81 MG 81 MG: 81 TABLET ORAL at 10:02

## 2020-03-01 RX ADMIN — AMLODIPINE BESYLATE 5 MG: 5 TABLET ORAL at 10:03

## 2020-03-01 NOTE — DISCHARGE SUMMARY
Internal Medicine  Discharge Summary    NAME: Nyla Felix  :  1945  MRN:  [id number]  24 Sherrell Muñoz DO [place]  ADMITTED: 2020      DISCHARGED: 3/1/20    ADMITTING PHYSICIAN: Alondra Laws DO    CONSULTANT(S):   IP CONSULT TO CARDIOLOGY  IP CONSULT TO INTERNAL MEDICINE  IP CONSULT TO CARDIOLOGY     ADMITTING DIAGNOSIS:   Chest pain with moderate risk for cardiac etiology [R07.9]     DISCHARGE DIAGNOSES:   1. Chest pain with known CAD and previous stents x3 within the last 6 to 7 months, with recent negative heart cath  2. Diabetes mellitus type 2 without long-term use of insulin   3. Hyperlipidemia  4. Essential hypertension   5. Hyperlipidemia  6. Chronic low back pain with DDD of the lumbar spine    BRIEF HISTORY OF PRESENT ILLNESS:   Gilberto Ta presented to [facility]  for evaluation of Chest pain. He was recently transferred from [address] [place] [address] to [facility] for further cardiac evaluation. He underwent a cardiac catheterization  that showed a patent stent in his RCA with only moderate disease otherwise. His pain was felt to be related to demand ischemia at that time. He was uptitrated on his beta-blocker and calcium channel blocker was discontinued. His Imdur was uptitrated as well as Prinzmetal angina was considered. Unfortunately, he continued to have ongoing chest discomfort. He denies any activity limitations or significant shortness of breath. No diaphoresis, nausea or indigestion reported. Denies any recent sick contacts or present symptoms of illness including fevers or chills. Intake of food and fluids is normal.  He has been taking his medications as directed. Denies cough or chest congestion at this time. Denies abdominal complaints, palpitations or blood in the stools/black tarry stools. Denies any urinary or genital complaints.     Emergency department evaluation undertaken was negative for acute coronary syndrome. Troponin was negative and EKG was unchanged.   The remainder was administered. FINDINGS: Postsurgical fusion at L4-5 is seen with transpedicular screws and connecting rods. An interbody spacer is also noted. T12-L1: Normal T12-L1. L1-L2: Normal L1-L2. L2-L3: Facet hypertrophy with a mild bulge. No canal or foraminal stenosis. L3-L4: Facet hypertrophy noted with a noncompressive bulge. There is moderate to severe foraminal stenosis. Mild canal stenosis noted. L4-L5: Postsurgical changes with laminectomies noted. Assessment for abnormal enhancement is limited due to metallic artifact. There is no canal stenosis. There is moderate foraminal stenosis. L5-S1: Severe facet hypertrophy noted with moderate to severe foraminal and moderate canal stenosis. SOFT TISSUES: The visualized paravertebral soft tissues are within normal limits. 1. Postsurgical fusion at L4-5. 2. No compressive disc herniations visualized. A recurrent herniation is poorly assessed at the L4-5 level due to metallic artifact. 3. Multilevel foraminal stenosis due to facet hypertrophy as discussed. Xr Chest Portable    Result Date: 2/29/2020  Location:100 Exam: XR CHEST PORTABLE Comparison: 2/18/2020 History: Chest pain Findings: Portable AP upright chest. Heart size is normal. Pulmonary vessels are nondistended. No focal pulmonary parenchymal consolidation. No acute cardiopulmonary disease. Cta Pulmonary W Contrast    Result Date: 2/18/2020  LOCATION:200 EXAM: CTA PULMONARY W CONTRAST COMPARISON: May 23, 2019 HISTORY:  Elevated d-dimer TECHNIQUE: Axial CT images are obtained of the chest.  Coronal and sagittal reconstructions were obtained with 3-D maximum intensity projection (MIP) reconstructed images. These were performed on a separate workstation with concurrent supervision for detailed evaluation of the pulmonary arteries.  CONTRAST: 80 mL Isovue-370 intravenous contrast. FINDINGS: SUPPORT DEVICES: None LUNGS/CENTRAL AIRWAYS/PLEURA: 7 mm nodule seen in the subpleural anterior right upper lobe, unchanged from May 2019. PULMONARY ARTERIES: Evaluation is adequate for pulmonary embolus. No filling defects identified to the subsegmental level. HEART/PERICARDIUM/GREAT VESSELS: Cardiac size is normal.  There is no pericardial effusion. The great vessels of the chest are normal in caliber. LYMPH NODES: No thoracic adenopathy by size criteria. NECK BASE/CHEST WALL/DIAPHRAGM: No soft tissue lesions or diaphragmatic abnormality. UPPER ABDOMEN: Limited images through the upper abdomen are unremarkable. OSSEOUS STRUCTURES: No suspicious lytic or blastic lesions. No fractures. 1. No evidence of pulmonary embolism. Nm Cardiac Stress Test Nuclear Imaging    Result Date: 2/19/2020  Location: 200 Exam: NM MYOCARDIAL SPECT REST EXERCISE OR RX Comparison: None History: Chest pain Technique: Myocardial perfusion SPECT images were obtained after administration of 26.4 mCi Tc99m Sestamibi during stress and  9.3 mCi during rest. The patient received 0.4 mg of IV Lexiscan. Findings: Both the stress and resting myocardial images are normal. The gated left ventricular ejection fraction is 70 %. No evidence of stress-induced ischemia. HOSPITAL COURSE:   Zenon Higuera did well throughout the course of his brief hospital stay. He was admitted for ongoing chest pain with a known history of coronary artery disease and coronary vasospasms. He recently underwent cardiac catheterization which showed patent RCA stent and only moderate disease of the remaining vessels. Cardiology was consulted and made medication changes to address the patient's symptoms. His Toprol was de-escalate it to once daily and his heart rate control remained adequate. His long-acting nitrate was increased from 60 to 120 mg daily which improved his pain significantly. Norvasc was added as well as part of the treatment for his coronary vasospasms. Blood pressure control improved with the regimen administered.   Overall, the patient's condition Wheezing             albuterol sulfate HFA (PROVENTIL HFA) 108 (90 Base) MCG/ACT inhaler  Inhale 2 puffs into the lungs every 4 hours as needed for Wheezing             amLODIPine (NORVASC) 5 MG tablet  Take 1 tablet by mouth daily             ammonium lactate (LAC-HYDRIN) 12 % lotion  Apply 1 g topically daily as needed for Dry Skin             aspirin 81 MG tablet  Take 81 mg by mouth Daily with lunch              clopidogrel (PLAVIX) 75 MG tablet  Take 75 mg by mouth daily             [employer] FIBER GUMMIES PO  Take 2 tablets by mouth daily             cyclobenzaprine (FLEXERIL) 10 MG tablet  Take 1 tablet by mouth 3 times daily as needed for Muscle spasms             folic acid (FOLVITE) 1 MG tablet  Take 1 tablet by mouth daily             glimepiride (AMARYL) 2 MG tablet  Take 1 tablet by mouth every morning (before breakfast)             glycopyrrolate (ROBINUL) 1 MG tablet  Take 1 tablet by mouth 3 times daily             isosorbide mononitrate (IMDUR) 120 MG extended release tablet  Take 1 tablet by mouth daily             metoprolol succinate (TOPROL XL) 50 MG extended release tablet  Take 1 tablet by mouth daily             pantoprazole (PROTONIX) 40 MG tablet  Take 40 mg by mouth daily as needed (Reflux)             pravastatin (PRAVACHOL) 10 MG tablet  Take 10 mg by mouth nightly                 FOLLOW UP/INSTRUCTIONS:  · This patient is instructed to follow-up with his primary care physician. · Patient is instructed to follow-up with the consults listed above as directed by them. · he is instructed to resume home medications and take new medications as indicated in the list above. · If the patient has a recurrence of symptoms, he is instructed to go to the ED. Preparing for this patient's discharge, including paperwork, orders, instructions, and meeting with patient did require > 30 minutes.     HARMAN Petty CNP     3/1/2020  7:48 AM

## 2020-03-01 NOTE — CONSULTS
DIAGNOSTIC      EPIDURAL STEROID INJECTION N/A 12/11/2018    C7-T1 EPIDURAL STEROID INJECTION performed by Serenity Virgen DO at [address] 1/8/2019    C7 - T1 EPIDURAL STEROID INJECTION #2 performed by Serenity Virgen DO at [address]  2008    LUMBAR FUSION  03/06/2017    NASAL SINUS SURGERY  12/07/2017    Submucosal Resection of Inferior Turbinate    NERVE BLOCK Bilateral 10 12 2015    bilateral sacroiliac joint injection #1    NERVE BLOCK Bilateral 10/22/15    sacroiliac joint #2    NERVE BLOCK Bilateral 11 02 2015    Sacroiliac joint bilateral #3    NERVE BLOCK Left 12/2/15    lumbar transforaminal #1    NERVE BLOCK Left 12 16 15    NERVE BLOCK Left 12 28 2015    transforaminal nerve block left lumbar #3    NERVE BLOCK Left 1 20 16    radiofrequency     NERVE BLOCK  12/11/2018    C7-T1 epidural    NERVE BLOCK N/A 01/08/2019    cervical epidural steroid injection    OTHER SURGICAL HISTORY  1945    had a feeding tube as an infant, scar    PROSTATE SURGERY  2017    PTCA  09/30/2019    2.5 x 38 mm Resolute [place] RAFI mid OM1, 2.25 x 8 mm Resolute [place] RAFI ostium of dx    UPPER GASTROINTESTINAL ENDOSCOPY  5/2018 [facility]    UPPER GASTROINTESTINAL ENDOSCOPY N/A 6/4/2019    EGD BIOPSY performed by Dennie Malm, MD at [place] [address]         Current Facility-Administered Medications:     nitroGLYCERIN (NITROSTAT) SL tablet 0.4 mg, 0.4 mg, Sublingual, Q5 Min PRN, Pamela Nettles DO, 0.4 mg at 02/29/20 1055    nitroGLYCERIN (NITROSTAT) SL tablet 0.4 mg, 0.4 mg, Sublingual, Q5 Min PRN, Samir Calle DO    albuterol (PROVENTIL) nebulizer solution 5 mg, 5 mg, Nebulization, 4x Daily PRN, HARMAN Coulter CNP, 5 mg at 02/29/20 1512    clopidogrel (PLAVIX) tablet 75 mg, 75 mg, Oral, Daily, HARMAN Coulter CNP    cyclobenzaprine (FLEXERIL) tablet 10 mg, 10 mg, Oral, TID PRN, HARMAN Coulter CNP    folic acid (FOLVITE) tablet 1 mg, 1 mg, Oral, Daily, HARMAN Banks - CNP, 1 mg at 02/29/20 1641    [START ON 3/1/2020] glimepiride (AMARYL) tablet 2 mg, 2 mg, Oral, QAM AC, HARMAN Banks - CNP    pantoprazole (PROTONIX) tablet 40 mg, 40 mg, Oral, Daily PRN, HARMAN Banks - CNP    pravastatin (PRAVACHOL) tablet 10 mg, 10 mg, Oral, Nightly, Polly Vee APRN - CNP    sodium chloride flush 0.9 % injection 10 mL, 10 mL, Intravenous, 2 times per day, HARMAN Banks - CNP    sodium chloride flush 0.9 % injection 10 mL, 10 mL, Intravenous, PRN, Polly Vee APRN - CNP    acetaminophen (TYLENOL) tablet 650 mg, 650 mg, Oral, Q6H PRN **OR** acetaminophen (TYLENOL) suppository 650 mg, 650 mg, Rectal, Q6H PRN, Polly Vee APRN - CNP    magnesium hydroxide (MILK OF MAGNESIA) 400 MG/5ML suspension 30 mL, 30 mL, Oral, Daily PRN, HARMAN Banks - CNP    promethazine (PHENERGAN) tablet 12.5 mg, 12.5 mg, Oral, Q6H PRN **OR** ondansetron (ZOFRAN) injection 4 mg, 4 mg, Intravenous, Q6H PRN, HARMAN Banks - CNP    enoxaparin (LOVENOX) injection 40 mg, 40 mg, Subcutaneous, Daily, HARMAN Banks - CNP, 40 mg at 02/29/20 1629    aspirin chewable tablet 81 mg, 81 mg, Oral, Daily, HARMAN Banks - CNP    glucose (GLUTOSE) 40 % oral gel 15 g, 15 g, Oral, PRN, HARMAN Banks - CNP    dextrose 50 % IV solution, 12.5 g, Intravenous, PRN, HARMAN Banks - CNP    glucagon (rDNA) injection 1 mg, 1 mg, Intramuscular, PRN, HARMAN Banks - CNP    dextrose 5 % solution, 100 mL/hr, Intravenous, PRN, HARMAN Banks CNP    magnesium sulfate 2 g in 50 mL IVPB premix, 2 g, Intravenous, PRN, HARMAN Banks CNP    potassium chloride (KLOR-CON M) extended release tablet 40 mEq, 40 mEq, Oral, PRN **OR** potassium bicarb-citric acid (EFFER-K) effervescent tablet 40 mEq, 40 mEq, Oral, PRN **OR** potassium chloride 10 mEq/100 mL IVPB (Peripheral Line), 10 mEq, Intravenous, PRN, HARMAN Chau - CNP    insulin lispro (HUMALOG) injection vial 0-6 Units, 0-6 Units, Subcutaneous, TID WC, HARMAN Chau - CNP    insulin lispro (HUMALOG) injection vial 0-3 Units, 0-3 Units, Subcutaneous, Nightly, Conrado Banegas APRJOCY - CNP  [place]  [START ON 3/1/2020] metoprolol succinate (TOPROL XL) extended release tablet 50 mg, 50 mg, Oral, Daily, Cele Gentile MD    isosorbide mononitrate (IMDUR) extended release tablet 120 mg, 120 mg, Oral, Daily, Cele Gentile MD    amLODIPine (NORVASC) tablet 5 mg, 5 mg, Oral, Daily, Cele Gentile MD, 5 mg at 20 1629    Allergies as of 2020 - Review Complete 2020   Allergen Reaction Noted    Oxycodone Rash 08/10/2015    Simvastatin Other (See Comments) 2019    Ciprofloxacin hcl Other (See Comments) 2018    Gabapentin Other (See Comments) 08/10/2015    Hydrocodone Nausea Only 2016    Norco [hydrocodone-acetaminophen] Nausea Only and Other (See Comments) 08/10/2015    Bactrim [sulfamethoxazole-trimethoprim] Nausea Only 08/10/2015       Social History     Socioeconomic History    Marital status:      Spouse name: Not on file    Number of children: Not on file    Years of education: Not on file    Highest education level: Not on file   Occupational History    Not on file   Social Needs    Financial resource strain: Not on file    Food insecurity:     Worry: Not on file     Inability: Not on file    Transportation needs:     Medical: Not on file     Non-medical: Not on file   Tobacco Use    Smoking status: Former Smoker     Packs/day: 0.00     Years: 16.00     Pack years: 0.00     Last attempt to quit: 1977     Years since quittin.1    Smokeless tobacco: Former User    Tobacco comment: quit in    Substance and Sexual Activity    Alcohol use: No     Alcohol/week: 0.0 standard drinks     Comment: 1-2 cups coffee daily    Drug use: No    Sexual activity: Never   Lifestyle    Physical activity:     Days per week: Not on file     Minutes per session: Not on file    Stress: Not on file   Relationships    Social connections:     Talks on phone: Not on file     Gets together: Not on file     Attends Baptism service: Not on file     Active member of club or organization: Not on file     Attends meetings of clubs or organizations: Not on file     Relationship status: Not on file    Intimate partner violence:     Fear of current or ex partner: Not on file     Emotionally abused: Not on file     Physically abused: Not on file     Forced sexual activity: Not on file   Other Topics Concern    Not on file   Social History Narrative    Not on file       Family History   Problem Relation Age of Onset    Stroke Mother     Other Father     Kidney Disease Brother     Arrhythmia Brother     Cancer Brother     Kidney Disease Sister     Hypertension Other     Diabetes Other        REVIEW OF SYSTEMS:     CONSTITUTIONAL:  negative for  fevers, chills, sweats and fatigue  EYES:  negative for  double vision, blurred vision and blind spots  HEENT:  negative for  tinnitus, earaches, nasal congestion and epistaxis  RESPIRATORY:  negative for  dry cough, cough with sputum, dyspnea, wheezing and hemoptysis  CARDIOVASCULAR: as per HPI  GASTROINTESTINAL:  negative for nausea, vomiting, diarrhea, constipation, pruritus and jaundice  GENITOURINARY:  negative for frequency, dysuria, nocturia, urinary incontinence and hesitancy  HEMATOLOGIC/LYMPHATIC:  negative for easy bruising, bleeding, lymphadenopathy and petechiae  ALLERGIC/IMMUNOLOGIC:  negative for urticaria, hay fever and angioedema  ENDOCRINE:  negative for heat intolerance, cold intolerance, tremor, hair loss and diabetic symptoms including neither polyuria nor polydipsia nor blurred vision  MUSCULOSKELETAL:  negative for  myalgias, arthralgias, joint swelling, stiff joints and decreased range of motion  NEUROLOGICAL:  negative for memory problems, speech is present. Mild tricuspid regurgitation. Pulmonary hypertension is mild . Fat pad versus small effusion cannot be ruled out. Stress Test:     Angiography: 2/20/2020:  1. Stenosis of the mid RCA distal to the previously deployed stent that  was around 80%, reduced to 20% to 30% after IC nitroglycerin, suspect  vasospasm. 2.  Patent stents in diagonal branch with 40% proximal LAD disease. 3.  Patent stent in OM branch and no significant disease in left  circumflex artery. 4.  Patent stent in RCA.       Cardiology Labs:   BMP:    Lab Results   Component Value Date     02/29/2020    K 3.8 02/29/2020     02/29/2020    CO2 25 02/29/2020    BUN 14 02/29/2020     CMP:    Lab Results   Component Value Date     02/29/2020    K 3.8 02/29/2020     02/29/2020    CO2 25 02/29/2020    BUN 14 02/29/2020    PROT 6.7 10/29/2019     CBC:    Lab Results   Component Value Date    WBC 6.8 02/29/2020    RBC 5.13 02/29/2020    HGB 16.0 02/29/2020    HCT 45.3 02/29/2020    MCV 88.3 02/29/2020    RDW 13.7 02/29/2020     02/29/2020     PT/INR:  No results found for: PTINR  PT/INR Warfarin:  No components found for: PTPATWAR, PTINRWAR  PTT:    Lab Results   Component Value Date    APTT 92.2 02/19/2020     PTT Heparin:  No components found for: APTTHEP  Magnesium:    Lab Results   Component Value Date    MG 2.1 08/14/2017     TSH:    Lab Results   Component Value Date    TSH 3.200 07/16/2019     TROPONIN:  No components found for: TROP  BNP:  No results found for: BNP  FASTING LIPID PANEL:    Lab Results   Component Value Date    CHOL 128 02/19/2020    HDL 45 02/19/2020    TRIG 123 02/19/2020     XR CHEST PORTABLE   Final Result   No acute cardiopulmonary disease. I have personally reviewed the laboratory, cardiac diagnostic and radiographic testing as outlined above:      IMPRESSION:  1.   Chest pain: No EKG changes, negative cardiac enzymes, very atypical for cardiac chest pain, patient

## 2020-03-03 ENCOUNTER — TELEPHONE (OUTPATIENT)
Dept: CARDIOLOGY CLINIC | Age: 75
End: 2020-03-03

## 2020-03-03 RX ORDER — ISOSORBIDE MONONITRATE 60 MG/1
60 TABLET, EXTENDED RELEASE ORAL DAILY
Qty: 90 TABLET | Refills: 1 | Status: SHIPPED
Start: 2020-03-03 | End: 2020-03-09

## 2020-03-03 NOTE — TELEPHONE ENCOUNTER
PT WAS RECENTLY HOSPITALIZED - HE GAVE INCORRECT INFORMATION ABOUT HIS IMDUR - HE HAS BEEN TAKING 30MG ALL ALONG    HE WENT THRU ER AND YOU THOUGHT HE WAS ON 60MG DAILY AND YOU INCREASED IT TO 120MG    THE PATIENT CAN FEEL THAT THIS IS TOO MUCH AND WANTED TO APOLOGIZE THAT HE WAS ONLY TAKING THE 30MG    PLEASE ADVISE ON WHAT STRENGTH YOU WOULD LIKE

## 2020-03-04 ENCOUNTER — APPOINTMENT (OUTPATIENT)
Dept: GENERAL RADIOLOGY | Age: 75
End: 2020-03-04
Payer: MEDICARE

## 2020-03-04 ENCOUNTER — HOSPITAL ENCOUNTER (EMERGENCY)
Age: 75
Discharge: HOME | End: 2020-03-04
Attending: EMERGENCY MEDICINE
Payer: MEDICARE

## 2020-03-04 VITALS
TEMPERATURE: 98.2 F | DIASTOLIC BLOOD PRESSURE: 75 MMHG | BODY MASS INDEX: 27.4 KG/M2 | HEIGHT: 69 IN | HEART RATE: 59 BPM | RESPIRATION RATE: 18 BRPM | SYSTOLIC BLOOD PRESSURE: 136 MMHG | OXYGEN SATURATION: 96 % | WEIGHT: 185 LBS

## 2020-03-04 LAB
ANION GAP SERPL CALCULATED.3IONS-SCNC: 12 MMOL/L (ref 7–16)
BASOPHILS ABSOLUTE: 0.03 E9/L (ref 0–0.2)
BASOPHILS RELATIVE PERCENT: 0.5 % (ref 0–2)
BUN BLDV-MCNC: 15 MG/DL (ref 8–23)
CALCIUM SERPL-MCNC: 9.6 MG/DL (ref 8.6–10.2)
CHLORIDE BLD-SCNC: 100 MMOL/L (ref 98–107)
CO2: 23 MMOL/L (ref 22–29)
CREAT SERPL-MCNC: 1 MG/DL (ref 0.7–1.2)
EOSINOPHILS ABSOLUTE: 0.13 E9/L (ref 0.05–0.5)
EOSINOPHILS RELATIVE PERCENT: 2.3 % (ref 0–6)
GFR AFRICAN AMERICAN: >60
GFR NON-AFRICAN AMERICAN: >60 ML/MIN/1.73
GLUCOSE BLD-MCNC: 88 MG/DL (ref 74–99)
HCT VFR BLD CALC: 44.6 % (ref 37–54)
HEMOGLOBIN: 15.7 G/DL (ref 12.5–16.5)
IMMATURE GRANULOCYTES #: 0.03 E9/L
IMMATURE GRANULOCYTES %: 0.5 % (ref 0–5)
LYMPHOCYTES ABSOLUTE: 1.83 E9/L (ref 1.5–4)
LYMPHOCYTES RELATIVE PERCENT: 32.5 % (ref 20–42)
MCH RBC QN AUTO: 30.5 PG (ref 26–35)
MCHC RBC AUTO-ENTMCNC: 35.2 % (ref 32–34.5)
MCV RBC AUTO: 86.8 FL (ref 80–99.9)
MONOCYTES ABSOLUTE: 0.46 E9/L (ref 0.1–0.95)
MONOCYTES RELATIVE PERCENT: 8.2 % (ref 2–12)
NEUTROPHILS ABSOLUTE: 3.15 E9/L (ref 1.8–7.3)
NEUTROPHILS RELATIVE PERCENT: 56 % (ref 43–80)
PDW BLD-RTO: 13.6 FL (ref 11.5–15)
PLATELET # BLD: 183 E9/L (ref 130–450)
PMV BLD AUTO: 9.8 FL (ref 7–12)
POTASSIUM REFLEX MAGNESIUM: 4.3 MMOL/L (ref 3.5–5)
PRO-BNP: 139 PG/ML (ref 0–450)
RBC # BLD: 5.14 E12/L (ref 3.8–5.8)
SODIUM BLD-SCNC: 135 MMOL/L (ref 132–146)
TROPONIN: <0.01 NG/ML (ref 0–0.03)
TROPONIN: <0.01 NG/ML (ref 0–0.03)
WBC # BLD: 5.6 E9/L (ref 4.5–11.5)

## 2020-03-04 PROCEDURE — 83880 ASSAY OF NATRIURETIC PEPTIDE: CPT

## 2020-03-04 PROCEDURE — 6370000000 HC RX 637 (ALT 250 FOR IP): Performed by: EMERGENCY MEDICINE

## 2020-03-04 PROCEDURE — 84484 ASSAY OF TROPONIN QUANT: CPT

## 2020-03-04 PROCEDURE — 6370000000 HC RX 637 (ALT 250 FOR IP): Performed by: PHYSICIAN ASSISTANT

## 2020-03-04 PROCEDURE — 80048 BASIC METABOLIC PNL TOTAL CA: CPT

## 2020-03-04 PROCEDURE — 93005 ELECTROCARDIOGRAM TRACING: CPT | Performed by: EMERGENCY MEDICINE

## 2020-03-04 PROCEDURE — 85025 COMPLETE CBC W/AUTO DIFF WBC: CPT

## 2020-03-04 PROCEDURE — 71045 X-RAY EXAM CHEST 1 VIEW: CPT

## 2020-03-04 PROCEDURE — 99284 EMERGENCY DEPT VISIT MOD MDM: CPT

## 2020-03-04 RX ORDER — AMLODIPINE BESYLATE 5 MG/1
5 TABLET ORAL 2 TIMES DAILY
Status: DISCONTINUED | OUTPATIENT
Start: 2020-03-04 | End: 2020-03-04 | Stop reason: HOSPADM

## 2020-03-04 RX ORDER — ASPIRIN 81 MG/1
324 TABLET, CHEWABLE ORAL ONCE
Status: COMPLETED | OUTPATIENT
Start: 2020-03-04 | End: 2020-03-04

## 2020-03-04 RX ORDER — NITROGLYCERIN 0.4 MG/1
0.4 TABLET SUBLINGUAL EVERY 5 MIN PRN
Status: DISCONTINUED | OUTPATIENT
Start: 2020-03-04 | End: 2020-03-04 | Stop reason: HOSPADM

## 2020-03-04 RX ORDER — ISOSORBIDE MONONITRATE 30 MG/1
60 TABLET, EXTENDED RELEASE ORAL 2 TIMES DAILY
Status: DISCONTINUED | OUTPATIENT
Start: 2020-03-04 | End: 2020-03-04 | Stop reason: HOSPADM

## 2020-03-04 RX ADMIN — ASPIRIN 81 MG 324 MG: 81 TABLET ORAL at 10:33

## 2020-03-04 RX ADMIN — AMLODIPINE BESYLATE 5 MG: 5 TABLET ORAL at 13:36

## 2020-03-04 ASSESSMENT — PAIN SCALES - GENERAL: PAINLEVEL_OUTOF10: 8

## 2020-03-04 ASSESSMENT — PAIN DESCRIPTION - LOCATION: LOCATION: CHEST

## 2020-03-04 ASSESSMENT — PAIN DESCRIPTION - PAIN TYPE: TYPE: ACUTE PAIN

## 2020-03-04 ASSESSMENT — PAIN DESCRIPTION - DESCRIPTORS: DESCRIPTORS: CONSTANT;SHARP

## 2020-03-04 NOTE — ED NOTES
Pt has no complaints of chest pain.   States he just has a lot of phlegm in his throat when his BP is up and has a hard time swallowing     Shan Spivey RN  03/04/20 1372

## 2020-03-04 NOTE — ED PROVIDER NOTES
Department of Emergency Medicine   ED  Provider Note  Admit Date/RoomTime: 3/4/2020  9:09 AM  ED Room: 02/02                HPI:  3/4/20, Time: 9:29 AM  .       Leticia Mcneil is a 66 y.o. male presenting to the ED for chest pain, beginning one day ago. The complaint has been persistent, moderate in severity, and worsened by nothing. Patient presents the emergency department complaining of chest pain. He states that for the past day or so he has had a fairly constant left-sided dull chest discomfort and he has not identified any remitting factors. He states that he took his Imdur which seem to help a little bit but he continues to have some chest discomfort. He also states that he has been \"phlegming\" a lot and this is how he knows that he is having some cardiac symptoms. He also states that he checked his blood pressure at home this morning and it was about 376 systolic and he became concerned and decided to come to the emergency department for evaluation. He denies any associated shortness of breath, dizziness, lightheadedness, palpitations, sweating, nausea, or vomiting. He does report a history of recent stent placement about 6 months ago and upon review of the electronic health record, patient was just admitted about 5 days ago for similar symptoms. He had a heart catheterization on 2/20/2020 which showed a patent RCA stent and no other significant occlusions. He was found to have some coronary artery vasospasm which improved with nitroglycerin.     Review of Systems:   Pertinent positives and negatives are stated within HPI, all other systems reviewed and are negative.          --------------------------------------------- PAST HISTORY ---------------------------------------------  Past Medical History:  has a past medical history of Abnormal computed tomography angiography of heart, Arthritis, Atrial fibrillation (HCC), Burning pain, CAD (coronary artery disease), Degenerative lumbar disc, vital signs as below have been reviewed by myself. BP (!) 152/75   Pulse 64   Temp 98.2 °F (36.8 °C)   Resp 18   Ht 5' 9\" (1.753 m)   Wt 185 lb (83.9 kg)   SpO2 97%   BMI 27.32 kg/m²   Oxygen Saturation Interpretation: Normal    The patients available past medical records and past encounters were reviewed. ------------------------------ ED COURSE/MEDICAL DECISION MAKING----------------------  Medications   nitroGLYCERIN (NITROSTAT) SL tablet 0.4 mg (has no administration in time range)   isosorbide mononitrate (IMDUR) extended release tablet 60 mg (has no administration in time range)   amLODIPine (NORVASC) tablet 5 mg (5 mg Oral Given 3/4/20 1336)   aspirin chewable tablet 324 mg (324 mg Oral Given 3/4/20 1033)       Medical Decision Making:    Patient presents with a recurrence of chest discomfort over the past day or so. Of note, patient was admitted only 5 days ago for similar symptoms and was seen by his cardiologist.  Patient had a cardiac catheterization on 2/20/2020 which showed a patent RCA stent but some coronary artery vasospasm which improved with nitroglycerin. There is consideration that this may be once again occurring causing his symptoms. Patient otherwise denies any symptoms that are new or different and his vital signs are stable. He appears in no acute distress. Patient had baseline blood work drawn including troponin and EKG and chest x-ray. He will be given nitroglycerin and aspirin and his cardiologist will be contacted. Re-Evaluations:             Re-evaluation.   Patients symptoms are improving      Consultations:             Dr. Luis Acosta Cardiology    Critical Care: 0        This patient's ED course included: re-evaluation prior to disposition, multiple bedside re-evaluations, cardiac monitoring, continuous pulse oximetry and a personal history and physicial eaxmination    This patient has remained hemodynamically stable during their ED course. Counseling: The emergency provider has spoken with the patient and discussed todays results, in addition to providing specific details for the plan of care and counseling regarding the diagnosis and prognosis. Questions are answered at this time and they are agreeable with the plan.       --------------------------------- IMPRESSION AND DISPOSITION ---------------------------------    IMPRESSION  1. Hypertension, unspecified type    2.  Chest pain, unspecified type        DISPOSITION  Disposition: Discharge to home  Patient condition is stable           Conor Moncada DO  03/04/20 [id number]

## 2020-03-04 NOTE — CONSULTS
Saw patient. ED physician discussed case with Dr. Cisco Ahumada, and patient will be discharged home from ED. Imdur changed to 60mg BID. Norvasc increased to 5 mg BID due to uncontrolled hypertension. No need for full consult note per Dr. Cisco Ahumada. He should follow up with his cardiologist outpatient in 3-4 weeks. Medications and compliance discussed in detail with the patient.

## 2020-03-06 ENCOUNTER — APPOINTMENT (OUTPATIENT)
Dept: GENERAL RADIOLOGY | Age: 75
End: 2020-03-06
Payer: MEDICARE

## 2020-03-06 ENCOUNTER — HOSPITAL ENCOUNTER (EMERGENCY)
Age: 75
Discharge: HOME | End: 2020-03-06
Attending: EMERGENCY MEDICINE
Payer: MEDICARE

## 2020-03-06 VITALS
RESPIRATION RATE: 20 BRPM | OXYGEN SATURATION: 98 % | HEART RATE: 70 BPM | DIASTOLIC BLOOD PRESSURE: 72 MMHG | SYSTOLIC BLOOD PRESSURE: 155 MMHG | TEMPERATURE: 98 F

## 2020-03-06 LAB
ANION GAP SERPL CALCULATED.3IONS-SCNC: 14 MMOL/L (ref 7–16)
BUN BLDV-MCNC: 14 MG/DL (ref 8–23)
CALCIUM SERPL-MCNC: 9.9 MG/DL (ref 8.6–10.2)
CHLORIDE BLD-SCNC: 99 MMOL/L (ref 98–107)
CO2: 24 MMOL/L (ref 22–29)
CREAT SERPL-MCNC: 1.1 MG/DL (ref 0.7–1.2)
EKG ATRIAL RATE: 69 BPM
EKG ATRIAL RATE: 71 BPM
EKG P AXIS: 46 DEGREES
EKG P AXIS: 67 DEGREES
EKG P-R INTERVAL: 180 MS
EKG P-R INTERVAL: 188 MS
EKG Q-T INTERVAL: 382 MS
EKG Q-T INTERVAL: 442 MS
EKG QRS DURATION: 130 MS
EKG QRS DURATION: 154 MS
EKG QTC CALCULATION (BAZETT): 415 MS
EKG QTC CALCULATION (BAZETT): 473 MS
EKG R AXIS: -74 DEGREES
EKG R AXIS: -75 DEGREES
EKG T AXIS: 55 DEGREES
EKG T AXIS: 64 DEGREES
EKG VENTRICULAR RATE: 69 BPM
EKG VENTRICULAR RATE: 71 BPM
GFR AFRICAN AMERICAN: >60
GFR NON-AFRICAN AMERICAN: >60 ML/MIN/1.73
GLUCOSE BLD-MCNC: 142 MG/DL (ref 74–99)
HCT VFR BLD CALC: 47 % (ref 37–54)
HEMOGLOBIN: 16.4 G/DL (ref 12.5–16.5)
MCH RBC QN AUTO: 30.9 PG (ref 26–35)
MCHC RBC AUTO-ENTMCNC: 34.9 % (ref 32–34.5)
MCV RBC AUTO: 88.7 FL (ref 80–99.9)
PDW BLD-RTO: 13.7 FL (ref 11.5–15)
PLATELET # BLD: 204 E9/L (ref 130–450)
PMV BLD AUTO: 10.4 FL (ref 7–12)
POTASSIUM SERPL-SCNC: 3.7 MMOL/L (ref 3.5–5)
RBC # BLD: 5.3 E12/L (ref 3.8–5.8)
SODIUM BLD-SCNC: 137 MMOL/L (ref 132–146)
TROPONIN: <0.01 NG/ML (ref 0–0.03)
WBC # BLD: 8.4 E9/L (ref 4.5–11.5)

## 2020-03-06 PROCEDURE — 80048 BASIC METABOLIC PNL TOTAL CA: CPT

## 2020-03-06 PROCEDURE — 99285 EMERGENCY DEPT VISIT HI MDM: CPT

## 2020-03-06 PROCEDURE — 94640 AIRWAY INHALATION TREATMENT: CPT

## 2020-03-06 PROCEDURE — 93010 ELECTROCARDIOGRAM REPORT: CPT | Performed by: INTERNAL MEDICINE

## 2020-03-06 PROCEDURE — 6370000000 HC RX 637 (ALT 250 FOR IP): Performed by: EMERGENCY MEDICINE

## 2020-03-06 PROCEDURE — 93005 ELECTROCARDIOGRAM TRACING: CPT | Performed by: EMERGENCY MEDICINE

## 2020-03-06 PROCEDURE — 85027 COMPLETE CBC AUTOMATED: CPT

## 2020-03-06 PROCEDURE — 84484 ASSAY OF TROPONIN QUANT: CPT

## 2020-03-06 PROCEDURE — 71045 X-RAY EXAM CHEST 1 VIEW: CPT

## 2020-03-06 PROCEDURE — 36415 COLL VENOUS BLD VENIPUNCTURE: CPT

## 2020-03-06 RX ORDER — ASPIRIN 81 MG/1
324 TABLET, CHEWABLE ORAL ONCE
Status: COMPLETED | OUTPATIENT
Start: 2020-03-06 | End: 2020-03-06

## 2020-03-06 RX ORDER — IPRATROPIUM BROMIDE AND ALBUTEROL SULFATE 2.5; .5 MG/3ML; MG/3ML
1 SOLUTION RESPIRATORY (INHALATION) ONCE
Status: COMPLETED | OUTPATIENT
Start: 2020-03-06 | End: 2020-03-06

## 2020-03-06 RX ADMIN — IPRATROPIUM BROMIDE AND ALBUTEROL SULFATE 1 AMPULE: .5; 3 SOLUTION RESPIRATORY (INHALATION) at 06:38

## 2020-03-06 RX ADMIN — ASPIRIN 81 MG 324 MG: 81 TABLET ORAL at 06:16

## 2020-03-06 ASSESSMENT — ENCOUNTER SYMPTOMS
VOMITING: 0
EYE REDNESS: 0
ABDOMINAL PAIN: 0
EYE PAIN: 0
BACK PAIN: 0
SHORTNESS OF BREATH: 0
SINUS PRESSURE: 0
COUGH: 0
NAUSEA: 0
SORE THROAT: 0
EYE DISCHARGE: 0
WHEEZING: 0
DIARRHEA: 0

## 2020-03-06 ASSESSMENT — PAIN DESCRIPTION - FREQUENCY: FREQUENCY: CONTINUOUS

## 2020-03-06 ASSESSMENT — PAIN DESCRIPTION - LOCATION: LOCATION: CHEST

## 2020-03-06 ASSESSMENT — PAIN SCALES - GENERAL: PAINLEVEL_OUTOF10: 8

## 2020-03-06 NOTE — ED PROVIDER NOTES
Hematocrit 47.0 37.0 - 54.0 %    MCV 88.7 80.0 - 99.9 fL    MCH 30.9 26.0 - 35.0 pg    MCHC 34.9 (H) 32.0 - 34.5 %    RDW 13.7 11.5 - 15.0 fL    Platelets 762 347 - 450 E9/L    MPV 10.4 7.0 - 12.0 fL   Troponin   Result Value Ref Range    Troponin <0.01 0.00 - 0.03 ng/mL   EKG 12 Lead   Result Value Ref Range    Ventricular Rate 71 BPM    Atrial Rate 71 BPM    P-R Interval 188 ms    QRS Duration 130 ms    Q-T Interval 382 ms    QTc Calculation (Bazett) 415 ms    P Axis 46 degrees    R Axis -75 degrees    T Axis 64 degrees       Radiology:  XR CHEST PORTABLE    (Results Pending)     This X-Ray is independently viewed and interpreted by me:   - Study: Chest X-Ray   - Number of Views: 1  - Findings: No acute cardiopulmonary disease.      ------------------------- NURSING NOTES AND VITALS REVIEWED ---------------------------  Date / Time Roomed:  3/6/2020  5:34 AM  ED Bed Assignment:  04/04    The nursing notes within the ED encounter and vital signs as below have been reviewed. BP (!) 150/66   Pulse 69   Temp 98 °F (36.7 °C) (Oral)   Resp 18   SpO2 99%   Oxygen Saturation Interpretation: Normal      ------------------------------------------ PROGRESS NOTES ------------------------------------------  I have spoken with the patient and discussed todays results, in addition to providing specific details for the plan of care and counseling regarding the diagnosis and prognosis. Their questions are answered at this time and they are agreeable with the plan. I discussed at length with them reasons for immediate return here for re evaluation. They will followup with primary care by calling their office tomorrow. --------------------------------- ADDITIONAL PROVIDER NOTES ---------------------------------  At this time the patient is without objective evidence of an acute process requiring hospitalization or inpatient management.   They have remained hemodynamically stable throughout their entire ED visit and are stable for discharge with outpatient follow-up. The plan has been discussed in detail and they are aware of the specific conditions for emergent return, as well as the importance of follow-up. New Prescriptions    No medications on file       Diagnosis:  1. Chest pain, unspecified type        Disposition:  Patient's disposition: Discharge to home  Patient's condition is stable.            Chante Betancourt DO  03/06/20 [id number]

## 2020-03-08 NOTE — H&P
H&P  Dr. Kristin Doshi: Chest pain  History Obtained From: patient  HISTORY OF PRESENT ILLNESS:   Patient is a 66years old male with history of CAD status post previous PCI's, recent cardiac catheterization, atrial fibrillation, hypertension, hyperlipidemia, Continue to need to have typical chest pain despite Recent negative Lexiscan stress, patient is here for cardiac catheterization for more definitive evaluation. Past Medical History:   Diagnosis Date    Abnormal computed tomography angiography of heart 07/19/2019    Calcified plaque proximal RCA with 50-70% stenosis, proximal LAD 50% stenosis, mild LAD 30-50% stenosis, groundglass infiltrates, area of consoldidation left lung base posteriorly.       Arthritis     Atrial fibrillation (HCC)     Burning pain     CAD (coronary artery disease)     Degenerative lumbar disc     Diabetes ([address]     H/O cardiovascular stress test 02/19/2020    Lexiscan    Herniated cervical disc     History of echocardiogram 03/16/2017    EF 60-65%    Hx of blood clots     Hyperlipidemia     Hypertension     Stented coronary artery     Urinary retention 3/17/2017       Past Surgical History:   Procedure Laterality Date    ABLATION OF DYSRHYTHMIC FOCUS  1980    APPROX 30-35 YRS AGO    CARDIAC CATHETERIZATION  02/20/2020    Dr Ren Spotted  02/20/2020    CATARACT REMOVAL  2013    COLECTOMY  2010 sigmoid    COLONOSCOPY  5/2018 [facility]    CORONARY ANGIOPLASTY WITH STENT PLACEMENT      DIAGNOSTIC CARDIAC CATH LAB PROCEDURE      ENDOSCOPY, COLON, DIAGNOSTIC      EPIDURAL STEROID INJECTION N/A 12/11/2018    C7-T1 EPIDURAL STEROID INJECTION performed by Deysi Camargo DO at [address]  1/8/2019    C7 - T1 EPIDURAL STEROID INJECTION #2 performed by Deysi Camargo DO at [facility]  2008    LUMBAR FUSION  03/06/2017    NASAL SINUS SURGERY 12/07/2017    Submucosal Resection of Inferior Turbinate    NERVE BLOCK Bilateral 10 12 2015    bilateral sacroiliac joint injection #1    NERVE BLOCK Bilateral 10/22/15    sacroiliac joint #2    NERVE BLOCK Bilateral 11 02 2015    Sacroiliac joint bilateral #3    NERVE BLOCK Left 12/2/15    lumbar transforaminal #1    NERVE BLOCK Left 12 16 15    NERVE BLOCK Left 12 28 2015    transforaminal nerve block left lumbar #3    NERVE BLOCK Left 1 20 16    radiofrequency     NERVE BLOCK  12/11/2018    C7-T1 epidural    NERVE BLOCK N/A 01/08/2019    cervical epidural steroid injection    OTHER SURGICAL HISTORY  1943    had a feeding tube as an infant, scar    PROSTATE SURGERY  2017    PTCA  09/30/2019    2.5 x 38 mm Resolute [place] RAFI mid OM1, 2.25 x 8 mm Resolute [place] RAFI ostium of dx    UPPER GASTROINTESTINAL ENDOSCOPY  5/2018 [facility]    UPPER GASTROINTESTINAL ENDOSCOPY N/A 6/4/2019    EGD BIOPSY performed by Marylu Bell MD at [place] [address]       No current facility-administered medications for this encounter.      Current Outpatient Medications:     glimepiride (AMARYL) 2 MG tablet, Take 1 tablet by mouth every morning (before breakfast), Disp: 30 tablet, Rfl: 5    albuterol (PROVENTIL) (5 MG/ML) 0.5% nebulizer solution, Take 1 mL by nebulization 4 times daily as needed for Wheezing, Disp: 120 each, Rfl: 5    albuterol sulfate HFA (PROVENTIL HFA) 108 (90 Base) MCG/ACT inhaler, Inhale 2 puffs into the lungs every 4 hours as needed for Wheezing, Disp: 1 Inhaler, Rfl: 5    ammonium lactate (LAC-HYDRIN) 12 % lotion, Apply 1 g topically daily as needed for Dry Skin, Disp: , Rfl:     pantoprazole (PROTONIX) 40 MG tablet, Take 40 mg by mouth daily as needed (Reflux), Disp: , Rfl:     pravastatin (PRAVACHOL) 10 MG tablet, Take 10 mg by mouth nightly, Disp: , Rfl:     [employer] FIBER GUMMIES PO, Take 2 tablets by mouth daily, Disp: , Rfl:     glycopyrrolate (ROBINUL) 1 MG tablet, Take 1 tablet by mouth 3 times daily, Disp: 270 tablet, Rfl: 3    clopidogrel (PLAVIX) 75 MG tablet, Take 75 mg by mouth daily, Disp: , Rfl:     folic acid (FOLVITE) 1 MG tablet, Take 1 tablet by mouth daily, Disp: 30 tablet, Rfl: 5    cyclobenzaprine (FLEXERIL) 10 MG tablet, Take 1 tablet by mouth 3 times daily as needed for Muscle spasms, Disp: 90 tablet, Rfl: 5    aspirin 81 MG tablet, Take 81 mg by mouth Daily with lunch , Disp: , Rfl:     isosorbide mononitrate (IMDUR) 60 MG extended release tablet, Take 1 tablet by mouth 2 times daily, Disp: 180 tablet, Rfl: 3    amLODIPine (NORVASC) 5 MG tablet, Take 1 tablet by mouth 2 times daily, Disp: 180 tablet, Rfl: 3    isosorbide mononitrate (IMDUR) 60 MG extended release tablet, Take 1 tablet by mouth daily, Disp: 90 tablet, Rfl: 1    metoprolol succinate (TOPROL XL) 50 MG extended release tablet, Take 1 tablet by mouth daily, Disp: 30 tablet, Rfl: 0    amLODIPine (NORVASC) 5 MG tablet, Take 1 tablet by mouth daily, Disp: 30 tablet, Rfl: 0    Allergies as of 02/20/2020 - Review Complete 02/20/2020   Allergen Reaction Noted    Oxycodone Rash 08/10/2015    Simvastatin Other (See Comments) 05/20/2019    Ciprofloxacin hcl Other (See Comments) 11/16/2018    Gabapentin Other (See Comments) 08/10/2015    Hydrocodone Nausea Only 04/29/2016    Norco [hydrocodone-acetaminophen] Nausea Only and Other (See Comments) 08/10/2015    Bactrim [sulfamethoxazole-trimethoprim] Nausea Only 08/10/2015       Social History     Socioeconomic History    Marital status:      Spouse name: Not on file    Number of children: Not on file    Years of education: Not on file    Highest education level: Not on file   Occupational History    Not on file   Social Needs    Financial resource strain: Not on file    Food insecurity     Worry: Not on file     Inability: Not on file    Transportation needs     Medical: Not on file     Non-medical: Not on file   Tobacco Use    Smoking status: Former Smoker negative for urticaria, hay fever and angioedema  ENDOCRINE:  negative for heat intolerance, cold intolerance, tremor, hair loss and diabetic symptoms including neither polyuria nor polydipsia nor blurred vision  MUSCULOSKELETAL:  negative for  myalgias, arthralgias, joint swelling, stiff joints and decreased range of motion  NEUROLOGICAL:  negative for memory problems, speech problems, visual disturbance, dysphagia, weakness and numbness      PHYSICAL EXAM:   CONSTITUTIONAL:  awake, alert, cooperative, no apparent distress, and appears stated age  EYES:  lids and lashes normal and pupils equal, round and reactive to light, anicteric sclerae  HEAD:  normocepalic, without obvious abnormality, atraumatic, pink, moist mucous membranes. NECK:  Supple, symmetrical, trachea midline, no adenopathy, thyroid symmetric, not enlarged and no tenderness, skin normal  HEMATOLOGIC/LYMPHATICS:  no cervical lymphadenopathy and no supraclavicular lymphadenopathy  LUNGS:  No increased work of breathing, good air exchange, clear to auscultation bilaterally, no crackles or wheezing  CARDIOVASCULAR:  Normal apical impulse, regular rate and rhythm, normal S1 and S2, no S3 or S4, and no murmur noted and no JVD, no carotid bruit, no pedal edema, good carotid upstroke bilaterally. ABDOMEN:  Soft, nontender, no masses, no hepatomegaly or splenomegaly, BS+  CHEST: nontender to palpation, expands symmetrically  MUSCULOSKELETAL:  No clubbing no cyanosis. there is no redness, warmth, or swelling of the joints  full range of motion noted  NEUROLOGIC:  Alert, awake,oriented x3, no focal neurologic deficit was appreciated  SKIN:  no bruising or bleeding, normal skin color, texture, turgor and no redness, warmth, or swelling        /63   Pulse 64   Temp 97.4 °F (36.3 °C) (Temporal)   Resp 16   Ht 5' 9\" (1.753 m)   Wt 188 lb (85.3 kg)   SpO2 97%   BMI 27.76 kg/m²     DATA:     ECHO: 3/16/2017,Summary   Left ventricular internal reviewed my findings and recommendations with patient      Electronically sign  ed by Kalen Hudson MD on 3/8/2020 at 5:50 PM  NOTE: This report was transcribed using voice recognition software.  Every effort was made to ensure accuracy; however, inadvertent computerized transcription errors may be present

## 2020-03-08 NOTE — PROGRESS NOTES
morning (before breakfast) 30 tablet 5    albuterol (PROVENTIL) (5 MG/ML) 0.5% nebulizer solution Take 1 mL by nebulization 4 times daily as needed for Wheezing 120 each 5    albuterol sulfate HFA (PROVENTIL HFA) 108 (90 Base) MCG/ACT inhaler Inhale 2 puffs into the lungs every 4 hours as needed for Wheezing 1 Inhaler 5    ammonium lactate (LAC-HYDRIN) 12 % lotion Apply 1 g topically daily as needed for Dry Skin      pantoprazole (PROTONIX) 40 MG tablet Take 40 mg by mouth daily as needed (Reflux)      pravastatin (PRAVACHOL) 10 MG tablet Take 10 mg by mouth nightly      [employer] FIBER GUMMIES PO Take 2 tablets by mouth daily      glycopyrrolate (ROBINUL) 1 MG tablet Take 1 tablet by mouth 3 times daily 270 tablet 3    clopidogrel (PLAVIX) 75 MG tablet Take 75 mg by mouth daily      folic acid (FOLVITE) 1 MG tablet Take 1 tablet by mouth daily 30 tablet 5    aspirin 81 MG tablet Take 81 mg by mouth Daily with lunch        No current facility-administered medications for this visit.           Allergies as of 03/09/2020 - Review Complete 03/09/2020   Allergen Reaction Noted    Oxycodone Rash 08/10/2015    Simvastatin Other (See Comments) 05/20/2019    Ciprofloxacin hcl Other (See Comments) 11/16/2018    Gabapentin Other (See Comments) 08/10/2015    Hydrocodone Nausea Only 04/29/2016    Norco [hydrocodone-acetaminophen] Nausea Only and Other (See Comments) 08/10/2015    Bactrim [sulfamethoxazole-trimethoprim] Nausea Only 08/10/2015       Social History     Socioeconomic History    Marital status:      Spouse name: Not on file    Number of children: Not on file    Years of education: Not on file    Highest education level: Not on file   Occupational History    Not on file   Social Needs    Financial resource strain: Not on file    Food insecurity     Worry: Not on file     Inability: Not on file    Transportation needs     Medical: Not on file     Non-medical: Not on file   Tobacco Use    Smoking status: Former Smoker     Packs/day: 0.00     Years: 16.00     Pack years: 0.00     Last attempt to quit: 1977     Years since quittin.2    Smokeless tobacco: Former User    Tobacco comment: quit in    Substance and Sexual Activity    Alcohol use: No     Alcohol/week: 0.0 standard drinks     Comment: 1-2 cups coffee daily    Drug use: No    Sexual activity: Never   Lifestyle    Physical activity     Days per week: Not on file     Minutes per session: Not on file    Stress: Not on file   Relationships    Social connections     Talks on phone: Not on file     Gets together: Not on file     Attends [employer] service: Not on file     Active member of club or organization: Not on file     Attends meetings of clubs or organizations: Not on file     Relationship status: Not on file    Intimate partner violence     Fear of current or ex partner: Not on file     Emotionally abused: Not on file     Physically abused: Not on file     Forced sexual activity: Not on file   Other Topics Concern    Not on file   Social History Narrative    Not on file       Family History   Problem Relation Age of Onset    Stroke Mother     Other Father     Kidney Disease Brother     Arrhythmia Brother     Cancer Brother     Kidney Disease Sister     Hypertension Other     Diabetes Other        REVIEW OF SYSTEMS:     CONSTITUTIONAL:  negative for  fevers, chills, sweats and fatigue  HEENT:  negative for  tinnitus, earaches, nasal congestion and epistaxis  RESPIRATORY:  negative for  dry cough, cough with sputum, dyspnea, wheezing and hemoptysis  GASTROINTESTINAL:  negative for nausea, vomiting, diarrhea, constipation, pruritus and jaundice  HEMATOLOGIC/LYMPHATIC:  negative for easy bruising, bleeding, lymphadenopathy and petechiae  ENDOCRINE:  negative for heat intolerance, cold intolerance, tremor, hair loss and diabetic symptoms including neither polyuria nor polydipsia nor blurred vision  MUSCULOSKELETAL: decreased  Inferior infarct is now present  QT has shortened    ECHO: 3/6/17  Summary   Left ventricular internal dimensions were normal in diastole and systole. Borderline concentric left ventricular hypertrophy. Normal left ventricular ejection fraction. Mild mitral regurgitation is present. Mild tricuspid regurgitation. Pulmonary hypertension is mild . Fat pad versus small effusion cannot be ruled out. Stress Test: 2/19/20   Findings:    Both the stress and resting myocardial images are normal.       The gated left ventricular ejection fraction is 70 %.             Impression   No evidence of stress-induced ischemia. Angiography: 2/20/20 IMPRESSION:  1. Stenosis of the mid RCA distal to the previously deployed stent that  was around 80%, reduced to 20% to 30% after IC nitroglycerin, suspect  vasospasm. 2.  Patent stents in diagonal branch with 40% proximal LAD disease. 3.  Patent stent in OM branch and no significant disease in left  circumflex artery. 4.  Patent stent in RCA.     Cardiology Labs: BMP:    Lab Results   Component Value Date     03/06/2020    K 3.7 03/06/2020    K 4.3 03/04/2020    CL 99 03/06/2020    CO2 24 03/06/2020    BUN 14 03/06/2020    CREATININE 1.1 03/06/2020     CMP:    Lab Results   Component Value Date     03/06/2020    K 3.7 03/06/2020    K 4.3 03/04/2020    CL 99 03/06/2020    CO2 24 03/06/2020    BUN 14 03/06/2020    CREATININE 1.1 03/06/2020    PROT 6.7 10/29/2019     CBC:    Lab Results   Component Value Date    WBC 8.4 03/06/2020    RBC 5.30 03/06/2020    HGB 16.4 03/06/2020    HCT 47.0 03/06/2020    MCV 88.7 03/06/2020    RDW 13.7 03/06/2020     03/06/2020     PT/INR:  No results found for: PTINR  PT/INR Warfarin:  No components found for: PTPATWAR, PTINRWAR  PTT:    Lab Results   Component Value Date    APTT 92.2 02/19/2020     PTT Heparin:  No components found for: APTTHEP  Magnesium:    Lab Results   Component Value Date    MG 2.1 08/14/2017     TSH:    Lab Results   Component Value Date    TSH 3.200 07/16/2019     TROPONIN:  No components found for: TROP  BNP:  No results found for: BNP  FASTING LIPID PANEL:    Lab Results   Component Value Date    CHOL 128 02/19/2020    HDL 45 02/19/2020    TRIG 123 02/19/2020     No orders to display     I have personally reviewed the laboratory, cardiac diagnostic and radiographic testing as outlined above:      IMPRESSION:  1. Chest pain: No EKG changes, negative cardiac enzymes, very atypical for cardiac chest pain, I highly doubt it is cardiac, patient was reassured  2. Coronary vasospasm: will continue current treatment  3. History of CAD recent cardiac catheterization showed patent stents in diagonal branch OM branch and RCA, will continue antiplatelet therapy  4. History of SVT: Status post ablation years ago  5. Hypertension: Not well controlled, will adjust medications  6. Hyperlipidemia: On statin  7. Type 2 diabetes mellitus    RECOMMENDATIONS:   1. Patient was reassured  2. Continue current treatment  3. Preventive cardiology: Low-salt, low-cholesterol diet, daily exercise, total cholesterol of less than 200, LDL of less than 70, adherence to diabetic diet and diabetic medications, were all advised. 4.  Follow-up with Dr. Clemente Miranda as scheduled  5. Follow-up with Dr. Simms All in 6 months, sooner if symptomatic for any reason    I have reviewed my findings and recommendations with patient    Electronically signed by France Montgomery MD on 3/29/2020 at 1:15 PM  NOTE: This report was transcribed using voice recognition software.  Every effort was made to ensure accuracy; however, inadvertent computerized transcription errors may be present

## 2020-03-09 ENCOUNTER — OFFICE VISIT (OUTPATIENT)
Dept: CARDIOLOGY CLINIC | Age: 75
End: 2020-03-09
Payer: MEDICARE

## 2020-03-09 VITALS
HEIGHT: 69 IN | WEIGHT: 185 LBS | BODY MASS INDEX: 27.4 KG/M2 | SYSTOLIC BLOOD PRESSURE: 132 MMHG | DIASTOLIC BLOOD PRESSURE: 68 MMHG | HEART RATE: 80 BPM

## 2020-03-09 PROCEDURE — 99213 OFFICE O/P EST LOW 20 MIN: CPT | Performed by: INTERNAL MEDICINE

## 2020-03-09 PROCEDURE — 93000 ELECTROCARDIOGRAM COMPLETE: CPT | Performed by: INTERNAL MEDICINE

## 2020-03-27 ENCOUNTER — TELEPHONE (OUTPATIENT)
Dept: CARDIOLOGY CLINIC | Age: 75
End: 2020-03-27

## 2020-03-27 NOTE — TELEPHONE ENCOUNTER
Patient called in stating since he had his stent placement his feet are swollen.   He does not have shortness of breath or has not noticed any weigh gain

## 2020-05-18 ENCOUNTER — TELEPHONE (OUTPATIENT)
Dept: FAMILY MEDICINE CLINIC | Age: 75
End: 2020-05-18

## 2020-05-20 ENCOUNTER — HOSPITAL ENCOUNTER (OUTPATIENT)
Age: 75
Discharge: HOME | End: 2020-05-22
Payer: MEDICARE

## 2020-05-20 LAB
ALBUMIN SERPL-MCNC: 4.7 G/DL (ref 3.5–5.2)
ALP BLD-CCNC: 116 U/L (ref 40–129)
ALT SERPL-CCNC: 18 U/L (ref 0–40)
ANION GAP SERPL CALCULATED.3IONS-SCNC: 16 MMOL/L (ref 7–16)
AST SERPL-CCNC: 24 U/L (ref 0–39)
BASOPHILS ABSOLUTE: 0.04 E9/L (ref 0–0.2)
BASOPHILS RELATIVE PERCENT: 0.6 % (ref 0–2)
BILIRUB SERPL-MCNC: 0.5 MG/DL (ref 0–1.2)
BUN BLDV-MCNC: 17 MG/DL (ref 8–23)
CALCIUM SERPL-MCNC: 9.8 MG/DL (ref 8.6–10.2)
CHLORIDE BLD-SCNC: 103 MMOL/L (ref 98–107)
CO2: 22 MMOL/L (ref 22–29)
CREAT SERPL-MCNC: 1 MG/DL (ref 0.7–1.2)
EOSINOPHILS ABSOLUTE: 0.15 E9/L (ref 0.05–0.5)
EOSINOPHILS RELATIVE PERCENT: 2.2 % (ref 0–6)
GFR AFRICAN AMERICAN: >60
GFR NON-AFRICAN AMERICAN: >60 ML/MIN/1.73
GLUCOSE BLD-MCNC: 122 MG/DL (ref 74–99)
HCT VFR BLD CALC: 51.4 % (ref 37–54)
HEMOGLOBIN: 17.2 G/DL (ref 12.5–16.5)
IMMATURE GRANULOCYTES #: 0.01 E9/L
IMMATURE GRANULOCYTES %: 0.1 % (ref 0–5)
LYMPHOCYTES ABSOLUTE: 2.42 E9/L (ref 1.5–4)
LYMPHOCYTES RELATIVE PERCENT: 36 % (ref 20–42)
MCH RBC QN AUTO: 30.1 PG (ref 26–35)
MCHC RBC AUTO-ENTMCNC: 33.5 % (ref 32–34.5)
MCV RBC AUTO: 90 FL (ref 80–99.9)
MONOCYTES ABSOLUTE: 0.46 E9/L (ref 0.1–0.95)
MONOCYTES RELATIVE PERCENT: 6.8 % (ref 2–12)
NEUTROPHILS ABSOLUTE: 3.64 E9/L (ref 1.8–7.3)
NEUTROPHILS RELATIVE PERCENT: 54.3 % (ref 43–80)
PDW BLD-RTO: 13 FL (ref 11.5–15)
PLATELET # BLD: 201 E9/L (ref 130–450)
PMV BLD AUTO: 10.6 FL (ref 7–12)
POTASSIUM SERPL-SCNC: 4.4 MMOL/L (ref 3.5–5)
RBC # BLD: 5.71 E12/L (ref 3.8–5.8)
SODIUM BLD-SCNC: 141 MMOL/L (ref 132–146)
TOTAL PROTEIN: 7.9 G/DL (ref 6.4–8.3)
WBC # BLD: 6.7 E9/L (ref 4.5–11.5)

## 2020-05-20 PROCEDURE — 85025 COMPLETE CBC W/AUTO DIFF WBC: CPT

## 2020-05-20 PROCEDURE — 36415 COLL VENOUS BLD VENIPUNCTURE: CPT

## 2020-05-20 PROCEDURE — 80053 COMPREHEN METABOLIC PANEL: CPT

## 2020-05-21 ENCOUNTER — HOSPITAL ENCOUNTER (OUTPATIENT)
Age: 75
Discharge: HOME | End: 2020-05-21
Payer: MEDICARE

## 2020-05-21 ENCOUNTER — TELEPHONE (OUTPATIENT)
Dept: CARDIOLOGY CLINIC | Age: 75
End: 2020-05-21

## 2020-05-21 LAB
EKG ATRIAL RATE: 74 BPM
EKG P AXIS: 69 DEGREES
EKG P-R INTERVAL: 178 MS
EKG Q-T INTERVAL: 426 MS
EKG QRS DURATION: 132 MS
EKG QTC CALCULATION (BAZETT): 472 MS
EKG R AXIS: -78 DEGREES
EKG T AXIS: 52 DEGREES
EKG VENTRICULAR RATE: 74 BPM

## 2020-05-21 PROCEDURE — 93005 ELECTROCARDIOGRAM TRACING: CPT | Performed by: INTERNAL MEDICINE

## 2020-05-21 PROCEDURE — 93000 ELECTROCARDIOGRAM COMPLETE: CPT | Performed by: INTERNAL MEDICINE

## 2020-05-21 NOTE — TELEPHONE ENCOUNTER
Patient will be having a lumbar laminectomy with Dr Emily Mays on June 5th needing cardiac clearance

## 2020-05-23 ENCOUNTER — HOSPITAL ENCOUNTER (EMERGENCY)
Age: 75
Discharge: HOME | End: 2020-05-23
Attending: EMERGENCY MEDICINE
Payer: MEDICARE

## 2020-05-23 VITALS
TEMPERATURE: 98.2 F | OXYGEN SATURATION: 97 % | RESPIRATION RATE: 20 BRPM | HEART RATE: 74 BPM | SYSTOLIC BLOOD PRESSURE: 168 MMHG | DIASTOLIC BLOOD PRESSURE: 87 MMHG | BODY MASS INDEX: 27.7 KG/M2 | WEIGHT: 187 LBS | HEIGHT: 69 IN

## 2020-05-23 PROCEDURE — 99283 EMERGENCY DEPT VISIT LOW MDM: CPT

## 2020-05-23 PROCEDURE — 6370000000 HC RX 637 (ALT 250 FOR IP): Performed by: EMERGENCY MEDICINE

## 2020-05-23 PROCEDURE — 96372 THER/PROPH/DIAG INJ SC/IM: CPT

## 2020-05-23 PROCEDURE — 6360000002 HC RX W HCPCS: Performed by: EMERGENCY MEDICINE

## 2020-05-23 RX ORDER — DEXAMETHASONE SODIUM PHOSPHATE 10 MG/ML
10 INJECTION INTRAMUSCULAR; INTRAVENOUS ONCE
Status: COMPLETED | OUTPATIENT
Start: 2020-05-23 | End: 2020-05-23

## 2020-05-23 RX ORDER — MORPHINE SULFATE 10 MG/ML
8 INJECTION, SOLUTION INTRAMUSCULAR; INTRAVENOUS ONCE
Status: COMPLETED | OUTPATIENT
Start: 2020-05-23 | End: 2020-05-23

## 2020-05-23 RX ORDER — KETOROLAC TROMETHAMINE 15 MG/ML
15 INJECTION, SOLUTION INTRAMUSCULAR; INTRAVENOUS ONCE
Status: COMPLETED | OUTPATIENT
Start: 2020-05-23 | End: 2020-05-23

## 2020-05-23 RX ORDER — ORPHENADRINE CITRATE 100 MG/1
100 TABLET, EXTENDED RELEASE ORAL ONCE
Status: COMPLETED | OUTPATIENT
Start: 2020-05-23 | End: 2020-05-23

## 2020-05-23 RX ORDER — KETOROLAC TROMETHAMINE 10 MG/1
10 TABLET, FILM COATED ORAL EVERY 6 HOURS PRN
Qty: 15 TABLET | Refills: 0 | Status: ON HOLD | OUTPATIENT
Start: 2020-05-23 | End: 2020-06-05

## 2020-05-23 RX ADMIN — KETOROLAC TROMETHAMINE 15 MG: 15 INJECTION, SOLUTION INTRAMUSCULAR; INTRAVENOUS at 08:34

## 2020-05-23 RX ADMIN — ORPHENADRINE CITRATE 100 MG: 100 TABLET, EXTENDED RELEASE ORAL at 08:34

## 2020-05-23 RX ADMIN — MORPHINE SULFATE 8 MG: 10 INJECTION INTRAVENOUS at 09:08

## 2020-05-23 RX ADMIN — DEXAMETHASONE SODIUM PHOSPHATE 10 MG: 10 INJECTION INTRAMUSCULAR; INTRAVENOUS at 08:34

## 2020-05-23 ASSESSMENT — PAIN DESCRIPTION - LOCATION: LOCATION: BACK;HIP

## 2020-05-23 ASSESSMENT — PAIN DESCRIPTION - FREQUENCY: FREQUENCY: CONTINUOUS

## 2020-05-23 ASSESSMENT — PAIN DESCRIPTION - PAIN TYPE: TYPE: CHRONIC PAIN

## 2020-05-23 ASSESSMENT — PAIN DESCRIPTION - ORIENTATION: ORIENTATION: LEFT;RIGHT;LOWER

## 2020-05-23 NOTE — ED PROVIDER NOTES
HPI: Yimi Nunez 66 y.o. male with a past medical history of   Past Medical History:   Diagnosis Date    Abnormal computed tomography angiography of heart 07/19/2019    Calcified plaque proximal RCA with 50-70% stenosis, proximal LAD 50% stenosis, mild LAD 30-50% stenosis, groundglass infiltrates, area of consoldidation left lung base posteriorly.  Arthritis     Atrial fibrillation (HCC)     Burning pain     CAD (coronary artery disease)     Degenerative lumbar disc     Diabetes ([address]     H/O cardiovascular stress test 02/19/2020    Lexiscan    Herniated cervical disc     History of echocardiogram 03/16/2017    EF 60-65%    Hx of blood clots     Hyperlipidemia     Hypertension     Stented coronary artery     Urinary retention 3/17/2017     Presents with a bilateral lower back pain. The patient states that he has a long-standing history of chronic back pain. He reports having prior surgery on his lower back, fusions. He states over the past year the pain is starting to come back and has been getting progressively worse. He did recently see a new surgeon who wants to remove some screws. He is scheduled for the surgery on 5 June. There is no apparent mechanism of injury for this ED Presentation. The time course of the symptoms were reported as gradual. The quality of the pain is aching. The severity of the symptoms are moderate. The patient denies any numbness, motor weakness, bowel incontinence, or bladder incontinence. The symptoms are exacerbated by movement. Patient states that the symptoms are relieved by nothing. The patient denies any history of fever. The patient denies a history of IV drug use or other high risk activities leading to epidural abscess inccluding recent procedures on his back.  The patient denies any trauma.     ROS:   Pertinent positives and negatives are stated within HPI, all other systems reviewed and are Nerve Block (Left, 12 16 15); Nerve Block (Left, 12 28 2015); Nerve Block (Left, 1 20 16); ablation of dysrhythmic focus (1980); lumbar fusion (03/06/2017); Nasal sinus surgery (12/07/2017); Upper gastrointestinal endoscopy (5/2018 [facility]); Nerve Block (12/11/2018); epidural steroid injection (N/A, 12/11/2018); Nerve Block (N/A, 01/08/2019); epidural steroid injection (N/A, 1/8/2019); eye surgery; Endoscopy, colon, diagnostic; other surgical history (1943); Upper gastrointestinal endoscopy (N/A, 6/4/2019); Diagnostic Cardiac Cath Lab Procedure; Percutaneous Transluminal Coronary Angio (09/30/2019); Coronary angioplasty with stent; Cardiac catheterization (02/20/2020); and Cardiac catheterization (02/20/2020). Social History:  reports that he quit smoking about 43 years ago. He smoked 0.00 packs per day for 16.00 years. He has quit using smokeless tobacco. He reports that he does not drink alcohol or use drugs. Family History: family history includes Arrhythmia in his brother; Cancer in his brother; Diabetes in an other family member; Hypertension in an other family member; Kidney Disease in his brother and sister; Other in his father; Stroke in his mother. The patients home medications have been reviewed. Allergies: Oxycodone; Simvastatin; Ciprofloxacin hcl; Gabapentin; Hydrocodone; Norco [hydrocodone-acetaminophen]; and Bactrim [sulfamethoxazole-trimethoprim]    -------------------------------------------------- RESULTS -------------------------------------------------  Labs:  No results found for this visit on 05/23/20. Radiology:  No orders to display       ------------------------- NURSING NOTES AND VITALS REVIEWED ---------------------------  Date / Time Roomed:  5/23/2020  8:18 AM  ED Bed Assignment:  06/06    The nursing notes within the ED encounter and vital signs as below have been reviewed.    BP (!) 168/87   Pulse 74   Temp 98.2 °F (36.8 °C) (Oral)   Resp 20   Ht 5' 9\" (1.753 m)   Wt

## 2020-05-23 NOTE — ED NOTES
Ambulated patient per order. Patient able to lift legs off of bed to put on hospital socks. Patient sat up without assistance, stood and ambulated from room 6  Past room 5 and back to room. Patient took small, shuffling steps. Complaint of low back pain radiating to left hip buttock. Dr. Yoana Renee aware.        Kalie Peoples, SUYAPA  05/23/20 [id number]

## 2020-05-28 RX ORDER — CYCLOBENZAPRINE HCL 10 MG
10 TABLET ORAL 3 TIMES DAILY PRN
Qty: 90 TABLET | Refills: 0 | Status: CANCELLED | OUTPATIENT
Start: 2020-05-28

## 2020-05-28 RX ORDER — TRAMADOL HYDROCHLORIDE 50 MG/1
50 TABLET ORAL EVERY 6 HOURS PRN
Qty: 20 TABLET | Refills: 0 | Status: CANCELLED | OUTPATIENT
Start: 2020-05-28 | End: 2020-06-02

## 2020-05-28 RX ORDER — FINASTERIDE 5 MG/1
5 TABLET, FILM COATED ORAL DAILY
COMMUNITY
End: 2021-03-29 | Stop reason: SDUPTHER

## 2020-05-28 RX ORDER — TRAMADOL HYDROCHLORIDE 50 MG/1
50 TABLET ORAL EVERY 6 HOURS PRN
Qty: 120 TABLET | Refills: 0 | Status: CANCELLED | OUTPATIENT
Start: 2020-05-28 | End: 2020-06-27

## 2020-05-28 NOTE — TELEPHONE ENCOUNTER
Patient called to advise he is having back SX next week w/Dr. Huseyin Khalil. Patient is requesting refillls.       Last seen 7/16/2019  Next appt Visit date not found  Tony

## 2020-05-29 RX ORDER — CYCLOBENZAPRINE HCL 10 MG
10 TABLET ORAL 3 TIMES DAILY PRN
Qty: 90 TABLET | Refills: 0 | Status: SHIPPED
Start: 2020-05-29 | End: 2020-09-22 | Stop reason: SDUPTHER

## 2020-05-29 RX ORDER — TRAMADOL HYDROCHLORIDE 50 MG/1
50 TABLET ORAL EVERY 6 HOURS PRN
Qty: 30 TABLET | Refills: 0 | Status: ON HOLD
Start: 2020-05-29 | End: 2020-06-05

## 2020-06-01 ENCOUNTER — HOSPITAL ENCOUNTER (OUTPATIENT)
Age: 75
Discharge: HOME | End: 2020-06-03
Payer: MEDICARE

## 2020-06-01 ENCOUNTER — HOSPITAL ENCOUNTER (OUTPATIENT)
Dept: PREADMISSION TESTING | Age: 75
Discharge: HOME | End: 2020-06-01
Payer: MEDICARE

## 2020-06-01 ENCOUNTER — HOSPITAL ENCOUNTER (OUTPATIENT)
Dept: GENERAL RADIOLOGY | Age: 75
Discharge: HOME | End: 2020-06-03
Payer: MEDICARE

## 2020-06-01 VITALS
TEMPERATURE: 98.2 F | BODY MASS INDEX: 27.7 KG/M2 | RESPIRATION RATE: 12 BRPM | HEIGHT: 69 IN | WEIGHT: 187 LBS | OXYGEN SATURATION: 95 % | HEART RATE: 69 BPM

## 2020-06-01 LAB
ABO/RH: NORMAL
ANION GAP SERPL CALCULATED.3IONS-SCNC: 13 MMOL/L (ref 7–16)
ANTIBODY SCREEN: NORMAL
BUN BLDV-MCNC: 12 MG/DL (ref 8–23)
CALCIUM SERPL-MCNC: 9.5 MG/DL (ref 8.6–10.2)
CHLORIDE BLD-SCNC: 101 MMOL/L (ref 98–107)
CO2: 26 MMOL/L (ref 22–29)
CREAT SERPL-MCNC: 1.2 MG/DL (ref 0.7–1.2)
GFR AFRICAN AMERICAN: >60
GFR NON-AFRICAN AMERICAN: 59 ML/MIN/1.73
GLUCOSE BLD-MCNC: 119 MG/DL (ref 74–99)
HCT VFR BLD CALC: 47.9 % (ref 37–54)
HEMOGLOBIN: 16.4 G/DL (ref 12.5–16.5)
INR BLD: 1
MCH RBC QN AUTO: 30.5 PG (ref 26–35)
MCHC RBC AUTO-ENTMCNC: 34.2 % (ref 32–34.5)
MCV RBC AUTO: 89.2 FL (ref 80–99.9)
PDW BLD-RTO: 12.9 FL (ref 11.5–15)
PLATELET # BLD: 179 E9/L (ref 130–450)
PMV BLD AUTO: 10.3 FL (ref 7–12)
POTASSIUM SERPL-SCNC: 4.9 MMOL/L (ref 3.5–5)
PROTHROMBIN TIME: 11.6 SEC (ref 9.3–12.4)
RBC # BLD: 5.37 E12/L (ref 3.8–5.8)
SODIUM BLD-SCNC: 140 MMOL/L (ref 132–146)
WBC # BLD: 6.2 E9/L (ref 4.5–11.5)

## 2020-06-01 PROCEDURE — 86901 BLOOD TYPING SEROLOGIC RH(D): CPT

## 2020-06-01 PROCEDURE — 85027 COMPLETE CBC AUTOMATED: CPT

## 2020-06-01 PROCEDURE — 36415 COLL VENOUS BLD VENIPUNCTURE: CPT

## 2020-06-01 PROCEDURE — 71046 X-RAY EXAM CHEST 2 VIEWS: CPT

## 2020-06-01 PROCEDURE — 86900 BLOOD TYPING SEROLOGIC ABO: CPT

## 2020-06-01 PROCEDURE — 85610 PROTHROMBIN TIME: CPT

## 2020-06-01 PROCEDURE — U0003 INFECTIOUS AGENT DETECTION BY NUCLEIC ACID (DNA OR RNA); SEVERE ACUTE RESPIRATORY SYNDROME CORONAVIRUS 2 (SARS-COV-2) (CORONAVIRUS DISEASE [COVID-19]), AMPLIFIED PROBE TECHNIQUE, MAKING USE OF HIGH THROUGHPUT TECHNOLOGIES AS DESCRIBED BY CMS-2020-01-R: HCPCS

## 2020-06-01 PROCEDURE — 80048 BASIC METABOLIC PNL TOTAL CA: CPT

## 2020-06-01 PROCEDURE — 86850 RBC ANTIBODY SCREEN: CPT

## 2020-06-01 ASSESSMENT — PAIN DESCRIPTION - FREQUENCY: FREQUENCY: INTERMITTENT

## 2020-06-04 ENCOUNTER — PREP FOR PROCEDURE (OUTPATIENT)
Dept: ORTHOPEDIC SURGERY | Age: 75
End: 2020-06-04

## 2020-06-04 ENCOUNTER — ANESTHESIA EVENT (OUTPATIENT)
Dept: OPERATING ROOM | Age: 75
End: 2020-06-04
Payer: MEDICARE

## 2020-06-04 LAB
SARS-COV-2: NOT DETECTED
SOURCE: NORMAL

## 2020-06-04 RX ORDER — SODIUM CHLORIDE 0.9 % (FLUSH) 0.9 %
10 SYRINGE (ML) INJECTION EVERY 12 HOURS SCHEDULED
Status: CANCELLED | OUTPATIENT
Start: 2020-06-04

## 2020-06-04 RX ORDER — SODIUM CHLORIDE 0.9 % (FLUSH) 0.9 %
10 SYRINGE (ML) INJECTION PRN
Status: CANCELLED | OUTPATIENT
Start: 2020-06-04

## 2020-06-05 ENCOUNTER — HOSPITAL ENCOUNTER (OUTPATIENT)
Age: 75
Setting detail: OBSERVATION
LOS: 1 days | Discharge: HOME | End: 2020-06-07
Attending: ORTHOPAEDIC SURGERY | Admitting: ORTHOPAEDIC SURGERY
Payer: MEDICARE

## 2020-06-05 ENCOUNTER — APPOINTMENT (OUTPATIENT)
Dept: GENERAL RADIOLOGY | Age: 75
End: 2020-06-05
Attending: ORTHOPAEDIC SURGERY
Payer: MEDICARE

## 2020-06-05 ENCOUNTER — ANESTHESIA (OUTPATIENT)
Dept: OPERATING ROOM | Age: 75
End: 2020-06-05
Payer: MEDICARE

## 2020-06-05 VITALS — RESPIRATION RATE: 19 BRPM | TEMPERATURE: 95.5 F | OXYGEN SATURATION: 99 %

## 2020-06-05 PROBLEM — Z98.890 S/P LUMBAR LAMINECTOMY: Status: ACTIVE | Noted: 2020-06-05

## 2020-06-05 PROBLEM — M48.061 LUMBAR FORAMINAL STENOSIS: Status: ACTIVE | Noted: 2020-06-05

## 2020-06-05 LAB
METER GLUCOSE: 123 MG/DL (ref 74–99)
METER GLUCOSE: 215 MG/DL (ref 74–99)

## 2020-06-05 PROCEDURE — 2580000003 HC RX 258

## 2020-06-05 PROCEDURE — 6360000002 HC RX W HCPCS

## 2020-06-05 PROCEDURE — 2709999900 HC NON-CHARGEABLE SUPPLY: Performed by: ORTHOPAEDIC SURGERY

## 2020-06-05 PROCEDURE — 2580000003 HC RX 258: Performed by: ORTHOPAEDIC SURGERY

## 2020-06-05 PROCEDURE — G0378 HOSPITAL OBSERVATION PER HR: HCPCS

## 2020-06-05 PROCEDURE — 3209999900 FLUORO FOR SURGICAL PROCEDURES

## 2020-06-05 PROCEDURE — 6360000002 HC RX W HCPCS: Performed by: ORTHOPAEDIC SURGERY

## 2020-06-05 PROCEDURE — P9041 ALBUMIN (HUMAN),5%, 50ML: HCPCS

## 2020-06-05 PROCEDURE — 3700000000 HC ANESTHESIA ATTENDED CARE: Performed by: ORTHOPAEDIC SURGERY

## 2020-06-05 PROCEDURE — 2500000003 HC RX 250 WO HCPCS: Performed by: ORTHOPAEDIC SURGERY

## 2020-06-05 PROCEDURE — 3600000014 HC SURGERY LEVEL 4 ADDTL 15MIN: Performed by: ORTHOPAEDIC SURGERY

## 2020-06-05 PROCEDURE — 94640 AIRWAY INHALATION TREATMENT: CPT

## 2020-06-05 PROCEDURE — 6360000002 HC RX W HCPCS: Performed by: PHYSICIAN ASSISTANT

## 2020-06-05 PROCEDURE — 3600000004 HC SURGERY LEVEL 4 BASE: Performed by: ORTHOPAEDIC SURGERY

## 2020-06-05 PROCEDURE — 7100000001 HC PACU RECOVERY - ADDTL 15 MIN: Performed by: ORTHOPAEDIC SURGERY

## 2020-06-05 PROCEDURE — 82962 GLUCOSE BLOOD TEST: CPT

## 2020-06-05 PROCEDURE — 7100000000 HC PACU RECOVERY - FIRST 15 MIN: Performed by: ORTHOPAEDIC SURGERY

## 2020-06-05 PROCEDURE — 36620 INSERTION CATHETER ARTERY: CPT | Performed by: ANESTHESIOLOGY

## 2020-06-05 PROCEDURE — 2500000003 HC RX 250 WO HCPCS

## 2020-06-05 PROCEDURE — 6370000000 HC RX 637 (ALT 250 FOR IP): Performed by: ORTHOPAEDIC SURGERY

## 2020-06-05 PROCEDURE — 3700000001 HC ADD 15 MINUTES (ANESTHESIA): Performed by: ORTHOPAEDIC SURGERY

## 2020-06-05 RX ORDER — NICOTINE POLACRILEX 4 MG
15 LOZENGE BUCCAL PRN
Status: DISCONTINUED | OUTPATIENT
Start: 2020-06-05 | End: 2020-06-07 | Stop reason: HOSPADM

## 2020-06-05 RX ORDER — TRAMADOL HYDROCHLORIDE 50 MG/1
50 TABLET ORAL EVERY 4 HOURS PRN
Qty: 42 TABLET | Refills: 0 | Status: SHIPPED | OUTPATIENT
Start: 2020-06-05 | End: 2020-06-07 | Stop reason: SDUPTHER

## 2020-06-05 RX ORDER — ONDANSETRON 2 MG/ML
4 INJECTION INTRAMUSCULAR; INTRAVENOUS EVERY 6 HOURS PRN
Status: DISCONTINUED | OUTPATIENT
Start: 2020-06-05 | End: 2020-06-07 | Stop reason: HOSPADM

## 2020-06-05 RX ORDER — CYCLOBENZAPRINE HCL 10 MG
10 TABLET ORAL 3 TIMES DAILY PRN
Status: DISCONTINUED | OUTPATIENT
Start: 2020-06-05 | End: 2020-06-07 | Stop reason: HOSPADM

## 2020-06-05 RX ORDER — GLYCOPYRROLATE 1 MG/1
1 TABLET ORAL 3 TIMES DAILY
Status: DISCONTINUED | OUTPATIENT
Start: 2020-06-05 | End: 2020-06-07 | Stop reason: HOSPADM

## 2020-06-05 RX ORDER — SODIUM CHLORIDE 9 MG/ML
INJECTION, SOLUTION INTRAVENOUS CONTINUOUS
Status: DISCONTINUED | OUTPATIENT
Start: 2020-06-05 | End: 2020-06-05 | Stop reason: SDUPTHER

## 2020-06-05 RX ORDER — DEXAMETHASONE SODIUM PHOSPHATE 10 MG/ML
INJECTION, SOLUTION INTRAMUSCULAR; INTRAVENOUS PRN
Status: DISCONTINUED | OUTPATIENT
Start: 2020-06-05 | End: 2020-06-05 | Stop reason: SDUPTHER

## 2020-06-05 RX ORDER — SODIUM CHLORIDE 0.9 % (FLUSH) 0.9 %
10 SYRINGE (ML) INJECTION EVERY 12 HOURS SCHEDULED
Status: DISCONTINUED | OUTPATIENT
Start: 2020-06-05 | End: 2020-06-05 | Stop reason: HOSPADM

## 2020-06-05 RX ORDER — SODIUM CHLORIDE 0.9 % (FLUSH) 0.9 %
10 SYRINGE (ML) INJECTION PRN
Status: DISCONTINUED | OUTPATIENT
Start: 2020-06-05 | End: 2020-06-05 | Stop reason: HOSPADM

## 2020-06-05 RX ORDER — DEXTROSE MONOHYDRATE 50 MG/ML
100 INJECTION, SOLUTION INTRAVENOUS PRN
Status: DISCONTINUED | OUTPATIENT
Start: 2020-06-05 | End: 2020-06-07 | Stop reason: HOSPADM

## 2020-06-05 RX ORDER — ONDANSETRON 2 MG/ML
INJECTION INTRAMUSCULAR; INTRAVENOUS PRN
Status: DISCONTINUED | OUTPATIENT
Start: 2020-06-05 | End: 2020-06-05 | Stop reason: SDUPTHER

## 2020-06-05 RX ORDER — ETOMIDATE 2 MG/ML
INJECTION INTRAVENOUS PRN
Status: DISCONTINUED | OUTPATIENT
Start: 2020-06-05 | End: 2020-06-05 | Stop reason: SDUPTHER

## 2020-06-05 RX ORDER — SENNA AND DOCUSATE SODIUM 50; 8.6 MG/1; MG/1
1 TABLET, FILM COATED ORAL 2 TIMES DAILY
Status: DISCONTINUED | OUTPATIENT
Start: 2020-06-05 | End: 2020-06-07 | Stop reason: HOSPADM

## 2020-06-05 RX ORDER — IPRATROPIUM BROMIDE AND ALBUTEROL SULFATE 2.5; .5 MG/3ML; MG/3ML
1 SOLUTION RESPIRATORY (INHALATION)
Status: DISCONTINUED | OUTPATIENT
Start: 2020-06-06 | End: 2020-06-07 | Stop reason: HOSPADM

## 2020-06-05 RX ORDER — MORPHINE SULFATE 2 MG/ML
1 INJECTION, SOLUTION INTRAMUSCULAR; INTRAVENOUS EVERY 5 MIN PRN
Status: DISCONTINUED | OUTPATIENT
Start: 2020-06-05 | End: 2020-06-05 | Stop reason: HOSPADM

## 2020-06-05 RX ORDER — METOPROLOL SUCCINATE 50 MG/1
50 TABLET, EXTENDED RELEASE ORAL DAILY
Status: DISCONTINUED | OUTPATIENT
Start: 2020-06-05 | End: 2020-06-07 | Stop reason: HOSPADM

## 2020-06-05 RX ORDER — HYDROCODONE BITARTRATE AND ACETAMINOPHEN 5; 325 MG/1; MG/1
2 TABLET ORAL PRN
Status: DISCONTINUED | OUTPATIENT
Start: 2020-06-05 | End: 2020-06-05 | Stop reason: HOSPADM

## 2020-06-05 RX ORDER — DEXTROSE MONOHYDRATE 25 G/50ML
12.5 INJECTION, SOLUTION INTRAVENOUS PRN
Status: DISCONTINUED | OUTPATIENT
Start: 2020-06-05 | End: 2020-06-07 | Stop reason: HOSPADM

## 2020-06-05 RX ORDER — PROMETHAZINE HYDROCHLORIDE 25 MG/1
12.5 TABLET ORAL EVERY 6 HOURS PRN
Status: DISCONTINUED | OUTPATIENT
Start: 2020-06-05 | End: 2020-06-07 | Stop reason: HOSPADM

## 2020-06-05 RX ORDER — AMLODIPINE BESYLATE 5 MG/1
5 TABLET ORAL DAILY
Status: DISCONTINUED | OUTPATIENT
Start: 2020-06-05 | End: 2020-06-07 | Stop reason: HOSPADM

## 2020-06-05 RX ORDER — SODIUM PHOSPHATE, DIBASIC AND SODIUM PHOSPHATE, MONOBASIC 7; 19 G/133ML; G/133ML
1 ENEMA RECTAL DAILY PRN
Status: DISCONTINUED | OUTPATIENT
Start: 2020-06-05 | End: 2020-06-07 | Stop reason: HOSPADM

## 2020-06-05 RX ORDER — MORPHINE SULFATE 4 MG/ML
4 INJECTION, SOLUTION INTRAMUSCULAR; INTRAVENOUS
Status: DISCONTINUED | OUTPATIENT
Start: 2020-06-05 | End: 2020-06-07 | Stop reason: HOSPADM

## 2020-06-05 RX ORDER — CEFAZOLIN SODIUM 2 G/50ML
2 SOLUTION INTRAVENOUS EVERY 8 HOURS
Status: COMPLETED | OUTPATIENT
Start: 2020-06-05 | End: 2020-06-06

## 2020-06-05 RX ORDER — TRAMADOL HYDROCHLORIDE 50 MG/1
50 TABLET ORAL EVERY 6 HOURS PRN
Status: DISCONTINUED | OUTPATIENT
Start: 2020-06-05 | End: 2020-06-07 | Stop reason: HOSPADM

## 2020-06-05 RX ORDER — LIDOCAINE HYDROCHLORIDE 20 MG/ML
INJECTION, SOLUTION INTRAVENOUS PRN
Status: DISCONTINUED | OUTPATIENT
Start: 2020-06-05 | End: 2020-06-05 | Stop reason: SDUPTHER

## 2020-06-05 RX ORDER — MEPERIDINE HYDROCHLORIDE 25 MG/ML
12.5 INJECTION INTRAMUSCULAR; INTRAVENOUS; SUBCUTANEOUS EVERY 5 MIN PRN
Status: DISCONTINUED | OUTPATIENT
Start: 2020-06-05 | End: 2020-06-05 | Stop reason: HOSPADM

## 2020-06-05 RX ORDER — SODIUM CHLORIDE 9 MG/ML
INJECTION, SOLUTION INTRAVENOUS CONTINUOUS
Status: DISCONTINUED | OUTPATIENT
Start: 2020-06-05 | End: 2020-06-06

## 2020-06-05 RX ORDER — PANTOPRAZOLE SODIUM 40 MG/1
40 TABLET, DELAYED RELEASE ORAL DAILY PRN
Status: DISCONTINUED | OUTPATIENT
Start: 2020-06-05 | End: 2020-06-07 | Stop reason: HOSPADM

## 2020-06-05 RX ORDER — FENTANYL CITRATE 50 UG/ML
INJECTION, SOLUTION INTRAMUSCULAR; INTRAVENOUS PRN
Status: DISCONTINUED | OUTPATIENT
Start: 2020-06-05 | End: 2020-06-05 | Stop reason: SDUPTHER

## 2020-06-05 RX ORDER — ALBUMIN, HUMAN INJ 5% 5 %
SOLUTION INTRAVENOUS PRN
Status: DISCONTINUED | OUTPATIENT
Start: 2020-06-05 | End: 2020-06-05 | Stop reason: SDUPTHER

## 2020-06-05 RX ORDER — TRAMADOL HYDROCHLORIDE 50 MG/1
100 TABLET ORAL EVERY 6 HOURS PRN
Status: DISCONTINUED | OUTPATIENT
Start: 2020-06-05 | End: 2020-06-07 | Stop reason: HOSPADM

## 2020-06-05 RX ORDER — NEOSTIGMINE METHYLSULFATE 1 MG/ML
INJECTION, SOLUTION INTRAVENOUS PRN
Status: DISCONTINUED | OUTPATIENT
Start: 2020-06-05 | End: 2020-06-05 | Stop reason: SDUPTHER

## 2020-06-05 RX ORDER — MORPHINE SULFATE 2 MG/ML
2 INJECTION, SOLUTION INTRAMUSCULAR; INTRAVENOUS EVERY 5 MIN PRN
Status: DISCONTINUED | OUTPATIENT
Start: 2020-06-05 | End: 2020-06-05 | Stop reason: HOSPADM

## 2020-06-05 RX ORDER — FINASTERIDE 5 MG/1
5 TABLET, FILM COATED ORAL DAILY
Status: DISCONTINUED | OUTPATIENT
Start: 2020-06-05 | End: 2020-06-07 | Stop reason: HOSPADM

## 2020-06-05 RX ORDER — MIDAZOLAM HYDROCHLORIDE 1 MG/ML
2 INJECTION INTRAMUSCULAR; INTRAVENOUS ONCE
Status: DISCONTINUED | OUTPATIENT
Start: 2020-06-05 | End: 2020-06-05 | Stop reason: HOSPADM

## 2020-06-05 RX ORDER — PROMETHAZINE HYDROCHLORIDE 25 MG/ML
6.25 INJECTION, SOLUTION INTRAMUSCULAR; INTRAVENOUS EVERY 10 MIN PRN
Status: DISCONTINUED | OUTPATIENT
Start: 2020-06-05 | End: 2020-06-05 | Stop reason: HOSPADM

## 2020-06-05 RX ORDER — MORPHINE SULFATE 2 MG/ML
2 INJECTION, SOLUTION INTRAMUSCULAR; INTRAVENOUS
Status: DISCONTINUED | OUTPATIENT
Start: 2020-06-05 | End: 2020-06-07 | Stop reason: HOSPADM

## 2020-06-05 RX ORDER — GLIMEPIRIDE 2 MG/1
2 TABLET ORAL
Status: DISCONTINUED | OUTPATIENT
Start: 2020-06-06 | End: 2020-06-07 | Stop reason: HOSPADM

## 2020-06-05 RX ORDER — GLYCOPYRROLATE 1 MG/5 ML
SYRINGE (ML) INTRAVENOUS PRN
Status: DISCONTINUED | OUTPATIENT
Start: 2020-06-05 | End: 2020-06-05 | Stop reason: SDUPTHER

## 2020-06-05 RX ORDER — HYDROCODONE BITARTRATE AND ACETAMINOPHEN 5; 325 MG/1; MG/1
1 TABLET ORAL PRN
Status: DISCONTINUED | OUTPATIENT
Start: 2020-06-05 | End: 2020-06-05 | Stop reason: HOSPADM

## 2020-06-05 RX ORDER — CEFAZOLIN SODIUM 2 G/50ML
2 SOLUTION INTRAVENOUS
Status: COMPLETED | OUTPATIENT
Start: 2020-06-05 | End: 2020-06-05

## 2020-06-05 RX ORDER — VECURONIUM BROMIDE 1 MG/ML
INJECTION, POWDER, LYOPHILIZED, FOR SOLUTION INTRAVENOUS PRN
Status: DISCONTINUED | OUTPATIENT
Start: 2020-06-05 | End: 2020-06-05 | Stop reason: SDUPTHER

## 2020-06-05 RX ORDER — FOLIC ACID 1 MG/1
1 TABLET ORAL DAILY
Status: DISCONTINUED | OUTPATIENT
Start: 2020-06-05 | End: 2020-06-07 | Stop reason: HOSPADM

## 2020-06-05 RX ORDER — FENTANYL CITRATE 50 UG/ML
100 INJECTION, SOLUTION INTRAMUSCULAR; INTRAVENOUS ONCE
Status: DISCONTINUED | OUTPATIENT
Start: 2020-06-05 | End: 2020-06-05 | Stop reason: HOSPADM

## 2020-06-05 RX ORDER — SODIUM CHLORIDE 0.9 % (FLUSH) 0.9 %
10 SYRINGE (ML) INJECTION EVERY 12 HOURS SCHEDULED
Status: DISCONTINUED | OUTPATIENT
Start: 2020-06-05 | End: 2020-06-07 | Stop reason: HOSPADM

## 2020-06-05 RX ORDER — SODIUM CHLORIDE 0.9 % (FLUSH) 0.9 %
10 SYRINGE (ML) INJECTION PRN
Status: DISCONTINUED | OUTPATIENT
Start: 2020-06-05 | End: 2020-06-07 | Stop reason: HOSPADM

## 2020-06-05 RX ADMIN — LIDOCAINE HYDROCHLORIDE 40 MG: 20 INJECTION, SOLUTION INTRAVENOUS at 14:12

## 2020-06-05 RX ADMIN — ONDANSETRON HYDROCHLORIDE 4 MG: 2 INJECTION, SOLUTION INTRAMUSCULAR; INTRAVENOUS at 16:33

## 2020-06-05 RX ADMIN — SODIUM CHLORIDE: 9 INJECTION, SOLUTION INTRAVENOUS at 09:00

## 2020-06-05 RX ADMIN — SODIUM CHLORIDE: 9 INJECTION, SOLUTION INTRAVENOUS at 11:07

## 2020-06-05 RX ADMIN — SODIUM CHLORIDE: 9 INJECTION, SOLUTION INTRAVENOUS at 22:17

## 2020-06-05 RX ADMIN — FOLIC ACID 1 MG: 1 TABLET ORAL at 21:21

## 2020-06-05 RX ADMIN — ALBUMIN (HUMAN) 25 G: 12.5 INJECTION, SOLUTION INTRAVENOUS at 16:18

## 2020-06-05 RX ADMIN — PHENYLEPHRINE HYDROCHLORIDE 100 MCG: 10 INJECTION INTRAVENOUS at 15:14

## 2020-06-05 RX ADMIN — TRAMADOL HYDROCHLORIDE 100 MG: 50 TABLET, FILM COATED ORAL at 22:37

## 2020-06-05 RX ADMIN — CEFAZOLIN SODIUM 2 G: 2 SOLUTION INTRAVENOUS at 14:06

## 2020-06-05 RX ADMIN — PHENYLEPHRINE HYDROCHLORIDE 100 MCG: 10 INJECTION INTRAVENOUS at 15:37

## 2020-06-05 RX ADMIN — SODIUM CHLORIDE, PRESERVATIVE FREE 10 ML: 5 INJECTION INTRAVENOUS at 21:23

## 2020-06-05 RX ADMIN — DOCUSATE SODIUM 50MG AND SENNOSIDES 8.6MG 1 TABLET: 8.6; 5 TABLET, FILM COATED ORAL at 21:22

## 2020-06-05 RX ADMIN — VECURONIUM BROMIDE 2 MG: 10 INJECTION, POWDER, LYOPHILIZED, FOR SOLUTION INTRAVENOUS at 15:41

## 2020-06-05 RX ADMIN — DEXAMETHASONE SODIUM PHOSPHATE 10 MG: 10 INJECTION INTRAMUSCULAR; INTRAVENOUS at 14:18

## 2020-06-05 RX ADMIN — PHENYLEPHRINE HYDROCHLORIDE 100 MCG: 10 INJECTION INTRAVENOUS at 15:21

## 2020-06-05 RX ADMIN — ETOMIDATE 18 MG: 2 INJECTION, SOLUTION INTRAVENOUS at 14:12

## 2020-06-05 RX ADMIN — FENTANYL CITRATE 50 MCG: 50 INJECTION, SOLUTION INTRAMUSCULAR; INTRAVENOUS at 12:57

## 2020-06-05 RX ADMIN — Medication 0.6 MG: at 16:38

## 2020-06-05 RX ADMIN — GLYCOPYRROLATE 1 MG: 1 TABLET ORAL at 21:23

## 2020-06-05 RX ADMIN — CEFAZOLIN SODIUM 2 G: 2 SOLUTION INTRAVENOUS at 22:18

## 2020-06-05 RX ADMIN — Medication 3 MG: at 16:38

## 2020-06-05 RX ADMIN — SODIUM CHLORIDE: 9 INJECTION, SOLUTION INTRAVENOUS at 18:38

## 2020-06-05 RX ADMIN — FINASTERIDE 5 MG: 5 TABLET, FILM COATED ORAL at 21:22

## 2020-06-05 RX ADMIN — VECURONIUM BROMIDE 8 MG: 10 INJECTION, POWDER, LYOPHILIZED, FOR SOLUTION INTRAVENOUS at 14:12

## 2020-06-05 RX ADMIN — PHENYLEPHRINE HYDROCHLORIDE 100 MCG: 10 INJECTION INTRAVENOUS at 15:30

## 2020-06-05 RX ADMIN — FENTANYL CITRATE 100 MCG: 50 INJECTION, SOLUTION INTRAMUSCULAR; INTRAVENOUS at 14:12

## 2020-06-05 ASSESSMENT — PULMONARY FUNCTION TESTS
PIF_VALUE: 20
PIF_VALUE: 20
PIF_VALUE: 17
PIF_VALUE: 19
PIF_VALUE: 20
PIF_VALUE: 19
PIF_VALUE: 17
PIF_VALUE: 14
PIF_VALUE: 19
PIF_VALUE: 18
PIF_VALUE: 20
PIF_VALUE: 2
PIF_VALUE: 20
PIF_VALUE: 19
PIF_VALUE: 20
PIF_VALUE: 3
PIF_VALUE: 21
PIF_VALUE: 20
PIF_VALUE: 2
PIF_VALUE: 20
PIF_VALUE: 20
PIF_VALUE: 3
PIF_VALUE: 19
PIF_VALUE: 20
PIF_VALUE: 20
PIF_VALUE: 19
PIF_VALUE: 20
PIF_VALUE: 3
PIF_VALUE: 19
PIF_VALUE: 19
PIF_VALUE: 20
PIF_VALUE: 1
PIF_VALUE: 19
PIF_VALUE: 18
PIF_VALUE: 20
PIF_VALUE: 2
PIF_VALUE: 18
PIF_VALUE: 20
PIF_VALUE: 2
PIF_VALUE: 19
PIF_VALUE: 20
PIF_VALUE: 20
PIF_VALUE: 10
PIF_VALUE: 20
PIF_VALUE: 18
PIF_VALUE: 2
PIF_VALUE: 17
PIF_VALUE: 20
PIF_VALUE: 16
PIF_VALUE: 17
PIF_VALUE: 20
PIF_VALUE: 19
PIF_VALUE: 21
PIF_VALUE: 20
PIF_VALUE: 17
PIF_VALUE: 19
PIF_VALUE: 2
PIF_VALUE: 20
PIF_VALUE: 16
PIF_VALUE: 2
PIF_VALUE: 17
PIF_VALUE: 20
PIF_VALUE: 19
PIF_VALUE: 20
PIF_VALUE: 19
PIF_VALUE: 18
PIF_VALUE: 18
PIF_VALUE: 19
PIF_VALUE: 19
PIF_VALUE: 18
PIF_VALUE: 20
PIF_VALUE: 0
PIF_VALUE: 20
PIF_VALUE: 19
PIF_VALUE: 16
PIF_VALUE: 21
PIF_VALUE: 3
PIF_VALUE: 20
PIF_VALUE: 2
PIF_VALUE: 18
PIF_VALUE: 19
PIF_VALUE: 19
PIF_VALUE: 16
PIF_VALUE: 16
PIF_VALUE: 19
PIF_VALUE: 1
PIF_VALUE: 18
PIF_VALUE: 20
PIF_VALUE: 19
PIF_VALUE: 19
PIF_VALUE: 20
PIF_VALUE: 2
PIF_VALUE: 19
PIF_VALUE: 17
PIF_VALUE: 20
PIF_VALUE: 2
PIF_VALUE: 20
PIF_VALUE: 20
PIF_VALUE: 19
PIF_VALUE: 18
PIF_VALUE: 18
PIF_VALUE: 19
PIF_VALUE: 21
PIF_VALUE: 5
PIF_VALUE: 19
PIF_VALUE: 20
PIF_VALUE: 20
PIF_VALUE: 2
PIF_VALUE: 19
PIF_VALUE: 13
PIF_VALUE: 2
PIF_VALUE: 18
PIF_VALUE: 17
PIF_VALUE: 21
PIF_VALUE: 18
PIF_VALUE: 20
PIF_VALUE: 19
PIF_VALUE: 20
PIF_VALUE: 20
PIF_VALUE: 19
PIF_VALUE: 4
PIF_VALUE: 20
PIF_VALUE: 2
PIF_VALUE: 21
PIF_VALUE: 18
PIF_VALUE: 20
PIF_VALUE: 19
PIF_VALUE: 20
PIF_VALUE: 19
PIF_VALUE: 18
PIF_VALUE: 10
PIF_VALUE: 27
PIF_VALUE: 20
PIF_VALUE: 19
PIF_VALUE: 20
PIF_VALUE: 20
PIF_VALUE: 16
PIF_VALUE: 18
PIF_VALUE: 19
PIF_VALUE: 20
PIF_VALUE: 17
PIF_VALUE: 19
PIF_VALUE: 19
PIF_VALUE: 20
PIF_VALUE: 19
PIF_VALUE: 2
PIF_VALUE: 20
PIF_VALUE: 19
PIF_VALUE: 20
PIF_VALUE: 16
PIF_VALUE: 20
PIF_VALUE: 19

## 2020-06-05 ASSESSMENT — PAIN DESCRIPTION - PAIN TYPE
TYPE: SURGICAL PAIN
TYPE: SURGICAL PAIN

## 2020-06-05 ASSESSMENT — PAIN DESCRIPTION - DESCRIPTORS: DESCRIPTORS: PENETRATING;SORE;THROBBING

## 2020-06-05 ASSESSMENT — PAIN SCALES - GENERAL
PAINLEVEL_OUTOF10: 0
PAINLEVEL_OUTOF10: 0
PAINLEVEL_OUTOF10: 2
PAINLEVEL_OUTOF10: 8

## 2020-06-05 ASSESSMENT — PAIN - FUNCTIONAL ASSESSMENT
PAIN_FUNCTIONAL_ASSESSMENT: 0-10
PAIN_FUNCTIONAL_ASSESSMENT: PREVENTS OR INTERFERES WITH ALL ACTIVE AND SOME PASSIVE ACTIVITIES

## 2020-06-05 ASSESSMENT — PAIN DESCRIPTION - LOCATION
LOCATION: BACK
LOCATION: BACK

## 2020-06-05 ASSESSMENT — PAIN DESCRIPTION - ORIENTATION
ORIENTATION: LOWER
ORIENTATION: LOWER

## 2020-06-05 NOTE — ANESTHESIA PRE PROCEDURE
Department of Anesthesiology  Preprocedure Note       Name:  Maxx Agent   Age:  66 y.o.  :  1945                                          MRN:  [id number]         Date:  2020      Surgeon: Sterling Salgado):  Adrienne Burnett DO    Procedure: Procedure(s):  LUMBAR LAMINECTOMY L3-4 , L5-S1, HARDWARE REMOVAL L4-5    Medications prior to admission:   Prior to Admission medications    Medication Sig Start Date End Date Taking?  Authorizing Provider   cyclobenzaprine (FLEXERIL) 10 MG tablet Take 1 tablet by mouth 3 times daily as needed for Muscle spasms 20  Yes Jhon Smoke, DO   finasteride (PROSCAR) 5 MG tablet Take 5 mg by mouth daily   Yes Historical Provider, MD   amLODIPine (NORVASC) 5 MG tablet Take 1 tablet by mouth 2 times daily  Patient taking differently: Take 5 mg by mouth daily  3/5/20  Yes Karel Corea MD   metoprolol succinate (TOPROL XL) 50 MG extended release tablet Take 1 tablet by mouth daily 3/1/20  Yes Eileen Zafar APRN - CNP   glimepiride (AMARYL) 2 MG tablet Take 1 tablet by mouth every morning (before breakfast) 20  Yes Jhon Smoke, DO   pantoprazole (PROTONIX) 40 MG tablet Take 40 mg by mouth daily as needed (Reflux)   Yes Historical Provider, MD   pravastatin (PRAVACHOL) 10 MG tablet Take 10 mg by mouth nightly   Yes Historical Provider, MD   [employer] FIB[employer] GUMMIES PO Take 2 tablets by mouth daily   Yes Historical Provider, MD   glycopyrrolate (ROBINUL) 1 MG tablet Take 1 tablet by mouth 3 times daily 20 Yes Liu Branch MD   clopidogrel (PLAVIX) 75 MG tablet Take 75 mg by mouth daily   Yes Historical Provider, MD   folic acid (FOLVITE) 1 MG tablet Take 1 tablet by mouth daily 19  Yes Jhon Smoke, DO   aspirin 81 MG tablet Take 81 mg by mouth Daily with lunch    Yes Historical Provider, MD   ammonium lactate (LAC-HYDRIN) 12 % lotion Apply 1 g topically daily as needed for Dry Skin    Historical Provider, MD       Current medications:    Current scar    PROSTATE SURGERY  2017    PTCA  2019    2.5 x 38 mm Resolute [place] RAFI mid OM1, 2.25 x 8 mm Resolute [place] RAFI ostium of dx    UPPER GASTROINTESTINAL ENDOSCOPY  2018 [facility]    UPPER GASTROINTESTINAL ENDOSCOPY N/A 2019    EGD BIOPSY performed by Mena Ford MD at [address] History:    Social History     Tobacco Use    Smoking status: Former Smoker     Packs/day: 0.00     Years: 16.00     Pack years: 0.00     Last attempt to quit: 1977     Years since quittin.4    Smokeless tobacco: Former User    Tobacco comment: quit in    Substance Use Topics    Alcohol use: No     Alcohol/week: 0.0 standard drinks     Comment: 1-2 cups coffee daily                                Counseling given: Not Answered  Comment: quit in       Vital Signs (Current):   Vitals:    20 0836   BP: 133/72   Pulse: 74   Resp: 16   Temp: 36.9 °C (98.4 °F)   TempSrc: Temporal   SpO2: 96%   Weight: 187 lb (84.8 kg)   Height: 5' 9\" (1.753 m)                                              BP Readings from Last 3 Encounters:   20 133/72   20 (!) 168/87   20 132/68     ANGIOGRAPHIC FINDINGS:  1. The left main coronary artery arises normally from the left sinus of  Valsalva. It is a good size vessel that trifurcates into left anterior  descending artery and left circumflex artery. The LAD itself is a mid  size vessel, extends down to the apex. Patent stent in the proximal  first diagonal branch, the second diagonal branch is mid size without  any disease. 2.  The ramus intermedius artery is a mid size vessel without any  significant disease. The left circumflex artery is also a mid size  vessel that tapers off distally and has no significant disease. 3.  The right coronary artery arises normally from the right sinus of  Valsalva.   There was an area of stenosis in the mid segment estimated at  80%, responded to IC nitroglycerin, suspect vasospasm, there is Cardiovascular:  Exercise tolerance: good (>4 METS),   (+) hypertension:, CAD: non-obstructive, CABG/stent (STENT X3 2017):,         Rhythm: regular  Rate: normal           Beta Blocker:  Dose within 24 Hrs         Neuro/Psych:   (+) neuromuscular disease:, depression/anxiety              ROS comment: MID BACK PAIN GI/Hepatic/Renal: Neg GI/Hepatic/Renal ROS            Endo/Other:    (+) DiabetesType II DM, well controlled, , hypothyroidism::., .          Pt had no PAT visit       Abdominal:           Vascular:                                        Anesthesia Plan      general     ASA 3       Induction: intravenous. arterial line  MIPS: Prophylactic antiemetics administered. Anesthetic plan and risks discussed with patient. Use of blood products discussed with patient whom. Plan discussed with surgical team, attending and fellow. HARMAN Skelton CRNA   6/5/2020      Pt seen, examined, chart reviewed, plan discussed.   Yasmin Olmstead  6/5/2020  2:20 PM

## 2020-06-05 NOTE — H&P
Department of Orthopedic Surgery  Resident H&P Note          CHIEF COMPLAINT:  Back and LE pain    HISTORY OF PRESENT ILLNESS:                 The patient is a 66 y.o. male complaining of back and LE pain     Past Medical History:        Diagnosis Date    Abnormal computed tomography angiography of heart 07/19/2019    Calcified plaque proximal RCA with 50-70% stenosis, proximal LAD 50% stenosis, mild LAD 30-50% stenosis, groundglass infiltrates, area of consoldidation left lung base posteriorly.       Arthritis     Atrial fibrillation (HCC)     Burning pain     CAD (coronary artery disease)     Degenerative lumbar disc     Diabetes ([address]     H/O cardiovascular stress test 02/19/2020    Lexiscan    Herniated cervical disc     History of echocardiogram 03/16/2017    EF 60-65%    Hx of blood clots     Hyperlipidemia     Hypertension     Stented coronary artery     Tinnitus     Urinary retention 3/17/2017     Past Surgical History:        Procedure Laterality Date    ABLATION OF DYSRHYTHMIC FOCUS  1980    APPROX 30-35 YRS AGO    CARDIAC CATHETERIZATION  02/20/2020    Dr Adali Shea  2010 sigmoid    COLONOSCOPY  5/2018 [facility]    CORONARY ANGIOPLASTY WITH STENT PLACEMENT      DIAGNOSTIC CARDIAC CATH LAB PROCEDURE      ENDOSCOPY, COLON, DIAGNOSTIC      EPIDURAL STEROID INJECTION N/A 12/11/2018    C7-T1 EPIDURAL STEROID INJECTION performed by Jerome Cisneros DO at [address]  1/8/2019    C7 - T1 EPIDURAL STEROID INJECTION #2 performed by Jerome Cisneros DO at [address] [facility]  2008    LUMBAR FUSION  03/06/2017    NASAL SINUS SURGERY  12/07/2017    Submucosal Resection of Inferior Turbinate    NERVE BLOCK Bilateral 10 12 2015    bilateral sacroiliac joint injection #1    NERVE BLOCK Bilateral 10/22/15    sacroiliac joint #2    NERVE BLOCK Bilateral 11 02 2015    Sacroiliac joint bilateral

## 2020-06-06 PROBLEM — R35.0 BENIGN PROSTATIC HYPERPLASIA WITH URINARY FREQUENCY: Status: ACTIVE | Noted: 2020-06-06

## 2020-06-06 PROBLEM — N40.1 BENIGN PROSTATIC HYPERPLASIA WITH URINARY FREQUENCY: Status: ACTIVE | Noted: 2020-06-06

## 2020-06-06 LAB
HCT VFR BLD CALC: 43.1 % (ref 37–54)
HEMOGLOBIN: 14.9 G/DL (ref 12.5–16.5)
METER GLUCOSE: 159 MG/DL (ref 74–99)
METER GLUCOSE: 165 MG/DL (ref 74–99)
METER GLUCOSE: 173 MG/DL (ref 74–99)
METER GLUCOSE: 186 MG/DL (ref 74–99)

## 2020-06-06 PROCEDURE — 82962 GLUCOSE BLOOD TEST: CPT

## 2020-06-06 PROCEDURE — 94640 AIRWAY INHALATION TREATMENT: CPT

## 2020-06-06 PROCEDURE — 2580000003 HC RX 258: Performed by: ORTHOPAEDIC SURGERY

## 2020-06-06 PROCEDURE — 6370000000 HC RX 637 (ALT 250 FOR IP): Performed by: HOSPITALIST

## 2020-06-06 PROCEDURE — G0378 HOSPITAL OBSERVATION PER HR: HCPCS

## 2020-06-06 PROCEDURE — 6370000000 HC RX 637 (ALT 250 FOR IP): Performed by: ORTHOPAEDIC SURGERY

## 2020-06-06 PROCEDURE — 97161 PT EVAL LOW COMPLEX 20 MIN: CPT

## 2020-06-06 PROCEDURE — 97530 THERAPEUTIC ACTIVITIES: CPT

## 2020-06-06 PROCEDURE — 85014 HEMATOCRIT: CPT

## 2020-06-06 PROCEDURE — 6360000002 HC RX W HCPCS: Performed by: ORTHOPAEDIC SURGERY

## 2020-06-06 PROCEDURE — 85018 HEMOGLOBIN: CPT

## 2020-06-06 PROCEDURE — 36415 COLL VENOUS BLD VENIPUNCTURE: CPT

## 2020-06-06 RX ORDER — PRAVASTATIN SODIUM 20 MG
10 TABLET ORAL NIGHTLY
Status: DISCONTINUED | OUTPATIENT
Start: 2020-06-06 | End: 2020-06-07 | Stop reason: HOSPADM

## 2020-06-06 RX ORDER — POLYETHYLENE GLYCOL 3350 17 G/17G
17 POWDER, FOR SOLUTION ORAL DAILY
Status: DISCONTINUED | OUTPATIENT
Start: 2020-06-06 | End: 2020-06-07 | Stop reason: HOSPADM

## 2020-06-06 RX ADMIN — INSULIN LISPRO 1 UNITS: 100 INJECTION, SOLUTION INTRAVENOUS; SUBCUTANEOUS at 21:19

## 2020-06-06 RX ADMIN — CYCLOBENZAPRINE 10 MG: 10 TABLET, FILM COATED ORAL at 08:29

## 2020-06-06 RX ADMIN — INSULIN LISPRO 2 UNITS: 100 INJECTION, SOLUTION INTRAVENOUS; SUBCUTANEOUS at 18:37

## 2020-06-06 RX ADMIN — AMLODIPINE BESYLATE 5 MG: 5 TABLET ORAL at 08:29

## 2020-06-06 RX ADMIN — GLYCOPYRROLATE 1 MG: 1 TABLET ORAL at 18:32

## 2020-06-06 RX ADMIN — TRAMADOL HYDROCHLORIDE 100 MG: 50 TABLET, FILM COATED ORAL at 18:35

## 2020-06-06 RX ADMIN — TRAMADOL HYDROCHLORIDE 100 MG: 50 TABLET, FILM COATED ORAL at 11:46

## 2020-06-06 RX ADMIN — IPRATROPIUM BROMIDE AND ALBUTEROL SULFATE 1 AMPULE: 2.5; .5 SOLUTION RESPIRATORY (INHALATION) at 12:11

## 2020-06-06 RX ADMIN — PRAVASTATIN SODIUM 10 MG: 20 TABLET ORAL at 21:12

## 2020-06-06 RX ADMIN — INSULIN LISPRO 2 UNITS: 100 INJECTION, SOLUTION INTRAVENOUS; SUBCUTANEOUS at 13:53

## 2020-06-06 RX ADMIN — FOLIC ACID 1 MG: 1 TABLET ORAL at 08:30

## 2020-06-06 RX ADMIN — GLYCOPYRROLATE 1 MG: 1 TABLET ORAL at 08:29

## 2020-06-06 RX ADMIN — TRAMADOL HYDROCHLORIDE 100 MG: 50 TABLET, FILM COATED ORAL at 05:58

## 2020-06-06 RX ADMIN — MORPHINE SULFATE 4 MG: 4 INJECTION, SOLUTION INTRAMUSCULAR; INTRAVENOUS at 01:03

## 2020-06-06 RX ADMIN — DOCUSATE SODIUM 50MG AND SENNOSIDES 8.6MG 1 TABLET: 8.6; 5 TABLET, FILM COATED ORAL at 08:55

## 2020-06-06 RX ADMIN — IPRATROPIUM BROMIDE AND ALBUTEROL SULFATE 1 AMPULE: 2.5; .5 SOLUTION RESPIRATORY (INHALATION) at 08:57

## 2020-06-06 RX ADMIN — CEFAZOLIN SODIUM 2 G: 2 SOLUTION INTRAVENOUS at 05:59

## 2020-06-06 RX ADMIN — GLIMEPIRIDE 2 MG: 2 TABLET ORAL at 07:06

## 2020-06-06 RX ADMIN — SODIUM CHLORIDE, PRESERVATIVE FREE 10 ML: 5 INJECTION INTRAVENOUS at 21:13

## 2020-06-06 RX ADMIN — BISACODYL 5 MG: 5 TABLET, COATED ORAL at 08:30

## 2020-06-06 RX ADMIN — GLYCOPYRROLATE 1 MG: 1 TABLET ORAL at 21:12

## 2020-06-06 RX ADMIN — IPRATROPIUM BROMIDE AND ALBUTEROL SULFATE 1 AMPULE: 2.5; .5 SOLUTION RESPIRATORY (INHALATION) at 18:56

## 2020-06-06 RX ADMIN — FINASTERIDE 5 MG: 5 TABLET, FILM COATED ORAL at 08:30

## 2020-06-06 RX ADMIN — CYCLOBENZAPRINE 10 MG: 10 TABLET, FILM COATED ORAL at 18:35

## 2020-06-06 RX ADMIN — IPRATROPIUM BROMIDE AND ALBUTEROL SULFATE 1 AMPULE: 2.5; .5 SOLUTION RESPIRATORY (INHALATION) at 16:26

## 2020-06-06 RX ADMIN — POLYETHYLENE GLYCOL 3350 17 G: 17 POWDER, FOR SOLUTION ORAL at 08:29

## 2020-06-06 RX ADMIN — METOPROLOL SUCCINATE 50 MG: 50 TABLET, EXTENDED RELEASE ORAL at 08:55

## 2020-06-06 RX ADMIN — DOCUSATE SODIUM 50MG AND SENNOSIDES 8.6MG 1 TABLET: 8.6; 5 TABLET, FILM COATED ORAL at 21:12

## 2020-06-06 ASSESSMENT — PAIN SCALES - GENERAL
PAINLEVEL_OUTOF10: 3
PAINLEVEL_OUTOF10: 3
PAINLEVEL_OUTOF10: 8
PAINLEVEL_OUTOF10: 0
PAINLEVEL_OUTOF10: 4
PAINLEVEL_OUTOF10: 7
PAINLEVEL_OUTOF10: 8
PAINLEVEL_OUTOF10: 7
PAINLEVEL_OUTOF10: 5

## 2020-06-06 ASSESSMENT — PAIN DESCRIPTION - PAIN TYPE
TYPE: SURGICAL PAIN

## 2020-06-06 ASSESSMENT — PAIN DESCRIPTION - FREQUENCY
FREQUENCY: CONTINUOUS
FREQUENCY: CONTINUOUS

## 2020-06-06 ASSESSMENT — PAIN - FUNCTIONAL ASSESSMENT
PAIN_FUNCTIONAL_ASSESSMENT: PREVENTS OR INTERFERES SOME ACTIVE ACTIVITIES AND ADLS
PAIN_FUNCTIONAL_ASSESSMENT: PREVENTS OR INTERFERES SOME ACTIVE ACTIVITIES AND ADLS
PAIN_FUNCTIONAL_ASSESSMENT: PREVENTS OR INTERFERES WITH ALL ACTIVE AND SOME PASSIVE ACTIVITIES

## 2020-06-06 ASSESSMENT — PAIN DESCRIPTION - PROGRESSION
CLINICAL_PROGRESSION: GRADUALLY IMPROVING
CLINICAL_PROGRESSION: GRADUALLY IMPROVING

## 2020-06-06 ASSESSMENT — PAIN DESCRIPTION - ORIENTATION
ORIENTATION: LOWER

## 2020-06-06 ASSESSMENT — PAIN DESCRIPTION - LOCATION
LOCATION: BACK

## 2020-06-06 ASSESSMENT — PAIN DESCRIPTION - DESCRIPTORS
DESCRIPTORS: ACHING;CONSTANT;DULL;SORE
DESCRIPTORS: ACHING;CONSTANT;DULL;SORE
DESCRIPTORS: PENETRATING;SORE;THROBBING

## 2020-06-06 ASSESSMENT — PAIN DESCRIPTION - ONSET
ONSET: ON-GOING
ONSET: ON-GOING

## 2020-06-06 NOTE — CONSULTS
intact. Conjunctivae/corneas clear. Neck: Supple, with full range of motion. No jugular venous distention. Trachea midline. Respiratory:  Normal respiratory effort. Clear to auscultation, bilaterally without crackles or rhonchi  Cardiovascular: Regular rate, regular rhythm with normal S1/S2 , with no murmurs  Abdomen: Soft, non-tender, non-distended with normal bowel sounds. Musculoskeletal: No clubbing, cyanosis or edema bilaterally. Skin: Skin color, texture, turgor normal.  No rashes or lesions. Neurologic:  Neurovascularly intact without any focal sensory/motor deficits. Cranial nerves: II-XII intact, grossly non-focal.  Psychiatric: Alert and oriented, thought content appropriate, normal insight    Labs:     CBC:   No results for input(s): WBC, RBC, HGB, HCT, MCV, RDW, PLT in the last 72 hours. BMP: No results for input(s): NA, K, CL, CO2, BUN, CREATININE, MG, PHOS in the last 72 hours. Invalid input(s): CA  LFT:  No results for input(s): PROT, ALB, ALKPHOS, ALT, AST, BILITOT, AMYLASE, LIPASE in the last 72 hours. CE:  No results for input(s): Nereida Newer in the last 72 hours. PT/INR: No results for input(s): INR, APTT in the last 72 hours. BNP: No results for input(s): BNP in the last 72 hours.   Hgb A1C:   Lab Results   Component Value Date    LABA1C 6.0 (H) 02/18/2020     No results found for: EAG  ESR:   Lab Results   Component Value Date    SEDRATE 115 (H) 03/15/2017     CRP:   Lab Results   Component Value Date    CRP 19.1 (H) 03/15/2017     D Dimer:   Lab Results   Component Value Date    DDIMER 289 02/18/2020     Folate and B12:   Lab Results   Component Value Date    GSVGXBGQ03 986 05/16/2019   ,   Lab Results   Component Value Date    FOLATE >20.0 05/16/2019     Lactic Acid:   Lab Results   Component Value Date    LACTA 1.3 10/29/2019     Thyroid Studies:   Lab Results   Component Value Date    TSH 3.200 07/16/2019         ASSESSMENT:  Lumbar foraminal stenosis status post lumbar laminectomy  Benign prostatic hyperplasia with urinary frequency  Essential hypertension  Type 2 diabetes  Hypothyroidism  Coronary artery disease     PLAN:    1. Lumbar foraminal stenosis status post lumbar laminectomy-patient reports that he had surgery on his back approximately 3 years ago. Patient states that subsequent to that back surgery, he has had progressive pain. Patient is status post laminectomy POD#1. Care as outlined by primary team.  2. BPH with urinary frequency- patient reports that he has had issues with urinary retention in the past.  Patient states that he has had chronic indwelling Maddox's in the past.  Patient's last time for indwelling Maddox was approximately 2-1/2 years ago. Patient last saw urologist 1 month ago and was started on Flomax. Patient notes that he had adverse reaction to Flomax. Patient currently on finasteride. Patient notes that he is only been on this medication for the past month. Patient may have difficulties in having Maddox removed during his hospitalization. May need to consider sending patient home with indwelling Maddox. 3. Essential hypertension-patient with history of hypertension. Patient be continued on his home blood pressure medications. 4. Type 2 diabetes-patient with history of type 2 diabetes. Patient to be continued on his home oral medications. Patient be placed on a insulin sliding scale. Accu-Cheks as scheduled. 5. Hypothyroidism-patient with history of hypothyroidism. Patient does not appear to be on any Synthroid at this time. 6. CAD-patient with history of coronary artery disease. Patient will be continued on metoprolol. Aspirin currently on hold. Will defer to primary team for restart of this medication. Diet: DIET CARB CONTROL;   Thank you for allowing us to participate in this patient's care.    +++++++++++++++++++++++++++++++++++++++++++++++++  [facility]

## 2020-06-06 NOTE — PLAN OF CARE
Problem: Falls - Risk of:  Goal: Will remain free from falls  Description: Will remain free from falls  Outcome: Met This Shift  Goal: Absence of physical injury  Description: Absence of physical injury  Outcome: Met This Shift     Problem: Infection - Surgical Site:  Goal: Will show no infection signs and symptoms  Description: Will show no infection signs and symptoms  Outcome: Met This Shift     Problem: Pain:  Goal: Pain level will decrease  Description: Pain level will decrease  Outcome: Met This Shift  Goal: Control of acute pain  Description: Control of acute pain  Outcome: Met This Shift  Goal: Control of chronic pain  Description: Control of chronic pain  Outcome: Met This Shift

## 2020-06-06 NOTE — OP NOTE
[address]                  [address] [facility],  [facility]                                OPERATIVE REPORT    PATIENT NAME: Katelin Ingram                     :        1945  MED REC NO:   [id number]                            ROOM:       [facility]  ACCOUNT NO:   [de-identified]                           ADMIT DATE: 2020  PROVIDER:     Morena Ramirez DO    DATE OF PROCEDURE:  2020    PREOPERATIVE DIAGNOSES:  1.  Status post lumbar fusion, L4-5.  2.  Lumbar radiculopathy. 3.  Lumbar back pain. 4.  L5 nerve root radiculopathy. POSTOPERATIVE DIAGNOSES:  1.  Status post lumbar fusion, L4-5.  2.  Lumbar radiculopathy. 3.  Lumbar back pain. 4.  L5 nerve root radiculopathy. 5.  Hypertrophic bone formation secondary to BMP. 6.  Inflammatory scar tissue of the dura and postoperative scar. 7.  Incidental dural tear. OPERATIVE PROCEDURES:  1. Hardware removal of L4-5.  2.  Exploration of fusion. 3.  Segmental decompression of lateral recesses L3, L4, L5, S1  bilaterally. 4.  Dural repair with suturing glue. SURGEON:  Morena Ramirez DO    FIRST ASSISTANT:  Austin Madrigal DO    COUNTS:  Sponge count and needle count correct. TOTAL BLOOD LOSS:  200 mL. ANESTHESIA:  General endotracheal anesthesia. OPERATIVE PROCEDURE:  After thorough consent was obtained from the  patient, the patient understood the risks and benefits of the operative  procedure, the patient was brought to the operating room and underwent  successful general endotracheal anesthesia without difficulty. We then  carefully rolled him to prone position on the operating table. All bony  prominences and vital neurovascular structures were well padded. Head  was placed in a cradle mott to avoid any pressure upon the globes of  the eyes. Mid to lower back was then sterilely prepped with ChloraPrep  solution and sterilely draped in a normal fashion.   C-arm control patient, there was extensive scar  and overgrowth of bone. During blunt dissection with a ball-tip probe,  the dura tore slightly above at L3-4 and down below near the L5-S1  region. These areas were mobilized and the scar tissue was mobilized to  get mobility of the dura and then those two were sewn together with a  5-0 silk suture. Valsalva did not express any spinal fluid leakage. The current dural glue was placed to repair the areas without  significant difficulty. The dissection with #3 Kerrison was carried out  at all the neural foramen at L3, L4, L5, and S1 until a [place]  ball-tipped probe could be passed out demonstrating no signs of any  compressive pathology. Once the spinal canal was completely  decompressed and opened up, Gelfoam and thrombin were then used to  maintain hemostasis. Wound was then pulse lavaged with 1000 mL of  normal saline with vancomycin wash. Gelfoam and thrombin were then  withdrawn and powdered Gelfoam and thrombin were then applied without  significant difficulty. Self-retaining retractors were then withdrawn. Medium Hemovac drain was then placed. Fascial planes were then closed  with interrupted 1-0 Vicryl suture. Subcutaneous layer was then  irrigated and closed with 2-0 Vicryl suture. Skin was reapproximated  using 3-0 Monocryl suture. Wound was then cleansed and dried. Tincture  was applied. Steri-Strips were applied. Sterile dressing was then  applied. The patient was then carefully rolled to the supine position  on the hospital bed. He was awakened from general anesthesia without  difficulty. He was then transferred to PACU per Anesthesia in stable  condition. GROSS HISTORY:  The patient is a 61-year-old male well known to my  service, he had prior laminectomy and fusion and subsequently developed  increasing pain and discomfort, especially along the L5 nerve root.   He  underwent the above operative procedure with the risks and benefits  discussed at

## 2020-06-06 NOTE — CONSULTS
Submucosal Resection of Inferior Turbinate    NERVE BLOCK Bilateral 10 12 2015    bilateral sacroiliac joint injection #1    NERVE BLOCK Bilateral 10/22/15    sacroiliac joint #2    NERVE BLOCK Bilateral 11 02 2015    Sacroiliac joint bilateral #3    NERVE BLOCK Left 12/2/15    lumbar transforaminal #1    NERVE BLOCK Left 12 16 15    NERVE BLOCK Left 12 28 2015    transforaminal nerve block left lumbar #3    NERVE BLOCK Left 1 20 16    radiofrequency     NERVE BLOCK  12/11/2018    C7-T1 epidural    NERVE BLOCK N/A 01/08/2019    cervical epidural steroid injection    OTHER SURGICAL HISTORY  1945    had a feeding tube as an infant, scar    PROSTATE SURGERY  2017    PTCA  09/30/2019    2.5 x 38 mm Resolute [place] RAFI mid OM1, 2.25 x 8 mm Resolute [place] RAFI ostium of dx    UPPER GASTROINTESTINAL ENDOSCOPY  5/2018 [facility]    UPPER GASTROINTESTINAL ENDOSCOPY N/A 6/4/2019    EGD BIOPSY performed by Petr Canchola MD at [place] [address]       Medications Prior to Admission:    Medications Prior to Admission: cyclobenzaprine (FLEXERIL) 10 MG tablet, Take 1 tablet by mouth 3 times daily as needed for Muscle spasms  finasteride (PROSCAR) 5 MG tablet, Take 5 mg by mouth daily  amLODIPine (NORVASC) 5 MG tablet, Take 1 tablet by mouth 2 times daily (Patient taking differently: Take 5 mg by mouth daily )  metoprolol succinate (TOPROL XL) 50 MG extended release tablet, Take 1 tablet by mouth daily  glimepiride (AMARYL) 2 MG tablet, Take 1 tablet by mouth every morning (before breakfast)  pantoprazole (PROTONIX) 40 MG tablet, Take 40 mg by mouth daily as needed (Reflux)  pravastatin (PRAVACHOL) 10 MG tablet, Take 10 mg by mouth nightly  [employer] F[employer] GUMMIES PO, Take 2 tablets by mouth daily  glycopyrrolate (ROBINUL) 1 MG tablet, Take 1 tablet by mouth 3 times daily  clopidogrel (PLAVIX) 75 MG tablet, Take 75 mg by mouth daily  folic acid (FOLVITE) 1 MG tablet, Take 1 tablet by mouth daily  aspirin 81 MG tablet, Take 81 mg

## 2020-06-07 VITALS
SYSTOLIC BLOOD PRESSURE: 128 MMHG | BODY MASS INDEX: 27.7 KG/M2 | WEIGHT: 187 LBS | HEIGHT: 69 IN | OXYGEN SATURATION: 99 % | DIASTOLIC BLOOD PRESSURE: 75 MMHG | HEART RATE: 86 BPM | RESPIRATION RATE: 16 BRPM | TEMPERATURE: 98 F

## 2020-06-07 LAB — METER GLUCOSE: 130 MG/DL (ref 74–99)

## 2020-06-07 PROCEDURE — 97530 THERAPEUTIC ACTIVITIES: CPT

## 2020-06-07 PROCEDURE — 82962 GLUCOSE BLOOD TEST: CPT

## 2020-06-07 PROCEDURE — G0378 HOSPITAL OBSERVATION PER HR: HCPCS

## 2020-06-07 PROCEDURE — 97165 OT EVAL LOW COMPLEX 30 MIN: CPT

## 2020-06-07 PROCEDURE — 6370000000 HC RX 637 (ALT 250 FOR IP): Performed by: ORTHOPAEDIC SURGERY

## 2020-06-07 RX ORDER — TRAMADOL HYDROCHLORIDE 50 MG/1
50 TABLET ORAL EVERY 4 HOURS PRN
Qty: 42 TABLET | Refills: 0 | Status: SHIPPED | OUTPATIENT
Start: 2020-06-07 | End: 2020-06-14

## 2020-06-07 RX ADMIN — TRAMADOL HYDROCHLORIDE 100 MG: 50 TABLET, FILM COATED ORAL at 06:36

## 2020-06-07 RX ADMIN — GLIMEPIRIDE 2 MG: 2 TABLET ORAL at 06:54

## 2020-06-07 ASSESSMENT — PAIN SCALES - GENERAL
PAINLEVEL_OUTOF10: 4
PAINLEVEL_OUTOF10: 8

## 2020-06-07 NOTE — PROGRESS NOTES
ADVANCED CARE PLANNING    Patient Name: Anita Thompson       YOB: 1945              MRN:    [id number]  Admission Date:  6/5/2020  8:19 AM      Active Diagnoses:  Principal Problem:    S/P lumbar laminectomy  Active Problems:    Lumbar foraminal stenosis    Benign prostatic hyperplasia with urinary frequency    Essential hypertension    Type 2 diabetes mellitus with diabetic polyneuropathy, without long-term current use of insulin ([facility] [address]    Hypothyroidism    Coronary artery disease involving native coronary artery of native heart without angina pectoris  Resolved Problems:    * No resolved hospital problems. *      These active diagnoses are of sufficient risk that focused discussion on advanced care planning is indicated in order to allow the patient to thoughtfully consider personal goals of care; and, if situations arise that prevent the patient to personally give input, to ensure appropriate representation of their personal desire for different levels and levels of care. Person(s) present in discussion: Sam Carlson MD, Family members: None. Discussion: I reviewed his admission for the above diagnoses and his desires for ongoing aggresive care, including potential intubation and mechanical ventilation; also discussed who would speak on his behalf should he be unable to do so and discussed what conversations he has had with his family so they understand his desires if such a situation occurred now or in the future. I presented and explained the availability of our palliaitve care team to him, which he will review with his family this evening and then fill out. If unable to make any decisions for himself; patient states he would like for his wife to make decisions for him. Patient denies any paperwork for POA. PLAN:  Code Status:  At this time patient wishes to be Full Code      Time Spent on Advanced Planning Documents: 18 minutes    [employer]
Department of Orthopedic Surgery  Ortho Spine Service  Resident Progress Note    Patient seen and examined. Pre-op sympts improved - does state his sympts were worst when up and hasnt been up yet. Needed aguilar for urinary retention. Has hx of post op retention and BPH, currently sees Dr. Karine New for BPH. No new bowel issues    VITALS:  BP (!) 129/56   Pulse 75   Temp 98 °F (36.7 °C) (Temporal)   Resp 16   Ht 5' 9\" (1.753 m)   Wt 187 lb (84.8 kg)   SpO2 96%   BMI 27.62 kg/m²     Orientation:  alert and oriented to person, place and time    Incision:  Dressing C/D/I  Drain:100cc output last shift       Lower Extremity    Lower Extremity Motor : Demonstrates 5/5 active ankle plantar/dorsiflexion/great toe extension    Lower Extremity Sensory: intact    Pulses:  Palpable dorsalis pedis and posterior tibialis pulse, brisk cap refill to toes, foot warm and perfused      CBC:   Lab Results   Component Value Date    WBC 6.2 06/01/2020    HGB 14.9 06/06/2020    HCT 43.1 06/06/2020     06/01/2020         ASSESSMENT AND PLAN:  S/P Removal Hardware L4/5 and laminectomy L3/4, L5/S1  Dural repair  Hx bladder retention and BPH    · Advance to Activity as tolerated in brace - monitor for signs of CSF leak. · Deep venous thrombosis prophylaxis - SCDs/TEDS/Early mobilization  · Continue physical therapy in brace. · D/C Plan - likely home 6/7 if doing well  · Pain Control:  Transition to oral  · Cs Urology for urinary issues  · DC drain when output < 60ml/Shift. · Discuss with Callum.
Department of Orthopedic Surgery  Ortho Spine Service  Resident Progress Note    Patient seen and examined. Urology Cs appreciated. Doing very well. Minimal low back pain. No LE pain. No headaches.   + BM    VITALS:  /76   Pulse 76   Temp 97.9 °F (36.6 °C) (Temporal)   Resp 16   Ht 5' 9\" (1.753 m)   Wt 187 lb (84.8 kg)   SpO2 96%   BMI 27.62 kg/m²     Orientation:  alert and oriented to person, place and time    Incision:  Incision C/D/I, steris in place  Drain:70cc output last shift - discontinued       Lower Extremity    Lower Extremity Motor : Demonstrates 5/5 active ankle plantar/dorsiflexion/great toe extension    Lower Extremity Sensory: intact    Pulses:  Palpable dorsalis pedis and posterior tibialis pulse, brisk cap refill to toes, foot warm and perfused      CBC:   Lab Results   Component Value Date    WBC 6.2 06/01/2020    HGB 14.9 06/06/2020    HCT 43.1 06/06/2020     06/01/2020         ASSESSMENT AND PLAN:  S/P Removal Hardware L4/5 and laminectomy L3/4, L5/S1  Dural repair  Hx bladder retention and BPH    · Home today  · F/U Dr. Rickey Crain  · Discuss with Callum.
Discharge instructions reviewed with patient. Patient refused all morning meds, stated he wanted to take them at home. Reviewed medications, incision care and follow up appointments. Resident provided patient with dressings. Patient verbalized understanding of teaching and stated that he had spine surgery in the past.  No further questions. Patient discharged with transport.
KHUSHI NOTIFIED OF SURGERY TIME CHANGE TO 1100 WITH ARRIVAL TIME 0900
Pt had COVID test on 6/1/20  The result is negative  The pt has adhered to the self-quarantine guidelines since having the nasal swab completed  Pt denies any signs or symptoms. The screening questionnaire was completed.  See documentation
Spoke to pt scheduled for covid test 6-5 joe knows to self isolate pre surgery
safety, ADL functioning and to build endurance/activity tolerance to improve overall independence and decrease fall risk. Treatment:     ? Bed mobility: Facilitated bed mobility with cues for proper body mechanics and sequencing to prepare for ADL completion at edge of bed  ? ADL completion: Self-care retraining for the above-mentioned ADLs; training on proper hand placement, safety technique, sequencing, and energy conservation techniques during ADLs and while completing functional transfers. ? Education: OT POC, OT role, Importance of completing ADL tasks daily as IND as possible to aide in recovery process, fall risk precautions, being OOB to chair for all meals, DME recs for homegoing, exercises to complete daily/at home  . Eval Complexity:   low Complexity    Assessment of current deficits:   Functional mobility [x]  ADLs [x] Strength []  Cognition []  Functional transfers  [x] IADLs [x] Safety Awareness []  Endurance [x]  Fine Motor Coordination [] Balance [x] Vision/perception [] Sensation []   Gross Motor Coordination [] ROM [] Delirium []                  Motor Control []    Plan of Care:  ADL retraining [x]   Equipment needs [x]   Neuromuscular re-education [] Energy Conservation Techniques [x]  Functional Transfer training [x] Patient and/or Family Education [x]  Functional Mobility training [x]  Environmental Modifications [x]  Cognitive re-training []   Compensatory techniques for ADLs [x]  Splinting Needs []   Positioning to improve overall function [x]   Therapeutic Activity [x]                       Therapeutic Exercise  [x]  Visual/Perceptual: []    Delirium prevention/treatment  [x]   Other:  []    Rehab Potential: Good for established goals    Patient / Family Goal: to go home    Patient and/or family were instructed/educated on diagnosis, prognosis/goals and plan of care. Demonstrated G understanding, further information could be needed.     [] Malnutrition indicators have been

## 2020-06-11 ENCOUNTER — HOSPITAL ENCOUNTER (EMERGENCY)
Age: 75
Discharge: HOME | End: 2020-06-11
Attending: EMERGENCY MEDICINE
Payer: MEDICARE

## 2020-06-11 VITALS
RESPIRATION RATE: 18 BRPM | SYSTOLIC BLOOD PRESSURE: 162 MMHG | WEIGHT: 186 LBS | BODY MASS INDEX: 27.55 KG/M2 | TEMPERATURE: 98.8 F | HEIGHT: 69 IN | OXYGEN SATURATION: 95 % | HEART RATE: 94 BPM | DIASTOLIC BLOOD PRESSURE: 92 MMHG

## 2020-06-11 PROCEDURE — 99283 EMERGENCY DEPT VISIT LOW MDM: CPT

## 2020-06-11 ASSESSMENT — ENCOUNTER SYMPTOMS
EYE REDNESS: 0
NAUSEA: 0
VOMITING: 0
SORE THROAT: 0
EYE PAIN: 0
ABDOMINAL PAIN: 0
WHEEZING: 0
SHORTNESS OF BREATH: 0
BACK PAIN: 0
EYE DISCHARGE: 0
COUGH: 0
SINUS PRESSURE: 0
DIARRHEA: 0

## 2020-06-11 NOTE — ED PROVIDER NOTES
sounds are normal. There is no distension. Palpations: Abdomen is soft. Tenderness: There is no abdominal tenderness. There is no guarding. Musculoskeletal: Normal range of motion. General: No swelling. Skin:     General: Skin is warm and dry. Findings: No rash. Neurological:      Mental Status: He is alert and oriented to person, place, and time. Procedures     [facility]         [id number]. Maddox catheter removed. Patient successfully urinates pink tinged urine. Suprapubic pressure resolved. Patient feels much better. --------------------------------------------- PAST HISTORY ---------------------------------------------  Past Medical History:  has a past medical history of Abnormal computed tomography angiography of heart, Arthritis, Atrial fibrillation (HCC), Burning pain, CAD (coronary artery disease), Degenerative lumbar disc, Diabetes ([facility] [address] H/O cardiovascular stress test, Herniated cervical disc, History of echocardiogram, Hx of blood clots, Hyperlipidemia, Hypertension, Stented coronary artery, Tinnitus, and Urinary retention. Past Surgical History:  has a past surgical history that includes colectomy (2010 sigmoid); Hemorrhoid surgery (2008); Prostate surgery (2017); Colonoscopy (5/2018 [facility]); Nerve Block (Bilateral, 10 12 2015); Nerve Block (Bilateral, 10/22/15); Nerve Block (Bilateral, 11 02 2015); Nerve Block (Left, 12/2/15); Nerve Block (Left, 12 16 15); Nerve Block (Left, 12 28 2015); Nerve Block (Left, 1 20 16); ablation of dysrhythmic focus (1980); lumbar fusion (03/06/2017); Nasal sinus surgery (12/07/2017); Upper gastrointestinal endoscopy (5/2018 [facility]); Nerve Block (12/11/2018); epidural steroid injection (N/A, 12/11/2018); Nerve Block (N/A, 01/08/2019); epidural steroid injection (N/A, 1/8/2019); Endoscopy, colon, diagnostic; other surgical history (1945); Upper gastrointestinal endoscopy (N/A, 6/4/2019);  Diagnostic Cardiac Cath Lab Procedure; Percutaneous Transluminal Coronary Angio (09/30/2019); Coronary angioplasty with stent; Cardiac catheterization (02/20/2020); eye surgery; and laminectomy (N/A, 6/5/2020). Social History:  reports that he quit smoking about 43 years ago. He smoked 0.00 packs per day for 16.00 years. He has quit using smokeless tobacco. He reports that he does not drink alcohol or use drugs. Family History: family history includes Arrhythmia in his brother; Cancer in his brother; Diabetes in an other family member; Hypertension in an other family member; Kidney Disease in his brother and sister; Other in his father; Stroke in his mother. The patients home medications have been reviewed. Allergies: Imdur [isosorbide nitrate]; Oxycodone; Simvastatin; Bactrim [sulfamethoxazole-trimethoprim]; Ciprofloxacin hcl; Gabapentin; Hydrocodone; and Norco [hydrocodone-acetaminophen]    -------------------------------------------------- RESULTS -------------------------------------------------  Labs:  No results found for this visit on 06/11/20. Radiology:  No orders to display       ------------------------- NURSING NOTES AND VITALS REVIEWED ---------------------------  Date / Time Roomed:  6/11/2020  5:04 AM  ED Bed Assignment:  05/05    The nursing notes within the ED encounter and vital signs as below have been reviewed. BP (!) 162/92   Pulse 94   Temp 98.8 °F (37.1 °C) (Oral)   Resp 18   Ht 5' 9\" (1.753 m)   Wt 186 lb (84.4 kg)   SpO2 95%   BMI 27.47 kg/m²   Oxygen Saturation Interpretation: Normal      ------------------------------------------ PROGRESS NOTES ------------------------------------------  I have spoken with the patient and discussed todays results, in addition to providing specific details for the plan of care and counseling regarding the diagnosis and prognosis. Their questions are answered at this time and they are agreeable with the plan.  I discussed at length with them reasons for immediate return here for re

## 2020-06-12 ENCOUNTER — CARE COORDINATION (OUTPATIENT)
Dept: CARE COORDINATION | Age: 75
End: 2020-06-12

## 2020-06-18 ENCOUNTER — CARE COORDINATION (OUTPATIENT)
Dept: CARE COORDINATION | Age: 75
End: 2020-06-18

## 2020-07-27 ENCOUNTER — HOSPITAL ENCOUNTER (OUTPATIENT)
Dept: MRI IMAGING | Age: 75
Discharge: HOME | End: 2020-07-29
Payer: MEDICARE

## 2020-07-27 PROCEDURE — 72148 MRI LUMBAR SPINE W/O DYE: CPT

## 2020-07-30 RX ORDER — METOPROLOL SUCCINATE 50 MG/1
50 TABLET, EXTENDED RELEASE ORAL DAILY
Qty: 30 TABLET | Refills: 0 | Status: SHIPPED
Start: 2020-07-30 | End: 2020-08-20 | Stop reason: SDUPTHER

## 2020-07-30 NOTE — TELEPHONE ENCOUNTER
Last Appointment   7/16/2019  Next Appointment  Visit date not found      Patient would like to have a 90 day supply

## 2020-07-31 NOTE — PROGRESS NOTES
Patient having covid testing April Reasons 8/3/20. Patient asked to bring ID and to self quarantine until day of procedure.

## 2020-08-03 ENCOUNTER — HOSPITAL ENCOUNTER (OUTPATIENT)
Age: 75
Discharge: HOME | End: 2020-08-05
Payer: MEDICARE

## 2020-08-03 PROCEDURE — U0003 INFECTIOUS AGENT DETECTION BY NUCLEIC ACID (DNA OR RNA); SEVERE ACUTE RESPIRATORY SYNDROME CORONAVIRUS 2 (SARS-COV-2) (CORONAVIRUS DISEASE [COVID-19]), AMPLIFIED PROBE TECHNIQUE, MAKING USE OF HIGH THROUGHPUT TECHNOLOGIES AS DESCRIBED BY CMS-2020-01-R: HCPCS

## 2020-08-03 RX ORDER — PRAVASTATIN SODIUM 10 MG
TABLET ORAL
Qty: 90 TABLET | Refills: 1 | Status: SHIPPED
Start: 2020-08-03 | End: 2020-09-22 | Stop reason: SDUPTHER

## 2020-08-04 ENCOUNTER — PREP FOR PROCEDURE (OUTPATIENT)
Dept: ORTHOPEDIC SURGERY | Age: 75
End: 2020-08-04

## 2020-08-04 DIAGNOSIS — Z01.818 PRE-OP EXAM: Primary | ICD-10-CM

## 2020-08-04 LAB
SARS-COV-2: NOT DETECTED
SOURCE: NORMAL

## 2020-08-04 RX ORDER — SODIUM CHLORIDE 0.9 % (FLUSH) 0.9 %
10 SYRINGE (ML) INJECTION EVERY 12 HOURS SCHEDULED
Status: CANCELLED | OUTPATIENT
Start: 2020-08-04

## 2020-08-04 RX ORDER — SODIUM CHLORIDE 0.9 % (FLUSH) 0.9 %
10 SYRINGE (ML) INJECTION PRN
Status: CANCELLED | OUTPATIENT
Start: 2020-08-04

## 2020-08-04 NOTE — PROGRESS NOTES
from [facility], upon entering the hospital, take elevator B to the 3rd floor. EDUCATION INSTRUCTIONS:      .       [x] Pre-admission Testing educational folder given  [x] Incentive Spirometry,coughing & deep breathing exercises reviewed. [x]Medication information sheet(s)   [x]Fluoroscopy-Xray used in surgery reviewed with patient. Educational pamphlet placed in chart. [x]Pain: Post-op pain is normal and to be expected. You will be asked to rate your pain from 0-10(a zero is not acceptable-education is needed). Your post-op pain goal is:5  [x] Ask your nurse for your pain medication.  :___________________________    MEDICATION INSTRUCTIONS:   [x]Bring a complete list of your medications, please write the last time you took the medicine, give this list to the nurse. [x] Take the following medications the morning of surgery with 1-2 ounces of water: SEE LIST  [x] Stop herbal supplements and vitamins 5 days before your surgery. [x] DO NOT take any diabetic medicine the morning of surgery. Follow instructions for insulin the day before surgery. [x] If you are diabetic and your blood sugar is low or you feel symptomatic, you may drink 1-2 ounces of apple juice or take a glucose tablet. The morning of your procedure, you may call the pre-op area if you have concerns about your blood sugar [phone]. [] Use your inhalers the morning of surgery. Bring your emergency inhaler with you day of surgery. [x] Follow physician instructions regarding any blood thinners you may be taking. WHAT TO EXPECT:  [x] The day of surgery you will be greeted and checked in by the [employer].  In addition, you will be registered in the [place] by a Patient Access Representative. Please bring your photo ID and insurance card. A nurse will greet you in accordance to the time you are needed in the pre-op area to prepare you for surgery.  Please do not be discouraged if you are not greeted in the order you arrive as there are many variables that are involved in patient preparation. Your patience is greatly appreciated as you wait for your nurse. Please bring in items such as: books, magazines, newspapers, electronics, or any other items  to occupy your time in the waiting area. [x]  Delays may occur with surgery and staff will make a sincere effort to keep you informed of delays. If any delays occur with your procedure, we apologize ahead of time for your inconvenience as we recognize the value of your time.

## 2020-08-05 ENCOUNTER — HOSPITAL ENCOUNTER (OUTPATIENT)
Dept: PREADMISSION TESTING | Age: 75
Setting detail: OUTPATIENT SURGERY
Discharge: HOME | DRG: 516 | End: 2020-08-05
Payer: MEDICARE

## 2020-08-05 ENCOUNTER — TELEPHONE (OUTPATIENT)
Dept: CARDIOLOGY CLINIC | Age: 75
End: 2020-08-05

## 2020-08-05 VITALS
HEIGHT: 69 IN | OXYGEN SATURATION: 94 % | HEART RATE: 66 BPM | SYSTOLIC BLOOD PRESSURE: 157 MMHG | BODY MASS INDEX: 27.55 KG/M2 | WEIGHT: 186 LBS | TEMPERATURE: 98.6 F | RESPIRATION RATE: 16 BRPM | DIASTOLIC BLOOD PRESSURE: 74 MMHG

## 2020-08-05 LAB
ABO/RH: NORMAL
ANION GAP SERPL CALCULATED.3IONS-SCNC: 14 MMOL/L (ref 7–16)
ANTIBODY SCREEN: NORMAL
BUN BLDV-MCNC: 11 MG/DL (ref 8–23)
CALCIUM SERPL-MCNC: 9.4 MG/DL (ref 8.6–10.2)
CHLORIDE BLD-SCNC: 102 MMOL/L (ref 98–107)
CO2: 23 MMOL/L (ref 22–29)
CREAT SERPL-MCNC: 0.9 MG/DL (ref 0.7–1.2)
GFR AFRICAN AMERICAN: >60
GFR NON-AFRICAN AMERICAN: >60 ML/MIN/1.73
GLUCOSE BLD-MCNC: 96 MG/DL (ref 74–99)
HCT VFR BLD CALC: 46 % (ref 37–54)
HEMOGLOBIN: 15.9 G/DL (ref 12.5–16.5)
INR BLD: 1
MCH RBC QN AUTO: 30.3 PG (ref 26–35)
MCHC RBC AUTO-ENTMCNC: 34.6 % (ref 32–34.5)
MCV RBC AUTO: 87.6 FL (ref 80–99.9)
PDW BLD-RTO: 12.9 FL (ref 11.5–15)
PLATELET # BLD: 201 E9/L (ref 130–450)
PMV BLD AUTO: 10.1 FL (ref 7–12)
POTASSIUM SERPL-SCNC: 4 MMOL/L (ref 3.5–5)
PROTHROMBIN TIME: 11.2 SEC (ref 9.3–12.4)
RBC # BLD: 5.25 E12/L (ref 3.8–5.8)
SODIUM BLD-SCNC: 139 MMOL/L (ref 132–146)
WBC # BLD: 6.1 E9/L (ref 4.5–11.5)

## 2020-08-05 PROCEDURE — 36415 COLL VENOUS BLD VENIPUNCTURE: CPT

## 2020-08-05 PROCEDURE — 85610 PROTHROMBIN TIME: CPT

## 2020-08-05 PROCEDURE — 85027 COMPLETE CBC AUTOMATED: CPT

## 2020-08-05 PROCEDURE — 86900 BLOOD TYPING SEROLOGIC ABO: CPT

## 2020-08-05 PROCEDURE — 86850 RBC ANTIBODY SCREEN: CPT

## 2020-08-05 PROCEDURE — 86901 BLOOD TYPING SEROLOGIC RH(D): CPT

## 2020-08-05 PROCEDURE — 80048 BASIC METABOLIC PNL TOTAL CA: CPT

## 2020-08-05 ASSESSMENT — PAIN DESCRIPTION - FREQUENCY: FREQUENCY: CONTINUOUS

## 2020-08-05 ASSESSMENT — PAIN DESCRIPTION - PAIN TYPE: TYPE: ACUTE PAIN

## 2020-08-05 ASSESSMENT — PAIN DESCRIPTION - LOCATION: LOCATION: BACK

## 2020-08-05 ASSESSMENT — PAIN DESCRIPTION - ORIENTATION: ORIENTATION: RIGHT;LEFT;LOWER;MID;UPPER

## 2020-08-05 ASSESSMENT — PAIN SCALES - GENERAL: PAINLEVEL_OUTOF10: 6

## 2020-08-05 ASSESSMENT — PAIN DESCRIPTION - DESCRIPTORS: DESCRIPTORS: CONSTANT;PRESSURE;RADIATING

## 2020-08-05 ASSESSMENT — PAIN DESCRIPTION - PROGRESSION: CLINICAL_PROGRESSION: GRADUALLY WORSENING

## 2020-08-05 NOTE — PROGRESS NOTES
Looking for previous cardiac clearance from Dr. Pedro Choe for surgery in May. I called Daria Padilla and inquired about locating it. She will question doctor.

## 2020-08-07 ENCOUNTER — ANESTHESIA (OUTPATIENT)
Dept: OPERATING ROOM | Age: 75
DRG: 516 | End: 2020-08-07
Payer: MEDICARE

## 2020-08-07 ENCOUNTER — HOSPITAL ENCOUNTER (INPATIENT)
Age: 75
LOS: 1 days | Discharge: HOME | DRG: 516 | End: 2020-08-08
Attending: ORTHOPAEDIC SURGERY | Admitting: ORTHOPAEDIC SURGERY
Payer: MEDICARE

## 2020-08-07 ENCOUNTER — ANESTHESIA EVENT (OUTPATIENT)
Dept: OPERATING ROOM | Age: 75
DRG: 516 | End: 2020-08-07
Payer: MEDICARE

## 2020-08-07 ENCOUNTER — APPOINTMENT (OUTPATIENT)
Dept: GENERAL RADIOLOGY | Age: 75
DRG: 516 | End: 2020-08-07
Attending: ORTHOPAEDIC SURGERY
Payer: MEDICARE

## 2020-08-07 VITALS
TEMPERATURE: 96.8 F | SYSTOLIC BLOOD PRESSURE: 158 MMHG | DIASTOLIC BLOOD PRESSURE: 73 MMHG | RESPIRATION RATE: 5 BRPM | OXYGEN SATURATION: 99 %

## 2020-08-07 PROBLEM — M71.38 SYNOVIAL CYST OF LUMBAR SPINE: Status: ACTIVE | Noted: 2020-08-07

## 2020-08-07 LAB
METER GLUCOSE: 112 MG/DL (ref 74–99)
METER GLUCOSE: 97 MG/DL (ref 74–99)

## 2020-08-07 PROCEDURE — 2580000003 HC RX 258

## 2020-08-07 PROCEDURE — 1200000000 HC SEMI PRIVATE

## 2020-08-07 PROCEDURE — 6360000002 HC RX W HCPCS: Performed by: PHYSICIAN ASSISTANT

## 2020-08-07 PROCEDURE — 2580000003 HC RX 258: Performed by: ORTHOPAEDIC SURGERY

## 2020-08-07 PROCEDURE — 3209999900 FLUORO FOR SURGICAL PROCEDURES

## 2020-08-07 PROCEDURE — 7100000001 HC PACU RECOVERY - ADDTL 15 MIN: Performed by: ORTHOPAEDIC SURGERY

## 2020-08-07 PROCEDURE — 6370000000 HC RX 637 (ALT 250 FOR IP): Performed by: ORTHOPAEDIC SURGERY

## 2020-08-07 PROCEDURE — 2500000003 HC RX 250 WO HCPCS

## 2020-08-07 PROCEDURE — 82962 GLUCOSE BLOOD TEST: CPT

## 2020-08-07 PROCEDURE — 7100000000 HC PACU RECOVERY - FIRST 15 MIN: Performed by: ORTHOPAEDIC SURGERY

## 2020-08-07 PROCEDURE — 01NR0ZZ RELEASE SACRAL NERVE, OPEN APPROACH: ICD-10-PCS | Performed by: ORTHOPAEDIC SURGERY

## 2020-08-07 PROCEDURE — 3600000004 HC SURGERY LEVEL 4 BASE: Performed by: ORTHOPAEDIC SURGERY

## 2020-08-07 PROCEDURE — 2500000003 HC RX 250 WO HCPCS: Performed by: ORTHOPAEDIC SURGERY

## 2020-08-07 PROCEDURE — 3700000001 HC ADD 15 MINUTES (ANESTHESIA): Performed by: ORTHOPAEDIC SURGERY

## 2020-08-07 PROCEDURE — 6360000002 HC RX W HCPCS

## 2020-08-07 PROCEDURE — 3700000000 HC ANESTHESIA ATTENDED CARE: Performed by: ORTHOPAEDIC SURGERY

## 2020-08-07 PROCEDURE — 0QC00ZZ EXTIRPATION OF MATTER FROM LUMBAR VERTEBRA, OPEN APPROACH: ICD-10-PCS | Performed by: ORTHOPAEDIC SURGERY

## 2020-08-07 PROCEDURE — 01NB0ZZ RELEASE LUMBAR NERVE, OPEN APPROACH: ICD-10-PCS | Performed by: ORTHOPAEDIC SURGERY

## 2020-08-07 PROCEDURE — 6360000002 HC RX W HCPCS: Performed by: ORTHOPAEDIC SURGERY

## 2020-08-07 PROCEDURE — 2709999900 HC NON-CHARGEABLE SUPPLY: Performed by: ORTHOPAEDIC SURGERY

## 2020-08-07 PROCEDURE — 3600000014 HC SURGERY LEVEL 4 ADDTL 15MIN: Performed by: ORTHOPAEDIC SURGERY

## 2020-08-07 RX ORDER — DEXTROSE AND SODIUM CHLORIDE 5; .45 G/100ML; G/100ML
INJECTION, SOLUTION INTRAVENOUS CONTINUOUS
Status: DISCONTINUED | OUTPATIENT
Start: 2020-08-07 | End: 2020-08-08 | Stop reason: HOSPADM

## 2020-08-07 RX ORDER — DEXTROSE MONOHYDRATE 25 G/50ML
12.5 INJECTION, SOLUTION INTRAVENOUS PRN
Status: DISCONTINUED | OUTPATIENT
Start: 2020-08-07 | End: 2020-08-08 | Stop reason: HOSPADM

## 2020-08-07 RX ORDER — PANTOPRAZOLE SODIUM 40 MG/1
40 TABLET, DELAYED RELEASE ORAL DAILY PRN
Status: DISCONTINUED | OUTPATIENT
Start: 2020-08-07 | End: 2020-08-08 | Stop reason: HOSPADM

## 2020-08-07 RX ORDER — OXYCODONE HYDROCHLORIDE AND ACETAMINOPHEN 5; 325 MG/1; MG/1
2 TABLET ORAL PRN
Status: DISCONTINUED | OUTPATIENT
Start: 2020-08-07 | End: 2020-08-07 | Stop reason: HOSPADM

## 2020-08-07 RX ORDER — EPHEDRINE SULFATE/0.9% NACL/PF 50 MG/5 ML
SYRINGE (ML) INTRAVENOUS PRN
Status: DISCONTINUED | OUTPATIENT
Start: 2020-08-07 | End: 2020-08-07 | Stop reason: SDUPTHER

## 2020-08-07 RX ORDER — PROPOFOL 10 MG/ML
INJECTION, EMULSION INTRAVENOUS PRN
Status: DISCONTINUED | OUTPATIENT
Start: 2020-08-07 | End: 2020-08-07 | Stop reason: SDUPTHER

## 2020-08-07 RX ORDER — SODIUM CHLORIDE 0.9 % (FLUSH) 0.9 %
10 SYRINGE (ML) INJECTION PRN
Status: DISCONTINUED | OUTPATIENT
Start: 2020-08-07 | End: 2020-08-08 | Stop reason: HOSPADM

## 2020-08-07 RX ORDER — ONDANSETRON 2 MG/ML
INJECTION INTRAMUSCULAR; INTRAVENOUS PRN
Status: DISCONTINUED | OUTPATIENT
Start: 2020-08-07 | End: 2020-08-07 | Stop reason: SDUPTHER

## 2020-08-07 RX ORDER — PRAVASTATIN SODIUM 10 MG
10 TABLET ORAL DAILY
Status: DISCONTINUED | OUTPATIENT
Start: 2020-08-08 | End: 2020-08-08 | Stop reason: HOSPADM

## 2020-08-07 RX ORDER — SODIUM CHLORIDE 9 MG/ML
INJECTION, SOLUTION INTRAVENOUS CONTINUOUS PRN
Status: DISCONTINUED | OUTPATIENT
Start: 2020-08-07 | End: 2020-08-07 | Stop reason: SDUPTHER

## 2020-08-07 RX ORDER — DIAZEPAM 5 MG/1
5 TABLET ORAL EVERY 6 HOURS PRN
Status: DISCONTINUED | OUTPATIENT
Start: 2020-08-07 | End: 2020-08-08 | Stop reason: HOSPADM

## 2020-08-07 RX ORDER — FENTANYL CITRATE 50 UG/ML
INJECTION, SOLUTION INTRAMUSCULAR; INTRAVENOUS PRN
Status: DISCONTINUED | OUTPATIENT
Start: 2020-08-07 | End: 2020-08-07 | Stop reason: SDUPTHER

## 2020-08-07 RX ORDER — GLYCOPYRROLATE 1 MG/1
1 TABLET ORAL 3 TIMES DAILY
Status: DISCONTINUED | OUTPATIENT
Start: 2020-08-07 | End: 2020-08-08 | Stop reason: HOSPADM

## 2020-08-07 RX ORDER — DEXAMETHASONE SODIUM PHOSPHATE 10 MG/ML
INJECTION, SOLUTION INTRAMUSCULAR; INTRAVENOUS PRN
Status: DISCONTINUED | OUTPATIENT
Start: 2020-08-07 | End: 2020-08-07 | Stop reason: SDUPTHER

## 2020-08-07 RX ORDER — MIDAZOLAM HYDROCHLORIDE 1 MG/ML
INJECTION INTRAMUSCULAR; INTRAVENOUS PRN
Status: DISCONTINUED | OUTPATIENT
Start: 2020-08-07 | End: 2020-08-07 | Stop reason: SDUPTHER

## 2020-08-07 RX ORDER — ROCURONIUM BROMIDE 10 MG/ML
INJECTION, SOLUTION INTRAVENOUS PRN
Status: DISCONTINUED | OUTPATIENT
Start: 2020-08-07 | End: 2020-08-07 | Stop reason: SDUPTHER

## 2020-08-07 RX ORDER — MORPHINE SULFATE 2 MG/ML
2 INJECTION, SOLUTION INTRAMUSCULAR; INTRAVENOUS EVERY 5 MIN PRN
Status: DISCONTINUED | OUTPATIENT
Start: 2020-08-07 | End: 2020-08-07 | Stop reason: HOSPADM

## 2020-08-07 RX ORDER — PROMETHAZINE HYDROCHLORIDE 25 MG/1
12.5 TABLET ORAL EVERY 6 HOURS PRN
Status: DISCONTINUED | OUTPATIENT
Start: 2020-08-07 | End: 2020-08-08 | Stop reason: HOSPADM

## 2020-08-07 RX ORDER — NEOSTIGMINE METHYLSULFATE 1 MG/ML
INJECTION, SOLUTION INTRAVENOUS PRN
Status: DISCONTINUED | OUTPATIENT
Start: 2020-08-07 | End: 2020-08-07 | Stop reason: SDUPTHER

## 2020-08-07 RX ORDER — GLIMEPIRIDE 2 MG/1
2 TABLET ORAL
Status: DISCONTINUED | OUTPATIENT
Start: 2020-08-08 | End: 2020-08-08 | Stop reason: HOSPADM

## 2020-08-07 RX ORDER — SODIUM CHLORIDE 0.9 % (FLUSH) 0.9 %
10 SYRINGE (ML) INJECTION EVERY 12 HOURS SCHEDULED
Status: DISCONTINUED | OUTPATIENT
Start: 2020-08-07 | End: 2020-08-07 | Stop reason: HOSPADM

## 2020-08-07 RX ORDER — SODIUM CHLORIDE 0.9 % (FLUSH) 0.9 %
10 SYRINGE (ML) INJECTION PRN
Status: DISCONTINUED | OUTPATIENT
Start: 2020-08-07 | End: 2020-08-07 | Stop reason: HOSPADM

## 2020-08-07 RX ORDER — GLYCOPYRROLATE 1 MG/5 ML
SYRINGE (ML) INTRAVENOUS PRN
Status: DISCONTINUED | OUTPATIENT
Start: 2020-08-07 | End: 2020-08-07 | Stop reason: SDUPTHER

## 2020-08-07 RX ORDER — SODIUM CHLORIDE 0.9 % (FLUSH) 0.9 %
10 SYRINGE (ML) INJECTION EVERY 12 HOURS SCHEDULED
Status: DISCONTINUED | OUTPATIENT
Start: 2020-08-07 | End: 2020-08-08 | Stop reason: HOSPADM

## 2020-08-07 RX ORDER — OXYCODONE HYDROCHLORIDE AND ACETAMINOPHEN 5; 325 MG/1; MG/1
1 TABLET ORAL PRN
Status: DISCONTINUED | OUTPATIENT
Start: 2020-08-07 | End: 2020-08-07 | Stop reason: HOSPADM

## 2020-08-07 RX ORDER — MORPHINE SULFATE 2 MG/ML
1 INJECTION, SOLUTION INTRAMUSCULAR; INTRAVENOUS EVERY 5 MIN PRN
Status: DISCONTINUED | OUTPATIENT
Start: 2020-08-07 | End: 2020-08-07 | Stop reason: HOSPADM

## 2020-08-07 RX ORDER — FOLIC ACID 1 MG/1
1 TABLET ORAL DAILY
Status: DISCONTINUED | OUTPATIENT
Start: 2020-08-08 | End: 2020-08-08 | Stop reason: HOSPADM

## 2020-08-07 RX ORDER — LIDOCAINE HYDROCHLORIDE 20 MG/ML
INJECTION, SOLUTION INTRAVENOUS PRN
Status: DISCONTINUED | OUTPATIENT
Start: 2020-08-07 | End: 2020-08-07 | Stop reason: SDUPTHER

## 2020-08-07 RX ORDER — NICOTINE POLACRILEX 4 MG
15 LOZENGE BUCCAL PRN
Status: DISCONTINUED | OUTPATIENT
Start: 2020-08-07 | End: 2020-08-08 | Stop reason: HOSPADM

## 2020-08-07 RX ORDER — ONDANSETRON 2 MG/ML
4 INJECTION INTRAMUSCULAR; INTRAVENOUS EVERY 6 HOURS PRN
Status: DISCONTINUED | OUTPATIENT
Start: 2020-08-07 | End: 2020-08-08 | Stop reason: HOSPADM

## 2020-08-07 RX ORDER — DEXTROSE MONOHYDRATE 50 MG/ML
100 INJECTION, SOLUTION INTRAVENOUS PRN
Status: DISCONTINUED | OUTPATIENT
Start: 2020-08-07 | End: 2020-08-08 | Stop reason: HOSPADM

## 2020-08-07 RX ORDER — METOPROLOL SUCCINATE 50 MG/1
50 TABLET, EXTENDED RELEASE ORAL DAILY
Status: DISCONTINUED | OUTPATIENT
Start: 2020-08-08 | End: 2020-08-08 | Stop reason: HOSPADM

## 2020-08-07 RX ORDER — ONDANSETRON 2 MG/ML
4 INJECTION INTRAMUSCULAR; INTRAVENOUS
Status: DISCONTINUED | OUTPATIENT
Start: 2020-08-07 | End: 2020-08-07 | Stop reason: HOSPADM

## 2020-08-07 RX ORDER — MEPERIDINE HYDROCHLORIDE 25 MG/ML
12.5 INJECTION INTRAMUSCULAR; INTRAVENOUS; SUBCUTANEOUS
Status: DISCONTINUED | OUTPATIENT
Start: 2020-08-07 | End: 2020-08-07 | Stop reason: HOSPADM

## 2020-08-07 RX ADMIN — MIDAZOLAM 2 MG: 1 INJECTION INTRAMUSCULAR; INTRAVENOUS at 19:04

## 2020-08-07 RX ADMIN — FENTANYL CITRATE 50 MCG: 50 INJECTION, SOLUTION INTRAMUSCULAR; INTRAVENOUS at 20:40

## 2020-08-07 RX ADMIN — SODIUM CHLORIDE: 9 INJECTION, SOLUTION INTRAVENOUS at 19:04

## 2020-08-07 RX ADMIN — ROCURONIUM BROMIDE 50 MG: 10 INJECTION, SOLUTION INTRAVENOUS at 19:07

## 2020-08-07 RX ADMIN — ONDANSETRON HYDROCHLORIDE 4 MG: 2 INJECTION, SOLUTION INTRAMUSCULAR; INTRAVENOUS at 20:02

## 2020-08-07 RX ADMIN — DEXAMETHASONE SODIUM PHOSPHATE 12 MG: 10 INJECTION, SOLUTION INTRAMUSCULAR; INTRAVENOUS at 19:07

## 2020-08-07 RX ADMIN — FENTANYL CITRATE 50 MCG: 50 INJECTION, SOLUTION INTRAMUSCULAR; INTRAVENOUS at 20:37

## 2020-08-07 RX ADMIN — FENTANYL CITRATE 50 MCG: 50 INJECTION, SOLUTION INTRAMUSCULAR; INTRAVENOUS at 19:55

## 2020-08-07 RX ADMIN — Medication 0.6 MG: at 20:22

## 2020-08-07 RX ADMIN — ROCURONIUM BROMIDE 10 MG: 10 INJECTION, SOLUTION INTRAVENOUS at 20:02

## 2020-08-07 RX ADMIN — Medication 10 MG: at 19:49

## 2020-08-07 RX ADMIN — LIDOCAINE HYDROCHLORIDE 80 MG: 20 INJECTION, SOLUTION INTRAVENOUS at 19:07

## 2020-08-07 RX ADMIN — Medication 0.2 MG: at 19:47

## 2020-08-07 RX ADMIN — Medication 10 MG: at 19:47

## 2020-08-07 RX ADMIN — FENTANYL CITRATE 100 MCG: 50 INJECTION, SOLUTION INTRAMUSCULAR; INTRAVENOUS at 19:07

## 2020-08-07 RX ADMIN — Medication 3 MG: at 20:22

## 2020-08-07 RX ADMIN — PROPOFOL 150 MG: 10 INJECTION, EMULSION INTRAVENOUS at 19:07

## 2020-08-07 RX ADMIN — Medication 2 G: at 19:07

## 2020-08-07 ASSESSMENT — PAIN DESCRIPTION - DESCRIPTORS: DESCRIPTORS: CONSTANT;NAGGING;PRESSURE

## 2020-08-07 ASSESSMENT — PAIN SCALES - GENERAL
PAINLEVEL_OUTOF10: 0

## 2020-08-07 ASSESSMENT — PAIN - FUNCTIONAL ASSESSMENT: PAIN_FUNCTIONAL_ASSESSMENT: 0-10

## 2020-08-07 NOTE — PROGRESS NOTES
COVID testing completed on: 8/3/2020  Results of the test: negative  The patient verbally confirms that they did adhere to the self-quarantine guidelines. No signs or symptoms expressed or observed.

## 2020-08-07 NOTE — ANESTHESIA PRE PROCEDURE
Department of Anesthesiology  Preprocedure Note       Name:  Thomas Shearer   Age:  66 y.o.  :  1945                                          MRN:  [id number]         Date:  2020      Surgeon: Phill Velasquez):  Diamond Petty DO    Procedure: Procedure(s):  LUMBAR LAMINECTOMY L3-4 , L5-S1, HARDWARE REMOVAL L4-5    Medications prior to admission:   Prior to Admission medications    Medication Sig Start Date End Date Taking?  Authorizing Provider   pravastatin (PRAVACHOL) 10 MG tablet TAKE 1 TABLET BY MOUTH DAILY 8/3/20  Yes Tomasa Lorenz DO   metoprolol succinate (TOPROL XL) 50 MG extended release tablet Take 1 tablet by mouth daily 20  Yes Tomasa Lorenz DO   cyclobenzaprine (FLEXERIL) 10 MG tablet Take 1 tablet by mouth 3 times daily as needed for Muscle spasms 20  Yes Tomasa Lorenz DO   finasteride (PROSCAR) 5 MG tablet Take 5 mg by mouth daily   Yes Historical Provider, MD   amLODIPine (NORVASC) 5 MG tablet Take 1 tablet by mouth 2 times daily  Patient taking differently: Take 5 mg by mouth daily  3/5/20  Yes Kaila Martins MD   pantoprazole (PROTONIX) 40 MG tablet Take 40 mg by mouth daily as needed (Reflux)   Yes Historical Provider, MD   glycopyrrolate (ROBINUL) 1 MG tablet Take 1 tablet by mouth 3 times daily 20 Yes Monica Reilly MD   folic acid (FOLVITE) 1 MG tablet Take 1 tablet by mouth daily 19  Yes Tomasa Lorenz DO   glimepiride (AMARYL) 2 MG tablet Take 1 tablet by mouth every morning (before breakfast) 20   Tomasa Lorenz DO   ammonium lactate (LAC-HYDRIN) 12 % lotion Apply 1 g topically daily as needed for Dry Skin    Historical Provider, MD   clopidogrel (PLAVIX) 75 MG tablet Take 75 mg by mouth daily    Historical Provider, MD   aspirin 81 MG tablet Take 81 mg by mouth Daily with lunch     Historical Provider, MD       Current medications:    Current Facility-Administered Medications   Medication Dose Route Frequency Provider Last Rate Last Dose  ceFAZolin (ANCEF) 2 g in sterile water 20 mL IV syringe  2 g Intravenous On Call to [address], [place]        sodium chloride flush 0.9 % injection 10 mL  10 mL Intravenous 2 times per day DENIA Erwin        sodium chloride flush 0.9 % injection 10 mL  10 mL Intravenous PRN DENIA Erwin        dexamethasone (DECADRON) 12 mg in sodium chloride 0.9 % IVPB  12 mg Intravenous Once Jan M ISAIAS [place]           Allergies:     Allergies   Allergen Reactions    Imdur [Isosorbide Nitrate] Other (See Comments)     headache    Oxycodone Rash    Simvastatin Other (See Comments)     Muscle weakness    Bactrim [Sulfamethoxazole-Trimethoprim] Nausea Only    Ciprofloxacin Hcl Other (See Comments)     \"Heel/ tendon pain\"    Gabapentin Other (See Comments)     Constipation      Hydrocodone Nausea Only    Norco [Hydrocodone-Acetaminophen] Nausea Only and Other (See Comments)     Causes sick feeling in head         Problem List:    Patient Active Problem List   Diagnosis Code    DDD (degenerative disc disease), cervical M50.30    Cervical radiculopathy M54.12    Essential hypertension I10    Type 2 diabetes mellitus with diabetic polyneuropathy, without long-term current use of insulin (HCC) E11.42    Chronic pain syndrome G89.4    Diabetic foot infection ([facility] [place] [address] E11.628, L08.9    Hypothyroidism E03.9    Anemia D64.9    Chest pain R07.9    Coronary artery disease involving native coronary artery of native heart without angina pectoris I25.10    Chest pain with moderate risk for cardiac etiology R07.9    Coronary vasospasm (HCC) I20.1    Bradycardia R00.1    Lumbar foraminal stenosis M48.061    S/P lumbar laminectomy Z98.890    Benign prostatic hyperplasia with urinary frequency N40.1, R35.0       Past Medical History:        Diagnosis Date    Abnormal computed tomography angiography of heart 07/19/2019    Calcified plaque proximal RCA with 50-70% stenosis, proximal LAD 50% stenosis, mild LAD 30-50% stenosis, groundglass infiltrates, area of consoldidation left lung base posteriorly.       Arthritis     Atrial fibrillation (HCC)     Burning pain     CAD (coronary artery disease)     Degenerative lumbar disc     Diabetes ([address]     H/O cardiovascular stress test 02/19/2020    Lexiscan    Herniated cervical disc     History of echocardiogram 03/16/2017    EF 60-65%    Hx of blood clots     Hyperlipidemia     Hypertension     Stented coronary artery     Tinnitus     Urinary retention 3/17/2017       Past Surgical History:        Procedure Laterality Date    ABLATION OF DYSRHYTHMIC FOCUS  1980    APPROX 30-35 YRS AGO    CARDIAC CATHETERIZATION  02/20/2020    Dr Omayra Silva  2010 sigmoid    COLONOSCOPY  5/2018 Dod    CORONARY ANGIOPLASTY WITH STENT PLACEMENT      DIAGNOSTIC CARDIAC CATH LAB PROCEDURE      ENDOSCOPY, COLON, DIAGNOSTIC      EPIDURAL STEROID INJECTION N/A 12/11/2018    C7-T1 EPIDURAL STEROID INJECTION performed by Moira Collet, DO at [address]  1/8/2019    C7 - T1 EPIDURAL STEROID INJECTION #2 performed by Moira Collet, DO at [address]      zachery lense implants    HEMORRHOID SURGERY  2008    LAMINECTOMY N/A 6/5/2020    LUMBAR LAMINECTOMY L3-4 , L5-S1, HARDWARE REMOVAL L4-5, DURAL REPAIR performed by Onesimo Hoang DO at [address] [place] [address]  03/06/2017    NASAL SINUS SURGERY  12/07/2017    Submucosal Resection of Inferior Turbinate    NERVE BLOCK Bilateral 10 12 2015    bilateral sacroiliac joint injection #1    NERVE BLOCK Bilateral 10/22/15    sacroiliac joint #2    NERVE BLOCK Bilateral 11 02 2015    Sacroiliac joint bilateral #3    NERVE BLOCK Left 12/2/15    lumbar transforaminal #1    NERVE BLOCK Left 12 16 15    NERVE BLOCK Left 12 28 2015    transforaminal nerve block left lumbar #3    NERVE BLOCK Left 1 20 16    radiofrequency     NERVE BLOCK  12/11/2018 C7-T1 epidural    NERVE BLOCK N/A 2019    cervical epidural steroid injection    OTHER SURGICAL HISTORY  1945    had a feeding tube as an infant, scar    PROSTATE SURGERY      PTCA  2019    2.5 x 38 mm Resolute Lito RAFI mid OM1, 2.25 x 8 mm Resolute Lito RAFI ostium of dx    UPPER GASTROINTESTINAL ENDOSCOPY  2018 [place]    UPPER GASTROINTESTINAL ENDOSCOPY N/A 2019    EGD BIOPSY performed by Delbert Pope MD at [address] History:    Social History     Tobacco Use    Smoking status: Former Smoker     Packs/day: 0.00     Years: 16.00     Pack years: 0.00     Last attempt to quit: 1977     Years since quittin.6    Smokeless tobacco: Former User    Tobacco comment: quit in    Substance Use Topics    Alcohol use: No     Alcohol/week: 0.0 standard drinks     Comment: 1-2 cups coffee daily                                Counseling given: Not Answered  Comment: quit in       Vital Signs (Current):   Vitals:    20 1120   BP: (!) 180/83   Pulse: 72   Resp: 20   Temp: (!) 33.4 °F (0.8 °C)   TempSrc: Temporal   SpO2: 94%   Weight: 186 lb (84.4 kg)   Height: 5' 9\" (1.753 m)                                              BP Readings from Last 3 Encounters:   20 (!) 180/83   20 (!) 157/74   20 (!) 162/92     ANGIOGRAPHIC FINDINGS:  1. The left main coronary artery arises normally from the left sinus of  Valsalva. It is a good size vessel that trifurcates into left anterior  descending artery and left circumflex artery. The LAD itself is a mid  size vessel, extends down to the apex. Patent stent in the proximal  first diagonal branch, the second diagonal branch is mid size without  any disease. 2.  The ramus intermedius artery is a mid size vessel without any  significant disease. The left circumflex artery is also a mid size  vessel that tapers off distally and has no significant disease.   3.  The right coronary artery arises normally MUSE Unknown Unknown 04/18/16 0721-Present  Narrative & Impression     Normal sinus rhythm  Left axis deviation  Right bundle branch block  Inferior infarct , age undetermined  Abnormal ECG  When compared with ECG of 04-MAR-2020 09:42,  QRS duration has decreased  Inferior infarct is now present  QT has shortened  Confirmed by Vickie  ([id number]) on 3/6/2020 4:18:02 PM  Lab and Collection     EKG 12 Lead - 3/6/2020   Result History       CBC:   Lab Results   Component Value Date    WBC 6.1 08/05/2020    RBC 5.25 08/05/2020    HGB 15.9 08/05/2020    HCT 46.0 08/05/2020    MCV 87.6 08/05/2020    RDW 12.9 08/05/2020     08/05/2020       CMP:   Lab Results   Component Value Date     08/05/2020    K 4.0 08/05/2020    K 4.3 03/04/2020     08/05/2020    CO2 23 08/05/2020    BUN 11 08/05/2020    CREATININE 0.9 08/05/2020    GFRAA >60 08/05/2020    LABGLOM >60 08/05/2020    GLUCOSE 96 08/05/2020    PROT 7.9 05/20/2020    CALCIUM 9.4 08/05/2020    BILITOT 0.5 05/20/2020    ALKPHOS 116 05/20/2020    AST 24 05/20/2020    ALT 18 05/20/2020       POC Tests: No results for input(s): POCGLU, POCNA, POCK, POCCL, POCBUN, POCHEMO, POCHCT in the last 72 hours.     Coags:   Lab Results   Component Value Date    PROTIME 11.2 08/05/2020    INR 1.0 08/05/2020    APTT 92.2 02/19/2020       HCG (If Applicable): No results found for: PREGTESTUR, PREGSERUM, HCG, HCGQUANT     ABGs: No results found for: PHART, PO2ART, TQV0ELP, GIN5OHB, BEART, D5MBLWPN     Type & Screen (If Applicable):  No results found for: LABABO, LABRH    Drug/Infectious Status (If Applicable):  No results found for: HIV, HEPCAB    COVID-19 Screening (If Applicable):   Lab Results   Component Value Date    COVID19 Not Detected 08/03/2020         Anesthesia Evaluation  Patient summary reviewed and Nursing notes reviewed no history of anesthetic complications:   Airway: Mallampati: II  TM distance: >3 FB   Neck ROM: full  Mouth opening: < 3 FB Dental: Comment: None loose    Pulmonary:Negative Pulmonary ROS and normal exam  breath sounds clear to auscultation            Patient did not smoke on day of surgery. Cardiovascular:  Exercise tolerance: good (>4 METS),   (+) hypertension:, CAD: non-obstructive, CABG/stent (STENT X3 2017):,         Rhythm: regular  Rate: normal           Beta Blocker:  Dose within 24 Hrs         Neuro/Psych:   (+) neuromuscular disease:, depression/anxiety              ROS comment: MID BACK PAIN GI/Hepatic/Renal: Neg GI/Hepatic/Renal ROS            Endo/Other:    (+) DiabetesType II DM, well controlled, , hypothyroidism::., .          Pt had no PAT visit       Abdominal:           Vascular:                                          Anesthesia Plan      general     ASA 3       Induction: intravenous. MIPS: Prophylactic antiemetics administered. Anesthetic plan and risks discussed with patient. Use of blood products discussed with patient whom. Plan discussed with surgical team, attending and fellow. Juan A Hunt DO   8/7/2020      Pt seen, examined, chart reviewed, plan discussed.   Juan A Hunt  8/7/2020  3:01 PM

## 2020-08-07 NOTE — PROGRESS NOTES
Attempted to insert aguilar catheter at beginning of case. Unable to advance.   Aguilar not inserted

## 2020-08-08 VITALS
RESPIRATION RATE: 15 BRPM | WEIGHT: 166.7 LBS | TEMPERATURE: 97.6 F | DIASTOLIC BLOOD PRESSURE: 67 MMHG | SYSTOLIC BLOOD PRESSURE: 144 MMHG | OXYGEN SATURATION: 97 % | BODY MASS INDEX: 24.69 KG/M2 | HEART RATE: 88 BPM | HEIGHT: 69 IN

## 2020-08-08 LAB
ANION GAP SERPL CALCULATED.3IONS-SCNC: 14 MMOL/L (ref 7–16)
BUN BLDV-MCNC: 11 MG/DL (ref 8–23)
CALCIUM SERPL-MCNC: 9.1 MG/DL (ref 8.6–10.2)
CHLORIDE BLD-SCNC: 99 MMOL/L (ref 98–107)
CO2: 21 MMOL/L (ref 22–29)
CREAT SERPL-MCNC: 0.9 MG/DL (ref 0.7–1.2)
GFR AFRICAN AMERICAN: >60
GFR NON-AFRICAN AMERICAN: >60 ML/MIN/1.73
GLUCOSE BLD-MCNC: 224 MG/DL (ref 74–99)
HCT VFR BLD CALC: 45.3 % (ref 37–54)
HEMOGLOBIN: 15.6 G/DL (ref 12.5–16.5)
MCH RBC QN AUTO: 29.9 PG (ref 26–35)
MCHC RBC AUTO-ENTMCNC: 34.4 % (ref 32–34.5)
MCV RBC AUTO: 86.9 FL (ref 80–99.9)
PDW BLD-RTO: 12.6 FL (ref 11.5–15)
PLATELET # BLD: 182 E9/L (ref 130–450)
PMV BLD AUTO: 9.8 FL (ref 7–12)
POTASSIUM SERPL-SCNC: 4 MMOL/L (ref 3.5–5)
RBC # BLD: 5.21 E12/L (ref 3.8–5.8)
SODIUM BLD-SCNC: 134 MMOL/L (ref 132–146)
WBC # BLD: 9.9 E9/L (ref 4.5–11.5)

## 2020-08-08 PROCEDURE — 6370000000 HC RX 637 (ALT 250 FOR IP): Performed by: ORTHOPAEDIC SURGERY

## 2020-08-08 PROCEDURE — 6370000000 HC RX 637 (ALT 250 FOR IP): Performed by: STUDENT IN AN ORGANIZED HEALTH CARE EDUCATION/TRAINING PROGRAM

## 2020-08-08 PROCEDURE — 36415 COLL VENOUS BLD VENIPUNCTURE: CPT

## 2020-08-08 PROCEDURE — 97530 THERAPEUTIC ACTIVITIES: CPT

## 2020-08-08 PROCEDURE — 2580000003 HC RX 258: Performed by: ORTHOPAEDIC SURGERY

## 2020-08-08 PROCEDURE — 6360000002 HC RX W HCPCS: Performed by: ORTHOPAEDIC SURGERY

## 2020-08-08 PROCEDURE — 80048 BASIC METABOLIC PNL TOTAL CA: CPT

## 2020-08-08 PROCEDURE — 97162 PT EVAL MOD COMPLEX 30 MIN: CPT

## 2020-08-08 PROCEDURE — 97165 OT EVAL LOW COMPLEX 30 MIN: CPT

## 2020-08-08 PROCEDURE — 85027 COMPLETE CBC AUTOMATED: CPT

## 2020-08-08 RX ORDER — ACETAMINOPHEN 325 MG/1
650 TABLET ORAL EVERY 6 HOURS
Status: DISCONTINUED | OUTPATIENT
Start: 2020-08-08 | End: 2020-08-08 | Stop reason: HOSPADM

## 2020-08-08 RX ORDER — MORPHINE SULFATE 2 MG/ML
2 INJECTION, SOLUTION INTRAMUSCULAR; INTRAVENOUS
Status: DISCONTINUED | OUTPATIENT
Start: 2020-08-08 | End: 2020-08-08 | Stop reason: HOSPADM

## 2020-08-08 RX ORDER — TRAMADOL HYDROCHLORIDE 50 MG/1
50 TABLET ORAL EVERY 6 HOURS PRN
Status: DISCONTINUED | OUTPATIENT
Start: 2020-08-08 | End: 2020-08-08 | Stop reason: HOSPADM

## 2020-08-08 RX ORDER — DIPHENHYDRAMINE HYDROCHLORIDE 50 MG/ML
25 INJECTION INTRAMUSCULAR; INTRAVENOUS EVERY 6 HOURS PRN
Status: DISCONTINUED | OUTPATIENT
Start: 2020-08-08 | End: 2020-08-08 | Stop reason: HOSPADM

## 2020-08-08 RX ORDER — HYDROCODONE BITARTRATE AND ACETAMINOPHEN 5; 325 MG/1; MG/1
1 TABLET ORAL EVERY 4 HOURS PRN
Status: DISCONTINUED | OUTPATIENT
Start: 2020-08-08 | End: 2020-08-08

## 2020-08-08 RX ORDER — HYDROCODONE BITARTRATE AND ACETAMINOPHEN 5; 325 MG/1; MG/1
2 TABLET ORAL EVERY 4 HOURS PRN
Status: DISCONTINUED | OUTPATIENT
Start: 2020-08-08 | End: 2020-08-08

## 2020-08-08 RX ADMIN — DEXTROSE AND SODIUM CHLORIDE: 5; 450 INJECTION, SOLUTION INTRAVENOUS at 00:25

## 2020-08-08 RX ADMIN — PRAVASTATIN SODIUM 10 MG: 10 TABLET ORAL at 08:12

## 2020-08-08 RX ADMIN — METOPROLOL SUCCINATE 50 MG: 50 TABLET, EXTENDED RELEASE ORAL at 08:12

## 2020-08-08 RX ADMIN — Medication 2 G: at 10:51

## 2020-08-08 RX ADMIN — BISACODYL 5 MG: 5 TABLET, COATED ORAL at 08:14

## 2020-08-08 RX ADMIN — TRAMADOL HYDROCHLORIDE 50 MG: 50 TABLET, FILM COATED ORAL at 08:11

## 2020-08-08 RX ADMIN — GLYCOPYRROLATE 1 MG: 1 TABLET ORAL at 08:12

## 2020-08-08 RX ADMIN — GLIMEPIRIDE 2 MG: 2 TABLET ORAL at 06:38

## 2020-08-08 RX ADMIN — SODIUM CHLORIDE, PRESERVATIVE FREE 10 ML: 5 INJECTION INTRAVENOUS at 00:26

## 2020-08-08 RX ADMIN — FOLIC ACID 1 MG: 1 TABLET ORAL at 08:12

## 2020-08-08 RX ADMIN — Medication 2 G: at 02:20

## 2020-08-08 RX ADMIN — ACETAMINOPHEN 650 MG: 325 TABLET, FILM COATED ORAL at 08:12

## 2020-08-08 RX ADMIN — SODIUM CHLORIDE, PRESERVATIVE FREE 10 ML: 5 INJECTION INTRAVENOUS at 08:12

## 2020-08-08 RX ADMIN — GLYCOPYRROLATE 1 MG: 1 TABLET ORAL at 00:25

## 2020-08-08 ASSESSMENT — PAIN DESCRIPTION - PROGRESSION: CLINICAL_PROGRESSION: NOT CHANGED

## 2020-08-08 ASSESSMENT — PAIN DESCRIPTION - ORIENTATION: ORIENTATION: RIGHT;ANTERIOR;MID

## 2020-08-08 ASSESSMENT — PAIN DESCRIPTION - LOCATION: LOCATION: BACK

## 2020-08-08 ASSESSMENT — PAIN DESCRIPTION - PAIN TYPE: TYPE: ACUTE PAIN;SURGICAL PAIN

## 2020-08-08 ASSESSMENT — PAIN SCALES - GENERAL
PAINLEVEL_OUTOF10: 8
PAINLEVEL_OUTOF10: 2
PAINLEVEL_OUTOF10: 8

## 2020-08-08 ASSESSMENT — PAIN DESCRIPTION - ONSET: ONSET: ON-GOING

## 2020-08-08 ASSESSMENT — PAIN DESCRIPTION - FREQUENCY: FREQUENCY: CONTINUOUS

## 2020-08-08 ASSESSMENT — PAIN DESCRIPTION - DESCRIPTORS: DESCRIPTORS: ACHING;DISCOMFORT;SORE

## 2020-08-08 NOTE — BRIEF OP NOTE
Brief Postoperative Note      Patient: Rochelle Holder  YOB: 1945  MRN: [id number]    Date of Procedure: 8/7/2020    Pre-Op Diagnosis: POST OP HEMATOMA, synovial cyst L5/S1 left, spinal stenosis    Post-Op Diagnosis: Same       Procedure(s):  LUMBAR DECOMPRESSION L4-S1 WITH IRRIGATION AND DEBRIDEMENT OF LUMBAR HEMATOMA, removal of synovial cyst    Surgeon(s):  Madhavi Marr DO    Assistant:  Physician Assistant: Nan Barry PA-C    Anesthesia: General    Estimated Blood Loss (mL): less than 471     Complications: None    Specimens:   * No specimens in log *    Implants:  * No implants in log *      Drains:   [REMOVED] Closed/Suction Drain Midline Back Accordion 10 [place] (Removed)       Findings: as above    Electronically signed by Madhavi Marr DO on 8/7/2020 at 8:14 PM

## 2020-08-08 NOTE — PROGRESS NOTES
Discharge instructions  given to patient and he verbalizes understanding . heplock and Hemovac discontinued without difficulty . Dressing changed to his back incision well apprximated with steri strips and no signs of infection . steristrips applied to hemovac site , and then covered with dry dressing .

## 2020-08-08 NOTE — ANESTHESIA POSTPROCEDURE EVALUATION
Department of Anesthesiology  Postprocedure Note    Patient: Ervin Galindo  MRN: [id number]  YOB: 1945  Date of evaluation: 8/8/2020  Time:  12:17 AM     Procedure Summary     Date:  08/07/20 Room / Location:  [address] [place] [address] / [facility]    Anesthesia Start:  1653 Anesthesia Stop:  2106    Procedure:  LUMBAR DECOMPRESSION L4-S1; IRRIGATION AND DEBRIDEMENT OF LUMBAR HEMATOMA Joey Gallop; REMOVAL SYNOVIAL CYST L5-S1 LEFT (N/A Back) Diagnosis:  (POST OP HEMATOMA)    Surgeon:  Tricia Thorpe DO Responsible Provider:  Dorie Ramirez DO    Anesthesia Type:  general ASA Status:  3          Anesthesia Type: general    Danya Phase I: Danya Score: 9    Danya Phase II:      Last vitals: Reviewed and per EMR flowsheets.        Anesthesia Post Evaluation    Patient location during evaluation: PACU  Patient participation: complete - patient participated  Level of consciousness: awake and alert  Airway patency: patent  Nausea & Vomiting: no nausea and no vomiting  Complications: no  Cardiovascular status: blood pressure returned to baseline  Respiratory status: acceptable  Hydration status: euvolemic

## 2020-08-08 NOTE — PROGRESS NOTES
OCCUPATIONAL THERAPY INITIAL EVALUATION      Date:2020  Patient Name: Se Driscoll  MRN: [id number]  : 1945  Room: [address] [place], [place] [address]    Evaluating OT: MAR Moya/L  Referring Provider: Asia Townsend DO AM-[facility] Daily Activity Raw Score:   Recommended Adaptive Equipment: to be determined; possible needs include shower chiar     Comments: Based on patient's functional performance as stated below and level of assistance needed prior to admission, this therapist believes that the patient would benefit from home health skilled OT following hospital stay in an effort to increase safety, functional independence, and quality of life. 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  Arthritis     Atrial fibrillation (HCC)     Burning pain     CAD (coronary artery disease)     Degenerative lumbar disc     Diabetes ([facility] [address]     H/O cardiovascular stress test 2020    Lexiscan    Herniated cervical disc     History of echocardiogram 2017    EF 60-65%    Hx of blood clots     Hyperlipidemia     Hypertension     Stented coronary artery     Tinnitus     Urinary retention 3/17/2017       Precautions:  Falls, AAT in lumbar brace, spinal precautions     Home Living: Pt lives with wife in a 1 story with 3 step(s) to enter and B rail(s); bed/bath on main floor  Bathroom setup: walk-in shower, elevated toilet  Equipment owned: none    Prior Level of Function: Assist PRN with ADLs and independent with IADLs; using no device for ambulation.  Wife assists PRN for LB bathing and dressing. Driving: yes  Occupation: Retired  for [employer]    Pain Level: reports 5/10 pain in L lower back, addressed with repositioning  Cognition: A&O: 4/4; Follows 1-2 step directions. impulsive   Memory: G   Sequencing: F   Problem solving: F   Judgement/safety: F     Functional Assessment:   Initial Eval Status  Date: 8/8/20 Treatment Status  Date: STG=LTG (~5-14  days)     Feeding Independent        Grooming Supervision  standing    improve grooming/hygiene tasks to independenet while standing at sink    UB Dressing SBA  To cristopher gown as robe; patient reports self donning brace at EOB prior to arrival    improve UB dressing to mod I   LB Dressing Mod A    improve LB dressing to mod I with AE PRN   Bathing Mod A    improve UB/LB bathing to mod I   Toileting SBA    improve toileting task and all clothing mgmt to independent   Bed Mobility  Supine to sit: NT  Sit to supine: NT  Pt at EOB upon arrival, brace donned  improve bed mobility to mod I in prep for EOB ADL tasks   Functional Transfers Sit to stand: Supervision  Stand to sit: Supervision  improve all functional transfers to independent   Functional Mobility SBA  improve functional mobility to min A with AE  PRN   Balance Sitting:     Static:  independent    Dynamic: supervision  Standing: SBA no AE  demo independent dynamic sitting balance EOB during ADL tasks   Endurance/Activity Tolerance G     Visual/  Perceptual Glasses: reading              Hand dominance: R  UE ROM: RUE: WFL  LUE: WFL  Strength: RUE: grossly WFL LUE: grossly WFL   Strength: WFL  Fine Motor Coordination: WFL    Hearing: WFL  Sensation:  No c/o numbness or tingling  Tone: WNL  Edema: unremarkable                            Comments: Session completed with PT collaboration. Upon arrival patient seated EOB with brace already applied. Reviewed spinal precautions, bed mobility, and brace donning.  Spo2 >96% throughout session with O2 off, left nasal [x]  ADLs [x] Strength [x]  Cognition []  Functional transfers  [x] IADLs [x] Safety Awareness [x]  Endurance [x]  Fine Motor Coordination [] Balance [x] Vision/perception [] Sensation []   Gross Motor Coordination [] ROM [] Delirium []                  Motor Control []    Plan of Care: 1-5 tx/wk PRN   ADL retraining [x]   Equipment needs [x]   Neuromuscular re-education [] Energy Conservation Techniques [x]  Functional Transfer training [x] Patient and/or Family Education [x]  Functional Mobility training [x]  Environmental Modifications [x]  Cognitive re-training []   Compensatory techniques for ADLs [x]  Splinting Needs []   Positioning to improve overall function [x]   Therapeutic Activity [x]                       Therapeutic Exercise  [x]  Visual/Perceptual: []    Delirium prevention/treatment  [x]   Other:  []    Rehab Potential: Good for established goals    Patient / Family Goal: None stated     Patient and/or family were instructed/educated on diagnosis, prognosis/goals and plan of care. Demonstrated fair understanding, further information not needed. [] Malnutrition indicators have been identified and nursing has been notified to ensure a dietitian consult is ordered.         (Evaluation time includes thorough review of current medical information, gathering information on past medical history/social history and prior level of function, completion of standardized testing/informal observation of tasks, assessment of data and development of POC/Goals.)    Low Complexity Evaluation +  Treatment Time In: 0830            Treatment Time Out: 9342               Treatment Charges: Mins Units   Ther Ex  52465     Manual Therapy [id number]     Thera Activities [id number] 13 1   ADL/Home Mgt 01118     Neuro Re-ed 23304     Group Therapy      Orthotic manage/training  73569     Non-Billable Time     Total Timed Treatment 13 Wenceslao Trevizo OTR/L  [id number]

## 2020-08-08 NOTE — PROGRESS NOTES
Department of Orthopedic Surgery  Ortho Spine Service  Resident Progress Note    Patient seen and examined. Pain controlled. No new complaints. Denies new onset weakness or paraesthesias. No acute changes with bowel or bladder. VITALS:  BP (!) 176/89   Pulse 98   Temp 97 °F (36.1 °C) (Temporal)   Resp 16   Ht 5' 9\" (1.753 m)   Wt 166 lb 11.2 oz (75.6 kg)   SpO2 97%   BMI 24.62 kg/m²     Orientation:  alert and oriented to person, place and time    Incision:  dressing in place, clean, dry and intact  Drain: Intact with minimal output in reservoir     Lower Extremity    Lower Extremity Motor : Demonstrates 5/5 active ankle plantar/dorsiflexion/great toe extension    Lower Extremity Sensory: intact    Pulses:  Palpable dorsalis pedis and posterior tibialis pulse, brisk cap refill to toes, foot warm and perfused    Abnormal Exam findings:  none      CBC:   Lab Results   Component Value Date    WBC 9.9 08/08/2020    HGB 15.6 08/08/2020    HCT 45.3 08/08/2020     08/08/2020         ASSESSMENT AND PLAN:  S/P Lumbar decompression L4-S1, I&D lumbar hematoma, removal of synovial cyst L5-S1 on 8/7    · Activity as tolerated in brace. · Deep venous thrombosis prophylaxis - SCDs/TEDS/Early mobilization  · Continue physical therapy in brace. · D/C Planning. Likely discharge later today  · Pain Control  · DC aguilar when pt ambulatory  · DC drain when output < 50ml/Shift. · Discuss with Callum. As above. Doing well. Denies any leg pain. Strained his back some in bed when turning. Ms 5/5. Will discharge home today.

## 2020-08-08 NOTE — H&P
Department of Orthopedic Surgery  Physician Assistant Pre-operative History and Physical        DIAGNOSIS:  Lumbar stenosis, seroma    INDICATION:  LBP, LLE pain, as above    PROCEDURE:  Decompression L4-S1, evacuation of seroma, removal of synovial cyst    CHIEF COMPLAINT:  LBP, LLE pain    History Obtained From:  patient    HISTORY OF PRESENT ILLNESS:      The patient is a 66 y.o. male who had prior HWR and laminectomy with ongoing pain in low back and LLE. Testing showed fluid collection. Pain did not improve with conservative care. Patient presents today for surgical intervention. Past Medical History:        Diagnosis Date    Abnormal computed tomography angiography of heart 07/19/2019    Calcified plaque proximal RCA with 50-70% stenosis, proximal LAD 50% stenosis, mild LAD 30-50% stenosis, groundglass infiltrates, area of consoldidation left lung base posteriorly.       Arthritis     Atrial fibrillation (HCC)     Burning pain     CAD (coronary artery disease)     Degenerative lumbar disc     Diabetes ([address]     H/O cardiovascular stress test 02/19/2020    Lexiscan    Herniated cervical disc     History of echocardiogram 03/16/2017    EF 60-65%    Hx of blood clots     Hyperlipidemia     Hypertension     Stented coronary artery     Tinnitus     Urinary retention 3/17/2017     Past Surgical History:        Procedure Laterality Date    ABLATION OF DYSRHYTHMIC FOCUS  1980    APPROX 30-35 YRS AGO    CARDIAC CATHETERIZATION  02/20/2020    Dr Miguel Kumar  2010 sigmoid    COLONOSCOPY  5/2018 [facility]    CORONARY ANGIOPLASTY WITH STENT PLACEMENT      DIAGNOSTIC CARDIAC CATH LAB PROCEDURE      ENDOSCOPY, COLON, DIAGNOSTIC      EPIDURAL STEROID INJECTION N/A 12/11/2018    C7-T1 EPIDURAL STEROID INJECTION performed by Heriberto Freeman DO at [facility] 1/8/2019    C7 - T1 EPIDURAL STEROID INJECTION #2 performed by [facility]erto [facility] DO at [facility] OR    EYE SURGERY      zachery lense implants    HEMORRHOID SURGERY  2008    LAMINECTOMY N/A 6/5/2020    LUMBAR LAMINECTOMY L3-4 , L5-S1, HARDWARE REMOVAL L4-5, DURAL REPAIR performed by Subha Albright DO at [address] [place] [address]  03/06/2017    NASAL SINUS SURGERY  12/07/2017    Submucosal Resection of Inferior Turbinate    NERVE BLOCK Bilateral 10 12 2015    bilateral sacroiliac joint injection #1    NERVE BLOCK Bilateral 10/22/15    sacroiliac joint #2    NERVE BLOCK Bilateral 11 02 2015    Sacroiliac joint bilateral #3    NERVE BLOCK Left 12/2/15    lumbar transforaminal #1    NERVE BLOCK Left 12 16 15    NERVE BLOCK Left 12 28 2015    transforaminal nerve block left lumbar #3    NERVE BLOCK Left 1 20 16    radiofrequency     NERVE BLOCK  12/11/2018    C7-T1 epidural    NERVE BLOCK N/A 01/08/2019    cervical epidural steroid injection    OTHER SURGICAL HISTORY  1945    had a feeding tube as an infant, scar    PROSTATE SURGERY  2017    PTCA  09/30/2019    2.5 x 38 mm Resolute Pomeroy RAFI mid OM1, 2.25 x 8 mm Resolute Lito RAFI ostium of dx    UPPER GASTROINTESTINAL ENDOSCOPY  5/2018 [facility]    UPPER GASTROINTESTINAL ENDOSCOPY N/A 6/4/2019    EGD BIOPSY performed by Hector Schroeder MD at [place] [address]     Medications Prior to Admission:   Medications Prior to Admission: pravastatin (PRAVACHOL) 10 MG tablet, TAKE 1 TABLET BY MOUTH DAILY  metoprolol succinate (TOPROL XL) 50 MG extended release tablet, Take 1 tablet by mouth daily  cyclobenzaprine (FLEXERIL) 10 MG tablet, Take 1 tablet by mouth 3 times daily as needed for Muscle spasms  finasteride (PROSCAR) 5 MG tablet, Take 5 mg by mouth daily  amLODIPine (NORVASC) 5 MG tablet, Take 1 tablet by mouth 2 times daily (Patient taking differently: Take 5 mg by mouth daily )  pantoprazole (PROTONIX) 40 MG tablet, Take 40 mg by mouth daily as needed (Reflux)  glycopyrrolate (ROBINUL) 1 MG tablet, Take 1 tablet by mouth 3 times daily  folic acid (FOLVITE) 1 MG tablet, Take 1 tablet by mouth daily  glimepiride (AMARYL) 2 MG tablet, Take 1 tablet by mouth every morning (before breakfast)  ammonium lactate (LAC-HYDRIN) 12 % lotion, Apply 1 g topically daily as needed for Dry Skin  clopidogrel (PLAVIX) 75 MG tablet, Take 75 mg by mouth daily  aspirin 81 MG tablet, Take 81 mg by mouth Daily with lunch   Allergies:  Imdur [isosorbide nitrate]; Oxycodone; Simvastatin; Bactrim [sulfamethoxazole-trimethoprim]; Ciprofloxacin hcl; Gabapentin; Hydrocodone; and Norco [hydrocodone-acetaminophen]  History of allergic reaction to anesthesia:  No    Social History: Former smoker, no alcohol    Family History:       Problem Relation Age of Onset    Stroke Mother     Other Father     Kidney Disease Brother     Arrhythmia Brother     Cancer Brother     Kidney Disease Sister     Hypertension Other     Diabetes Other      REVIEW OF SYSTEMS:  CONSTITUTIONAL:  negative  EYES:  negative  HEENT:  negative  RESPIRATORY:  negative  CARDIOVASCULAR:  negative  GASTROINTESTINAL:  negative    PHYSICAL EXAM:     VITALS:  BP (!) 180/83   Pulse 72   Temp (!) 33.4 °F (0.8 °C) (Temporal)   Resp 20   Ht 5' 9\" (1.753 m)   Wt 186 lb (84.4 kg)   SpO2 94%   BMI 27.47 kg/m²     Eyes:  extra-ocular muscles intact and vision intact    Head/ENT:  normocepalic, without obvious abnormality, atraumatic    Neck:  supple, symmetrical, trachea midline and skin normal    Heart:  normal S1 and S2 and no edema    Lungs:  No increased work of breathing, good air exchange, clear to auscultation bilaterally, no crackles or wheezing    Abdomen:  NTND, +BS    Extremities:  No clubbing, cyanosis, or edema    Musculoskeletal:  there is no redness, warmth, or swelling of the joints    SKIN:  no rashes and no lesions    DATA:  Testing reviewed with the patient in the office. ASSESSMENT AND PLAN:    1.   Patient is a 66 y.o. male with above specified procedure planned decompression L4-S1, evacuation of seroma/hematoma, removal of synovial cyst under general anesthesia     2. Procedure options, risks and benefits reviewed with patient. Patient expresses understanding. Dr. Lety Ernandez saw the patient prior to surgery, above reviewed and discussed.

## 2020-08-08 NOTE — PROGRESS NOTES
Physical Therapy  Physical Therapy Initial Assessment     Name: Micki Lujan  : 1945  MRN: [id number]    Referring Provider: Jose Enrique Hdez DO    Date of Service: 2020    Evaluating PT: Vamsi Marc PT, DPT, [id number]    Room #: 0648/8215-E  Diagnosis: Synovial cyst of lumbar spine  PMHx/PSHx: HTN, herniated cervical disc, HLD, DM, OA, Afib, hx of blood clots, CAD  Procedure/Surgery: Lumbar decompression L4-S1 with I&D of lumbar hematoma, removal of synovial cyst ()  Precautions: Fall risk, spinal neutrality, LSO, hemovac    SUBJECTIVE:    Pt lives with wife in a mobile home with 3 stair(s) and 2 rail(s) to enter. Pt ambulated without AD Independent prior to admission. OBJECTIVE:   Initial Evaluation  Date: 20 Treatment Date: Short Term/ Long Term   Goals   AM-PAC 6 Clicks /24     Was pt agreeable to Eval/treatment? Yes     Does pt have pain? 5/10 L lower back pain     Bed Mobility  Rolling: NT  Supine to sit: NT  Sit to supine: NT  Scooting: NT  Rolling: Independent   Supine to sit: Independent   Sit to supine: Independent   Scooting: Independent    Transfers Sit to stand: Supervision  Stand to sit: Supervision  Stand pivot: SBA without AD  Sit to stand: Independent   Stand to sit: Independent   Stand pivot: Independent    Ambulation   100 feet without AD with SBA  400 feet Independent    Stair negotiation: ascended and descended NT  3 step(s) with 2 rail(s) Mod Independent    ROM BUE: Refer to OT note  BLE: WFL     Strength BUE: Refer to OT note  BLE: WFL     Balance Sitting EOB: Independent   Dynamic Standing: SBA without AD  Dynamic Standing: Independent      Pt is A & O x: 4 to person, place, month/year, and situation. Sensation: Pt reports chronic B feet numbness and tingling due to diabetic neuropathy. Edema: Unremarkable. Patient education  Pt educated on spinal neutrality and LSO brace.     Patient response to education:   Pt verbalized understanding Pt demonstrated skill Pt requires further education in this area   Yes Yes Reinforce      ASSESSMENT:    Comments:   Pt was standing at EOB upon room entry, agreeable to PT evaluation with OT collaboration. Pt reports he donned brace without assistance while sitting EOB. Pt was instructed to have brace donned while supine; pt verbalized understanding. Pt ambulates with slow gait speed with mild unsteadiness. Pt ambulated down hallway and back to room where he was seated in chair. All questions and concerns were addressed. Pt was left seated at EOB with all needs met at conclusion of session. Treatment:  Patient practiced and was instructed in the following treatment:     Therapeutic activities: Pt completed all therapeutic activities noted above. Pt was educated on spinal neutrality and proper technique when donning LSO brace. Pt was cued for safety when ambulating in hallway. Pt was cued for controlled decent during stand to sit transfers. Pt's/family goals:   1. To return home. Patient and or family understand(s) diagnosis, prognosis, and plan of care. Yes. PLAN:    PT care will be provided in accordance with the objectives noted above. Exercises and functional mobility practice will be used as well as appropriate assistive devices or modalities to obtain goals. Patient and family education will also be administered as needed. Frequency of treatments: Daily x 1-2 days. Time in: 0824  Time out: 0843    Total Treatment Time 10 minutes     Evaluation Time includes thorough review of current medical information, gathering information on past medical history/social history and prior level of function, completion of standardized testing/informal observation of tasks, assessment of data and education on plan of care and goals.     CPT codes:  [] Low Complexity PT evaluation 86675  [x] Moderate Complexity PT evaluation 38545  [] High Complexity PT evaluation 65233  [] PT Re-evaluation 17360  [] Gait training 70261 0 minutes  [] Manual therapy 38843 0 minutes  [x] Therapeutic activities 32372 10 minutes  [] Therapeutic exercises 31030 0 minutes  [] Neuromuscular reeducation 56981 0 minutes     Felipe Macdonald PT, DPT  [id number]

## 2020-08-08 NOTE — PROGRESS NOTES
Department of Orthopedic Surgery  Ortho Spine Service  Resident Progress Note    Patient seen and examined. Pain controlled. No new complaints. Denies new onset weakness or paraesthesias. No acute changes with bowel or bladder. VITALS:  BP (!) 98/53   Pulse 80   Temp 97.5 °F (36.4 °C) (Temporal)   Resp 14   Ht 5' 9\" (1.753 m)   Wt 166 lb 11.2 oz (75.6 kg)   SpO2 91%   BMI 24.62 kg/m²     Orientation:  alert and oriented to person, place and time    Incision:  dressing in place, clean, dry and intact  Drain: Intact with minimal output in reservoir     Lower Extremity    Lower Extremity Motor : Demonstrates 5/5 active ankle plantar/dorsiflexion/great toe extension    Lower Extremity Sensory: intact    Pulses:  Palpable dorsalis pedis and posterior tibialis pulse, brisk cap refill to toes, foot warm and perfused    Abnormal Exam findings:  none      CBC:   Lab Results   Component Value Date    WBC 6.1 08/05/2020    HGB 15.9 08/05/2020    HCT 46.0 08/05/2020     08/05/2020         ASSESSMENT AND PLAN:  S/P Lumbar decompression L4-S1, I&D lumbar hematoma, removal of synovial cyst L5-S1 on 8/7    · Activity as tolerated in brace. · Deep venous thrombosis prophylaxis - SCDs/TEDS/Early mobilization  · Continue physical therapy in brace. · D/C Planning. Likely discharge later today  · Pain Control  · DC aguilar when pt ambulatory  · DC drain when output < 50ml/Shift. · Discuss with Callum.

## 2020-08-10 ENCOUNTER — CARE COORDINATION (OUTPATIENT)
Dept: CASE MANAGEMENT | Age: 75
End: 2020-08-10

## 2020-08-10 NOTE — CARE COORDINATION
Patient contacted regarding recent discharge and COVID-19 risk. Discussed COVID-19 related testing which was available at this time. Ambulatory test results were negative done on 20 and 8/3/20. Patient informed of results, if available? Yes, patient was already aware of negative results. Care Transition Nurse/ Ambulatory Care Manager contacted the patient by telephone to perform post discharge assessment. Verified name and  with patient as identifiers. Patient has following risk factors of: diabetes. CTN/ACM reviewed discharge instructions, medical action plan and red flags related to discharge diagnosis. Reviewed and educated them on any new and changed medications related to discharge diagnosis. Advised obtaining a 90-day supply of all daily and as-needed medications. Education provided regarding infection prevention, and signs and symptoms of COVID-19 and when to seek medical attention with patient who verbalized understanding. Discussed exposure protocols and quarantine from [address] you at higher risk for severe illness  and given an opportunity for questions and concerns. The patient agrees to contact the COVID-19 hotline [phone] or PCP office for questions related to their healthcare. CTN/ACM provided contact information for future reference. From CDC: Are you at higher risk for severe illness?  Wash your hands often.  Avoid close contact (6 feet, which is about two arm lengths) with people who are sick.  Put distance between yourself and other people if COVID-19 is spreading in your community.  Clean and disinfect frequently touched surfaces.  Avoid all cruise travel and non-essential air travel.  Call your healthcare professional if you have concerns about COVID-19 and your underlying condition or if you are sick.     For more information on steps you can take to protect yourself, see CDC's How to Protect Yourself    Patient declines further calls    [facility] 45 Transitions Initial Follow Up Call    Call within 2 business days of discharge: Yes    Patient: Ervin Galindo Patient : 1945   MRN: [id number]  Reason for Admission: There are no discharge diagnoses documented for the most recent discharge. Discharge Date: 20 RARS: Readmission Risk Score: 17      Last Discharge [facility]       Complaint Diagnosis Description Type Department Provider    20  COVID-19 . .. Admission (Discharged) SEY[facility] 5S NS Tricia Thorpe DO           Spoke with: Ervin Galindo, patient    [place] [facility]    Non-face-to-face services provided:  Scheduled appointment with PCP-patient to call  Scheduled appointment with Specialist-patient aware of ortho(20)and cardiology(20)appointments. Patient states his daughter in law will drive him to ortho appointment  Obtained and reviewed discharge summary and/or continuity of care documents    Care Transitions 24 Hour Call    Do you have any ongoing symptoms?:  No  Do you have a copy of your discharge instructions?:  Yes  Do you have all of your prescriptions and are they filled?:  Yes  Have you been contacted by a [address]?:  No  Have you scheduled your follow up appointment?:  No  Were you discharged with any Home Care or Post Acute Services:  No  Do you feel like you have everything you need to keep you well at home?:  Yes    Care Transitions Interventions  No Identified Needs       -Spoke with patient for initial care transition call post hospital discharge.  -Patient admitted to [facility] ([facility]) on 20 and underwent lumbar decompression L4-S1.  -Patient states he is doing well and painfree at present. Per patient his wife has been performing daily dressing changes without difficulty. -Med review not completed, 1111F not entered. No new/stopped medications per patient and AVS.  -Healthcare decision makers updated in advance care planning tab.   -Patient declines MyChart.  -Patient declines further follow up calls  -CTN to sign off.     Follow Up  Future Appointments   Date Time Provider Shabbir Martínez   9/8/2020  9:30 AM Alfredo Arthur MD [facility]       Chaitanya Cardenas RN

## 2020-08-12 ENCOUNTER — TELEPHONE (OUTPATIENT)
Dept: ADMINISTRATIVE | Age: 75
End: 2020-08-12

## 2020-08-12 ENCOUNTER — NURSE TRIAGE (OUTPATIENT)
Dept: OTHER | Facility: CLINIC | Age: 75
End: 2020-08-12

## 2020-08-12 NOTE — TELEPHONE ENCOUNTER
Reason for Disposition   Systolic BP >= 251 OR Diastolic >= 623    Answer Assessment - Initial Assessment Questions  1. BLOOD PRESSURE: \"What is the blood pressure? \" \"Did you take at least two measurements 5 minutes apart?\"      182/85. Caller only took one measurement today, however BP has been running in this range recently. 2. ONSET: \"When did you take your blood pressure? \"     5 mins before call. 3. HOW: \"How did you obtain the blood pressure? \" (e.g., visiting nurse, automatic home BP monitor)     Automatic home BP monitor   4. HISTORY: \"Do you have a history of high blood pressure? \"     Yes  5. MEDICATIONS: Felecia Peer you taking any medications for blood pressure? \" \"Have you missed any doses recently? \"     Yes, no doses missed. 6. OTHER SYMPTOMS: \"Do you have any symptoms? \" (e.g., headache, chest pain, blurred vision, difficulty breathing, weakness)    Denies. 7. PREGNANCY: \"Is there any chance you are pregnant? \" \"When was your last menstrual period? \"      N/A    Protocols used: HIGH BLOOD PRESSURE-ADULT-OH    Please do not respond to the triage nurse through this encounter. Any subsequent communication should be directly with the patient.

## 2020-08-12 NOTE — TELEPHONE ENCOUNTER
Nurse triage call from Jacklyn Love, pt needs seen today by PCP for elevated BP, PCP unavailable, high priority message sent to Dr Guido Hitchcock to contact pt.

## 2020-08-12 NOTE — OP NOTE
Operative Note      Patient: Artie Perez  YOB: 1945  MRN: [id number]    Date of Procedure: 8/7/2020    Pre-Op Diagnosis: POST OP HEMATOMA    Post-Op Diagnosis: Same       Procedure(s):  LUMBAR DECOMPRESSION L4-S1; IRRIGATION AND DEBRIDEMENT OF LUMBAR HEMATOMA Terrie Jury; REMOVAL SYNOVIAL CYST L5-S1 LEFT    Surgeon(s):  Corby Lomax DO    Assistant:   Physician Assistant: Lydia Mora PA-C    Anesthesia: General    Estimated Blood Loss (mL): less than 388     Complications: None    Specimens:   * No specimens in log *    Implants:  * No implants in log *      Drains:   [REMOVED] Closed/Suction Drain Midline Back Accordion 10 Iranian (Removed)       [REMOVED] Closed/Suction Drain Right Back Accordion 10 Iranian (Removed)   Site Description Unable to view 08/08/20 0800   Dressing Status Clean;Dry; Intact 08/08/20 0800   Drainage Appearance Bloody 08/08/20 0800   Status Compressed 08/08/20 0800   Output (ml) 50 ml 08/08/20 1330       Findings: as above.     Detailed Description of Procedure:   See dictated note    Electronically signed by Corby Lomax DO on 8/12/2020 at 9:11 AM

## 2020-08-12 NOTE — TELEPHONE ENCOUNTER
Call transfer from Nurse Triage [facility], pt needs seen by PCP Dr Karin Hooker for elevated BP, pt took BP running in 180's, currently was 182/85, PCP unavailable. Please contact pt concerning this important matter, per nurse BP medication may need reviewed and adjusted.   [phone]

## 2020-08-13 ENCOUNTER — NURSE ONLY (OUTPATIENT)
Dept: FAMILY MEDICINE CLINIC | Age: 75
End: 2020-08-13

## 2020-08-13 NOTE — OP NOTE
[address]                  [address] [facility]  [facility]                                OPERATIVE REPORT    PATIENT NAME: Etelvina Ponce                     :        1945  MED REC NO:   [id number]                            ROOM:       5215  ACCOUNT NO:   [de-identified]                           ADMIT DATE: 2020  PROVIDER:     Stella Escaalnte DO    DATE OF PROCEDURE:  2020    PREOPERATIVE DIAGNOSES:  1.  Status post postoperative hematoma, lumbar spine. 2.  Synovial cyst L5-S1, left hand side. 3.  L5 nerve root radiculopathy. POSTOPERATIVE DIAGNOSES:  1.  Status post postoperative hematoma, lumbar spine. 2.  Synovial cyst L5-S1, left hand side. 3.  L5 nerve root radiculopathy. OPERATION PERFORMED:  1. Segmental decompressive laminectomy L4, L5, S1.  2.  Irrigation and drainage of a lumbar hematoma seroma. 3.  Removal of cyst L5-S1, left hand side. SURGEON:  Stella Escalante DO    ASSISTANT:  Mika Washington PA-C    COUNTS:  Sponge count and needle count correct. ANESTHESIA:  General endotracheal anesthesia. TOTAL BLOOD LOSS:  100 mL. COMPLICATIONS:  None. DISPOSITION:  Stable. OPERATIVE PROCEDURE:  After thorough consent was obtained from the  patient, the patient understood the risks of the procedure. The patient  was brought to the operating room and underwent successful general  endotracheal anesthesia without difficulty. He was then carefully  rolled to prone position on the operating table. All bony prominences  and neurovascular structures were well padded. Head was placed in a  cradle mott to avoid any pressure upon the globes of the eyes. Mid to  lower back was then sterilely prepped with ChloraPrep solution and  sterilely draped in normal fashion. C-arm control was then brought and  confirmed appropriate level for the skin incision.   A marking pen was  used to prince from the surgical site for this operative procedure. A 1%  lidocaine and 0.5% Marcaine was used to anesthetize the skin down to the  level of the deep structures. A #10 blade was used to make a skin  incision down through the skin, subcutaneous layer down to the level of  deep fascia. Hemostasis was maintained with electrocautery. Periosteal  dissection was then carried out above where he had the laminectomy side,  but also starting off the spinous process at L5, out to the L5 lamina,  out to the L4-L5 facet joint superiorly and the L5-S1 facet joint  inferiorly carrying out to the spinous process of S1. Once I was able  to get in that area, the Leksell rongeur to remove the scar tissue into  the L5-S1 window and I was able to enter the spinal canal.  I took off  the spinous process of L5 in the lamina with the Leksell rongeur. I  segmentally decompressed all the way taking the lamina of L5 out with a  #3 Kerrison carrying it superiorly out to the lateral margins, carrying  out the lateral area and decompressing along the lateral margins. I  went through the edges so I can follow the nerve root so I followed the  L4, L5, and S1 nerve out the neural foramen. There was a large synovial  cyst noted on the left hand side coming off the facet joint so I took  this down, it was nicely opened up. I could see the nerve roots passed  out and decompressed at this region. It was noted that when I entered  the spinal canal, there was a seroma present. I did not see a hematoma,  but a seroma was present, this was evacuated without significant  difficulty that caused some minimal compression, but it was irrigated  and decompressed also. Once I was able to decompress with #3 and #4  Kerrison, I was able to pass the Padmaja Nanci ball-tipped probe out the neural  foramen of L4, L5, S1. The Gelfoam and thrombin was then placed at this  time. The wound was then thoroughly irrigated with 1000 mL of normal  saline with vancomycin wash.   Gelfoam and thrombin

## 2020-08-16 NOTE — DISCHARGE SUMMARY
Physician Discharge Summary     Patient ID:  Rochelle Holder  [id number]  89 y.o.  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 Patient was taken to the PACU and subsequently to the floor in stable condition. Physical therapy was ordered for ROM,  hamstring stretching, and gait training with brace/soft collar in place. Antibiotics were continued 24 hours post-operatively as the patient received a dose of antibiotics pre-operatively. Blood counts were followed daily. On post-operative day 1, the drain was discontinued and dressing was changed revealing the wound to be benign in appearance without any obvious signs of infection including erythema or purulence. On post-operative day 1, the patient had made appropriate gains in pain control, and was discharged to home in stable condition. Treatments: s/p decompressive laminectomy, I&D of seroma, and removal of cyst    Disposition: Patient was given prescriptions for pain medication. The patient was given instructions that they may shower, but not to scrub the wound. Daily dry dressing changes. No driving. Follow up with Madhavi Marr DO in 2 weeks and to call for an appointment.       Signed:  Rona Bhat  8/16/2020  3:47 AM

## 2020-08-20 ENCOUNTER — TELEPHONE (OUTPATIENT)
Dept: FAMILY MEDICINE CLINIC | Age: 75
End: 2020-08-20

## 2020-08-20 ENCOUNTER — NURSE ONLY (OUTPATIENT)
Dept: FAMILY MEDICINE CLINIC | Age: 75
End: 2020-08-20

## 2020-08-20 VITALS — DIASTOLIC BLOOD PRESSURE: 88 MMHG | SYSTOLIC BLOOD PRESSURE: 134 MMHG

## 2020-08-20 PROCEDURE — 2000F BLOOD PRESSURE MEASURE: CPT | Performed by: FAMILY MEDICINE

## 2020-08-20 NOTE — TELEPHONE ENCOUNTER
Pt in for b/p check   132/74   Sees heart dr  9-10-20 the message I put today was an error  This is correct

## 2020-08-21 RX ORDER — METOPROLOL SUCCINATE 50 MG/1
50 TABLET, EXTENDED RELEASE ORAL DAILY
Qty: 30 TABLET | Refills: 0 | Status: SHIPPED
Start: 2020-08-21 | End: 2020-09-08 | Stop reason: SDUPTHER

## 2020-08-25 ENCOUNTER — TELEPHONE (OUTPATIENT)
Dept: CARDIOLOGY CLINIC | Age: 75
End: 2020-08-25

## 2020-08-25 RX ORDER — HYDRALAZINE HYDROCHLORIDE 25 MG/1
25 TABLET, FILM COATED ORAL 4 TIMES DAILY
Qty: 270 TABLET | Refills: 3 | Status: SHIPPED | OUTPATIENT
Start: 2020-08-25 | End: 2021-03-29 | Stop reason: SDUPTHER

## 2020-08-25 NOTE — TELEPHONE ENCOUNTER
Patient called in c/o elevated blood pressure. He states he is drinking 2 cups of coffee daily and is asking if this could be the issue? It is running 160-180 over 75-90    For about 1 week he is taking the metoprolol 50 bid and the norvasc 5mg bid with no relief.   He states he had back surgery - this was when the elevated pressures began

## 2020-09-02 NOTE — TELEPHONE ENCOUNTER
Patient called in with blood pressure readings    8/26 /75  /75  8/27 /83 /80  8/28 /75 /75  8/29 /75 /80  8/30 /75 /81  8/31 /71 /71  9/1  /80 /78  9/2 /76    Feeling pretty good

## 2020-09-05 ENCOUNTER — HOSPITAL ENCOUNTER (OUTPATIENT)
Dept: MRI IMAGING | Age: 75
Discharge: HOME | End: 2020-09-07
Payer: MEDICARE

## 2020-09-05 PROCEDURE — 72141 MRI NECK SPINE W/O DYE: CPT

## 2020-09-06 NOTE — PROGRESS NOTES
[facility] Progress Note  Dr. Benedict Jones      Referring Physician: Paul Greene DO  CHIEF COMPLAINT:   Chief Complaint   Patient presents with    Coronary Artery Disease     6 month check. Post hospital from [facility]. Cyst removed from Lumbar spine. Patient denies any cp sob or dizziness. HISTORY OF PRESENT ILLNESS:   Patient is a 66years old male with history of CAD status post previous PCI's, coronary artery vasospasm, atrial fibrillation, hypertension, hyperlipidemia, who for follow-up appointment. Patient is complaining of chest pain, right-sided, radiating to the right shoulder, sometimes left-sided, last for a few seconds and it goes away, nonexertional, occasional palpitations, occasional shortness of breath, no pedal edema, no PND, no orthopnea, no syncope, no presyncopal episodes. Recent back surgery for synovial cyst        Past Medical History:   Diagnosis Date    Abnormal computed tomography angiography of heart 07/19/2019    Calcified plaque proximal RCA with 50-70% stenosis, proximal LAD 50% stenosis, mild LAD 30-50% stenosis, groundglass infiltrates, area of consoldidation left lung base posteriorly.       Arthritis     Atrial fibrillation (HCC)     Burning pain     CAD (coronary artery disease)     Degenerative lumbar disc     Diabetes ([address]     H/O cardiovascular stress test 02/19/2020    Lexiscan    Herniated cervical disc     History of echocardiogram 03/16/2017    EF 60-65%    Hx of blood clots     Hyperlipidemia     Hypertension     Stented coronary artery     Tinnitus     Urinary retention 3/17/2017         Past Surgical History:   Procedure Laterality Date    ABLATION OF DYSRHYTHMIC FOCUS  1980    APPROX 30-35 YRS AGO    CARDIAC CATHETERIZATION  02/20/2020    Dr Carlos Mattson  2010 sigmoid    COLONOSCOPY  5/2018 [facility]    CORONARY ANGIOPLASTY WITH STENT PLACEMENT      DIAGNOSTIC CARDIAC CATH LAB PROCEDURE      ENDOSCOPY, COLON, DIAGNOSTIC      insecurity     Worry: Not on file     Inability: Not on file    Transportation needs     Medical: Not on file     Non-medical: Not on file   Tobacco Use    Smoking status: Former Smoker     Packs/day: 0.00     Years: 16.00     Pack years: 0.00     Last attempt to quit: 1977     Years since quittin.7    Smokeless tobacco: Former User    Tobacco comment: quit in    Substance and Sexual Activity    Alcohol use: No     Alcohol/week: 0.0 standard drinks     Comment: 1-2 cups coffee daily    Drug use: No    Sexual activity: Never   Lifestyle    Physical activity     Days per week: Not on file     Minutes per session: Not on file    Stress: Not on file   Relationships    Social connections     Talks on phone: Not on file     Gets together: Not on file     Attends Sikhism service: Not on file     Active member of club or organization: Not on file     Attends meetings of clubs or organizations: Not on file     Relationship status: Not on file    Intimate partner violence     Fear of current or ex partner: Not on file     Emotionally abused: Not on file     Physically abused: Not on file     Forced sexual activity: Not on file   Other Topics Concern    Not on file   Social History Narrative    Not on file       Family History   Problem Relation Age of Onset    Stroke Mother     Other Father     Kidney Disease Brother     Arrhythmia Brother     Cancer Brother     Kidney Disease Sister     Hypertension Other     Diabetes Other        REVIEW OF SYSTEMS:     CONSTITUTIONAL:  negative for  fevers, chills, sweats and fatigue  HEENT:  negative for  tinnitus, earaches, nasal congestion and epistaxis  RESPIRATORY:  negative for  dry cough, cough with sputum, dyspnea, wheezing and hemoptysis  GASTROINTESTINAL:  negative for nausea, vomiting, diarrhea, constipation, pruritus and jaundice  HEMATOLOGIC/LYMPHATIC:  negative for easy bruising, bleeding, lymphadenopathy and petechiae  ENDOCRINE:  negative ventricular internal dimensions were normal in diastole and systole. Borderline concentric left ventricular hypertrophy. Normal left ventricular ejection fraction. Mild mitral regurgitation is present. Mild tricuspid regurgitation. Pulmonary hypertension is mild . Fat pad versus small effusion cannot be ruled out. Stress Test: 2/19/20   Findings:    Both the stress and resting myocardial images are normal.       The gated left ventricular ejection fraction is 70 %.             Impression   No evidence of stress-induced ischemia. Angiography: 2/20/20 IMPRESSION:  1. Stenosis of the mid RCA distal to the previously deployed stent that  was around 80%, reduced to 20% to 30% after IC nitroglycerin, suspect  vasospasm. 2.  Patent stents in diagonal branch with 40% proximal LAD disease. 3.  Patent stent in OM branch and no significant disease in left  circumflex artery. 4.  Patent stent in RCA.     Cardiology Labs: BMP:    Lab Results   Component Value Date     08/08/2020    K 4.0 08/08/2020    K 4.3 03/04/2020    CL 99 08/08/2020    CO2 21 08/08/2020    BUN 11 08/08/2020    CREATININE 0.9 08/08/2020     CMP:    Lab Results   Component Value Date     08/08/2020    K 4.0 08/08/2020    K 4.3 03/04/2020    CL 99 08/08/2020    CO2 21 08/08/2020    BUN 11 08/08/2020    CREATININE 0.9 08/08/2020    PROT 7.9 05/20/2020     CBC:    Lab Results   Component Value Date    WBC 9.9 08/08/2020    RBC 5.21 08/08/2020    HGB 15.6 08/08/2020    HCT 45.3 08/08/2020    MCV 86.9 08/08/2020    RDW 12.6 08/08/2020     08/08/2020     PT/INR:  No results found for: PTINR  PT/INR Warfarin:  No components found for: PTPATWAR, PTINRWAR  PTT:    Lab Results   Component Value Date    APTT 92.2 02/19/2020     PTT Heparin:  No components found for: APTTHEP  Magnesium:    Lab Results   Component Value Date    MG 2.1 08/14/2017     TSH:    Lab Results   Component Value Date    TSH 3.200 07/16/2019 TROPONIN:  No components found for: TROP  BNP:  No results found for: BNP  FASTING LIPID PANEL:    Lab Results   Component Value Date    CHOL 128 02/19/2020    HDL 45 02/19/2020    TRIG 123 02/19/2020     No orders to display     I have personally reviewed the laboratory, cardiac diagnostic and radiographic testing as outlined above:      IMPRESSION:  1. Coronary vasospasm: will continue current treatment  2. History of CAD recent cardiac catheterization showed patent stents in diagonal branch OM branch and RCA, will continue antiplatelet therapy  3. History of SVT: Status post ablation years ago  4. Hypertension: Controlled, continue current treatment  5. Hyperlipidemia: On statin  6. Type 2 diabetes mellitus    RECOMMENDATIONS:   1. Continue current treatment  2. Preventive cardiology: Low-salt, low-cholesterol diet, daily exercise, total cholesterol of less than 200, LDL of less than 70, adherence to diabetic diet and diabetic medications, were all advised. 3.  Follow-up with Dr. Shellie Antonio as scheduled  4. Follow-up with Dr. Anita Solitario in 9 months, sooner if symptomatic for any reason    I have reviewed my findings and recommendations with patient    Electronically signed by Marilyn Gallo MD on 9/8/2020 at 10:25 AM  NOTE: This report was transcribed using voice recognition software.  Every effort was made to ensure accuracy; however, inadvertent computerized transcription errors may be present

## 2020-09-08 ENCOUNTER — OFFICE VISIT (OUTPATIENT)
Dept: CARDIOLOGY CLINIC | Age: 75
End: 2020-09-08
Payer: MEDICARE

## 2020-09-08 VITALS
HEART RATE: 70 BPM | WEIGHT: 191 LBS | BODY MASS INDEX: 28.29 KG/M2 | HEIGHT: 69 IN | SYSTOLIC BLOOD PRESSURE: 124 MMHG | DIASTOLIC BLOOD PRESSURE: 64 MMHG

## 2020-09-08 PROCEDURE — 99213 OFFICE O/P EST LOW 20 MIN: CPT | Performed by: INTERNAL MEDICINE

## 2020-09-08 PROCEDURE — 93000 ELECTROCARDIOGRAM COMPLETE: CPT | Performed by: INTERNAL MEDICINE

## 2020-09-08 RX ORDER — METOPROLOL SUCCINATE 50 MG/1
50 TABLET, EXTENDED RELEASE ORAL 2 TIMES DAILY
Qty: 180 TABLET | Refills: 3 | Status: SHIPPED
Start: 2020-09-08 | End: 2020-09-22 | Stop reason: SDUPTHER

## 2020-09-08 RX ORDER — CLOPIDOGREL BISULFATE 75 MG/1
75 TABLET ORAL DAILY
Qty: 90 TABLET | Refills: 3 | Status: SHIPPED
Start: 2020-09-08 | End: 2020-09-22 | Stop reason: SDUPTHER

## 2020-09-14 ENCOUNTER — TELEPHONE (OUTPATIENT)
Dept: FAMILY MEDICINE CLINIC | Age: 75
End: 2020-09-14

## 2020-09-14 NOTE — TELEPHONE ENCOUNTER
Patient called to speak with General Moni MA, to follow up on forms he stated he dropped off at the [facility] on Friday, September 11th, at the [facility], from Dr. Gabo Morrow. Patient stated he is going to be having another SX and needs paperwork completed.

## 2020-09-22 ENCOUNTER — HOSPITAL ENCOUNTER (OUTPATIENT)
Age: 75
Discharge: HOME | End: 2020-09-24
Payer: MEDICARE

## 2020-09-22 ENCOUNTER — OFFICE VISIT (OUTPATIENT)
Dept: FAMILY MEDICINE CLINIC | Age: 75
End: 2020-09-22
Payer: MEDICARE

## 2020-09-22 VITALS
HEIGHT: 69 IN | WEIGHT: 191 LBS | HEART RATE: 74 BPM | OXYGEN SATURATION: 97 % | RESPIRATION RATE: 18 BRPM | DIASTOLIC BLOOD PRESSURE: 80 MMHG | SYSTOLIC BLOOD PRESSURE: 126 MMHG | BODY MASS INDEX: 28.29 KG/M2

## 2020-09-22 LAB
ALBUMIN SERPL-MCNC: 4.9 G/DL (ref 3.5–5.2)
ALP BLD-CCNC: 127 U/L (ref 40–129)
ALT SERPL-CCNC: 19 U/L (ref 0–40)
ANION GAP SERPL CALCULATED.3IONS-SCNC: 22 MMOL/L (ref 7–16)
AST SERPL-CCNC: 29 U/L (ref 0–39)
BASOPHILS ABSOLUTE: 0.05 E9/L (ref 0–0.2)
BASOPHILS RELATIVE PERCENT: 0.7 % (ref 0–2)
BILIRUB SERPL-MCNC: 0.4 MG/DL (ref 0–1.2)
BUN BLDV-MCNC: 11 MG/DL (ref 8–23)
CALCIUM SERPL-MCNC: 10.2 MG/DL (ref 8.6–10.2)
CHLORIDE BLD-SCNC: 106 MMOL/L (ref 98–107)
CHOLESTEROL, TOTAL: 156 MG/DL (ref 0–199)
CO2: 20 MMOL/L (ref 22–29)
CREAT SERPL-MCNC: 1.1 MG/DL (ref 0.7–1.2)
EOSINOPHILS ABSOLUTE: 0.27 E9/L (ref 0.05–0.5)
EOSINOPHILS RELATIVE PERCENT: 3.8 % (ref 0–6)
GFR AFRICAN AMERICAN: >60
GFR NON-AFRICAN AMERICAN: >60 ML/MIN/1.73
GLUCOSE BLD-MCNC: 124 MG/DL (ref 74–99)
HBA1C MFR BLD: 6 % (ref 4–5.6)
HCT VFR BLD CALC: 51.4 % (ref 37–54)
HDLC SERPL-MCNC: 46 MG/DL
HEMOGLOBIN: 17.4 G/DL (ref 12.5–16.5)
IMMATURE GRANULOCYTES #: 0.03 E9/L
IMMATURE GRANULOCYTES %: 0.4 % (ref 0–5)
LDL CHOLESTEROL CALCULATED: 70 MG/DL (ref 0–99)
LYMPHOCYTES ABSOLUTE: 2.93 E9/L (ref 1.5–4)
LYMPHOCYTES RELATIVE PERCENT: 41.6 % (ref 20–42)
MCH RBC QN AUTO: 30.4 PG (ref 26–35)
MCHC RBC AUTO-ENTMCNC: 33.9 % (ref 32–34.5)
MCV RBC AUTO: 89.7 FL (ref 80–99.9)
MICROALBUMIN UR-MCNC: 35.9 MG/L
MONOCYTES ABSOLUTE: 0.47 E9/L (ref 0.1–0.95)
MONOCYTES RELATIVE PERCENT: 6.7 % (ref 2–12)
NEUTROPHILS ABSOLUTE: 3.3 E9/L (ref 1.8–7.3)
NEUTROPHILS RELATIVE PERCENT: 46.8 % (ref 43–80)
PDW BLD-RTO: 13.2 FL (ref 11.5–15)
PLATELET # BLD: 221 E9/L (ref 130–450)
PMV BLD AUTO: 10.6 FL (ref 7–12)
POTASSIUM SERPL-SCNC: 5.2 MMOL/L (ref 3.5–5)
RBC # BLD: 5.73 E12/L (ref 3.8–5.8)
SODIUM BLD-SCNC: 148 MMOL/L (ref 132–146)
TOTAL PROTEIN: 7.9 G/DL (ref 6.4–8.3)
TRIGL SERPL-MCNC: 200 MG/DL (ref 0–149)
TSH SERPL DL<=0.05 MIU/L-ACNC: 2.8 UIU/ML (ref 0.27–4.2)
VLDLC SERPL CALC-MCNC: 40 MG/DL
WBC # BLD: 7.1 E9/L (ref 4.5–11.5)

## 2020-09-22 PROCEDURE — 4040F PNEUMOC VAC/ADMIN/RCVD: CPT | Performed by: FAMILY MEDICINE

## 2020-09-22 PROCEDURE — 82044 UR ALBUMIN SEMIQUANTITATIVE: CPT

## 2020-09-22 PROCEDURE — 85025 COMPLETE CBC W/AUTO DIFF WBC: CPT

## 2020-09-22 PROCEDURE — 80061 LIPID PANEL: CPT

## 2020-09-22 PROCEDURE — 80053 COMPREHEN METABOLIC PANEL: CPT

## 2020-09-22 PROCEDURE — 1123F ACP DISCUSS/DSCN MKR DOCD: CPT | Performed by: FAMILY MEDICINE

## 2020-09-22 PROCEDURE — 84443 ASSAY THYROID STIM HORMONE: CPT

## 2020-09-22 PROCEDURE — G0439 PPPS, SUBSEQ VISIT: HCPCS | Performed by: FAMILY MEDICINE

## 2020-09-22 PROCEDURE — 83036 HEMOGLOBIN GLYCOSYLATED A1C: CPT

## 2020-09-22 PROCEDURE — 36415 COLL VENOUS BLD VENIPUNCTURE: CPT

## 2020-09-22 RX ORDER — FOLIC ACID 1 MG/1
1 TABLET ORAL DAILY
Qty: 30 TABLET | Refills: 5 | Status: SHIPPED | OUTPATIENT
Start: 2020-09-22 | End: 2021-03-29 | Stop reason: SDUPTHER

## 2020-09-22 RX ORDER — METOPROLOL SUCCINATE 50 MG/1
50 TABLET, EXTENDED RELEASE ORAL 2 TIMES DAILY
Qty: 180 TABLET | Refills: 3 | Status: SHIPPED | OUTPATIENT
Start: 2020-09-22 | End: 2021-03-29 | Stop reason: SDUPTHER

## 2020-09-22 RX ORDER — CLOPIDOGREL BISULFATE 75 MG/1
75 TABLET ORAL DAILY
Qty: 90 TABLET | Refills: 3 | Status: SHIPPED | OUTPATIENT
Start: 2020-09-22 | End: 2021-03-29 | Stop reason: SDUPTHER

## 2020-09-22 RX ORDER — GLYCOPYRROLATE 1 MG/1
1 TABLET ORAL 3 TIMES DAILY
Qty: 270 TABLET | Refills: 3 | Status: SHIPPED | OUTPATIENT
Start: 2020-09-22 | End: 2021-03-29 | Stop reason: SDUPTHER

## 2020-09-22 RX ORDER — GLIMEPIRIDE 2 MG/1
2 TABLET ORAL
Qty: 30 TABLET | Refills: 5 | Status: SHIPPED
Start: 2020-09-22 | End: 2021-09-29 | Stop reason: SDUPTHER

## 2020-09-22 RX ORDER — PANTOPRAZOLE SODIUM 40 MG/1
40 TABLET, DELAYED RELEASE ORAL DAILY PRN
Qty: 30 TABLET | Refills: 5 | Status: SHIPPED | OUTPATIENT
Start: 2020-09-22 | End: 2021-03-29 | Stop reason: SDUPTHER

## 2020-09-22 RX ORDER — AMLODIPINE BESYLATE 5 MG/1
5 TABLET ORAL 2 TIMES DAILY
Qty: 180 TABLET | Refills: 3 | Status: SHIPPED
Start: 2020-09-22 | End: 2021-03-29 | Stop reason: SINTOL

## 2020-09-22 RX ORDER — PRAVASTATIN SODIUM 10 MG
TABLET ORAL
Qty: 90 TABLET | Refills: 5 | Status: SHIPPED | OUTPATIENT
Start: 2020-09-22 | End: 2021-03-29 | Stop reason: SDUPTHER

## 2020-09-22 RX ORDER — CYCLOBENZAPRINE HCL 10 MG
10 TABLET ORAL 3 TIMES DAILY PRN
Qty: 90 TABLET | Refills: 5 | Status: SHIPPED
Start: 2020-09-22 | End: 2021-09-29 | Stop reason: SDUPTHER

## 2020-09-22 ASSESSMENT — PATIENT HEALTH QUESTIONNAIRE - PHQ9
2. FEELING DOWN, DEPRESSED OR HOPELESS: 0
1. LITTLE INTEREST OR PLEASURE IN DOING THINGS: 0
SUM OF ALL RESPONSES TO PHQ9 QUESTIONS 1 & 2: 0
SUM OF ALL RESPONSES TO PHQ QUESTIONS 1-9: 0
SUM OF ALL RESPONSES TO PHQ QUESTIONS 1-9: 0

## 2020-09-28 NOTE — PATIENT INSTRUCTIONS
Personalized Preventive Plan for Particia Vanessa - 9/22/2020  Medicare offers a range of preventive health benefits. Some of the tests and screenings are paid in full while other may be subject to a deductible, co-insurance, and/or copay. Some of these benefits include a comprehensive review of your medical history including lifestyle, illnesses that may run in your family, and various assessments and screenings as appropriate. After reviewing your medical record and screening and assessments performed today your provider may have ordered immunizations, labs, imaging, and/or referrals for you. A list of these orders (if applicable) as well as your Preventive Care list are included within your After Visit Summary for your review. Other Preventive Recommendations:    · A preventive eye exam performed by an eye specialist is recommended every 1-2 years to screen for glaucoma; cataracts, macular degeneration, and other eye disorders. · A preventive dental visit is recommended every 6 months. · Try to get at least 150 minutes of exercise per week or 10,000 steps per day on a pedometer . · Order or download the FREE \"Exercise & Physical Activity: Your Everyday Guide\" from [employer]. Call 8-761.590.4487 or search [employer] online. · You need 5127-2068 mg of calcium and 8649-9855 IU of vitamin D per day. It is possible to meet your calcium requirement with diet alone, but a vitamin D supplement is usually necessary to meet this goal.  · When exposed to the sun, use a sunscreen that protects against both UVA and UVB radiation with an SPF of 30 or greater. Reapply every 2 to 3 hours or after sweating, drying off with a towel, or swimming. · Always wear a seat belt when traveling in a car. Always wear a helmet when riding a bicycle or motorcycle.

## 2020-09-28 NOTE — PROGRESS NOTES
Medicare Annual Wellness Visit  Name: Elvie Baker Date: 2020   MRN: <[id number]> Sex: Male   Age: 66 y.o. Ethnicity: Non-/Non    : 1945 Race: Devonte Guevara is here for Medicare AWV    Screenings for behavioral, psychosocial and functional/safety risks, and cognitive dysfunction are all negative except as indicated below. These results, as well as other patient data from the [address] form, are documented in Flowsheets linked to this Encounter. Allergies   Allergen Reactions    Imdur [Isosorbide Nitrate] Other (See Comments)     headache    Oxycodone Rash    Simvastatin Other (See Comments)     Muscle weakness    Bactrim [Sulfamethoxazole-Trimethoprim] Nausea Only    Ciprofloxacin Hcl Other (See Comments)     \"Heel/ tendon pain\"    Gabapentin Other (See Comments)     Constipation      Hydrocodone Nausea Only    Norco [Hydrocodone-Acetaminophen] Nausea Only and Other (See Comments)     Causes sick feeling in head         Prior to Visit Medications    Medication Sig Taking?  Authorizing Provider   cyclobenzaprine (FLEXERIL) 10 MG tablet Take 1 tablet by mouth 3 times daily as needed for Muscle spasms Yes Simran Zigeler, DO   glimepiride (AMARYL) 2 MG tablet Take 1 tablet by mouth every morning (before breakfast) Yes Simran Ziegler DO   folic acid (FOLVITE) 1 MG tablet Take 1 tablet by mouth daily Yes Simran Ziegler DO   glycopyrrolate (ROBINUL) 1 MG tablet Take 1 tablet by mouth 3 times daily Yes Simran Ziegler DO   pantoprazole (PROTONIX) 40 MG tablet Take 1 tablet by mouth daily as needed (Reflux) Yes Simran Ziegler DO   pravastatin (PRAVACHOL) 10 MG tablet TAKE 1 TABLET BY MOUTH DAILY Yes Simran Ziegler DO   metoprolol succinate (TOPROL XL) 50 MG extended release tablet Take 1 tablet by mouth 2 times daily Yes Simran Ziegler DO   amLODIPine (NORVASC) 5 MG tablet Take 1 tablet by mouth 2 times daily Yes Simran Ziegler, DO clopidogrel (PLAVIX) 75 MG tablet Take 1 tablet by mouth daily Yes Clemetine Saint,    hydrALAZINE (APRESOLINE) 25 MG tablet Take 1 tablet by mouth 4 times daily Yes Jess Paulson MD   finasteride (PROSCAR) 5 MG tablet Take 5 mg by mouth daily Yes Historical Provider, MD   ammonium lactate (LAC-HYDRIN) 12 % lotion Apply 1 g topically daily as needed for Dry Skin Yes Historical Provider, MD   aspirin 81 MG tablet Take 81 mg by mouth Daily with lunch  Yes Historical Provider, MD       Past Medical History:   Diagnosis Date    Abnormal computed tomography angiography of heart 07/19/2019    Calcified plaque proximal RCA with 50-70% stenosis, proximal LAD 50% stenosis, mild LAD 30-50% stenosis, groundglass infiltrates, area of consoldidation left lung base posteriorly.       Arthritis     Atrial fibrillation (HCC)     Burning pain     CAD (coronary artery disease)     Degenerative lumbar disc     Diabetes ([address]     H/O cardiovascular stress test 02/19/2020    Lexiscan    Herniated cervical disc     History of echocardiogram 03/16/2017    EF 60-65%    Hx of blood clots     Hyperlipidemia     Hypertension     Stented coronary artery     Tinnitus     Urinary retention 3/17/2017       Past Surgical History:   Procedure Laterality Date    ABLATION OF DYSRHYTHMIC FOCUS  1980    APPROX 30-35 YRS AGO    CARDIAC CATHETERIZATION  02/20/2020    Dr Huey Laguerre  2010 sigmoid    COLONOSCOPY  5/2018 [facility]    CORONARY ANGIOPLASTY WITH STENT PLACEMENT      DIAGNOSTIC CARDIAC CATH LAB PROCEDURE      ENDOSCOPY, COLON, DIAGNOSTIC      EPIDURAL STEROID INJECTION N/A 12/11/2018    C7-T1 EPIDURAL STEROID INJECTION performed by Cash Shah DO at [place] [address] 1/8/2019    C7 - T1 EPIDURAL STEROID INJECTION #2 performed by [facility] DO at [facility] [address]  2008    LAMINECTOMY N/A 6/5/2020    LUMBAR (Cardiology)    Wt Readings from Last 3 Encounters:   09/22/20 191 lb (86.6 kg)   09/08/20 191 lb (86.6 kg)   08/08/20 166 lb 11.2 oz (75.6 kg)     Vitals:    09/22/20 1033   BP: 126/80   Site: Left Upper Arm   Position: Sitting   Pulse: 74   Resp: 18   SpO2: 97%   Weight: 191 lb (86.6 kg)   Height: 5' 9\" (1.753 m)     Body mass index is 28.21 kg/m². Based upon direct observation of the patient, evaluation of cognition reveals recent and remote memory intact. Patient's complete Health Risk Assessment and screening values have been reviewed and are found in Flowsheets. The following problems were reviewed today and where indicated follow up appointments were made and/or referrals ordered. Positive Risk Factor Screenings with Interventions:     General Health:  General  In general, how would you say your health is?: Good  In the past 7 days, have you experienced any of the following? New or Increased Pain, New or Increased Fatigue, Loneliness, Social Isolation, Stress or Anger?: (!) New or Increased Pain  Do you get the social and emotional support that you need?: Yes  Do you have a Living Will?: Yes  General Health Risk Interventions:  · Fatigue: regular exercise recommended- 3-5 times per week, 30-45 minutes per session    Health Habits/Nutrition:  Health Habits/Nutrition  Do you exercise for at least 20 minutes 2-3 times per week?: (!) No  Have you lost any weight without trying in the past 3 months?: No  Do you eat fewer than 2 meals per day?: No  Have you seen a dentist within the past year?: (!) No  Body mass index is 28.21 kg/m².   Health Habits/Nutrition Interventions:  · Inadequate physical activity:  patient agrees to exercise for at least 150 minutes/week    Personalized Preventive Plan   Current Health Maintenance Status  Immunization History   Administered Date(s) Administered    Influenza Virus Vaccine 09/20/2018, 09/20/2019    Influenza, Quadv, IM, (6 mo and older Fluzone, Flulaval, maintenance report. Patient is aware of deficiencies and suggested preventative tests.

## 2020-09-29 NOTE — PROGRESS NOTES
Patient having covid testing at [facility] on 10/5/20. Patient asked to bring ID and to self quarantine until day of procedure.

## 2020-10-01 ENCOUNTER — TELEPHONE (OUTPATIENT)
Dept: CARDIOLOGY CLINIC | Age: 75
End: 2020-10-01

## 2020-10-01 RX ORDER — SODIUM CHLORIDE 0.9 % (FLUSH) 0.9 %
10 SYRINGE (ML) INJECTION EVERY 12 HOURS SCHEDULED
Status: CANCELLED | OUTPATIENT
Start: 2020-10-01

## 2020-10-01 RX ORDER — SODIUM CHLORIDE 0.9 % (FLUSH) 0.9 %
10 SYRINGE (ML) INJECTION PRN
Status: CANCELLED | OUTPATIENT
Start: 2020-10-01

## 2020-10-01 RX ORDER — TRAMADOL HYDROCHLORIDE 50 MG/1
50 TABLET ORAL EVERY 6 HOURS PRN
COMMUNITY
End: 2021-03-29 | Stop reason: SDUPTHER

## 2020-10-01 NOTE — PROGRESS NOTES
[facility] PRE-ADMISSION TESTING GENERAL INSTRUCTIONS- formerly [facility]-phone number:[phone]    GENERAL INSTRUCTIONS    [x] Pollo wipe instruction sheet and wipes given. [x] Nothing by mouth after midnight, including gum, candy, mints, or water. [x] You may brush your teeth, gargle, but do NOT swallow water. [x]No smoking, chewing tobacco, illegal drugs, or alcohol within 24 hours of your surgery. [x] Jewelry, valuables or body piercing's should not be brought to the hospital. All body and/or tongue piercing's must be removed prior to arriving to hospital.  ALL hair pins must be removed. [x] Do not wear makeup, lotions, powders, deodorant. Nail polish as directed by the nurse. [x] Arrange transportation with a responsible adult  to and from the hospital. If you do not have a responsible adult  to transport you, you will need to make arrangements with a medical transportation company (i.e. [employer]. A Uber/taxi/bus is not appropriate unless you are accompanied by a responsible adult ). Arrange for someone to be with you for the remainder of the day and for 24 hours after your procedure due to having had anesthesia. Who will be your  for transportation?____FAMILY_____________  [x] Bring insurance card and photo ID. [x] Transfusion Bracelet: Please bring with you to hospital, day of surgery       PARKING INSTRUCTIONS:   [x] Arrival Time:___0930_________  [x] PLEASE ARRIVE 10/9/2020  [facility] ENTRANCE 0930. SURGERY WITH DR Holland Rey. YOU WILL BE DIRECTED TO PRE OP. [x] To reach the [facility] from [address], upon entering the hospital, take elevator B to the 3rd floor. EDUCATION INSTRUCTIONS:        [x] Pre-admission Testing educational folder given  [x] Incentive Spirometry,coughing & deep breathing exercises reviewed. [x]Medication information sheet(s)   [x]Fluoroscopy-Xray used in surgery reviewed with patient. Educational pamphlet placed in chart. [x]Pain: Post-op pain is normal and to be expected. You will be asked to rate your pain from 0-10(a zero is not acceptable-education is needed). Your post-op pain goal is:3  [x] Ask your nurse for your pain medication. ____________________    MEDICATION INSTRUCTIONS:   [x]Bring a complete list of your medications, please write the last time you took the medicine, give this list to the nurse. [x] Take the following medications the morning of surgery with 1-2 ounces of water: SEE LIST  [x] Stop herbal supplements and vitamins 5 days before your surgery. [x] DO NOT take any diabetic medicine the morning of surgery. Follow instructions for insulin the day before surgery. [x] If you are diabetic and your blood sugar is low or you feel symptomatic, you may drink 1-2 ounces of apple juice or take a glucose tablet. The morning of your procedure, you may call the pre-op area if you have concerns about your blood sugar [phone]. [] Use your inhalers the morning of surgery. Bring your emergency inhaler with you day of surgery. [x] Follow physician instructions regarding any blood thinners you may be taking. WHAT TO EXPECT:  [x] The day of surgery you will be greeted and checked in by the [facility].  In addition, you will be registered in the [place] by a Patient Access Representative. Please bring your photo ID and insurance card. A nurse will greet you in accordance to the time you are needed in the pre-op area to prepare you for surgery. Please do not be discouraged if you are not greeted in the order you arrive as there are many variables that are involved in patient preparation. Your patience is greatly appreciated as you wait for your nurse. Please bring in items such as: books, magazines, newspapers, electronics, or any other items  to occupy your time in the waiting area.     [x]  Delays may occur with surgery and staff will make a sincere effort

## 2020-10-05 ENCOUNTER — HOSPITAL ENCOUNTER (OUTPATIENT)
Dept: PREADMISSION TESTING | Age: 75
Discharge: HOME | End: 2020-10-05
Payer: MEDICARE

## 2020-10-05 ENCOUNTER — HOSPITAL ENCOUNTER (OUTPATIENT)
Age: 75
Discharge: HOME | End: 2020-10-05
Payer: MEDICARE

## 2020-10-05 ENCOUNTER — HOSPITAL ENCOUNTER (OUTPATIENT)
Dept: GENERAL RADIOLOGY | Age: 75
Discharge: HOME | End: 2020-10-07
Payer: MEDICARE

## 2020-10-05 ENCOUNTER — HOSPITAL ENCOUNTER (OUTPATIENT)
Age: 75
Discharge: HOME | End: 2020-10-07
Payer: MEDICARE

## 2020-10-05 VITALS
HEART RATE: 65 BPM | SYSTOLIC BLOOD PRESSURE: 145 MMHG | HEIGHT: 69 IN | DIASTOLIC BLOOD PRESSURE: 67 MMHG | TEMPERATURE: 98.1 F | BODY MASS INDEX: 28.29 KG/M2 | OXYGEN SATURATION: 96 % | WEIGHT: 191 LBS | RESPIRATION RATE: 12 BRPM

## 2020-10-05 LAB
ABO/RH: NORMAL
ANION GAP SERPL CALCULATED.3IONS-SCNC: 11 MMOL/L (ref 7–16)
ANTIBODY SCREEN: NORMAL
BASOPHILS ABSOLUTE: 0.05 E9/L (ref 0–0.2)
BASOPHILS RELATIVE PERCENT: 0.7 % (ref 0–2)
BUN BLDV-MCNC: 12 MG/DL (ref 8–23)
CALCIUM SERPL-MCNC: 10.2 MG/DL (ref 8.6–10.2)
CHLORIDE BLD-SCNC: 100 MMOL/L (ref 98–107)
CO2: 29 MMOL/L (ref 22–29)
CREAT SERPL-MCNC: 1.1 MG/DL (ref 0.7–1.2)
EOSINOPHILS ABSOLUTE: 0.24 E9/L (ref 0.05–0.5)
EOSINOPHILS RELATIVE PERCENT: 3.3 % (ref 0–6)
GFR AFRICAN AMERICAN: >60
GFR NON-AFRICAN AMERICAN: >60 ML/MIN/1.73
GLUCOSE BLD-MCNC: 122 MG/DL (ref 74–99)
HCT VFR BLD CALC: 49.4 % (ref 37–54)
HEMOGLOBIN: 17.1 G/DL (ref 12.5–16.5)
IMMATURE GRANULOCYTES #: 0.01 E9/L
IMMATURE GRANULOCYTES %: 0.1 % (ref 0–5)
INR BLD: 1
LYMPHOCYTES ABSOLUTE: 2.67 E9/L (ref 1.5–4)
LYMPHOCYTES RELATIVE PERCENT: 37.2 % (ref 20–42)
MCH RBC QN AUTO: 30.1 PG (ref 26–35)
MCHC RBC AUTO-ENTMCNC: 34.6 % (ref 32–34.5)
MCV RBC AUTO: 87 FL (ref 80–99.9)
MONOCYTES ABSOLUTE: 0.47 E9/L (ref 0.1–0.95)
MONOCYTES RELATIVE PERCENT: 6.6 % (ref 2–12)
NEUTROPHILS ABSOLUTE: 3.73 E9/L (ref 1.8–7.3)
NEUTROPHILS RELATIVE PERCENT: 52.1 % (ref 43–80)
PDW BLD-RTO: 13.2 FL (ref 11.5–15)
PLATELET # BLD: 212 E9/L (ref 130–450)
PMV BLD AUTO: 10 FL (ref 7–12)
POTASSIUM SERPL-SCNC: 4.8 MMOL/L (ref 3.5–5)
PROTHROMBIN TIME: 11.1 SEC (ref 9.3–12.4)
RBC # BLD: 5.68 E12/L (ref 3.8–5.8)
SODIUM BLD-SCNC: 140 MMOL/L (ref 132–146)
WBC # BLD: 7.2 E9/L (ref 4.5–11.5)

## 2020-10-05 PROCEDURE — U0003 INFECTIOUS AGENT DETECTION BY NUCLEIC ACID (DNA OR RNA); SEVERE ACUTE RESPIRATORY SYNDROME CORONAVIRUS 2 (SARS-COV-2) (CORONAVIRUS DISEASE [COVID-19]), AMPLIFIED PROBE TECHNIQUE, MAKING USE OF HIGH THROUGHPUT TECHNOLOGIES AS DESCRIBED BY CMS-2020-01-R: HCPCS

## 2020-10-05 PROCEDURE — 85610 PROTHROMBIN TIME: CPT

## 2020-10-05 PROCEDURE — 86850 RBC ANTIBODY SCREEN: CPT

## 2020-10-05 PROCEDURE — 71046 X-RAY EXAM CHEST 2 VIEWS: CPT

## 2020-10-05 PROCEDURE — 85025 COMPLETE CBC W/AUTO DIFF WBC: CPT

## 2020-10-05 PROCEDURE — 80048 BASIC METABOLIC PNL TOTAL CA: CPT

## 2020-10-05 PROCEDURE — 86901 BLOOD TYPING SEROLOGIC RH(D): CPT

## 2020-10-05 PROCEDURE — 36415 COLL VENOUS BLD VENIPUNCTURE: CPT

## 2020-10-05 PROCEDURE — 87081 CULTURE SCREEN ONLY: CPT

## 2020-10-05 PROCEDURE — 86900 BLOOD TYPING SEROLOGIC ABO: CPT

## 2020-10-05 NOTE — PROGRESS NOTES
Saw pt in PAT, reviewed:    Surgical Procedure-reviewed procedure and post-op events:pre-op/PACU, Unit admission, PT/OT evaluation, Discharge [facility] [address] Stay expectations-reviewed importance of early mobilization. Instructions of Spine Precautions given-PT to review on evaluation. Surgical Pathway Booklet-reviewed and given  Post-op/Discharge Instructions-reviewed activity allowances/restrictions  Use of Incentive Spirometer-instructed on importance of use post-op and continued use @ home to prevent complications. Instructions on use given  Setting realistic pain goals-reaching goal before discharge. ORTHOTICS: Pt has cervical collar,    Discharge Plans- home with self/family care. Requesting [place] [id number] services upon discharge. Verbalized understanding of all instructions and questions answered. Contact number given.     Bird Gonzalez RN, Spine Navigator  [phone] Office  [phone] Cell

## 2020-10-06 LAB — MRSA CULTURE ONLY: NORMAL

## 2020-10-07 LAB
SARS-COV-2: NOT DETECTED
SOURCE: NORMAL

## 2020-10-08 ENCOUNTER — ANESTHESIA EVENT (OUTPATIENT)
Dept: OPERATING ROOM | Age: 75
DRG: 473 | End: 2020-10-08
Payer: MEDICARE

## 2020-10-09 ENCOUNTER — APPOINTMENT (OUTPATIENT)
Dept: GENERAL RADIOLOGY | Age: 75
DRG: 473 | End: 2020-10-09
Attending: ORTHOPAEDIC SURGERY
Payer: MEDICARE

## 2020-10-09 ENCOUNTER — ANESTHESIA (OUTPATIENT)
Dept: OPERATING ROOM | Age: 75
DRG: 473 | End: 2020-10-09
Payer: MEDICARE

## 2020-10-09 ENCOUNTER — HOSPITAL ENCOUNTER (INPATIENT)
Age: 75
LOS: 1 days | Discharge: HOME | DRG: 473 | End: 2020-10-10
Attending: ORTHOPAEDIC SURGERY | Admitting: ORTHOPAEDIC SURGERY
Payer: MEDICARE

## 2020-10-09 VITALS — TEMPERATURE: 95.5 F | OXYGEN SATURATION: 96 %

## 2020-10-09 PROBLEM — M48.02 CERVICAL STENOSIS OF SPINE: Status: ACTIVE | Noted: 2020-10-09

## 2020-10-09 LAB
METER GLUCOSE: 135 MG/DL (ref 74–99)
METER GLUCOSE: 148 MG/DL (ref 74–99)
METER GLUCOSE: 229 MG/DL (ref 74–99)

## 2020-10-09 PROCEDURE — 2580000003 HC RX 258: Performed by: ORTHOPAEDIC SURGERY

## 2020-10-09 PROCEDURE — 6360000002 HC RX W HCPCS: Performed by: PHYSICIAN ASSISTANT

## 2020-10-09 PROCEDURE — 95938 SOMATOSENSORY TESTING: CPT | Performed by: AUDIOLOGIST

## 2020-10-09 PROCEDURE — 3600000004 HC SURGERY LEVEL 4 BASE: Performed by: ORTHOPAEDIC SURGERY

## 2020-10-09 PROCEDURE — 2780000010 HC IMPLANT OTHER: Performed by: ORTHOPAEDIC SURGERY

## 2020-10-09 PROCEDURE — 82962 GLUCOSE BLOOD TEST: CPT

## 2020-10-09 PROCEDURE — 01N10ZZ RELEASE CERVICAL NERVE, OPEN APPROACH: ICD-10-PCS | Performed by: ORTHOPAEDIC SURGERY

## 2020-10-09 PROCEDURE — 3700000001 HC ADD 15 MINUTES (ANESTHESIA): Performed by: ORTHOPAEDIC SURGERY

## 2020-10-09 PROCEDURE — C1713 ANCHOR/SCREW BN/BN,TIS/BN: HCPCS | Performed by: ORTHOPAEDIC SURGERY

## 2020-10-09 PROCEDURE — 0RG20A0 FUSION OF 2 OR MORE CERVICAL VERTEBRAL JOINTS WITH INTERBODY FUSION DEVICE, ANTERIOR APPROACH, ANTERIOR COLUMN, OPEN APPROACH: ICD-10-PCS | Performed by: ORTHOPAEDIC SURGERY

## 2020-10-09 PROCEDURE — 2709999900 HC NON-CHARGEABLE SUPPLY: Performed by: ORTHOPAEDIC SURGERY

## 2020-10-09 PROCEDURE — 2580000003 HC RX 258: Performed by: PHYSICIAN ASSISTANT

## 2020-10-09 PROCEDURE — 6370000000 HC RX 637 (ALT 250 FOR IP): Performed by: CLINICAL NURSE SPECIALIST

## 2020-10-09 PROCEDURE — 3600000014 HC SURGERY LEVEL 4 ADDTL 15MIN: Performed by: ORTHOPAEDIC SURGERY

## 2020-10-09 PROCEDURE — 95940 IONM IN OPERATNG ROOM 15 MIN: CPT | Performed by: AUDIOLOGIST

## 2020-10-09 PROCEDURE — 2500000003 HC RX 250 WO HCPCS

## 2020-10-09 PROCEDURE — 6360000002 HC RX W HCPCS

## 2020-10-09 PROCEDURE — 6370000000 HC RX 637 (ALT 250 FOR IP): Performed by: PHYSICIAN ASSISTANT

## 2020-10-09 PROCEDURE — 2580000003 HC RX 258

## 2020-10-09 PROCEDURE — 1200000000 HC SEMI PRIVATE

## 2020-10-09 PROCEDURE — 6360000002 HC RX W HCPCS: Performed by: ANESTHESIOLOGY

## 2020-10-09 PROCEDURE — 0RB30ZZ EXCISION OF CERVICAL VERTEBRAL DISC, OPEN APPROACH: ICD-10-PCS | Performed by: ORTHOPAEDIC SURGERY

## 2020-10-09 PROCEDURE — 7100000001 HC PACU RECOVERY - ADDTL 15 MIN: Performed by: ORTHOPAEDIC SURGERY

## 2020-10-09 PROCEDURE — 2500000003 HC RX 250 WO HCPCS: Performed by: ORTHOPAEDIC SURGERY

## 2020-10-09 PROCEDURE — 3700000000 HC ANESTHESIA ATTENDED CARE: Performed by: ORTHOPAEDIC SURGERY

## 2020-10-09 PROCEDURE — 6370000000 HC RX 637 (ALT 250 FOR IP): Performed by: ORTHOPAEDIC SURGERY

## 2020-10-09 PROCEDURE — 7100000000 HC PACU RECOVERY - FIRST 15 MIN: Performed by: ORTHOPAEDIC SURGERY

## 2020-10-09 PROCEDURE — 2720000010 HC SURG SUPPLY STERILE: Performed by: ORTHOPAEDIC SURGERY

## 2020-10-09 PROCEDURE — 6360000002 HC RX W HCPCS: Performed by: ORTHOPAEDIC SURGERY

## 2020-10-09 PROCEDURE — 3209999900 FLUORO FOR SURGICAL PROCEDURES

## 2020-10-09 DEVICE — CRYSTAL® 14MM X 11MM X 8MM 7° CAGE, PC
Type: IMPLANTABLE DEVICE | Site: NECK | Status: FUNCTIONAL
Brand: CRYSTAL

## 2020-10-09 DEVICE — I-FACTOR PUTTY, 1.0CC SYRINGE
Type: IMPLANTABLE DEVICE | Site: NECK | Status: FUNCTIONAL
Brand: I-FACTOR PEPTIDE ENHANCED BONE GRAFT

## 2020-10-09 DEVICE — CRYSTAL® 14MM X 11MM X 9MM 7° CAGE, PC
Type: IMPLANTABLE DEVICE | Site: NECK | Status: FUNCTIONAL
Brand: CRYSTAL

## 2020-10-09 RX ORDER — LIDOCAINE HYDROCHLORIDE 20 MG/ML
INJECTION, SOLUTION INTRAVENOUS PRN
Status: DISCONTINUED | OUTPATIENT
Start: 2020-10-09 | End: 2020-10-09 | Stop reason: SDUPTHER

## 2020-10-09 RX ORDER — PROPOFOL 10 MG/ML
INJECTION, EMULSION INTRAVENOUS PRN
Status: DISCONTINUED | OUTPATIENT
Start: 2020-10-09 | End: 2020-10-09 | Stop reason: SDUPTHER

## 2020-10-09 RX ORDER — NEOSTIGMINE METHYLSULFATE 1 MG/ML
INJECTION, SOLUTION INTRAVENOUS PRN
Status: DISCONTINUED | OUTPATIENT
Start: 2020-10-09 | End: 2020-10-09 | Stop reason: SDUPTHER

## 2020-10-09 RX ORDER — METOPROLOL SUCCINATE 50 MG/1
50 TABLET, EXTENDED RELEASE ORAL 2 TIMES DAILY
Status: DISCONTINUED | OUTPATIENT
Start: 2020-10-09 | End: 2020-10-10 | Stop reason: HOSPADM

## 2020-10-09 RX ORDER — SODIUM CHLORIDE 0.9 % (FLUSH) 0.9 %
10 SYRINGE (ML) INJECTION PRN
Status: DISCONTINUED | OUTPATIENT
Start: 2020-10-09 | End: 2020-10-09 | Stop reason: HOSPADM

## 2020-10-09 RX ORDER — MORPHINE SULFATE 2 MG/ML
2 INJECTION, SOLUTION INTRAMUSCULAR; INTRAVENOUS
Status: DISCONTINUED | OUTPATIENT
Start: 2020-10-09 | End: 2020-10-10 | Stop reason: HOSPADM

## 2020-10-09 RX ORDER — FENTANYL CITRATE 50 UG/ML
25 INJECTION, SOLUTION INTRAMUSCULAR; INTRAVENOUS EVERY 5 MIN PRN
Status: DISCONTINUED | OUTPATIENT
Start: 2020-10-09 | End: 2020-10-09 | Stop reason: HOSPADM

## 2020-10-09 RX ORDER — SODIUM CHLORIDE 9 MG/ML
INJECTION, SOLUTION INTRAVENOUS CONTINUOUS PRN
Status: DISCONTINUED | OUTPATIENT
Start: 2020-10-09 | End: 2020-10-09 | Stop reason: SDUPTHER

## 2020-10-09 RX ORDER — ONDANSETRON 2 MG/ML
INJECTION INTRAMUSCULAR; INTRAVENOUS PRN
Status: DISCONTINUED | OUTPATIENT
Start: 2020-10-09 | End: 2020-10-09 | Stop reason: SDUPTHER

## 2020-10-09 RX ORDER — PROMETHAZINE HYDROCHLORIDE 25 MG/ML
25 INJECTION, SOLUTION INTRAMUSCULAR; INTRAVENOUS PRN
Status: DISCONTINUED | OUTPATIENT
Start: 2020-10-09 | End: 2020-10-09 | Stop reason: HOSPADM

## 2020-10-09 RX ORDER — SODIUM CHLORIDE 0.9 % (FLUSH) 0.9 %
10 SYRINGE (ML) INJECTION PRN
Status: DISCONTINUED | OUTPATIENT
Start: 2020-10-09 | End: 2020-10-10 | Stop reason: HOSPADM

## 2020-10-09 RX ORDER — DEXAMETHASONE SODIUM PHOSPHATE 10 MG/ML
INJECTION, SOLUTION INTRAMUSCULAR; INTRAVENOUS PRN
Status: DISCONTINUED | OUTPATIENT
Start: 2020-10-09 | End: 2020-10-09 | Stop reason: SDUPTHER

## 2020-10-09 RX ORDER — SODIUM CHLORIDE 0.9 % (FLUSH) 0.9 %
10 SYRINGE (ML) INJECTION EVERY 12 HOURS SCHEDULED
Status: DISCONTINUED | OUTPATIENT
Start: 2020-10-09 | End: 2020-10-10 | Stop reason: HOSPADM

## 2020-10-09 RX ORDER — ROCURONIUM BROMIDE 10 MG/ML
INJECTION, SOLUTION INTRAVENOUS PRN
Status: DISCONTINUED | OUTPATIENT
Start: 2020-10-09 | End: 2020-10-09 | Stop reason: SDUPTHER

## 2020-10-09 RX ORDER — PHENYLEPHRINE HCL IN 0.9% NACL 1 MG/10 ML
SYRINGE (ML) INTRAVENOUS PRN
Status: DISCONTINUED | OUTPATIENT
Start: 2020-10-09 | End: 2020-10-09 | Stop reason: SDUPTHER

## 2020-10-09 RX ORDER — HYDRALAZINE HYDROCHLORIDE 25 MG/1
25 TABLET, FILM COATED ORAL 4 TIMES DAILY
Status: DISCONTINUED | OUTPATIENT
Start: 2020-10-09 | End: 2020-10-10 | Stop reason: HOSPADM

## 2020-10-09 RX ORDER — FENTANYL CITRATE 50 UG/ML
50 INJECTION, SOLUTION INTRAMUSCULAR; INTRAVENOUS EVERY 5 MIN PRN
Status: DISCONTINUED | OUTPATIENT
Start: 2020-10-09 | End: 2020-10-09 | Stop reason: HOSPADM

## 2020-10-09 RX ORDER — NICOTINE POLACRILEX 4 MG
15 LOZENGE BUCCAL PRN
Status: DISCONTINUED | OUTPATIENT
Start: 2020-10-09 | End: 2020-10-10 | Stop reason: HOSPADM

## 2020-10-09 RX ORDER — AMLODIPINE BESYLATE 5 MG/1
5 TABLET ORAL 2 TIMES DAILY
Status: DISCONTINUED | OUTPATIENT
Start: 2020-10-09 | End: 2020-10-10 | Stop reason: HOSPADM

## 2020-10-09 RX ORDER — SODIUM CHLORIDE 0.9 % (FLUSH) 0.9 %
10 SYRINGE (ML) INJECTION EVERY 12 HOURS SCHEDULED
Status: DISCONTINUED | OUTPATIENT
Start: 2020-10-09 | End: 2020-10-09 | Stop reason: HOSPADM

## 2020-10-09 RX ORDER — FENTANYL CITRATE 50 UG/ML
INJECTION, SOLUTION INTRAMUSCULAR; INTRAVENOUS PRN
Status: DISCONTINUED | OUTPATIENT
Start: 2020-10-09 | End: 2020-10-09 | Stop reason: SDUPTHER

## 2020-10-09 RX ORDER — MIDAZOLAM HYDROCHLORIDE 1 MG/ML
INJECTION INTRAMUSCULAR; INTRAVENOUS PRN
Status: DISCONTINUED | OUTPATIENT
Start: 2020-10-09 | End: 2020-10-09 | Stop reason: SDUPTHER

## 2020-10-09 RX ORDER — PRAVASTATIN SODIUM 10 MG
10 TABLET ORAL DAILY
Status: DISCONTINUED | OUTPATIENT
Start: 2020-10-09 | End: 2020-10-10 | Stop reason: HOSPADM

## 2020-10-09 RX ORDER — TRAMADOL HYDROCHLORIDE 50 MG/1
50 TABLET ORAL EVERY 4 HOURS PRN
Status: DISCONTINUED | OUTPATIENT
Start: 2020-10-09 | End: 2020-10-10 | Stop reason: HOSPADM

## 2020-10-09 RX ORDER — LABETALOL HYDROCHLORIDE 5 MG/ML
5 INJECTION, SOLUTION INTRAVENOUS EVERY 10 MIN PRN
Status: DISCONTINUED | OUTPATIENT
Start: 2020-10-09 | End: 2020-10-09 | Stop reason: HOSPADM

## 2020-10-09 RX ORDER — GLYCOPYRROLATE 1 MG/5 ML
SYRINGE (ML) INTRAVENOUS PRN
Status: DISCONTINUED | OUTPATIENT
Start: 2020-10-09 | End: 2020-10-09 | Stop reason: SDUPTHER

## 2020-10-09 RX ORDER — MEPERIDINE HYDROCHLORIDE 25 MG/ML
12.5 INJECTION INTRAMUSCULAR; INTRAVENOUS; SUBCUTANEOUS EVERY 5 MIN PRN
Status: DISCONTINUED | OUTPATIENT
Start: 2020-10-09 | End: 2020-10-09 | Stop reason: HOSPADM

## 2020-10-09 RX ORDER — DEXTROSE MONOHYDRATE 25 G/50ML
12.5 INJECTION, SOLUTION INTRAVENOUS PRN
Status: DISCONTINUED | OUTPATIENT
Start: 2020-10-09 | End: 2020-10-10 | Stop reason: HOSPADM

## 2020-10-09 RX ORDER — MORPHINE SULFATE 4 MG/ML
4 INJECTION, SOLUTION INTRAMUSCULAR; INTRAVENOUS
Status: DISCONTINUED | OUTPATIENT
Start: 2020-10-09 | End: 2020-10-10 | Stop reason: HOSPADM

## 2020-10-09 RX ORDER — DEXTROSE MONOHYDRATE 50 MG/ML
100 INJECTION, SOLUTION INTRAVENOUS PRN
Status: DISCONTINUED | OUTPATIENT
Start: 2020-10-09 | End: 2020-10-10 | Stop reason: HOSPADM

## 2020-10-09 RX ORDER — CYCLOBENZAPRINE HCL 10 MG
10 TABLET ORAL 3 TIMES DAILY PRN
Status: DISCONTINUED | OUTPATIENT
Start: 2020-10-09 | End: 2020-10-10 | Stop reason: HOSPADM

## 2020-10-09 RX ADMIN — Medication 2 G: at 07:45

## 2020-10-09 RX ADMIN — DEXAMETHASONE SODIUM PHOSPHATE 10 MG: 10 INJECTION, SOLUTION INTRAMUSCULAR; INTRAVENOUS at 07:40

## 2020-10-09 RX ADMIN — ROCURONIUM BROMIDE 10 MG: 10 INJECTION, SOLUTION INTRAVENOUS at 08:52

## 2020-10-09 RX ADMIN — Medication 100 MCG: at 08:44

## 2020-10-09 RX ADMIN — SODIUM CHLORIDE, PRESERVATIVE FREE 10 ML: 5 INJECTION INTRAVENOUS at 21:54

## 2020-10-09 RX ADMIN — Medication 2 G: at 18:05

## 2020-10-09 RX ADMIN — Medication 100 MCG: at 10:02

## 2020-10-09 RX ADMIN — SODIUM CHLORIDE: 9 INJECTION, SOLUTION INTRAVENOUS at 07:30

## 2020-10-09 RX ADMIN — Medication 0.2 MG: at 08:49

## 2020-10-09 RX ADMIN — FENTANYL CITRATE 100 MCG: 50 INJECTION, SOLUTION INTRAMUSCULAR; INTRAVENOUS at 07:40

## 2020-10-09 RX ADMIN — ROCURONIUM BROMIDE 10 MG: 10 INJECTION, SOLUTION INTRAVENOUS at 08:08

## 2020-10-09 RX ADMIN — AMLODIPINE BESYLATE 5 MG: 5 TABLET ORAL at 21:53

## 2020-10-09 RX ADMIN — HYDRALAZINE HYDROCHLORIDE 25 MG: 25 TABLET, FILM COATED ORAL at 21:53

## 2020-10-09 RX ADMIN — TRAMADOL HYDROCHLORIDE 50 MG: 50 TABLET ORAL at 21:52

## 2020-10-09 RX ADMIN — ONDANSETRON HYDROCHLORIDE 4 MG: 2 INJECTION, SOLUTION INTRAMUSCULAR; INTRAVENOUS at 10:40

## 2020-10-09 RX ADMIN — TRAMADOL HYDROCHLORIDE 50 MG: 50 TABLET ORAL at 16:57

## 2020-10-09 RX ADMIN — MORPHINE SULFATE 2 MG: 2 INJECTION, SOLUTION INTRAMUSCULAR; INTRAVENOUS at 19:18

## 2020-10-09 RX ADMIN — PRAVASTATIN SODIUM 10 MG: 10 TABLET ORAL at 21:52

## 2020-10-09 RX ADMIN — FENTANYL CITRATE 50 MCG: 50 INJECTION, SOLUTION INTRAMUSCULAR; INTRAVENOUS at 11:30

## 2020-10-09 RX ADMIN — PHENYLEPHRINE HYDROCHLORIDE 10 MCG/MIN: 10 INJECTION INTRAVENOUS at 10:16

## 2020-10-09 RX ADMIN — ROCURONIUM BROMIDE 10 MG: 10 INJECTION, SOLUTION INTRAVENOUS at 08:24

## 2020-10-09 RX ADMIN — PROPOFOL 50 MG: 10 INJECTION, EMULSION INTRAVENOUS at 10:53

## 2020-10-09 RX ADMIN — INSULIN LISPRO 1 UNITS: 100 INJECTION, SOLUTION INTRAVENOUS; SUBCUTANEOUS at 21:52

## 2020-10-09 RX ADMIN — SODIUM CHLORIDE: 9 INJECTION, SOLUTION INTRAVENOUS at 08:36

## 2020-10-09 RX ADMIN — MIDAZOLAM 2 MG: 1 INJECTION INTRAMUSCULAR; INTRAVENOUS at 07:30

## 2020-10-09 RX ADMIN — PROPOFOL 180 MG: 10 INJECTION, EMULSION INTRAVENOUS at 07:40

## 2020-10-09 RX ADMIN — PROMETHAZINE HYDROCHLORIDE 25 MG: 25 INJECTION INTRAMUSCULAR; INTRAVENOUS at 11:59

## 2020-10-09 RX ADMIN — Medication 0.6 MG: at 10:40

## 2020-10-09 RX ADMIN — Medication 100 MCG: at 09:48

## 2020-10-09 RX ADMIN — Medication 3 MG: at 10:40

## 2020-10-09 RX ADMIN — Medication 100 MCG: at 09:38

## 2020-10-09 RX ADMIN — CYCLOBENZAPRINE 10 MG: 10 TABLET, FILM COATED ORAL at 16:57

## 2020-10-09 RX ADMIN — ROCURONIUM BROMIDE 40 MG: 10 INJECTION, SOLUTION INTRAVENOUS at 07:40

## 2020-10-09 RX ADMIN — FENTANYL CITRATE 100 MCG: 50 INJECTION, SOLUTION INTRAMUSCULAR; INTRAVENOUS at 09:09

## 2020-10-09 RX ADMIN — METOPROLOL SUCCINATE 50 MG: 50 TABLET, EXTENDED RELEASE ORAL at 22:11

## 2020-10-09 RX ADMIN — Medication 100 MCG: at 07:54

## 2020-10-09 RX ADMIN — FENTANYL CITRATE 50 MCG: 50 INJECTION, SOLUTION INTRAMUSCULAR; INTRAVENOUS at 11:45

## 2020-10-09 RX ADMIN — LIDOCAINE HYDROCHLORIDE 100 MG: 20 INJECTION, SOLUTION INTRAVENOUS at 07:40

## 2020-10-09 RX ADMIN — ROCURONIUM BROMIDE 20 MG: 10 INJECTION, SOLUTION INTRAVENOUS at 09:28

## 2020-10-09 RX ADMIN — FENTANYL CITRATE 50 MCG: 50 INJECTION, SOLUTION INTRAMUSCULAR; INTRAVENOUS at 08:25

## 2020-10-09 ASSESSMENT — PAIN DESCRIPTION - LOCATION
LOCATION: NECK
LOCATION: NECK
LOCATION: NOSE
LOCATION: NECK
LOCATION: NECK

## 2020-10-09 ASSESSMENT — PULMONARY FUNCTION TESTS
PIF_VALUE: 15
PIF_VALUE: 19
PIF_VALUE: 2
PIF_VALUE: 14
PIF_VALUE: 19
PIF_VALUE: 1
PIF_VALUE: 20
PIF_VALUE: 19
PIF_VALUE: 19
PIF_VALUE: 18
PIF_VALUE: 19
PIF_VALUE: 19
PIF_VALUE: 20
PIF_VALUE: 15
PIF_VALUE: 15
PIF_VALUE: 8
PIF_VALUE: 19
PIF_VALUE: 18
PIF_VALUE: 19
PIF_VALUE: 19
PIF_VALUE: 4
PIF_VALUE: 3
PIF_VALUE: 19
PIF_VALUE: 18
PIF_VALUE: 19
PIF_VALUE: 20
PIF_VALUE: 19
PIF_VALUE: 14
PIF_VALUE: 20
PIF_VALUE: 19
PIF_VALUE: 2
PIF_VALUE: 19
PIF_VALUE: 19
PIF_VALUE: 1
PIF_VALUE: 17
PIF_VALUE: 19
PIF_VALUE: 3
PIF_VALUE: 20
PIF_VALUE: 19
PIF_VALUE: 14
PIF_VALUE: 18
PIF_VALUE: 20
PIF_VALUE: 16
PIF_VALUE: 19
PIF_VALUE: 3
PIF_VALUE: 19
PIF_VALUE: 19
PIF_VALUE: 8
PIF_VALUE: 10
PIF_VALUE: 19
PIF_VALUE: 17
PIF_VALUE: 2
PIF_VALUE: 19
PIF_VALUE: 19
PIF_VALUE: 3
PIF_VALUE: 19
PIF_VALUE: 20
PIF_VALUE: 20
PIF_VALUE: 18
PIF_VALUE: 1
PIF_VALUE: 15
PIF_VALUE: 14
PIF_VALUE: 0
PIF_VALUE: 19
PIF_VALUE: 19
PIF_VALUE: 14
PIF_VALUE: 16
PIF_VALUE: 19
PIF_VALUE: 0
PIF_VALUE: 19
PIF_VALUE: 15
PIF_VALUE: 14
PIF_VALUE: 19
PIF_VALUE: 19
PIF_VALUE: 3
PIF_VALUE: 18
PIF_VALUE: 20
PIF_VALUE: 18
PIF_VALUE: 18
PIF_VALUE: 3
PIF_VALUE: 3
PIF_VALUE: 18
PIF_VALUE: 18
PIF_VALUE: 3
PIF_VALUE: 18
PIF_VALUE: 19
PIF_VALUE: 18
PIF_VALUE: 19
PIF_VALUE: 10
PIF_VALUE: 19
PIF_VALUE: 20
PIF_VALUE: 1
PIF_VALUE: 15
PIF_VALUE: 19
PIF_VALUE: 0
PIF_VALUE: 20
PIF_VALUE: 19
PIF_VALUE: 0
PIF_VALUE: 19
PIF_VALUE: 19
PIF_VALUE: 1
PIF_VALUE: 20
PIF_VALUE: 20
PIF_VALUE: 11
PIF_VALUE: 18
PIF_VALUE: 19
PIF_VALUE: 3
PIF_VALUE: 19
PIF_VALUE: 14
PIF_VALUE: 19
PIF_VALUE: 19
PIF_VALUE: 20
PIF_VALUE: 19
PIF_VALUE: 15
PIF_VALUE: 19
PIF_VALUE: 2
PIF_VALUE: 19
PIF_VALUE: 10
PIF_VALUE: 19
PIF_VALUE: 3
PIF_VALUE: 19
PIF_VALUE: 17
PIF_VALUE: 14
PIF_VALUE: 19
PIF_VALUE: 19
PIF_VALUE: 15
PIF_VALUE: 3
PIF_VALUE: 15
PIF_VALUE: 18
PIF_VALUE: 17
PIF_VALUE: 19
PIF_VALUE: 14
PIF_VALUE: 3
PIF_VALUE: 15
PIF_VALUE: 19
PIF_VALUE: 3
PIF_VALUE: 19
PIF_VALUE: 20
PIF_VALUE: 19
PIF_VALUE: 19
PIF_VALUE: 17
PIF_VALUE: 19
PIF_VALUE: 18
PIF_VALUE: 19
PIF_VALUE: 18
PIF_VALUE: 18
PIF_VALUE: 19
PIF_VALUE: 0
PIF_VALUE: 11
PIF_VALUE: 19
PIF_VALUE: 19
PIF_VALUE: 3
PIF_VALUE: 18
PIF_VALUE: 19
PIF_VALUE: 20
PIF_VALUE: 19
PIF_VALUE: 15
PIF_VALUE: 19
PIF_VALUE: 17
PIF_VALUE: 19
PIF_VALUE: 3
PIF_VALUE: 12
PIF_VALUE: 20
PIF_VALUE: 19
PIF_VALUE: 19
PIF_VALUE: 1
PIF_VALUE: 18
PIF_VALUE: 19
PIF_VALUE: 1
PIF_VALUE: 19
PIF_VALUE: 20
PIF_VALUE: 19
PIF_VALUE: 19
PIF_VALUE: 18
PIF_VALUE: 18
PIF_VALUE: 3
PIF_VALUE: 19
PIF_VALUE: 20
PIF_VALUE: 20
PIF_VALUE: 14
PIF_VALUE: 18
PIF_VALUE: 19
PIF_VALUE: 18
PIF_VALUE: 19

## 2020-10-09 ASSESSMENT — PAIN SCALES - GENERAL
PAINLEVEL_OUTOF10: 8
PAINLEVEL_OUTOF10: 5
PAINLEVEL_OUTOF10: 4
PAINLEVEL_OUTOF10: 8
PAINLEVEL_OUTOF10: 4
PAINLEVEL_OUTOF10: 5
PAINLEVEL_OUTOF10: 8
PAINLEVEL_OUTOF10: 7
PAINLEVEL_OUTOF10: 0
PAINLEVEL_OUTOF10: 3
PAINLEVEL_OUTOF10: 0
PAINLEVEL_OUTOF10: 4
PAINLEVEL_OUTOF10: 7

## 2020-10-09 ASSESSMENT — PAIN DESCRIPTION - DESCRIPTORS
DESCRIPTORS: ACHING;BURNING;CONSTANT
DESCRIPTORS: ACHING;NUMBNESS
DESCRIPTORS: ACHING;BURNING;CONSTANT

## 2020-10-09 ASSESSMENT — PAIN DESCRIPTION - PAIN TYPE
TYPE: SURGICAL PAIN

## 2020-10-09 ASSESSMENT — PAIN - FUNCTIONAL ASSESSMENT
PAIN_FUNCTIONAL_ASSESSMENT: PREVENTS OR INTERFERES SOME ACTIVE ACTIVITIES AND ADLS
PAIN_FUNCTIONAL_ASSESSMENT: 0-10

## 2020-10-09 NOTE — PROGRESS NOTES
Admitted to [facility]. Instructions given    Covid test done:10/5/2020    Results:  neg    Self quarantine guidelines followed since tested? yes  Any unusual S/S or concerns expressed/observed? No    Povidine-iodine 5% nasal antiseptic pre-op prep completed 15 seconds per nare and repeated. 2% CHG pre-op skin prep completed in appropriate order using all 6 cloths:    With 1st cloth, wipe the neck, chest, and abdomen. Scrub inside nd  around the navel/belly-button area.  With 2nd cloth, wipe both arms, starting each with the shoulder  and ending at the fingertips. Be sure to thoroughly wipe the arm  pit area.  With 3rd cloth, wipe the right and left hip followed by the groin. Be sure to wipe folds in the abdominal and groin areas.  With 4th cloth, wipe both legs, starting at the thigh and ending   at the toes. Be sure to thoroughly wipe behind the knees.  With 5th cloth, wipe the back starting at the base of the neck and   Ending at the waist line.  With 6th cloth, wipe the buttocks.

## 2020-10-09 NOTE — ANESTHESIA PRE PROCEDURE
Department of Anesthesiology  Preprocedure Note       Name:  Nay Peralta   Age:  66 y.o.  :  1945                                          MRN:  [id number]         Date:  10/9/2020      Surgeon: Marcus Hernandez):  Katherine Rinaldi DO    Procedure: Procedure(s):  ANTERIOR CERVICAL FUSION  C3-C6, PARTIAL CORPECTOMY C3-C6, ANTERIOR OSTEOPHYTECTOMY C5-T1 ---AUDIOLOGY, SPINAL ELEMENTS    Medications prior to admission:   Prior to Admission medications    Medication Sig Start Date End Date Taking? Authorizing Provider   traMADol (ULTRAM) 50 MG tablet Take 50 mg by mouth every 6 hours as needed for Pain.    Yes Historical Provider, MD   cyclobenzaprine (FLEXERIL) 10 MG tablet Take 1 tablet by mouth 3 times daily as needed for Muscle spasms 20  Yes Tobin Lawrence DO   folic acid (FOLVITE) 1 MG tablet Take 1 tablet by mouth daily 20  Yes Tobin Lawrence DO   glycopyrrolate (ROBINUL) 1 MG tablet Take 1 tablet by mouth 3 times daily 20 Yes Tobin Lawrence DO   pantoprazole (PROTONIX) 40 MG tablet Take 1 tablet by mouth daily as needed (Reflux) 20  Yes Tobin Lawrenec DO   pravastatin (PRAVACHOL) 10 MG tablet TAKE 1 TABLET BY MOUTH DAILY 20  Yes Tobin Lawrence DO   metoprolol succinate (TOPROL XL) 50 MG extended release tablet Take 1 tablet by mouth 2 times daily 20  Yes Tobin Lawrence DO   amLODIPine (NORVASC) 5 MG tablet Take 1 tablet by mouth 2 times daily 20  Yes Tobin Lawrence DO   hydrALAZINE (APRESOLINE) 25 MG tablet Take 1 tablet by mouth 4 times daily 20  Yes Reynold Beltran MD   finasteride (PROSCAR) 5 MG tablet Take 5 mg by mouth daily   Yes Historical Provider, MD   ammonium lactate (LAC-HYDRIN) 12 % lotion Apply 1 g topically daily as needed for Dry Skin   Yes Historical Provider, MD   glimepiride (AMARYL) 2 MG tablet Take 1 tablet by mouth every morning (before breakfast) 20   Tobin Lawrence DO   clopidogrel (PLAVIX) 75 MG tablet Take 1 tablet by mouth daily 9/22/20   Gulshan Arredondo DO   aspirin 81 MG tablet Take 81 mg by mouth Daily with lunch     Historical Provider, MD       Current medications:    Current Facility-Administered Medications   Medication Dose Route Frequency Provider Last Rate Last Dose    ceFAZolin (ANCEF) 2 g in sterile water 20 mL IV syringe  2 g Intravenous On Call to [address] [place], [place]        sodium chloride flush 0.9 % injection 10 mL  10 mL Intravenous 2 times per day DENIA Lopez        sodium chloride flush 0.9 % injection 10 mL  10 mL Intravenous PRN DENIA Lopez           Allergies:     Allergies   Allergen Reactions    Imdur [Isosorbide Nitrate] Other (See Comments)     headache    Oxycodone Rash    Simvastatin Other (See Comments)     Muscle weakness    Bactrim [Sulfamethoxazole-Trimethoprim] Nausea Only    Ciprofloxacin Hcl Other (See Comments)     \"Heel/ tendon pain\"    Gabapentin Other (See Comments)     Constipation      Hydrocodone Nausea Only    Norco [Hydrocodone-Acetaminophen] Nausea Only and Other (See Comments)     Causes sick feeling in head         Problem List:    Patient Active Problem List   Diagnosis Code    DDD (degenerative disc disease), cervical M50.30    Cervical radiculopathy M54.12    Atrial fibrillation (HCC) I48.91    Essential hypertension I10    Type 2 diabetes mellitus with diabetic polyneuropathy, without long-term current use of insulin (HCC) E11.42    Chronic pain syndrome G89.4    Diabetic foot infection ([facility] [address] E11.628, L08.9    Hypothyroidism E03.9    Anemia D64.9    Chest pain R07.9    Coronary artery disease involving native coronary artery of native heart without angina pectoris I25.10    Chest pain with moderate risk for cardiac etiology R07.9    Coronary vasospasm (HCC) I20.1    Bradycardia R00.1    Lumbar foraminal stenosis M48.061    S/P lumbar laminectomy Z98.890    Benign prostatic hyperplasia with urinary frequency N40.1, R35.0    Synovial cyst of lumbar spine M71.38    Cervical stenosis of spine M48.02       Past Medical History:        Diagnosis Date    Abnormal computed tomography angiography of heart 07/19/2019    Calcified plaque proximal RCA with 50-70% stenosis, proximal LAD 50% stenosis, mild LAD 30-50% stenosis, groundglass infiltrates, area of consoldidation left lung base posteriorly.       Arthritis     Atrial fibrillation (HCC)     Burning pain     CAD (coronary artery disease)     Degenerative lumbar disc     Diabetes ([address]     Edentulous     upper denture    H/O cardiovascular stress test 02/19/2020    Lexiscan    Herniated cervical disc     History of echocardiogram 03/16/2017    EF 60-65%    Hx of blood clots     Hyperlipidemia     Hypertension     Stented coronary artery     Tinnitus     Urinary retention 3/17/2017       Past Surgical History:        Procedure Laterality Date    ABLATION OF DYSRHYTHMIC FOCUS  1980    APPROX 30-35 YRS AGO    CARDIAC CATHETERIZATION  02/20/2020    Dr Maxx Mariano  2010 sigmoid    COLONOSCOPY  5/2018 [facility]    CORONARY ANGIOPLASTY WITH STENT PLACEMENT      DIAGNOSTIC CARDIAC CATH LAB PROCEDURE      ENDOSCOPY, COLON, DIAGNOSTIC      EPIDURAL STEROID INJECTION N/A 12/11/2018    C7-T1 EPIDURAL STEROID INJECTION performed by Prema Solis DO at [address] 1/8/2019    C7 - T1 EPIDURAL STEROID INJECTION #2 performed by Prema Solis DO at [address] [facility]   [address] [facility]  2008    LAMINECTOMY N/A 6/5/2020    LUMBAR LAMINECTOMY L3-4 , L5-S1, HARDWARE REMOVAL L4-5, DURAL REPAIR performed by Palma Beltran DO at [facility] N/A 8/7/2020    LUMBAR DECOMPRESSION L4-S1; IRRIGATION AND DEBRIDEMENT OF LUMBAR HEMATOMA /SEROMA; REMOVAL SYNOVIAL CYST L5-S1 LEFT performed by Palma Beltran DO at [address] [place] [address]  03/06/2017    NASAL SINUS SURGERY  12/07/2017 Submucosal Resection of Inferior Turbinate    NERVE BLOCK Bilateral 10 12 2015    bilateral sacroiliac joint injection #1    NERVE BLOCK Bilateral 10/22/15    sacroiliac joint #2    NERVE BLOCK Bilateral 11 02 2015    Sacroiliac joint bilateral #3    NERVE BLOCK Left 12/2/15    lumbar transforaminal #1    NERVE BLOCK Left 12 16 15    NERVE BLOCK Left 12 28 2015    transforaminal nerve block left lumbar #3    NERVE BLOCK Left 1 20 16    radiofrequency     NERVE BLOCK  2018    C7-T1 epidural    NERVE BLOCK N/A 2019    cervical epidural steroid injection    OTHER SURGICAL HISTORY  1945    had a feeding tube as an infant, scar    PROSTATE SURGERY      PTCA  2019    2.5 x 38 mm Resolute Lito RAFI mid OM1, 2.25 x 8 mm Resolute Lito RAFI ostium of dx    UPPER GASTROINTESTINAL ENDOSCOPY  2018 [facility]    UPPER GASTROINTESTINAL ENDOSCOPY N/A 2019    EGD BIOPSY performed by Britta Uribe MD at [address] History:    Social History     Tobacco Use    Smoking status: Former Smoker     Packs/day: 0.00     Years: 16.00     Pack years: 0.00     Last attempt to quit: 1977     Years since quittin.8    Smokeless tobacco: Former User    Tobacco comment: quit in    Substance Use Topics    Alcohol use: No     Alcohol/week: 0.0 standard drinks     Comment: 1-2 cups coffee daily                                Counseling given: Not Answered  Comment: quit in       Vital Signs (Current):   Vitals:    10/09/20 0533   BP: 125/68   Pulse: 79   Resp: 16   Temp: 36.8 °C (98.3 °F)   TempSrc: Temporal   SpO2: 97%   Weight: 191 lb (86.6 kg)   Height: 5' 9\" (1.753 m)                                              BP Readings from Last 3 Encounters:   10/09/20 125/68   10/05/20 (!) 145/67   20 126/80       NPO Status: Time of last liquid consumption: 0330                        Time of last solid consumption: 1600                        Date of last liquid consumption: 10/09/20                        Date of last solid food consumption: 10/08/20    BMI:   Wt Readings from Last 3 Encounters:   10/09/20 191 lb (86.6 kg)   10/05/20 191 lb (86.6 kg)   09/22/20 191 lb (86.6 kg)     Body mass index is 28.21 kg/m². CBC:   Lab Results   Component Value Date    WBC 7.2 10/05/2020    RBC 5.68 10/05/2020    HGB 17.1 10/05/2020    HCT 49.4 10/05/2020    MCV 87.0 10/05/2020    RDW 13.2 10/05/2020     10/05/2020       CMP:   Lab Results   Component Value Date     10/05/2020    K 4.8 10/05/2020    K 4.3 03/04/2020     10/05/2020    CO2 29 10/05/2020    BUN 12 10/05/2020    CREATININE 1.1 10/05/2020    GFRAA >60 10/05/2020    LABGLOM >60 10/05/2020    GLUCOSE 122 10/05/2020    PROT 7.9 09/22/2020    CALCIUM 10.2 10/05/2020    BILITOT 0.4 09/22/2020    ALKPHOS 127 09/22/2020    AST 29 09/22/2020    ALT 19 09/22/2020       POC Tests: No results for input(s): POCGLU, POCNA, POCK, POCCL, POCBUN, POCHEMO, POCHCT in the last 72 hours. Coags:   Lab Results   Component Value Date    PROTIME 11.1 10/05/2020    INR 1.0 10/05/2020    APTT 92.2 02/19/2020       HCG (If Applicable): No results found for: PREGTESTUR, PREGSERUM, HCG, HCGQUANT     ABGs: No results found for: PHART, PO2ART, RXQ9CBZ, UCJ8FTV, BEART, S4URCLQB     Type & Screen (If Applicable):  No results found for: LABABO, LABRH    Drug/Infectious Status (If Applicable):  No results found for: HIV, HEPCAB    COVID-19 Screening (If Applicable):   Lab Results   Component Value Date    COVID19 Not Detected 10/05/2020     EKG 5/21/20  Narrative & Impression     Normal sinus rhythm  Left axis deviation due to left anterior hemiblock  Right bundle branch block  Possible remote anteroseptal infarct  Abnormal ECG     Confirmed by Andrés Wood ([id number]) on 5/21/2020 12:53:26 PM     Cardiac Catheterization 2/20/20  IMPRESSION:  1.   Stenosis of the mid RCA distal to the previously deployed stent that  was around 80%, reduced to 20% to 30% after IC nitroglycerin, suspect  vasospasm. 2.  Patent stents in diagonal branch with 40% proximal LAD disease. 3.  Patent stent in OM branch and no significant disease in left  circumflex artery. 4.  Patent stent in RCA.     RECOMMENDATIONS:  We will start the patient on isosorbide, continue  dual-antiplatelet therapy, continue aggressive coronary artery disease  risk factor modifications. The patient will be observed for two hours  and discharge home if he meets discharge criteria.           Bryson Franklin MD     D: 02/20/2020 14:29:17       T: 02/20/2020 15:23:55     [email or web address]  Job#: [id number]     Doc#: [id number]     NM Cardiac Stress Test 2/18/20  Narrative    Location: [facility]         Exam: NM MYOCARDIAL SPECT REST EXERCISE OR RX         Comparison: None         History: Chest pain         Technique: Myocardial perfusion SPECT images were obtained after    administration of 26.4 mCi Tc99m Sestamibi during stress and  9.3 mCi    during rest. The patient received 0.4 mg of IV Lexiscan.         Findings:    Both the stress and resting myocardial images are normal.         The gated left ventricular ejection fraction is 70 %.                Impression    No evidence of stress-induced ischemia. ECHO 3/15/17  Findings      Left Ventricle   Left ventricular internal dimensions were normal in diastole and systole. Borderline concentric left ventricular hypertrophy. Normal left ventricular ejection fraction. Ejection fraction is visually estimated at 60-65%. Normal left ventricular diastolic filling pattern for age. Right Ventricle   Normal right ventricular size and function. Left Atrium   Normal sized left atrium. Interatrial septum appears intact. Right Atrium   Normal right atrium size. Mitral Valve   Structurally normal mitral valve. Mild mitral regurgitation is present.       Tricuspid Valve   The tricuspid valve appears structurally normal.   Mild tricuspid regurgitation. RVSP is 35-40 mmHg. Pulmonary hypertension is mild . Aortic Valve   Structurally normal aortic valve. Pulmonic Valve   The pulmonic valve was not well visualized. Pericardial Effusion   Fat pad versus small effusion cannot be ruled out. Aorta   Aortic root dimension within normal limits. Conclusions      Summary   Left ventricular internal dimensions were normal in diastole and systole. Borderline concentric left ventricular hypertrophy. Normal left ventricular ejection fraction. Mild mitral regurgitation is present. Mild tricuspid regurgitation. Pulmonary hypertension is mild . Fat pad versus small effusion cannot be ruled out. Signature      ----------------------------------------------------------------   Electronically signed by Caroline Perez MD(Interpreting   physician) on 03/17/2017 06:15 AM    Anesthesia Evaluation  Patient summary reviewed no history of anesthetic complications:   Airway: Mallampati: IV  TM distance: >3 FB   Neck ROM: limited  Mouth opening: > = 3 FB Dental:    (+) edentulous and upper dentures      Pulmonary: breath sounds clear to auscultation                             Cardiovascular:    (+) hypertension:, CAD:, dysrhythmias: atrial fibrillation, pulmonary hypertension:, hyperlipidemia      ECG reviewed  Rhythm: regular  Rate: normal  Echocardiogram reviewed  Stress test reviewed       Beta Blocker:  Dose within 24 Hrs         Neuro/Psych:                ROS comment: DDD (degenerative disc disease), cervical   Cervical radiculopathy  Tinnitus   GI/Hepatic/Renal:             Endo/Other:    (+) DiabetesType II DM, , hypothyroidism, blood dyscrasia: anemia, arthritis:., .                 Abdominal:           Vascular:                                      Anesthesia Plan      general     ASA 3       Induction: intravenous. arterial line and BIS  MIPS: Prophylactic antiemetics administered.   Anesthetic plan and risks discussed with patient. Use of blood products discussed with patient whom consented to blood products. Plan discussed with CRNA and attending. Matt Ayala RN   10/9/2020    Pt seen, examined, chart reviewed, plan discussed.   [facility]  10/9/2020  7:34 AM

## 2020-10-09 NOTE — ANESTHESIA POSTPROCEDURE EVALUATION
Department of Anesthesiology  Postprocedure Note    Patient: Clara Laughlin  MRN: [id number]  YOB: 1945  Date of evaluation: 10/9/2020  Time:  1:59 PM     Procedure Summary     Date:  10/09/20 Room / Location:  [facility] [place] [facility]    Anesthesia Start:  0730 Anesthesia Stop:  1120    Procedure:  ANTERIOR CERVICAL FUSION  C3-C6, PARTIAL CORPECTOMY C3-C6, ANTERIOR OSTEOPHYTECTOMY C5-T1 ---AUDIOLOGY, SPINAL ELEMENTS (N/A Neck) Diagnosis:  (CERVICAL SPINAL STENOSIS)    Surgeon:  Danni Anton DO Responsible Provider:  Deandra Gauthier MD    Anesthesia Type:  general ASA Status:  3          Anesthesia Type: general    Danya Phase I: Danya Score: 9    Danya Phase II:      Last vitals: Reviewed and per EMR flowsheets.        Anesthesia Post Evaluation    Patient location during evaluation: PACU  Patient participation: complete - patient participated  Level of consciousness: awake and alert  Airway patency: patent  Nausea & Vomiting: no nausea and no vomiting  Complications: no  Cardiovascular status: hemodynamically stable and blood pressure returned to baseline  Respiratory status: acceptable  Hydration status: euvolemic

## 2020-10-09 NOTE — BRIEF OP NOTE
Brief Postoperative Note      Patient: Juan Darby  YOB: 1945  MRN: [id number]    Date of Procedure: 10/9/2020    Pre-Op Diagnosis: CERVICAL SPINAL STENOSIS, dysphagia, DDD    Post-Op Diagnosis: Same       Procedure(s):  ANTERIOR CERVICAL FUSION  C3-C6, PARTIAL CORPECTOMY C3-C6, ANTERIOR OSTEOPHYTECTOMY C5-T1 ---AUDIOLOGY, SPINAL ELEMENTS    Surgeon(s):  Poly Zhao DO    Assistant:  * No surgical staff found *    Anesthesia: General    Estimated Blood Loss (mL): less than 373     Complications: None    Specimens:   * No specimens in log *    Implants:  Implant Name Type Inv. Item Serial No.  Lot No. LRB No. Used Action   INTERBODY CERV [id number] 7 DEG TI Screw/Plate/Nail/Samuel INTERBODY CERV [id number] 7 DEG TI  [employer]  N/A 2 Implanted   INTERBODY CERV [id number] 7 DEG TI Spine INTERBODY CERV [id number] 7 DEG TI  [employer]  N/A 1 Implanted   DARION-C/ACP SELF DRILLING SCREWS 4.0 X 14MM      N/A 8 Implanted   DARION-ACP 3 LEVEL PLATES 51 MM      N/A 1 Implanted   FIXATIVE PIN      N/A 2 Implanted   PUTTY BONE I FACTOR 1CC Bone/Graft/Tissue/Human/Synth PUTTY BONE I FACTOR 1CC  [employer]  N/A 1 Implanted         Drains:   Closed/Suction Drain Anterior; Left Neck Bulb  (Active)       Urethral Catheter Coude 16 fr (Active)       Findings: as above.  Marked displacement of esophagus from osteopytes    Electronically signed by Poly Zhao DO on 10/9/2020 at 11:25 AM

## 2020-10-09 NOTE — ANESTHESIA PROCEDURE NOTES
Arterial Line:    An arterial line was placed using ultrasound guidance, in the holding area for the following indication(s): continuous blood pressure monitoring and blood sampling needed. A 20 gauge (size), 1 and 1/4 inch (length), Arrow (type) catheter was placed, Seldinger technique used, into the left radial artery, secured by tape and Tegaderm. Anesthesia type: Local  Local infiltration: Injection    Events:  patient tolerated procedure well with no complications and EBL 0mL. 10/9/2020 6:55 AM10/9/2020 7:02 AM  Anesthesiologist: Ivania Hunter MD  Other anesthesia staff: Dmitri Cano RN  Performed:  Other anesthesia staff   Preanesthetic Checklist  Completed: patient identified, site marked, surgical consent, pre-op evaluation, timeout performed, IV checked, risks and benefits discussed, monitors and equipment checked, anesthesia consent given, oxygen available and patient being monitored

## 2020-10-09 NOTE — PROGRESS NOTES
INTRAOPERATIVE MONITORING EMG REPORT    Diagnosis: cervical stenosis, radiculopathy, DDD, neck pain  Procedure: ACDF C3/4, 4/5, 5/6   Anesthesia: isoflurane  Surgeon:  India Parker D.O. Intra-op Monitoring:  3 hours    Procedure:     EMG recording electrodes were placed over the trapezious, deltoid, bicep and triceps  musles for recording spontaneous EMG activity. A silent control was performed to test the integrity of the system prior to the procedure. Results:  Spontaneous EMG showed occasional EMG activity from the left trapezious muscle. All activity resolved and no sustained activity was noted. All other recording leads were primarily quiet throughout the surgical procedure.     Evoked EMG:     none

## 2020-10-10 VITALS
TEMPERATURE: 97 F | OXYGEN SATURATION: 95 % | HEIGHT: 69 IN | HEART RATE: 73 BPM | RESPIRATION RATE: 18 BRPM | DIASTOLIC BLOOD PRESSURE: 80 MMHG | BODY MASS INDEX: 28.29 KG/M2 | WEIGHT: 191 LBS | SYSTOLIC BLOOD PRESSURE: 141 MMHG

## 2020-10-10 LAB
HCT VFR BLD CALC: 46.8 % (ref 37–54)
HEMOGLOBIN: 16.2 G/DL (ref 12.5–16.5)
MCH RBC QN AUTO: 30.1 PG (ref 26–35)
MCHC RBC AUTO-ENTMCNC: 34.6 % (ref 32–34.5)
MCV RBC AUTO: 86.8 FL (ref 80–99.9)
METER GLUCOSE: 143 MG/DL (ref 74–99)
METER GLUCOSE: 185 MG/DL (ref 74–99)
PDW BLD-RTO: 13.2 FL (ref 11.5–15)
PLATELET # BLD: 187 E9/L (ref 130–450)
PMV BLD AUTO: 9.9 FL (ref 7–12)
RBC # BLD: 5.39 E12/L (ref 3.8–5.8)
WBC # BLD: 15.2 E9/L (ref 4.5–11.5)

## 2020-10-10 PROCEDURE — 2580000003 HC RX 258: Performed by: PHYSICIAN ASSISTANT

## 2020-10-10 PROCEDURE — 6370000000 HC RX 637 (ALT 250 FOR IP): Performed by: PHYSICIAN ASSISTANT

## 2020-10-10 PROCEDURE — 6360000002 HC RX W HCPCS: Performed by: PHYSICIAN ASSISTANT

## 2020-10-10 PROCEDURE — 6370000000 HC RX 637 (ALT 250 FOR IP): Performed by: CLINICAL NURSE SPECIALIST

## 2020-10-10 PROCEDURE — 82962 GLUCOSE BLOOD TEST: CPT

## 2020-10-10 PROCEDURE — 97530 THERAPEUTIC ACTIVITIES: CPT

## 2020-10-10 PROCEDURE — 85027 COMPLETE CBC AUTOMATED: CPT

## 2020-10-10 PROCEDURE — 36415 COLL VENOUS BLD VENIPUNCTURE: CPT

## 2020-10-10 PROCEDURE — 97161 PT EVAL LOW COMPLEX 20 MIN: CPT

## 2020-10-10 PROCEDURE — 97165 OT EVAL LOW COMPLEX 30 MIN: CPT

## 2020-10-10 PROCEDURE — 97535 SELF CARE MNGMENT TRAINING: CPT

## 2020-10-10 RX ADMIN — INSULIN LISPRO 1 UNITS: 100 INJECTION, SOLUTION INTRAVENOUS; SUBCUTANEOUS at 13:05

## 2020-10-10 RX ADMIN — TRAMADOL HYDROCHLORIDE 50 MG: 50 TABLET ORAL at 10:51

## 2020-10-10 RX ADMIN — PRAVASTATIN SODIUM 10 MG: 10 TABLET ORAL at 10:53

## 2020-10-10 RX ADMIN — MORPHINE SULFATE 2 MG: 2 INJECTION, SOLUTION INTRAMUSCULAR; INTRAVENOUS at 04:32

## 2020-10-10 RX ADMIN — HYDRALAZINE HYDROCHLORIDE 25 MG: 25 TABLET, FILM COATED ORAL at 14:08

## 2020-10-10 RX ADMIN — CYCLOBENZAPRINE 10 MG: 10 TABLET, FILM COATED ORAL at 10:53

## 2020-10-10 RX ADMIN — AMLODIPINE BESYLATE 5 MG: 5 TABLET ORAL at 10:52

## 2020-10-10 RX ADMIN — Medication 2 G: at 00:39

## 2020-10-10 RX ADMIN — INSULIN LISPRO 1 UNITS: 100 INJECTION, SOLUTION INTRAVENOUS; SUBCUTANEOUS at 09:36

## 2020-10-10 RX ADMIN — SODIUM CHLORIDE, PRESERVATIVE FREE 10 ML: 5 INJECTION INTRAVENOUS at 10:53

## 2020-10-10 RX ADMIN — METOPROLOL SUCCINATE 50 MG: 50 TABLET, EXTENDED RELEASE ORAL at 10:52

## 2020-10-10 RX ADMIN — HYDRALAZINE HYDROCHLORIDE 25 MG: 25 TABLET, FILM COATED ORAL at 10:51

## 2020-10-10 ASSESSMENT — PAIN SCALES - GENERAL
PAINLEVEL_OUTOF10: 5
PAINLEVEL_OUTOF10: 5
PAINLEVEL_OUTOF10: 6
PAINLEVEL_OUTOF10: 7

## 2020-10-10 NOTE — OP NOTE
operating room and underwent successful general  endotracheal anesthesia on the [facility] table. All bony prominences and  vital neurovascular structures were well padded. Head was placed in a  halter traction device for this operative procedure. Neck was then  sterilely prepped with ChloraPrep solution and sterilely draped in  normal fashion. C-arm control was then brought in to confirm the  appropriate level for the skin incision. Marking pen was used to prince  the left side of the neck for an oblique incision for this operative  procedure. A #10 blade was used to make a skin incision down through  skin, subcutaneous layer down to level of deep fascia. The platysmal  muscle was split in line with the skin incision with electrocautery. Blunt dissection was then carried out through the superficial cervical  fascia down to the deep cervical fascia down the deep vertebral and  pretracheal fascia. It was noted the osteophytes and bridging were  causing significant entrapment of the esophagus. They were gently swept  off with a large spur especially at C5 and C6 and carried down off of  the spurs at C7 and T1. Once I was able to do this, I was able to  mobilize the area and look a little bit better through this area. I  went ahead and placed a spinal needle for the surgical site at C3-4 just  to confirm the appropriate level for the decompression. At this point,  electrocautery was then used to mobilize the fascial planes and the  longus colli off the vertebral body exposing the vertebral body at C3,  C4, C5, C6, C7, and T1 at the lower levels to expose the large spur and  anterior osteophytes that had to be taken off, they were causing  entrapment of the esophagus. This was carried out to level of joint  Luschka bilaterally. Jackson retractor was then placed at the depth  of wound.   At this point, osteotome as well as the [place]ell rongeur was  also removed through the anterior osteophytectomy and osteotomy of  vertebral bodies, removing the large anterior osteophytes off the  vertebral body of C5, C6, and C7 until it was nice and smooth. I also  decorticated and smoothened out with the high-speed acorn sunni. Anterior osteophytes were also taken off the vertebral body at C3, C4. Bone wax was then used to maintain hemostasis, which was nice and clean. C-arm demonstrated satisfactory removal of the osteotomy and the  vertebral bodies. Once this was confirmed, Shadow-Line retractor was  then placed at the depth of the wound. Two Meeker pins were then placed at the vertebral bodies of C3 and C4. Left-sided distractor was then placed over the Meeker pins of the  interspaces of C3 and C4. A #11 blade was used to make an annulotomy. Straight and upgoing pituitary was then used to remove the disk  material.  Straight curette was used to mobilize the disk material and  cartilaginous endplate back to the posterior longitudinal ligament, was  then taken down as well as the posterior osteophytes with #1 and #2  Kerrison. It was carried out to the lateral edges so that the exiting  C4 nerve root was completely decompressed bilaterally. High speed [place]  sunni was then used to complete the partial corpectomies of the vertebral  body of C3 and C4 until it was nice and squared out and opened up. Gelfoam and thrombin were then used to maintain hemostasis. Appropriate-sized trial was then brought in to confirm the appropriate  size of the Ti-Bond PEEK spacer, which measured 8 mm. 8-mm cage was  then packed with the Cerapedics graft. The interspace at C3-4 was then  thoroughly irrigated. The Ti-Bond PEEK cage was then placed in position  and recessed under direct C-arm control. Meeker pin was then removed  from the body of C3 and bone wax was then used to maintain hemostasis.    Meeker pin was then placed in the body of C5 and the left-sided  distractor was then placed over top of the C4 and C5 vertebral body.  It  opened up the open interspace at C4 and C5. A #11 blade was used to  make an annulotomy. Straight and upgoing pituitary to remove the disk  material.  Straight curette was used to mobilize the disk material and  cartilaginous endplate back to posterior longitudinal ligament. Posterior longitudinal ligament as well as the posterior osteophytes  were once again taken down with a #1 and #2 Kerrison out to lateral  edges so that the exiting C5 nerve root was completely decompressed. Partial corpectomies were then performed at this time with a high-speed  sunni off the vertebral body of C4 and C5. Gelfoam and thrombin were  then used to maintain hemostasis. Appropriate-sized trial was then  brought in to confirm the appropriate sized Ti-Bond PEEK cage which  measured 8 mm once again, it was then packed with local bone graft and  Cerapedics graft. Interspace was then thoroughly irrigated. Gelfoam  and thrombin were then withdrawn. Powdered Gelfoam and thrombin were  then applied. Please note, at C3-4 also the Gelfoam and thrombin were  withdrawn and powdered Gelfoam and thrombin were applied. The Ti-Bond  PEEK cage was then placed in position and impacted and recessed under  direct C-arm control. Blanchard pin was then removed from the body of C4. Bone wax was then used to maintain hemostasis. Blanchard pin was then  placed in the body of C6. Left-sided distractor was then placed over  the interspace at C5 and T6. This opened up very nicely. A #11 blade  was used to make an annulotomy. Straight pituitary was then used to  remove the disk material.  Straight curette was used to mobilize the  disk material and cartilaginous endplate back to the posterior  longitudinal ligament. #1 and #2 Kerrison were then used to remove the  posterior longitudinal ligament as well as the posterior osteophyte out  of the neural foramen so that the exiting C6 nerve root was completely  decompressed.   High speed [place] sunni was then used to decorticate and  score out the inferior endplate of C5 and C6 until it was nice and lined  up. Gelfoam and thrombin were then used to maintain hemostasis. Wound  was then thoroughly irrigated. Gelfoam and thrombin were then  withdrawn. Powdered Gelfoam and thrombin were then applied. Once the  trial was done for the Ti-Bond PEEK cage, it was measured 8 mm, it was  packed with the Cerapedics graft and local bone graft. Cage was then  brought into the field, impacted, recessed under direct C-arm control. The two Franklin pins at C5 and C6 were then removed and bone wax was then  placed to maintain hemostasis. Appropriate-sized [place] 42-mm plate  was then expanded from C3 to C6. It was held in place with the stay pin  and confirmed appropriate position under C-arm control. Each of the  drill holes was then drilled with a 2.5 mm drill and 4.0 x 40 mm screws  were then placed through the screw holes of C3, C4, C5, and C6. They  were then locked down per the manufacture's specification. AP and  lateral x-rays demonstrated satisfactory position of all the plate and  screws and the cages. Wound was then thoroughly irrigated with 1000 mL  of normal saline with vancomycin wash. 7 ARMAAN drain was then placed at  the depth of the wound. Self-retaining retractor was then withdrawn to  allow the deep structures to fall back in the midline. Deep fascial  structures were then closed with running 2-0 Vicryl suture. Subcutaneous layer was then irrigated and closed with 2-0 Vicryl suture. Skin was reapproximated using 3-0 Monocryl suture. Wound was then  cleansed and dried. Tincture was applied. Steri-Strips were applied. Sterile dressing was then applied. The patient was then carefully  rolled to a supine position on hospital bed, was awakened from general  anesthesia without difficulty. He was then transferred to PACU per  Anesthesia in stable condition.     GROSS HISTORY:  The patient is a 28-year-old male with significant back  pain and difficulty ambulation with significant radiculopathy and some  underlying myelopathic gait. He underwent the above operative procedure  with the risks and benefits discussed at length. There were no  guarantees given. Consent forms were signed. Please also note that the  patient has significant dysphagia related to the large osteophytes in  his neck.         Melinda Mills DO    D: 10/09/2020 11:25:41       T: 10/09/2020 11:35:29     [email or web address]  Job#: [id number]     Doc#: [id number]    CC:

## 2020-10-10 NOTE — CONSULTS
Hospital Medicine  Consult History & Physical        Chief Complaint:    Medical management    Date of Service:   10/10/20    History Of Present Illness:      66 y.o. male who we are asked to see/evaluate by Poly Zhao DO for medical management. Admitted to orthopedic services following fusion of C3-6. He has PMH of a fib, DM, HLD, HTN, urinary retention. [facility] is consulted for medical management. Past Medical History:        Diagnosis Date    Abnormal computed tomography angiography of heart 07/19/2019    Calcified plaque proximal RCA with 50-70% stenosis, proximal LAD 50% stenosis, mild LAD 30-50% stenosis, groundglass infiltrates, area of consoldidation left lung base posteriorly.       Arthritis     Atrial fibrillation (HCC)     Burning pain     CAD (coronary artery disease)     Degenerative lumbar disc     Diabetes ([address]     Edentulous     upper denture    H/O cardiovascular stress test 02/19/2020    Lexiscan    Herniated cervical disc     History of echocardiogram 03/16/2017    EF 60-65%    Hx of blood clots     Hyperlipidemia     Hypertension     Stented coronary artery     Tinnitus     Urinary retention 3/17/2017       Past Surgical History:        Procedure Laterality Date    ABLATION OF DYSRHYTHMIC FOCUS  1980    APPROX 30-35 YRS AGO    CARDIAC CATHETERIZATION  02/20/2020    Dr Papo Camacho  2010 sigmoid    COL[facility]OSCOPY  5/[facility]    CORONARY ANGIOPLASTY WITH STENT PLACEMENT      DIAGNOSTIC CARDIAC CATH LAB PROCEDURE      ENDOSCOPY, COLON, DIAGNOSTIC      EPIDURAL STEROID INJECTION N/A 12/11/2018    C7-T1 EPIDURAL STEROID INJECTION performed by Aaliyah Gabriel DO at [address]  1/8/2019    C7 - T1 EPIDURAL STEROID INJECTION #2 performed by Aaliyah Gabriel DO at [address] [facility]      [facility] [address] [facility]  2008    LAMINECTOMY N/A 6/5/2020    LUMBAR LAMINECTOMY L3-4 , tablet Take 1 tablet by mouth daily as needed (Reflux) 9/22/20  Yes Gulshan Arredondo, DO   pravastatin (PRAVACHOL) 10 MG tablet TAKE 1 TABLET BY MOUTH DAILY 9/22/20  Yes Gulshan Arredondo, DO   metoprolol succinate (TOPROL XL) 50 MG extended release tablet Take 1 tablet by mouth 2 times daily 9/22/20  Yes Gulshan Arredondo, DO   amLODIPine (NORVASC) 5 MG tablet Take 1 tablet by mouth 2 times daily 9/22/20  Yes Glushan Arredondo, DO   hydrALAZINE (APRESOLINE) 25 MG tablet Take 1 tablet by mouth 4 times daily 8/25/20  Yes Stephanie Bergeron MD   finasteride (PROSCAR) 5 MG tablet Take 5 mg by mouth daily   Yes Historical Provider, MD   ammonium lactate (LAC-HYDRIN) 12 % lotion Apply 1 g topically daily as needed for Dry Skin   Yes Historical Provider, MD   glimepiride (AMARYL) 2 MG tablet Take 1 tablet by mouth every morning (before breakfast) 9/22/20   Gulshan Arredondo, DO   clopidogrel (PLAVIX) 75 MG tablet Take 1 tablet by mouth daily 9/22/20   Gulshan Arredondo, DO   aspirin 81 MG tablet Take 81 mg by mouth Daily with lunch     Historical Provider, MD       Allergies:    Imdur [isosorbide nitrate]; Oxycodone; Simvastatin; Bactrim [sulfamethoxazole-trimethoprim]; Ciprofloxacin hcl; Gabapentin; Hydrocodone; and Norco [hydrocodone-acetaminophen]    Social History:      TOBACCO:   reports that he quit smoking about 43 years ago. He smoked 0.00 packs per day for 16.00 years. He has quit using smokeless tobacco.  ETOH:   reports no history of alcohol use. Family History:          Problem Relation Age of Onset    Stroke Mother     Other Father     Kidney Disease Brother     Arrhythmia Brother     Cancer Brother     Kidney Disease Sister     Hypertension Other     Diabetes Other       family history reviewed and found to be negative for contributing factors     REVIEW OF SYSTEMS:     Pertinent positives as noted in the HPI. All other systems reviewed and negative.     PHYSICAL EXAM:  /66   Pulse 66   Temp 98.2 °F (36.8 °C) (Tympanic)   Resp 20   Ht 5' 9\" (1.753 m)   Wt 191 lb (86.6 kg)   SpO2 96%   BMI 28.21 kg/m²     General appearance: No apparent distress, appears stated age and cooperative. HEENT: Normal cephalic, atraumatic without obvious deformity. Pupils equal, round, and reactive to light. Extra ocular muscles intact. Conjunctivae/corneas clear. Neck: Supple, with full range of motion. No jugular venous distention. Trachea midline. Respiratory:  Normal respiratory effort. Clear to auscultation, bilaterally without Rales/Wheezes/Rhonchi. Cardiovascular: Regular rate and rhythm with normal S1/S2 without murmurs, rubs or gallops. Brisk capillary refill. 2+ lower extremity pulses (dorsalis pedis). Abdomen: Soft, non-tender, non-distended with normal bowel sounds. Musculoskeletal: No clubbing, cyanosis or edema bilaterally. Full range of motion without deformity. Skin: Normal skin color. No rashes or lesions. Neurologic:  Neurovascularly intact without any focal sensory/motor deficits. Psychiatric: Alert and oriented, thought content appropriate, normal insight    Labs:     Recent Labs     10/10/20  0527   WBC 15.2*   HGB 16.2   HCT 46.8        No results for input(s): NA, K, CL, CO2, BUN, CREATININE, CALCIUM, PHOS in the last 72 hours. Invalid input(s): MAGNES  No results for input(s): AST, ALT, BILIDIR, BILITOT, ALKPHOS in the last 72 hours. No results for input(s): INR in the last 72 hours. No results for input(s): [place] in the last 72 hours. Urinalysis:      Lab Results   Component Value Date    NITRU Negative 10/29/2019    WBCUA PACKED 04/20/2017    BACTERIA MANY 04/20/2017    RBCUA 5-10 04/20/2017    BLOODU Negative 10/29/2019    SPECGRAV <=1.005 10/29/2019    GLUCOSEU Negative 10/29/2019     FLUORO FOR SURGICAL PROCEDURES   Final Result   Intraoperative spot films demonstrating anterior cervical discectomy and   fusion from C3-C6 without adverse features evident. ASSESSMENT:  DM  A fib  HTN  HLD    Plan  Glucose management-insulin sliding scale  BP control-hydralazine, amlodipine  Resume BB and statin  Pain control  Neurovascular checks  I/Os  Resume ASA and plavix when ok with primary service  Remainder per primary      -  has a past medical history of Abnormal computed tomography angiography of heart, Arthritis, Atrial fibrillation ([facility] [address] Burning pain, CAD (coronary artery disease), Degenerative lumbar disc, Diabetes ([address] Edentulous, H/O cardiovascular stress test, Herniated cervical disc, History of echocardiogram, Hx of blood clots, Hyperlipidemia, Hypertension, Stented coronary artery, Tinnitus, and Urinary retention.   -  Body mass index is 28.21 kg/m².   - DVT Prophylaxis  SCDs    Radha Maria, CNP  [facility]  Please contact me via Melissa Li

## 2020-10-10 NOTE — PROGRESS NOTES
Physical Therapy  Physical Therapy Initial Assessment     Name: Misty Olsen  : 1945  MRN: [id number]    Referring Provider:  DENIA York     Date of Service: 10/10/2020    Evaluating PT:  Nico Petersen PT, DPT. [id number]    Room #:  9514/6932-A  Diagnosis:  Cervical stenosis of spine  Reason for admission:  Neck pain  Precautions:  Falls, C collar, spinal neutrality, LSO  Procedures: 10/9 C3-6 fusion and corpectomy. Previous 2020 lumbar sx  Equipment Recommendations:  TBD    SUBJECTIVE:  Pt lives with wife in a mobile home with 3 steps 2 rails to enter. Pt ambulated with no AD PTA. Pt independent for ADL performance. OBJECTIVE:   Initial Evaluation  Date: 10/10 Treatment   Short Term/ Long Term   Goals   AM-PAC 6 Clicks 15/91     Was pt agreeable to Eval/treatment? Yes      Does pt have pain?  2/10 neck      Bed Mobility  Rolling: NT  Supine to sit: Supervision  Sit to supine: NT  Scooting: Supervision  Independent   Transfers Sit to stand: SBA  Stand to sit: SBA  Stand pivot: NT  Independent   Ambulation    125 feet with no AD SBA    >300 feet with no AD independent   Stair negotiation: ascended and descended  NT  >3 steps with 1 rail mod I    ROM BUE:  See OT eval   BLE:  WFL     Strength BUE:  See OT eval   BLE:  knee ext 5/5  Ankle DF 5/5  Increase by 1/3 MMT grade    Balance Sitting EOB:  Independent  Dynamic Standing:  SBA  Sitting EOB:  Independent  Dynamic Standing:  Independent     -Pt is A & O x 4  -Sensation:  Impaired to R thigh chronic   -Edema:  unremarkable     Therapeutic Exercises:  functional activity     Patient education  Pt educated on safety, sequencing of transfers, and role of PT    Patient response to education:   Pt verbalized understanding Pt demonstrated skill Pt requires further education in this area   Yes  Yes  No      ASSESSMENT:    Comments:  Pt received sitting in bed and agreeable to PT session  Pt educated on spinal precautions with good understanding from previous sx - sat EOB without assist and donned LSO. Able to stand and ambulate without help or device. Mild unsteadiness during ambulation with 2x minor stumbles able to self correct. Pt educated on proper scanning of room for safety. Close standby during gait for safety. Seated in chair to end session   Pt with all needs met and call light in reach. Pt would benefit from continued PT POC to address functional deficits described above. Treatment:  Patient practiced and was instructed in the following treatment:     Patient education provided continuously throughout session for sequencing, safety maintenance, and improving any deficits found during the evaluation.  Bed mobility training - pt given verbal and tactile cues to facilitate proper sequencing and safety during rolling and supine>sit    STS and pivot transfer training - pt educated on proper hand and foot placement, safety and sequencing, and use of proper technique to safely complete sit<>stand   Gait training- pt was given verbal and tactile cues to facilitate normal speed and improved safety awareness during ambulation as well as provided with physical assistance to complete task.  Education on spinal precautions and use of brace    Pt's/ family goals   1. Return home     Patient and or family understand(s) diagnosis, prognosis, and plan of care. Yes     PLAN:    Current Treatment Recommendations   [] Strengthening     [] ROM   [x] Balance Training   [x] Endurance Training   [x] Transfer Training   [x] Gait Training   [x] Stair Training   [] Positioning   [x] Safety and Education Training   [] Patient/Caregiver Education   [] HEP  [] Other     Frequency of treatments: 2-5x/week x 1-2 weeks.     Time in  0825  Time out  0840    Total Treatment Time 10 minutes     Evaluation Time includes thorough review of current medical information, gathering information on past medical history/social history and prior level of function, completion of standardized testing/informal observation of tasks, assessment of data and education on plan of care and goals.     CPT codes:  [x] Low Complexity PT evaluation 48713  [] Moderate Complexity PT evaluation 67525  [] High Complexity PT evaluation 03459  [] PT Re-evaluation 27054  [] Gait training 61214 - minutes  [] Manual therapy 91587 - minutes  [x] Therapeutic activities 38158 10 minutes  [] Therapeutic exercises 42078 - minutes  [] Neuromuscular reeducation 69432 - minutes     Lanny Marroquin, PT, DPT  [id number]

## 2020-10-10 NOTE — PROGRESS NOTES
OCCUPATIONAL THERAPY INITIAL EVALUATION      Date:10/10/2020  Patient Name: Nay Peralta  MRN: [id number]  : 1945  Room: [address] [place], [place] [address]    Evaluating OT: Bunnie Boxer, OTR/ADELINE  Referring Provider:       DENIA Castillo      AM-PAC Daily Activity Raw Score:   Recommended Adaptive Equipment: none at this time     Comments: Based on patient's functional performance as stated below and level of assistance needed prior to admission, this therapist believes that the patient would benefit from home health OT following hospital stay in an effort to increase safety, functional independence, and quality of life. Diagnosis: Cervical stenosis of spine [M48.02]   Surgery: 10/9 C3-6 fusion and corpectomy, Previous 2020 lumbar sx   Pertinent Medical History:   Past Medical History:   Diagnosis Date    Abnormal computed tomography angiography of heart 2019    Calcified plaque proximal RCA with 50-70% stenosis, proximal LAD 50% stenosis, mild LAD 30-50% stenosis, groundglass infiltrates, area of consoldidation left lung base posteriorly.  Arthritis     Atrial fibrillation (HCC)     Burning pain     CAD (coronary artery disease)     Degenerative lumbar disc     Diabetes ([facility] [address]     Edentulous     upper denture    H/O cardiovascular stress test 2020    Lexiscan    Herniated cervical disc     History of echocardiogram 2017    EF 60-65%    Hx of blood clots     Hyperlipidemia     Hypertension     Stented coronary artery     Tinnitus     Urinary retention 3/17/2017       Precautions:  Falls, spinal precautions, BOA lumbar brace, c-collar, elevate bed 30*     Home Living: Pt lives with wife in a 1 story with 3 step(s) to enter and B rail(s); bed/bath on main floor  Bathroom setup: walk-in shower, elevated toilet  Equipment owned: none     Prior Level of Function: Assist PRN with ADLs and independent with IADLs; using no device for ambulation. Sponge bathes at baseline.  Wife assists with LB bathing and dressing. Driving: yes  Occupation: Retired  for [employer]    Pain Level: reports 4-5/10 neck pain  Cognition: A&O: 4/4; Follows 3 step directions   Memory: G   Sequencing: G   Problem solving: G   Judgement/safety: G     Functional Assessment:   Initial Eval Status  Date: 10/10/20 Treatment Status  Date: STG=LTG (~5-14  days)     Feeding Set up A  Education provided regarding feeding with c-collar     Mod I   Grooming Supervision  To wash hands in standing at sink    independent while standing at sink    UB Dressing SBA  Simulated; patient self donned LSO brace. Verbalizes understanding of spinal precautions. Education regarding proper c-collar fit. Modified independent   LB Dressing Mod A    Mod I   Bathing Mod A    Mod I   Toileting SBA  For toilet transfer and LB clothing mgmt, demos F understanding of spinal neutral mechanics    Mod I   Bed Mobility  Rolling: NT  Supine to sit: NT  Sit to supine: NT  Mod I   Functional Transfers Sit to stand: SBA  Stand to sit: SBA  Mod I   Functional Mobility SBA no AE  For in-room distance  independent   Balance Sitting:     Static:  independent    Dynamic: supervision  Standing: SBA  independent   Endurance/Activity Tolerance good     Visual/  Perceptual Glasses: readers              Hand dominance: R  UE ROM: RUE: WFL  LUE: WFL  Strength: RUE: grossly WFL LUE: grossly WFL   Strength: WFL  Fine Motor Coordination: WFL    Hearing: minor [place]  Sensation:  No c/o numbness or tingling  Tone: WNL  Edema: unremarkable                            Comments: Upon arrival patient supine with HOB slightly elevated. Cervical and spinal precautions reviewed with good understanding. Extensive education provided regarding c-collar use/precautions during ADL activities (bathing, sleeping, toileting, feeding, grooming). After session, patient left up in chair with all devices within reach, all lines and tubes intact.       Pt required cues and education problem solving skills & safe participation in ADLs/IADLs     []Sensory re-education techniques to improve extremity awareness, maintain skin integrity and improve hand function                     []Visual/Perceptual retraining ex's to improve body awareness and safety during transfers and ADLs  []Splinting/postioning needs to maintain joint/skin integrity and prevent contractures  [x]Therapeutic activity to improve functional skills                                   [x]Therapeutic exercise to improve strength and safety during ADL activities   [x]Balance retraining ex's for postural control with dynamic challenges  []Neuromuscular re-education exercises                    []Delirium prevention/treatment    [x]Other:  Reinforcement of spinal/cervical precautions and use of c-collar and LSO           Rehab Potential: Good for established goals    Patient / Family Goal: None stated     Patient and/or family were instructed/educated on diagnosis, prognosis/goals and plan of care. Demonstrated G understanding, further information not needed. [] Malnutrition indicators have been identified and nursing has been notified to ensure a dietitian consult is ordered.         (Evaluation time includes thorough review of current medical information, gathering information on past medical history/social history and prior level of function, completion of standardized testing/informal observation of tasks, assessment of data and development of POC/Goals.)    Time In: 0847            Time Out: 0905         Min Units   OT Eval Low 97165 X 1   OT Eval Medium 93488     OT Eval High [id number]     OT Re-Eval [id number]     Therapeutic Ex [id number]     Therapeutic Activities 08968     ADL/Self Care 98829 10 1   Orthotic Management 37731     Neuro Re-Ed 78533     Non-Billable Time     TOTAL TIMED TREATMENT [address], OTR/L  [id number]

## 2020-10-16 NOTE — DISCHARGE SUMMARY
[address]                  [address] [facility],  [facility]                               DISCHARGE SUMMARY    PATIENT NAME: Anna Bui                     :        1945  MED REC NO:   [id number]                            ROOM:       5213  ACCOUNT NO:   [de-identified]                           ADMIT DATE: 10/09/2020  PROVIDER:     DENIA Wyman                  DISCHARGE DATE:  10/10/2020    ADMISSION DIAGNOSES:  1. Cervical spinal stenosis. 2.  Cervical degenerative disk disease. 3.  Dysphagia. 4.  Cervical radiculopathy. DISCHARGE DIAGNOSES:  1. Cervical spinal stenosis. 2.  Cervical degenerative disk disease. 3.  Dysphagia. 4.  Cervical radiculopathy. PROCEDURE:  ACDF C3 to C6 with anterior osteophytectomy, C5 to T1.    HOSPITAL COURSE:  The patient tolerated the procedure well and was  admitted for postop management. Pain was controlled with the  appropriate pain meds. He did have same physical and occupational  therapy evaluations. CONDITION AT TIME OF DISCHARGE:  Stable. DISPOSITION:  The patient was discharged home. He will continue with  his home exercise program, which was reviewed in the hospital.  He will  continue wearing a cervical collar at home. He was discharged with pain  medication to help control his pain. PLAN:  He will follow up in the office in two weeks.         DENIA Esquivel    D: 10/15/2020 11:23:34       T: 10/15/2020 12:03:38     [email or web address]  Job#: [id number]     Doc#: [id number]    CC:

## 2020-10-24 NOTE — H&P
[address]                  [address] [facility],  [facility]                              HISTORY AND PHYSICAL    PATIENT NAME: Nan Moreno                     :        1945  MED REC NO:   [id number]                            ROOM:  ACCOUNT NO:   [de-identified]                           ADMIT DATE: 10/09/2020  PROVIDER:     DENIA Coburn    CHIEF COMPLAINT:  Neck and radiating upper extremity pain. HISTORY OF PRESENT ILLNESS:  The patient is a 68-year-old male who  complains of intractable neck pain with radiating bilateral upper  extremity pain and numbness. He has failed conservative measures and  presents for surgery. PAST MEDICAL HISTORY:  Urinary retention, stented coronary artery,  hypertension, hyperlipidemia, blood clots, diabetes, coronary artery  disease, AFib. PAST SURGICAL HISTORY:  Prostate surgery, prior lumbar fusion,  laminectomy, hemorrhoid surgery, eye surgery, coronary angioplasty with  stent placement. FAMILY HISTORY:  Noncontributory. SOCIAL HISTORY:  He denies smoking or drinking alcohol. ALLERGIES:  IMDUR, OXYCODONE, SIMVASTATIN, BACTRIM, CIPROFLOXACIN,  GABAPENTIN, HYDROCODONE.    MEDICATIONS:  Listed in chart. REVIEW OF SYSTEMS:  He denies any problem with his head, eyes, ear,  nose, or throat. He denies chest pain, shortness of breath, or  abdominal pain. PHYSICAL EXAMINATION:  GENERAL:  He is an alert and oriented 68-year-old male. EXTREMITIES:  Decreased sensation to light touch diffusely in bilateral  upper extremities. HEENT:  Head:  Normocephalic, atraumatic. Eyes:  BARBER. Ears:  TMs  intact. Nose:  Nares patent. Throat:  No erythema. HEART:  Regular rate and rhythm. LUNGS:  Clear to auscultation. ABDOMEN:  Soft, nontender. Bowel sounds intact. CNS:  Intact without hair kathy, lesions or discoloration. NEUROLOGICAL:  Cranial nerves II through XII intact.   CHEST:  Symmetric expansion. NECK:  Full range of motion. BACK:  No paraspinal tenderness. LYMPH NODES:  No lymphadenopathy. PELVIC/RECTAL/BREASTS/HERNIA:  Deferred. IMPRESSION:  1. Cervical spinal stenosis. 2.  Cervical degenerative disk disease. 3.  Neck pain. 4.  Cervical radiculopathy. PLAN:  The patient has failed all conservative measures and presents for  surgical intervention consisting of a cervical fusion with  osteophytectomy. He will be admitted to the hospital for postop  management and discharged when medically stable.         DENIA Haney    D: 10/23/2020 12:20:47       T: 10/23/2020 13:06:32     [email or web address]  Job#: [id number]     Doc#: [id number]    CC:

## 2020-11-03 PROBLEM — R07.9 CHEST PAIN: Status: RESOLVED | Noted: 2020-02-18 | Resolved: 2020-11-03

## 2021-02-03 ENCOUNTER — TELEPHONE (OUTPATIENT)
Dept: ADMINISTRATIVE | Age: 76
End: 2021-02-03

## 2021-02-03 ENCOUNTER — HOSPITAL ENCOUNTER (EMERGENCY)
Age: 76
Discharge: HOME | End: 2021-02-03
Attending: EMERGENCY MEDICINE
Payer: MEDICARE

## 2021-02-03 VITALS
RESPIRATION RATE: 16 BRPM | WEIGHT: 190 LBS | DIASTOLIC BLOOD PRESSURE: 80 MMHG | SYSTOLIC BLOOD PRESSURE: 160 MMHG | HEIGHT: 69 IN | TEMPERATURE: 97.1 F | BODY MASS INDEX: 28.14 KG/M2 | HEART RATE: 70 BPM | OXYGEN SATURATION: 96 %

## 2021-02-03 DIAGNOSIS — R30.0 DYSURIA: Primary | ICD-10-CM

## 2021-02-03 LAB
ANION GAP SERPL CALCULATED.3IONS-SCNC: 13 MMOL/L (ref 7–16)
BACTERIA: NORMAL /HPF
BILIRUBIN URINE: NEGATIVE
BLOOD, URINE: NEGATIVE
BUN BLDV-MCNC: 12 MG/DL (ref 8–23)
CALCIUM SERPL-MCNC: 9.2 MG/DL (ref 8.6–10.2)
CHLORIDE BLD-SCNC: 101 MMOL/L (ref 98–107)
CLARITY: CLEAR
CO2: 23 MMOL/L (ref 22–29)
COLOR: YELLOW
CREAT SERPL-MCNC: 1 MG/DL (ref 0.7–1.2)
EPITHELIAL CELLS, UA: NORMAL /HPF
GFR AFRICAN AMERICAN: >60
GFR NON-AFRICAN AMERICAN: >60 ML/MIN/1.73
GLUCOSE BLD-MCNC: 170 MG/DL (ref 74–99)
GLUCOSE URINE: NEGATIVE MG/DL
KETONES, URINE: NEGATIVE MG/DL
LEUKOCYTE ESTERASE, URINE: NEGATIVE
NITRITE, URINE: NEGATIVE
PH UA: 6 (ref 5–9)
POTASSIUM REFLEX MAGNESIUM: 4.3 MMOL/L (ref 3.5–5)
PROTEIN UA: NEGATIVE MG/DL
RBC UA: NORMAL /HPF (ref 0–2)
SODIUM BLD-SCNC: 137 MMOL/L (ref 132–146)
SPECIFIC GRAVITY UA: 1.02 (ref 1–1.03)
UROBILINOGEN, URINE: 0.2 E.U./DL
WBC UA: NORMAL /HPF (ref 0–5)

## 2021-02-03 PROCEDURE — 81001 URINALYSIS AUTO W/SCOPE: CPT

## 2021-02-03 PROCEDURE — 80048 BASIC METABOLIC PNL TOTAL CA: CPT

## 2021-02-03 PROCEDURE — 99282 EMERGENCY DEPT VISIT SF MDM: CPT

## 2021-02-03 ASSESSMENT — ENCOUNTER SYMPTOMS
EYE PAIN: 0
SHORTNESS OF BREATH: 0
COUGH: 0
VOMITING: 0
DIARRHEA: 0
ABDOMINAL PAIN: 0
NAUSEA: 0
RHINORRHEA: 0
WHEEZING: 0

## 2021-02-03 NOTE — ED PROVIDER NOTES
Pt Name: Stacy Huitron  MRN: [id number]  [place] 1945  Date of evaluation: 2/3/2021      CHIEF COMPLAINT       Chief Complaint   Patient presents with   [address] X 3-4 DAYS        Stacy Huitron is a 66 y.o. male presenting to the ED with chief complaint of dysuria, beginning 3 to 4 days ago. Patient has a history of urinary tract infections and states they feel similar to this. Patient was told he has an enlarged prostate is scheduled to see urology later this month. He admits associated symptoms of urinary frequency. He denies fever, chills, nausea, vomiting, abdominal pain or distention. He denies history of kidney stones. His symptoms are not made better or worse by anything. He is not tried anything for treatment prior to arrival.    HPI       Patient has a medical history as listed below:   Past Medical History:   Diagnosis Date    Abnormal computed tomography angiography of heart 07/19/2019    Calcified plaque proximal RCA with 50-70% stenosis, proximal LAD 50% stenosis, mild LAD 30-50% stenosis, groundglass infiltrates, area of consoldidation left lung base posteriorly.       Arthritis     Atrial fibrillation (HCC)     Burning pain     CAD (coronary artery disease)     Degenerative lumbar disc     Diabetes ([address]     Edentulous     upper denture    H/O cardiovascular stress test 02/19/2020    Lexiscan    Herniated cervical disc     History of echocardiogram 03/16/2017    EF 60-65%    Hx of blood clots     Hyperlipidemia     Hypertension     Stented coronary artery     Tinnitus     Urinary retention 3/17/2017     Patient Active Problem List    Diagnosis Date Noted    Cervical stenosis of spine 10/09/2020    Synovial cyst of lumbar spine 08/07/2020    Benign prostatic hyperplasia with urinary frequency 06/06/2020    Lumbar foraminal stenosis 06/05/2020    S/P lumbar laminectomy 06/05/2020  Chest pain with moderate risk for cardiac etiology 02/29/2020    Coronary vasospasm (HCC)     Bradycardia     Coronary artery disease involving native coronary artery of native heart without angina pectoris 02/20/2020    Hypothyroidism 10/31/2019    Anemia 10/31/2019    Diabetic foot infection ([facility] [address] 05/24/2019    Chronic pain syndrome 11/20/2018    Atrial fibrillation ([facility] [address]     Essential hypertension     Type 2 diabetes mellitus with diabetic polyneuropathy, without long-term current use of insulin ([facility] [address]     DDD (degenerative disc disease), cervical 09/09/2015    Cervical radiculopathy 09/09/2015       Review of Systems   Constitutional: Negative for chills and fever. HENT: Negative for congestion and rhinorrhea. Eyes: Negative for pain and visual disturbance. Respiratory: Negative for cough, shortness of breath and wheezing. Cardiovascular: Negative for chest pain and leg swelling. Gastrointestinal: Negative for abdominal pain, diarrhea, nausea and vomiting. Genitourinary: Positive for dysuria and frequency. Negative for decreased urine volume, difficulty urinating, discharge, flank pain, hematuria, penile swelling and testicular pain. Musculoskeletal: Negative for arthralgias and neck pain. Neurological: Negative for numbness and headaches. Physical Exam  Vitals signs and nursing note reviewed. Constitutional:       General: He is not in acute distress. Appearance: He is well-developed. HENT:      Head: Normocephalic and atraumatic. Nose: Nose normal.      Mouth/Throat:      Pharynx: Oropharynx is clear. Eyes:      Conjunctiva/sclera: Conjunctivae normal.      Pupils: Pupils are equal, round, and reactive to light. Neck:      Musculoskeletal: Normal range of motion. No neck rigidity. Thyroid: No thyromegaly. Vascular: No JVD. Cardiovascular:      Rate and Rhythm: Normal rate and regular rhythm. Heart sounds: Normal heart sounds. Pulmonary:      Effort: Pulmonary effort is normal. No respiratory distress. Breath sounds: Normal breath sounds. No wheezing, rhonchi or rales. Chest:      Chest wall: No tenderness. Abdominal:      General: Abdomen is flat. There is no distension. Palpations: Abdomen is soft. There is no mass. Tenderness: There is no abdominal tenderness. Genitourinary:     Penis: Normal and uncircumcised. Testes: Normal.   Skin:     General: Skin is warm and dry. Findings: No rash. Comments: No crepitus, erythema, discoloration or swelling around patient's perianal or to his groin. Neurological:      General: No focal deficit present. Mental Status: He is alert. Procedures     MDM     ED Course as of Feb 03 1452   Wed Feb 03, 2021   1405   ATTENDING PROVIDER ATTESTATION:     I have personally performed and/or participated in the history, exam, medical decision making, and procedures and agree with all pertinent clinical information unless otherwise noted. I have also reviewed and agree with the past medical, family and social history unless otherwise noted. I have discussed this patient in detail with the resident and provided the instruction and education regarding the evidence-based evaluation and treatment of [unfilled]  History: Patient presents with complaint of burning at the tip of his penis with urination. He denies any suprapubic or flank pain. He denies any discharge. He states he urinates slightly more frequently than he used to. Denies fever or chills. My findings: Eleanor Rodriguez is a 66 y.o. male whom is in no distress. Physical exam reveals well-appearing. Heart regular rate and rhythm, lungs clear to auscultation, abdomen is soft and nontender. No CVA tenderness appreciated. Past Surgical History:  has a past surgical history that includes colectomy (2010 sigmoid); Hemorrhoid surgery (2008); Prostate surgery (2017); Colonoscopy (5/2018 [facility]); Nerve Block (Bilateral, 10 12 2015); Nerve Block (Bilateral, 10/22/15); Nerve Block (Bilateral, 11 02 2015); Nerve Block (Left, 12/2/15); Nerve Block (Left, 12 16 15); Nerve Block (Left, 12 28 2015); Nerve Block (Left, 1 20 16); ablation of dysrhythmic focus (1980); lumbar fusion (03/06/2017); Nasal sinus surgery (12/07/2017); Upper gastrointestinal endoscopy (5/2018 [facility]); Nerve Block (12/11/2018); epidural steroid injection (N/A, 12/11/2018); Nerve Block (N/A, 01/08/2019); epidural steroid injection (N/A, 1/8/2019); Endoscopy, colon, diagnostic; other surgical history (1945); Upper gastrointestinal endoscopy (N/A, 6/4/2019); Diagnostic Cardiac Cath Lab Procedure; Percutaneous Transluminal Coronary Angio (09/30/2019); Coronary angioplasty with stent; Cardiac catheterization (02/20/2020); eye surgery; laminectomy (N/A, 6/5/2020); laminectomy (N/A, 8/7/2020); cervical fusion (N/A, 10/9/2020); and back surgery. Social History:  reports that he quit smoking about 44 years ago. He smoked 0.00 packs per day for 16.00 years. He has quit using smokeless tobacco. He reports that he does not drink alcohol or use drugs. Family History: family history includes Arrhythmia in his brother; Cancer in his brother; Diabetes in an other family member; Hypertension in an other family member; Kidney Disease in his brother and sister; Other in his father; Stroke in his mother. The patients home medications have been reviewed.     Allergies: Imdur [isosorbide nitrate], Oxycodone, Simvastatin, Bactrim [sulfamethoxazole-trimethoprim], Ciprofloxacin hcl, Gabapentin, Hydrocodone, and Norco [hydrocodone-acetaminophen]    -------------------------------------------------- RESULTS -------------------------------------------------  Labs: Results for orders placed or performed during the hospital encounter of 02/03/21   Urinalysis with Microscopic   Result Value Ref Range    Color, UA Yellow Straw/Yellow    Clarity, UA Clear Clear    Glucose, Ur Negative Negative mg/dL    Bilirubin Urine Negative Negative    Ketones, Urine Negative Negative mg/dL    Specific Gravity, UA 1.025 1.005 - 1.030    Blood, Urine Negative Negative    pH, UA 6.0 5.0 - 9.0    Protein, UA Negative Negative mg/dL    Urobilinogen, Urine 0.2 <2.0 E.U./dL    Nitrite, Urine Negative Negative    Leukocyte Esterase, Urine Negative Negative    WBC, UA NONE 0 - 5 /HPF    RBC, UA 0-1 0 - 2 /HPF    Epithelial Cells, UA NONE SEEN /HPF    Bacteria, UA NONE SEEN None Seen /HPF   Basic Metabolic Panel w/ Reflex to MG   Result Value Ref Range    Sodium 137 132 - 146 mmol/L    Potassium reflex Magnesium 4.3 3.5 - 5.0 mmol/L    Chloride 101 98 - 107 mmol/L    CO2 23 22 - 29 mmol/L    Anion Gap 13 7 - 16 mmol/L    Glucose 170 (H) 74 - 99 mg/dL    BUN 12 8 - 23 mg/dL    CREATININE 1.0 0.7 - 1.2 mg/dL    GFR Non-African American >60 >=60 mL/min/1.73    GFR African American >60     Calcium 9.2 8.6 - 10.2 mg/dL       Radiology:  No orders to display       ------------------------- NURSING NOTES AND VITALS REVIEWED ---------------------------  Date / Time Roomed:  2/3/2021 12:14 PM  ED Bed Assignment:  16/16    The nursing notes within the ED encounter and vital signs as below have been reviewed. BP (!) 160/80   Pulse 70   Temp 97.1 °F (36.2 °C)   Resp 16   Ht 5' 9\" (1.753 m)   Wt 190 lb (86.2 kg)   SpO2 96%   BMI 28.06 kg/m²           --------------------------------- ADDITIONAL PROVIDER NOTES ---------------------------------  At this time the patient is without objective evidence of an acute process requiring hospitalization or inpatient management. They have remained hemodynamically stable throughout their entire ED visit and are stable for discharge with outpatient follow-up. The plan has been discussed in detail and they are aware of the specific conditions for emergent return, as well as the importance of follow-up. Discharge Medication List as of 2/3/2021  2:19 PM          Diagnosis:  1. Dysuria        Disposition:  Patient's disposition: Discharge  Patient's condition is stable. NOTE:  This report was transcribed using voice recognition software. Efforts were made to ensure accuracy; however, inadvertent computerized transcription errors may be present.              Moe Santamaria DO  Resident  02/03/21 8513

## 2021-02-03 NOTE — TELEPHONE ENCOUNTER
Pt believes he has a bladder infection, tried to get in to urologist but couldn't get an appt until 17th and was advised to contact his pcp. Not seeing any openings to assist.  Please contact and advise. Thank you in advance.

## 2021-02-04 DIAGNOSIS — N30.00 ACUTE CYSTITIS WITHOUT HEMATURIA: Primary | ICD-10-CM

## 2021-02-04 RX ORDER — NITROFURANTOIN 25; 75 MG/1; MG/1
100 CAPSULE ORAL 2 TIMES DAILY
Qty: 20 CAPSULE | Refills: 0 | Status: SHIPPED | OUTPATIENT
Start: 2021-02-04 | End: 2021-02-14

## 2021-03-29 ENCOUNTER — OFFICE VISIT (OUTPATIENT)
Dept: FAMILY MEDICINE CLINIC | Age: 76
End: 2021-03-29
Payer: MEDICARE

## 2021-03-29 VITALS
DIASTOLIC BLOOD PRESSURE: 78 MMHG | BODY MASS INDEX: 30.21 KG/M2 | HEIGHT: 69 IN | SYSTOLIC BLOOD PRESSURE: 128 MMHG | OXYGEN SATURATION: 98 % | WEIGHT: 204 LBS | HEART RATE: 56 BPM | RESPIRATION RATE: 18 BRPM

## 2021-03-29 DIAGNOSIS — M96.1 FAILED BACK SYNDROME OF LUMBAR SPINE: Primary | ICD-10-CM

## 2021-03-29 DIAGNOSIS — E11.42 TYPE 2 DIABETES MELLITUS WITH DIABETIC POLYNEUROPATHY, WITHOUT LONG-TERM CURRENT USE OF INSULIN (HCC): ICD-10-CM

## 2021-03-29 DIAGNOSIS — M48.061 NEURAL FORAMINAL STENOSIS OF LUMBAR SPINE: Chronic | ICD-10-CM

## 2021-03-29 DIAGNOSIS — I48.91 ATRIAL FIBRILLATION, UNSPECIFIED TYPE (HCC): ICD-10-CM

## 2021-03-29 DIAGNOSIS — R20.2 PARESTHESIA OF BOTH LOWER EXTREMITIES: ICD-10-CM

## 2021-03-29 DIAGNOSIS — E03.9 ACQUIRED HYPOTHYROIDISM: ICD-10-CM

## 2021-03-29 DIAGNOSIS — M51.16 LUMBAR DISC DISEASE WITH RADICULOPATHY: ICD-10-CM

## 2021-03-29 DIAGNOSIS — N40.1 BENIGN PROSTATIC HYPERPLASIA WITH URINARY FREQUENCY: ICD-10-CM

## 2021-03-29 DIAGNOSIS — K21.9 GASTROESOPHAGEAL REFLUX DISEASE WITHOUT ESOPHAGITIS: ICD-10-CM

## 2021-03-29 DIAGNOSIS — I10 ESSENTIAL HYPERTENSION: ICD-10-CM

## 2021-03-29 DIAGNOSIS — M54.12 CERVICAL RADICULOPATHY: Chronic | ICD-10-CM

## 2021-03-29 DIAGNOSIS — E78.2 MIXED HYPERLIPIDEMIA: ICD-10-CM

## 2021-03-29 DIAGNOSIS — Z12.5 SCREENING FOR MALIGNANT NEOPLASM OF PROSTATE: ICD-10-CM

## 2021-03-29 DIAGNOSIS — M51.36 DDD (DEGENERATIVE DISC DISEASE), LUMBAR: Chronic | ICD-10-CM

## 2021-03-29 DIAGNOSIS — R35.0 BENIGN PROSTATIC HYPERPLASIA WITH URINARY FREQUENCY: ICD-10-CM

## 2021-03-29 DIAGNOSIS — R19.5 MUCUS IN STOOL: ICD-10-CM

## 2021-03-29 DIAGNOSIS — M96.1 FAILED BACK SYNDROME OF LUMBAR SPINE: ICD-10-CM

## 2021-03-29 LAB
ALBUMIN SERPL-MCNC: 4.8 G/DL (ref 3.5–5.2)
ALP BLD-CCNC: 133 U/L (ref 40–129)
ALT SERPL-CCNC: 20 U/L (ref 0–40)
ANION GAP SERPL CALCULATED.3IONS-SCNC: 18 MMOL/L (ref 7–16)
AST SERPL-CCNC: 27 U/L (ref 0–39)
BASOPHILS ABSOLUTE: 0.04 E9/L (ref 0–0.2)
BASOPHILS RELATIVE PERCENT: 0.6 % (ref 0–2)
BILIRUB SERPL-MCNC: 0.4 MG/DL (ref 0–1.2)
BUN BLDV-MCNC: 13 MG/DL (ref 8–23)
CALCIUM SERPL-MCNC: 10 MG/DL (ref 8.6–10.2)
CHLORIDE BLD-SCNC: 104 MMOL/L (ref 98–107)
CHOLESTEROL, TOTAL: 183 MG/DL (ref 0–199)
CO2: 22 MMOL/L (ref 22–29)
CREAT SERPL-MCNC: 1 MG/DL (ref 0.7–1.2)
EOSINOPHILS ABSOLUTE: 0.38 E9/L (ref 0.05–0.5)
EOSINOPHILS RELATIVE PERCENT: 5.3 % (ref 0–6)
GFR AFRICAN AMERICAN: >60
GFR NON-AFRICAN AMERICAN: >60 ML/MIN/1.73
GLUCOSE BLD-MCNC: 131 MG/DL (ref 74–99)
HBA1C MFR BLD: 6.4 % (ref 4–5.6)
HCT VFR BLD CALC: 50.2 % (ref 37–54)
HDLC SERPL-MCNC: 45 MG/DL
HEMOGLOBIN: 17 G/DL (ref 12.5–16.5)
IMMATURE GRANULOCYTES #: 0.02 E9/L
IMMATURE GRANULOCYTES %: 0.3 % (ref 0–5)
LDL CHOLESTEROL CALCULATED: 85 MG/DL (ref 0–99)
LYMPHOCYTES ABSOLUTE: 3.23 E9/L (ref 1.5–4)
LYMPHOCYTES RELATIVE PERCENT: 45.3 % (ref 20–42)
MCH RBC QN AUTO: 30.2 PG (ref 26–35)
MCHC RBC AUTO-ENTMCNC: 33.9 % (ref 32–34.5)
MCV RBC AUTO: 89.3 FL (ref 80–99.9)
MONOCYTES ABSOLUTE: 0.49 E9/L (ref 0.1–0.95)
MONOCYTES RELATIVE PERCENT: 6.9 % (ref 2–12)
NEUTROPHILS ABSOLUTE: 2.97 E9/L (ref 1.8–7.3)
NEUTROPHILS RELATIVE PERCENT: 41.6 % (ref 43–80)
PDW BLD-RTO: 13.2 FL (ref 11.5–15)
PLATELET # BLD: 199 E9/L (ref 130–450)
PMV BLD AUTO: 10.6 FL (ref 7–12)
POTASSIUM SERPL-SCNC: 4.9 MMOL/L (ref 3.5–5)
PROSTATE SPECIFIC ANTIGEN: 0.45 NG/ML (ref 0–4)
RBC # BLD: 5.62 E12/L (ref 3.8–5.8)
SODIUM BLD-SCNC: 144 MMOL/L (ref 132–146)
TOTAL PROTEIN: 7.9 G/DL (ref 6.4–8.3)
TRIGL SERPL-MCNC: 263 MG/DL (ref 0–149)
TSH SERPL DL<=0.05 MIU/L-ACNC: 2.63 UIU/ML (ref 0.27–4.2)
VLDLC SERPL CALC-MCNC: 53 MG/DL
WBC # BLD: 7.1 E9/L (ref 4.5–11.5)

## 2021-03-29 PROCEDURE — 1036F TOBACCO NON-USER: CPT | Performed by: FAMILY MEDICINE

## 2021-03-29 PROCEDURE — 99213 OFFICE O/P EST LOW 20 MIN: CPT | Performed by: FAMILY MEDICINE

## 2021-03-29 PROCEDURE — G8427 DOCREV CUR MEDS BY ELIG CLIN: HCPCS | Performed by: FAMILY MEDICINE

## 2021-03-29 PROCEDURE — G8484 FLU IMMUNIZE NO ADMIN: HCPCS | Performed by: FAMILY MEDICINE

## 2021-03-29 PROCEDURE — G8417 CALC BMI ABV UP PARAM F/U: HCPCS | Performed by: FAMILY MEDICINE

## 2021-03-29 PROCEDURE — 1123F ACP DISCUSS/DSCN MKR DOCD: CPT | Performed by: FAMILY MEDICINE

## 2021-03-29 PROCEDURE — 4040F PNEUMOC VAC/ADMIN/RCVD: CPT | Performed by: FAMILY MEDICINE

## 2021-03-29 RX ORDER — GLYCOPYRROLATE 1 MG/1
1 TABLET ORAL 3 TIMES DAILY
Qty: 270 TABLET | Refills: 3 | Status: SHIPPED
Start: 2021-03-29 | End: 2022-06-13 | Stop reason: SDUPTHER

## 2021-03-29 RX ORDER — TRAMADOL HYDROCHLORIDE 50 MG/1
50 TABLET ORAL EVERY 6 HOURS PRN
Qty: 120 TABLET | Refills: 5 | Status: SHIPPED
Start: 2021-03-29 | End: 2021-09-29 | Stop reason: SDUPTHER

## 2021-03-29 RX ORDER — FINASTERIDE 5 MG/1
5 TABLET, FILM COATED ORAL DAILY
Qty: 90 TABLET | Refills: 5 | Status: SHIPPED
Start: 2021-03-29 | End: 2021-12-15

## 2021-03-29 RX ORDER — ALFUZOSIN HYDROCHLORIDE 10 MG/1
TABLET, EXTENDED RELEASE ORAL
COMMUNITY
Start: 2021-02-19 | End: 2021-12-21

## 2021-03-29 RX ORDER — PRAVASTATIN SODIUM 10 MG
TABLET ORAL
Qty: 90 TABLET | Refills: 5 | Status: SHIPPED
Start: 2021-03-29 | End: 2021-11-01 | Stop reason: SDUPTHER

## 2021-03-29 RX ORDER — METOPROLOL SUCCINATE 50 MG/1
50 TABLET, EXTENDED RELEASE ORAL 2 TIMES DAILY
Qty: 180 TABLET | Refills: 3 | Status: SHIPPED
Start: 2021-03-29 | End: 2021-11-01 | Stop reason: SDUPTHER

## 2021-03-29 RX ORDER — PANTOPRAZOLE SODIUM 40 MG/1
40 TABLET, DELAYED RELEASE ORAL DAILY PRN
Qty: 90 TABLET | Refills: 5 | Status: SHIPPED
Start: 2021-03-29 | End: 2022-06-01

## 2021-03-29 RX ORDER — FOLIC ACID 1 MG/1
1 TABLET ORAL DAILY
Qty: 90 TABLET | Refills: 5 | Status: SHIPPED | OUTPATIENT
Start: 2021-03-29

## 2021-03-29 RX ORDER — PREGABALIN 50 MG/1
CAPSULE ORAL
COMMUNITY
Start: 2021-02-24 | End: 2021-03-29

## 2021-03-29 RX ORDER — CLOPIDOGREL BISULFATE 75 MG/1
75 TABLET ORAL DAILY
Qty: 90 TABLET | Refills: 3 | Status: SHIPPED
Start: 2021-03-29 | End: 2021-11-01 | Stop reason: SDUPTHER

## 2021-03-29 RX ORDER — HYDRALAZINE HYDROCHLORIDE 25 MG/1
25 TABLET, FILM COATED ORAL 4 TIMES DAILY
Qty: 270 TABLET | Refills: 3 | Status: SHIPPED
Start: 2021-03-29 | End: 2022-06-13 | Stop reason: SDUPTHER

## 2021-03-29 ASSESSMENT — ENCOUNTER SYMPTOMS
CONSTIPATION: 0
CHEST TIGHTNESS: 0
VOMITING: 0
ANAL BLEEDING: 0
BLOOD IN STOOL: 0
ABDOMINAL PAIN: 0
STRIDOR: 0
RHINORRHEA: 0
PHOTOPHOBIA: 0
NAUSEA: 0
SORE THROAT: 0
EYE ITCHING: 0
COLOR CHANGE: 0
SINUS PRESSURE: 0
FACIAL SWELLING: 0
ABDOMINAL DISTENTION: 0
TROUBLE SWALLOWING: 0
VOICE CHANGE: 0
WHEEZING: 0
EYE DISCHARGE: 0
RECTAL PAIN: 0
EYE PAIN: 0
BACK PAIN: 1
COUGH: 0
CHOKING: 0
EYE REDNESS: 0
DIARRHEA: 0
SHORTNESS OF BREATH: 0
APNEA: 0

## 2021-03-29 ASSESSMENT — PATIENT HEALTH QUESTIONNAIRE - PHQ9
SUM OF ALL RESPONSES TO PHQ QUESTIONS 1-9: 0
SUM OF ALL RESPONSES TO PHQ9 QUESTIONS 1 & 2: 0

## 2021-03-30 DIAGNOSIS — E11.42 TYPE 2 DIABETES MELLITUS WITH DIABETIC POLYNEUROPATHY, WITHOUT LONG-TERM CURRENT USE OF INSULIN (HCC): ICD-10-CM

## 2021-03-30 DIAGNOSIS — R35.0 BENIGN PROSTATIC HYPERPLASIA WITH URINARY FREQUENCY: ICD-10-CM

## 2021-03-30 DIAGNOSIS — N40.1 BENIGN PROSTATIC HYPERPLASIA WITH URINARY FREQUENCY: ICD-10-CM

## 2021-03-30 DIAGNOSIS — I10 ESSENTIAL HYPERTENSION: ICD-10-CM

## 2021-03-30 DIAGNOSIS — E03.9 ACQUIRED HYPOTHYROIDISM: ICD-10-CM

## 2021-03-30 DIAGNOSIS — M51.16 LUMBAR DISC DISEASE WITH RADICULOPATHY: ICD-10-CM

## 2021-03-30 DIAGNOSIS — M54.12 CERVICAL RADICULOPATHY: Chronic | ICD-10-CM

## 2021-03-30 DIAGNOSIS — Z12.5 SCREENING FOR MALIGNANT NEOPLASM OF PROSTATE: ICD-10-CM

## 2021-03-30 DIAGNOSIS — I48.91 ATRIAL FIBRILLATION, UNSPECIFIED TYPE (HCC): ICD-10-CM

## 2021-03-30 DIAGNOSIS — M96.1 FAILED BACK SYNDROME OF LUMBAR SPINE: ICD-10-CM

## 2021-03-30 LAB — MICROALBUMIN UR-MCNC: <12 MG/L

## 2021-03-30 NOTE — PROGRESS NOTES
Marva Canseco is a 66 y.o. male  . Subjective:      Patient following with Dr Minerva Mcgovern for back issues. However, they will not be performing any more surgery. Discussed taking over his tramadol. He tried both Lyrica and gabapentin. Could not tolerate them. They are discussing electrical stimulator. Patient following with Dr Gio Glaser regularly for cardiac issues and Dr Shaggy Killian for BPH. Diabetes  He presents for his follow-up diabetic visit. He has type 2 diabetes mellitus. His disease course has been improving. There are no hypoglycemic associated symptoms. Pertinent negatives for hypoglycemia include no confusion, dizziness, headaches, nervousness/anxiousness, pallor, seizures, speech difficulty or tremors. Associated symptoms include foot paresthesias. Pertinent negatives for diabetes include no chest pain, no fatigue, no polydipsia, no polyphagia, no polyuria and no weakness. There are no hypoglycemic complications. Symptoms are stable. Diabetic complications include peripheral neuropathy. Risk factors for coronary artery disease include dyslipidemia, diabetes mellitus and hypertension. Current diabetic treatment includes oral agent (dual therapy). He is compliant with treatment most of the time. His weight is stable. He is following a generally healthy diet. Meal planning includes avoidance of concentrated sweets. He participates in exercise intermittently. His home blood glucose trend is decreasing steadily. An ACE inhibitor/angiotensin II receptor blocker is being taken. He sees a podiatrist.Eye exam is current. Fatigue  This is a chronic problem. The current episode started more than 1 year ago. The problem occurs daily. The problem has been waxing and waning. Associated symptoms include arthralgias.  Pertinent negatives include no abdominal pain, chest pain, chills, congestion, coughing, diaphoresis, fatigue, fever, headaches, joint swelling, myalgias, nausea, neck pain, numbness, rash, sore throat, vomiting or weakness. Nothing aggravates the symptoms. He has tried nothing for the symptoms. The treatment provided no relief. Back Pain  This is a chronic problem. The current episode started more than 1 year ago. The problem occurs daily. The problem has been waxing and waning since onset. The pain is present in the lumbar spine. The quality of the pain is described as aching and burning. The pain radiates to the left knee, left thigh, right knee and right foot. The pain is at a severity of 6/10. The pain is moderate. The pain is the same all the time. The symptoms are aggravated by bending and position. Pertinent negatives include no abdominal pain, chest pain, dysuria, fever, headaches, numbness or weakness. He has tried analgesics and muscle relaxant for the symptoms. The treatment provided moderate relief. Hyperlipidemia  This is a chronic problem. The current episode started more than 1 year ago. The problem is resistant. Recent lipid tests were reviewed and are variable. Pertinent negatives include no chest pain, myalgias or shortness of breath. Current antihyperlipidemic treatment includes statins. The current treatment provides moderate improvement of lipids. Risk factors for coronary artery disease include dyslipidemia, diabetes mellitus, hypertension and male sex. Hypertension  This is a chronic problem. The current episode started more than 1 year ago. The problem has been waxing and waning since onset. The problem is resistant. Pertinent negatives include no chest pain, headaches, neck pain, palpitations or shortness of breath. Past treatments include beta blockers and direct vasodilators. There are no compliance problems. Review of Systems   Constitutional: Negative for activity change, appetite change, chills, diaphoresis, fatigue, fever and unexpected weight change.    HENT: Negative for congestion, dental problem, drooling, ear discharge, ear pain, facial swelling, hearing loss, mouth sores, nosebleeds, postnasal drip, rhinorrhea, sinus pressure, sneezing, sore throat, tinnitus, trouble swallowing and voice change. Eyes: Negative for photophobia, pain, discharge, redness, itching and visual disturbance. Respiratory: Negative for apnea, cough, choking, chest tightness, shortness of breath, wheezing and stridor. Cardiovascular: Negative for chest pain, palpitations and leg swelling. Gastrointestinal: Negative for abdominal distention, abdominal pain, anal bleeding, blood in stool, constipation, diarrhea, nausea, rectal pain and vomiting. Endocrine: Negative for cold intolerance, heat intolerance, polydipsia, polyphagia and polyuria. Genitourinary: Negative for decreased urine volume, difficulty urinating, discharge, dysuria, enuresis, flank pain, frequency, genital sores, hematuria, penile pain, penile swelling, scrotal swelling, testicular pain and urgency. Musculoskeletal: Positive for arthralgias and back pain. Negative for gait problem, joint swelling, myalgias, neck pain and neck stiffness. Skin: Negative for color change, pallor, rash and wound. Allergic/Immunologic: Negative for environmental allergies, food allergies and immunocompromised state. Neurological: Negative for dizziness, tremors, seizures, syncope, facial asymmetry, speech difficulty, weakness, light-headedness, numbness and headaches. Hematological: Negative for adenopathy. Does not bruise/bleed easily. Psychiatric/Behavioral: Negative for agitation, behavioral problems, confusion, decreased concentration, dysphoric mood, hallucinations, self-injury, sleep disturbance and suicidal ideas. The patient is not nervous/anxious and is not hyperactive.         Past Medical History:   Diagnosis Date    Abnormal computed tomography angiography of heart 07/19/2019    Calcified plaque proximal RCA with 50-70% stenosis, proximal LAD 50% stenosis, mild LAD 30-50% stenosis, groundglass infiltrates, area of consoldidation left lung base posteriorly.       Arthritis     Atrial fibrillation (HCC)     Burning pain     CAD (coronary artery disease)     Degenerative lumbar disc     Diabetes ([facility] [address]     Edentulous     upper denture    H/O cardiovascular stress test 2020    Lexiscan    Herniated cervical disc     History of echocardiogram 2017    EF 60-65%    Hx of blood clots     Hyperlipidemia     Hypertension     Stented coronary artery     Tinnitus     Urinary retention 3/17/2017       Social History     Socioeconomic History    Marital status:      Spouse name: Not on file    Number of children: Not on file    Years of education: Not on file    Highest education level: Not on file   Occupational History    Not on file   Social Needs    Financial resource strain: Not on file    Food insecurity     Worry: Not on file     Inability: Not on file   [email or web address] needs     Medical: Not on file     Non-medical: Not on file   Tobacco Use    Smoking status: Former Smoker     Packs/day: 0.00     Years: 16.00     Pack years: 0.00     Quit date: 1977     Years since quittin.2    Smokeless tobacco: Former User    Tobacco comment: quit in    Substance and Sexual Activity    Alcohol use: No     Alcohol/week: 0.0 standard drinks     Comment: 1-2 cups coffee daily    Drug use: No    Sexual activity: Never   Lifestyle    Physical activity     Days per week: Not on file     Minutes per session: Not on file    Stress: Not on file   Relationships    Social connections     Talks on phone: Not on file     Gets together: Not on file     Attends [employer] service: Not on file     Active member of club or organization: Not on file     Attends meetings of clubs or organizations: Not on file     Relationship status: Not on file    Intimate partner violence     Fear of current or ex partner: Not on file     Emotionally abused: Not on file     Physically abused: Not on file     Forced sexual activity: Not on file   Other Topics Concern    Not on file   Social History Narrative    Not on file       Family History   Problem Relation Age of Onset    Stroke Mother     Other Father     Kidney Disease Brother     Arrhythmia Brother     Cancer Brother     Kidney Disease Sister     Hypertension Other     Diabetes Other        Current Outpatient Medications on File Prior to Visit   Medication Sig Dispense Refill    alfuzosin (UROXATRAL) 10 MG extended release tablet       cyclobenzaprine (FLEXERIL) 10 MG tablet Take 1 tablet by mouth 3 times daily as needed for Muscle spasms 90 tablet 5    glimepiride (AMARYL) 2 MG tablet Take 1 tablet by mouth every morning (before breakfast) 30 tablet 5    ammonium lactate (LAC-HYDRIN) 12 % lotion Apply 1 g topically daily as needed for Dry Skin      aspirin 81 MG tablet Take 81 mg by mouth Daily with lunch        No current facility-administered medications on file prior to visit. Allergies   Allergen Reactions    Imdur [Isosorbide Nitrate] Other (See Comments)     headache    Oxycodone Rash    Simvastatin Other (See Comments)     Muscle weakness    Bactrim [Sulfamethoxazole-Trimethoprim] Nausea Only    Ciprofloxacin Hcl Other (See Comments)     \"Heel/ tendon pain\"    Gabapentin Other (See Comments)     Constipation      Hydrocodone Nausea Only    Norco [Hydrocodone-Acetaminophen] Nausea Only and Other (See Comments)     Causes sick feeling in head         I have reviewed his allergies, medications, problem list, medical,social and family history and have updated as needed in the electronic medical record. Objective:     Physical Exam  Vitals signs and nursing note reviewed. Constitutional:       General: He is not in acute distress. Appearance: He is well-developed. He is not diaphoretic. HENT:      Head: Normocephalic and atraumatic.       Right Ear: External ear normal.      Left Ear: External ear normal.      Nose: Nose Failed back syndrome of lumbar spine  -     traMADol (ULTRAM) 50 MG tablet; Take 1 tablet by mouth every 6 hours as needed for Pain for up to 30 days. -     CBC Auto Differential; Future  -     Comprehensive Metabolic Panel; Future  -     TSH without Reflex; Future  -     Lipid Panel; Future  -     Hemoglobin A1C; Future  -     MICROALBUMIN, UR; Future  -     PSA screening; Future    Type 2 diabetes mellitus with diabetic polyneuropathy, without long-term current use of insulin (HCC)  -     CBC Auto Differential; Future  -     Comprehensive Metabolic Panel; Future  -     TSH without Reflex; Future  -     Lipid Panel; Future  -     Hemoglobin A1C; Future  -     MICROALBUMIN, UR; Future  -     PSA screening; Future  -     pravastatin (PRAVACHOL) 10 MG tablet; TAKE 1 TABLET BY MOUTH DAILY  -     folic acid (FOLVITE) 1 MG tablet; Take 1 tablet by mouth daily    Atrial fibrillation, unspecified type (HCC)  -     CBC Auto Differential; Future  -     Comprehensive Metabolic Panel; Future  -     TSH without Reflex; Future  -     Lipid Panel; Future  -     Hemoglobin A1C; Future  -     MICROALBUMIN, UR; Future  -     PSA screening; Future  -     clopidogrel (PLAVIX) 75 MG tablet; Take 1 tablet by mouth daily  -     metoprolol succinate (TOPROL XL) 50 MG extended release tablet; Take 1 tablet by mouth 2 times daily    Lumbar disc disease with radiculopathy  -     traMADol (ULTRAM) 50 MG tablet; Take 1 tablet by mouth every 6 hours as needed for Pain for up to 30 days. -     CBC Auto Differential; Future  -     Comprehensive Metabolic Panel; Future  -     TSH without Reflex; Future  -     Lipid Panel; Future  -     Hemoglobin A1C; Future  -     MICROALBUMIN, UR; Future  -     PSA screening; Future    Essential hypertension  -     CBC Auto Differential; Future  -     Comprehensive Metabolic Panel; Future  -     TSH without Reflex; Future  -     Lipid Panel;  Future  -     Hemoglobin A1C; Future  -     MICROALBUMIN, UR; Future -     PSA screening; Future  -     hydrALAZINE (APRESOLINE) 25 MG tablet; Take 1 tablet by mouth 4 times daily    Cervical radiculopathy  -     CBC Auto Differential; Future  -     Comprehensive Metabolic Panel; Future  -     TSH without Reflex; Future  -     Lipid Panel; Future  -     Hemoglobin A1C; Future  -     MICROALBUMIN, UR; Future  -     PSA screening; Future    Acquired hypothyroidism  -     CBC Auto Differential; Future  -     Comprehensive Metabolic Panel; Future  -     TSH without Reflex; Future  -     Lipid Panel; Future  -     Hemoglobin A1C; Future  -     MICROALBUMIN, UR; Future  -     PSA screening; Future    Benign prostatic hyperplasia with urinary frequency  -     CBC Auto Differential; Future  -     Comprehensive Metabolic Panel; Future  -     TSH without Reflex; Future  -     Lipid Panel; Future  -     Hemoglobin A1C; Future  -     MICROALBUMIN, UR; Future  -     PSA screening; Future  -     finasteride (PROSCAR) 5 MG tablet; Take 1 tablet by mouth daily    Screening for malignant neoplasm of prostate  -     CBC Auto Differential; Future  -     Comprehensive Metabolic Panel; Future  -     TSH without Reflex; Future  -     Lipid Panel; Future  -     Hemoglobin A1C; Future  -     MICROALBUMIN, UR; Future  -     PSA screening; Future    Mixed hyperlipidemia  -     pravastatin (PRAVACHOL) 10 MG tablet; TAKE 1 TABLET BY MOUTH DAILY    Gastroesophageal reflux disease without esophagitis  -     pantoprazole (PROTONIX) 40 MG tablet; Take 1 tablet by mouth daily as needed (Reflux)    Mucus in stool  -     glycopyrrolate (ROBINUL) 1 MG tablet; Take 1 tablet by mouth 3 times daily    Neural foraminal stenosis of lumbar spine  -     traMADol (ULTRAM) 50 MG tablet; Take 1 tablet by mouth every 6 hours as needed for Pain for up to 30 days. Paresthesia of both lower extremities  -     traMADol (ULTRAM) 50 MG tablet; Take 1 tablet by mouth every 6 hours as needed for Pain for up to 30 days.     DDD (degenerative disc disease), lumbar  -     traMADol (ULTRAM) 50 MG tablet; Take 1 tablet by mouth every 6 hours as needed for Pain for up to 30 days. See HPI    Reviewed healthmaintenance report. Patient is aware of deficiencies and suggested preventative tests.

## 2021-05-24 ENCOUNTER — TELEPHONE (OUTPATIENT)
Dept: CARDIOLOGY CLINIC | Age: 76
End: 2021-05-24

## 2021-05-24 NOTE — TELEPHONE ENCOUNTER
Called patient to schedule his follow up visit from recall - he states he is feeling fine, no need for an appointment and he will call if needed

## 2021-08-12 ENCOUNTER — HOSPITAL ENCOUNTER (EMERGENCY)
Age: 76
Discharge: HOME | End: 2021-08-12
Attending: EMERGENCY MEDICINE
Payer: MEDICARE

## 2021-08-12 VITALS
RESPIRATION RATE: 20 BRPM | SYSTOLIC BLOOD PRESSURE: 192 MMHG | DIASTOLIC BLOOD PRESSURE: 85 MMHG | TEMPERATURE: 97.7 F | OXYGEN SATURATION: 95 % | WEIGHT: 200 LBS | BODY MASS INDEX: 29.62 KG/M2 | HEIGHT: 69 IN | HEART RATE: 67 BPM

## 2021-08-12 DIAGNOSIS — L03.116 CELLULITIS OF LEFT LOWER EXTREMITY: Primary | ICD-10-CM

## 2021-08-12 PROCEDURE — 6370000000 HC RX 637 (ALT 250 FOR IP): Performed by: EMERGENCY MEDICINE

## 2021-08-12 PROCEDURE — 99284 EMERGENCY DEPT VISIT MOD MDM: CPT

## 2021-08-12 RX ORDER — CEPHALEXIN 250 MG/1
500 CAPSULE ORAL 3 TIMES DAILY
Qty: 60 CAPSULE | Refills: 0 | Status: SHIPPED | OUTPATIENT
Start: 2021-08-12 | End: 2021-09-29 | Stop reason: ALTCHOICE

## 2021-08-12 RX ORDER — CEPHALEXIN 250 MG/1
500 CAPSULE ORAL ONCE
Status: COMPLETED | OUTPATIENT
Start: 2021-08-12 | End: 2021-08-12

## 2021-08-12 RX ADMIN — CEPHALEXIN 500 MG: 250 CAPSULE ORAL at 06:15

## 2021-08-12 ASSESSMENT — ENCOUNTER SYMPTOMS
BACK PAIN: 0
EYE DISCHARGE: 0
ABDOMINAL PAIN: 0
EYE REDNESS: 0
SORE THROAT: 0
VOMITING: 0
WHEEZING: 0
COUGH: 0
NAUSEA: 0
SHORTNESS OF BREATH: 0
EYE PAIN: 0
SINUS PRESSURE: 0
DIARRHEA: 0

## 2021-08-12 ASSESSMENT — PAIN SCALES - GENERAL: PAINLEVEL_OUTOF10: 4

## 2021-08-12 ASSESSMENT — PAIN DESCRIPTION - ORIENTATION: ORIENTATION: LEFT;INNER

## 2021-08-12 ASSESSMENT — PAIN DESCRIPTION - LOCATION: LOCATION: ANKLE

## 2021-08-12 ASSESSMENT — PAIN DESCRIPTION - FREQUENCY: FREQUENCY: CONTINUOUS

## 2021-08-12 ASSESSMENT — PAIN DESCRIPTION - ONSET: ONSET: AWAKENED FROM SLEEP

## 2021-08-12 ASSESSMENT — PAIN DESCRIPTION - DESCRIPTORS: DESCRIPTORS: ACHING

## 2021-08-12 NOTE — ED PROVIDER NOTES
Patient is a 65 y/o male who presents to the ED with a wound of his left lower leg. Patient states that he washes with a wash cloth because he does not want to fall in the shower. While bathing, his finger nail caused a cut of his medial left ankle. He states that he has applied topical antibiotics and peroxide, however, this morning he woke up with increase pain at the site. He denies any drainage. He denies any fever. Review of Systems   Constitutional: Negative for chills and fever. HENT: Negative for ear pain, sinus pressure and sore throat. Eyes: Negative for pain, discharge and redness. Respiratory: Negative for cough, shortness of breath and wheezing. Cardiovascular: Negative for chest pain. Gastrointestinal: Negative for abdominal pain, diarrhea, nausea and vomiting. Genitourinary: Negative for dysuria and frequency. Musculoskeletal: Negative for arthralgias and back pain. Skin: Positive for wound. Negative for rash. Neurological: Negative for weakness and headaches. Hematological: Negative for adenopathy. All other systems reviewed and are negative. Physical Exam  Vitals and nursing note reviewed. Constitutional:       General: He is not in acute distress. HENT:      Head: Normocephalic and atraumatic. Right Ear: External ear normal.      Left Ear: External ear normal.      Nose: Nose normal.      Mouth/Throat:      Mouth: Mucous membranes are moist.   Eyes:      Conjunctiva/sclera: Conjunctivae normal.      Pupils: Pupils are equal, round, and reactive to light. Cardiovascular:      Rate and Rhythm: Normal rate and regular rhythm. Heart sounds: No murmur heard. Pulmonary:      Effort: Pulmonary effort is normal. No respiratory distress. Breath sounds: Normal breath sounds. No stridor. No wheezing or rales. Abdominal:      General: Bowel sounds are normal. There is no distension. Palpations: Abdomen is soft. Tenderness:  There is no abdominal tenderness. There is no guarding. Musculoskeletal:         General: Normal range of motion. Cervical back: Normal range of motion and neck supple. Skin:     General: Skin is warm and dry. Comments: 1 cm linear superficial laceration of medial left ankle. Mild surrounding erythema. No drainage. Neurological:      Mental Status: He is alert and oriented to person, place, and time. Procedures     [facility]             --------------------------------------------- PAST HISTORY ---------------------------------------------  Past Medical History:  has a past medical history of Abnormal computed tomography angiography of heart, Arthritis, Atrial fibrillation (HCC), Burning pain, CAD (coronary artery disease), Degenerative lumbar disc, Diabetes ([facility] [address] Edentulous, H/O cardiovascular stress test, Herniated cervical disc, History of echocardiogram, Hx of blood clots, Hyperlipidemia, Hypertension, Stented coronary artery, Tinnitus, and Urinary retention. Past Surgical History:  has a past surgical history that includes colectomy (2010 sigmoid); Hemorrhoid surgery (2008); Prostate surgery (2017); Colonoscopy (5/2018 [facility]); Nerve Block (Bilateral, 10 12 2015); Nerve Block (Bilateral, 10/22/15); Nerve Block (Bilateral, 11 02 2015); Nerve Block (Left, 12/2/15); Nerve Block (Left, 12 16 15); Nerve Block (Left, 12 28 2015); Nerve Block (Left, 1 20 16); ablation of dysrhythmic focus (1980); lumbar fusion (03/06/2017); Nasal sinus surgery (12/07/2017); Upper gastrointestinal endoscopy (5/2018 [facility]); Nerve Block (12/11/2018); epidural steroid injection (N/A, 12/11/2018); Nerve Block (N/A, 01/08/2019); epidural steroid injection (N/A, 1/8/2019); Endoscopy, colon, diagnostic; other surgical history (1943); Upper gastrointestinal endoscopy (N/A, 6/4/2019); Diagnostic Cardiac Cath Lab Procedure; Percutaneous Transluminal Coronary Angio (09/30/2019);  Coronary angioplasty with stent; Cardiac here for re evaluation. They will followup with primary care by calling their office tomorrow. --------------------------------- ADDITIONAL PROVIDER NOTES ---------------------------------  At this time the patient is without objective evidence of an acute process requiring hospitalization or inpatient management. They have remained hemodynamically stable throughout their entire ED visit and are stable for discharge with outpatient follow-up. The plan has been discussed in detail and they are aware of the specific conditions for emergent return, as well as the importance of follow-up. New Prescriptions    CEPHALEXIN (KEFLEX) 250 MG CAPSULE    Take 2 capsules by mouth 3 times daily       Diagnosis:  1. Cellulitis of left lower extremity        Disposition:  Patient's disposition: Discharge to home  Patient's condition is stable.          [address] [facility],   08/12/21 0848

## 2021-09-29 ENCOUNTER — OFFICE VISIT (OUTPATIENT)
Dept: FAMILY MEDICINE CLINIC | Age: 76
End: 2021-09-29
Payer: MEDICARE

## 2021-09-29 VITALS
HEIGHT: 69 IN | WEIGHT: 207 LBS | OXYGEN SATURATION: 98 % | RESPIRATION RATE: 18 BRPM | BODY MASS INDEX: 30.66 KG/M2 | HEART RATE: 68 BPM | SYSTOLIC BLOOD PRESSURE: 134 MMHG | DIASTOLIC BLOOD PRESSURE: 80 MMHG

## 2021-09-29 DIAGNOSIS — R53.82 CHRONIC FATIGUE: ICD-10-CM

## 2021-09-29 DIAGNOSIS — M51.36 DDD (DEGENERATIVE DISC DISEASE), LUMBAR: Chronic | ICD-10-CM

## 2021-09-29 DIAGNOSIS — M48.061 NEURAL FORAMINAL STENOSIS OF LUMBAR SPINE: Chronic | ICD-10-CM

## 2021-09-29 DIAGNOSIS — E11.42 TYPE 2 DIABETES MELLITUS WITH DIABETIC POLYNEUROPATHY, WITHOUT LONG-TERM CURRENT USE OF INSULIN (HCC): ICD-10-CM

## 2021-09-29 DIAGNOSIS — Z00.00 ROUTINE GENERAL MEDICAL EXAMINATION AT A HEALTH CARE FACILITY: ICD-10-CM

## 2021-09-29 DIAGNOSIS — R20.2 PARESTHESIA OF BOTH LOWER EXTREMITIES: ICD-10-CM

## 2021-09-29 DIAGNOSIS — E78.2 MIXED HYPERLIPIDEMIA: Primary | ICD-10-CM

## 2021-09-29 DIAGNOSIS — M51.16 LUMBAR DISC DISEASE WITH RADICULOPATHY: ICD-10-CM

## 2021-09-29 DIAGNOSIS — M96.1 FAILED BACK SYNDROME OF LUMBAR SPINE: ICD-10-CM

## 2021-09-29 PROCEDURE — 1123F ACP DISCUSS/DSCN MKR DOCD: CPT | Performed by: FAMILY MEDICINE

## 2021-09-29 PROCEDURE — 4040F PNEUMOC VAC/ADMIN/RCVD: CPT | Performed by: FAMILY MEDICINE

## 2021-09-29 PROCEDURE — G0439 PPPS, SUBSEQ VISIT: HCPCS | Performed by: FAMILY MEDICINE

## 2021-09-29 RX ORDER — GLIMEPIRIDE 2 MG/1
2 TABLET ORAL
Qty: 90 TABLET | Refills: 5 | Status: SHIPPED
Start: 2021-09-29 | End: 2021-12-21 | Stop reason: SDUPTHER

## 2021-09-29 RX ORDER — CYCLOBENZAPRINE HCL 10 MG
10 TABLET ORAL 3 TIMES DAILY PRN
Qty: 270 TABLET | Refills: 5 | Status: SHIPPED | OUTPATIENT
Start: 2021-09-29

## 2021-09-29 RX ORDER — TRAMADOL HYDROCHLORIDE 50 MG/1
50 TABLET ORAL EVERY 6 HOURS PRN
Qty: 120 TABLET | Refills: 5 | Status: SHIPPED | OUTPATIENT
Start: 2021-09-29 | End: 2021-10-29

## 2021-09-29 ASSESSMENT — PATIENT HEALTH QUESTIONNAIRE - PHQ9
SUM OF ALL RESPONSES TO PHQ QUESTIONS 1-9: 0
2. FEELING DOWN, DEPRESSED OR HOPELESS: 0
SUM OF ALL RESPONSES TO PHQ9 QUESTIONS 1 & 2: 0
SUM OF ALL RESPONSES TO PHQ QUESTIONS 1-9: 0
1. LITTLE INTEREST OR PLEASURE IN DOING THINGS: 0
SUM OF ALL RESPONSES TO PHQ QUESTIONS 1-9: 0

## 2021-09-29 ASSESSMENT — LIFESTYLE VARIABLES: HOW OFTEN DO YOU HAVE A DRINK CONTAINING ALCOHOL: NEVER

## 2021-10-03 NOTE — PATIENT INSTRUCTIONS
Personalized Preventive Plan for Rosina Otto - 9/29/2021  Medicare offers a range of preventive health benefits. Some of the tests and screenings are paid in full while other may be subject to a deductible, co-insurance, and/or copay. Some of these benefits include a comprehensive review of your medical history including lifestyle, illnesses that may run in your family, and various assessments and screenings as appropriate. After reviewing your medical record and screening and assessments performed today your provider may have ordered immunizations, labs, imaging, and/or referrals for you. A list of these orders (if applicable) as well as your Preventive Care list are included within your After Visit Summary for your review. Other Preventive Recommendations:    · A preventive eye exam performed by an eye specialist is recommended every 1-2 years to screen for glaucoma; cataracts, macular degeneration, and other eye disorders. · A preventive dental visit is recommended every 6 months. · Try to get at least 150 minutes of exercise per week or 10,000 steps per day on a pedometer . · Order or download the FREE \"Exercise & Physical Activity: Your Everyday Guide\" from [employer]. Call 9-944.472.8724 or search [employer] online. · You need 6599-0141 mg of calcium and 7931-3795 IU of vitamin D per day. It is possible to meet your calcium requirement with diet alone, but a vitamin D supplement is usually necessary to meet this goal.  · When exposed to the sun, use a sunscreen that protects against both UVA and UVB radiation with an SPF of 30 or greater. Reapply every 2 to 3 hours or after sweating, drying off with a towel, or swimming. · Always wear a seat belt when traveling in a car. Always wear a helmet when riding a bicycle or motorcycle.

## 2021-10-03 NOTE — PROGRESS NOTES
Medicare Annual Wellness Visit  Name: Kaila Alvarez Date: 10/3/2021   MRN: <[id number]> Sex: Male   Age: 66 y.o. Ethnicity: Non- / Non    : 1945 Race: White (non-)      Samaria Blanco is here for Medicare AWV    Screenings for behavioral, psychosocial and functional/safety risks, and cognitive dysfunction are all negative except as indicated below. These results, as well as other patient data from the [address] form, are documented in Flowsheets linked to this Encounter. Allergies   Allergen Reactions    Imdur [Isosorbide Nitrate] Other (See Comments)     headache    Oxycodone Rash    Simvastatin Other (See Comments)     Muscle weakness    Bactrim [Sulfamethoxazole-Trimethoprim] Nausea Only    Ciprofloxacin Hcl Other (See Comments)     \"Heel/ tendon pain\"    Gabapentin Other (See Comments)     Constipation      Hydrocodone Nausea Only    Norco [Hydrocodone-Acetaminophen] Nausea Only and Other (See Comments)     Causes sick feeling in head         Prior to Visit Medications    Medication Sig Taking? Authorizing Provider   cyclobenzaprine (FLEXERIL) 10 MG tablet Take 1 tablet by mouth 3 times daily as needed for Muscle spasms Yes Dick Esters, DO   glimepiride (AMARYL) 2 MG tablet Take 1 tablet by mouth every morning (before breakfast) Yes Dick Esters, DO   traMADol (ULTRAM) 50 MG tablet Take 1 tablet by mouth every 6 hours as needed for Pain for up to 30 days.  Yes Dick Esters, DO   alfuzosin (UROXATRAL) 10 MG extended release tablet  Yes Historical Provider, MD   clopidogrel (PLAVIX) 75 MG tablet Take 1 tablet by mouth daily Yes Dick Esters, DO   hydrALAZINE (APRESOLINE) 25 MG tablet Take 1 tablet by mouth 4 times daily Yes Dick Esters, DO   metoprolol succinate (TOPROL XL) 50 MG extended release tablet Take 1 tablet by mouth 2 times daily Yes Dick Esters, DO   pravastatin (PRAVACHOL) 10 MG tablet TAKE 1 TABLET BY MOUTH DAILY Yes Yimi Arias DO   pantoprazole (PROTONIX) 40 MG tablet Take 1 tablet by mouth daily as needed (Reflux) Yes Yimi Arias DO   glycopyrrolate (ROBINUL) 1 MG tablet Take 1 tablet by mouth 3 times daily Yes Yimi Arias DO   folic acid (FOLVITE) 1 MG tablet Take 1 tablet by mouth daily Yes Yimi Arias DO   finasteride (PROSCAR) 5 MG tablet Take 1 tablet by mouth daily Yes Yimi Arias DO   ammonium lactate (LAC-HYDRIN) 12 % lotion Apply 1 g topically daily as needed for Dry Skin Yes Historical Provider, MD   aspirin 81 MG tablet Take 81 mg by mouth Daily with lunch  Yes Historical Provider, MD       Past Medical History:   Diagnosis Date    Abnormal computed tomography angiography of heart 07/19/2019    Calcified plaque proximal RCA with 50-70% stenosis, proximal LAD 50% stenosis, mild LAD 30-50% stenosis, groundglass infiltrates, area of consoldidation left lung base posteriorly.       Arthritis     Atrial fibrillation (HCC)     Burning pain     CAD (coronary artery disease)     Degenerative lumbar disc     Diabetes ([address]     Edentulous     upper denture    H/O cardiovascular stress test 02/19/2020    Lexiscan    Herniated cervical disc     History of echocardiogram 03/16/2017    EF 60-65%    Hx of blood clots     Hyperlipidemia     Hypertension     Stented coronary artery     Tinnitus     Urinary retention 3/17/2017       Past Surgical History:   Procedure Laterality Date    ABLATION OF DYSRHYTHMIC FOCUS  1980    APPROX 30-35 YRS AGO    BACK SURGERY      CARDIAC CATHETERIZATION  02/20/2020    Dr Marisa Ramos N/A 10/9/2020    ANTERIOR CERVICAL FUSION  C3-C6, PARTIAL CORPECTOMY C3-C6, ANTERIOR OSTEOPHYTECTOMY C5-T1 ---AUDIOLOGY, SPINAL ELEMENTS performed by Milton Soliman DO at [facility]  2010 sigmoid    COLONOSCOPY  5/2018 [facility]    CORONARY ANGIOPLASTY WITH STENT PLACEMENT      DIAGNOSTIC CARDIAC CATH LAB PROCEDURE      ENDOSCOPY, COLON, DIAGNOSTIC      EPIDURAL STEROID INJECTION N/A 12/11/2018    C7-T1 EPIDURAL STEROID INJECTION performed by Bi Fernandez DO at [address] 1/8/2019    C7 - T1 EPIDURAL STEROID INJECTION #2 performed by Bi Fernandez DO at [address] [facility]      zachery lense implants   [address]  2008    LAMINECTOMY N/A 6/5/2020    LUMBAR LAMINECTOMY L3-4 , L5-S1, HARDWARE REMOVAL L4-5, DURAL REPAIR performed by Rolando Pierre DO at [facility] N/A 8/7/2020    LUMBAR DECOMPRESSION L4-S1; IRRIGATION AND DEBRIDEMENT OF LUMBAR HEMATOMA Noam Flaming; REMOVAL SYNOVIAL CYST L5-S1 LEFT performed by Rolando Pierre DO at [address] [facility]  03/06/2017    NASAL SINUS SURGERY  12/07/2017    Submucosal Resection of Inferior Turbinate    NERVE BLOCK Bilateral 10 12 2015    bilateral sacroiliac joint injection #1    NERVE BLOCK Bilateral 10/22/15    sacroiliac joint #2    NERVE BLOCK Bilateral 11 02 2015    Sacroiliac joint bilateral #3    NERVE BLOCK Left 12/2/15    lumbar transforaminal #1    NERVE BLOCK Left 12 16 15    NERVE BLOCK Left 12 28 2015    transforaminal nerve block left lumbar #3    NERVE BLOCK Left 1 20 16    radiofrequency     NERVE BLOCK  12/11/2018    C7-T1 epidural    NERVE BLOCK N/A 01/08/2019    cervical epidural steroid injection    OTHER SURGICAL HISTORY  1945    had a feeding tube as an infant, scar    PROSTATE SURGERY  2017    PTCA  09/30/2019    2.5 x 38 mm Resolute [place] RAFI mid OM1, 2.25 x 8 mm Resolute [place] RAFI ostium of dx    UPPER GASTROINTESTINAL ENDOSCOPY  5/2018 [facility]    UPPER GASTROINTESTINAL ENDOSCOPY N/A 6/4/2019    EGD BIOPSY performed by Zurdo Shore MD at [facility] History   Problem Relation Age of Onset    Stroke Mother     Other Father     Kidney Disease Brother     Arrhythmia Brother     Cancer Brother     Kidney Disease Sister     Hypertension Other     Diabetes Other CareTeam (Including outside providers/suppliers regularly involved in providing care):   Patient Care Team:  Belinda Shields DO as PCP - [facility] - [facility] Empaneled Provider  Christina Herr MD as Consulting Physician (Cardiology)  Mai Harrison (Ophthalmology)  Zee Torrez DO as Consulting Physician (Cardiology)  Arti Meeks MD as Consulting Physician (Cardiology)    Wt Readings from Last 3 Encounters:   09/29/21 207 lb (93.9 kg)   08/12/21 200 lb (90.7 kg)   03/29/21 204 lb (92.5 kg)     Vitals:    09/29/21 0918   BP: 134/80   Site: Left Upper Arm   Position: Sitting   Pulse: 68   Resp: 18   SpO2: 98%   Weight: 207 lb (93.9 kg)   Height: 5' 9\" (1.753 m)     Body mass index is 30.57 kg/m². Based upon direct observation of the patient, evaluation of cognition reveals recent and remote memory intact. Patient's complete Health Risk Assessment and screening values have been reviewed and are found in Flowsheets. The following problems were reviewed today and where indicated follow up appointments were made and/or referrals ordered.     Positive Risk Factor Screenings with Interventions:           Health Habits/Nutrition:  Health Habits/Nutrition  Do you exercise for at least 20 minutes 2-3 times per week?: Yes  Have you lost any weight without trying in the past 3 months?: No  Do you eat only one meal per day?: No  Have you seen the dentist within the past year?: N/A - wear dentures  Body mass index: (!) 30.57  Health Habits/Nutrition Interventions:  · Inadequate physical activity:  patient agrees to exercise for at least 150 minutes/week       Personalized Preventive Plan   Current Health Maintenance Status  Immunization History   Administered Date(s) Administered    Influenza Virus Vaccine 09/20/2018, 09/20/2019    Influenza, Quadv, IM, (6 mo and older Fluzone, Flulaval, Fluarix and 3 yrs and older Afluria) 09/15/2017    Influenza, Quadv, adjuvanted, 65 yrs +, IM, PF (Fluad) 09/03/2020    Influenza, Triv, 3 Years and older, IM (Afluria (5 yrs and older) 09/03/2016    Influenza, Triv, inactivated, subunit, adjuvanted, IM (Fluad 65 yrs and older) 09/20/2019    Pneumococcal Conjugate 13-valent ([id number]) 07/27/2017    Pneumococcal Polysaccharide ([id number]) 07/02/2012, 04/12/2019    Zoster Live (Zostavax) 12/08/2010        Health Maintenance   Topic Date Due    Hepatitis C screen  Never done    COVID-19 Vaccine (1) Never done    DTaP/Tdap/Td vaccine (1 - Tdap) Never done    Shingles Vaccine (2 of 3) 02/02/2011    Annual Wellness Visit (AWV)  Never done    Flu vaccine (1) 09/01/2021    Lipid screen  03/29/2022    TSH testing  03/29/2022    Potassium monitoring  03/29/2022    Creatinine monitoring  03/29/2022    PSA counseling  03/29/2022    Pneumococcal 65+ years Vaccine  Completed    Hepatitis A vaccine  Aged Out    Hib vaccine  Aged Out    Meningococcal (ACWY) vaccine  Aged Out     Recommendations for threadsy Due: see orders and patient instructions/AVS.  . Recommended screening schedule for the next 5-10 years is provided to the patient in written form: see Patient Instructions/AVS.    Luann Betancourt was seen today for medicare awv. Diagnoses and all orders for this visit:    Mixed hyperlipidemia  -     Lipid Panel; Future    Neural foraminal stenosis of lumbar spine  -     cyclobenzaprine (FLEXERIL) 10 MG tablet; Take 1 tablet by mouth 3 times daily as needed for Muscle spasms  -     traMADol (ULTRAM) 50 MG tablet; Take 1 tablet by mouth every 6 hours as needed for Pain for up to 30 days. Paresthesia of both lower extremities  -     cyclobenzaprine (FLEXERIL) 10 MG tablet; Take 1 tablet by mouth 3 times daily as needed for Muscle spasms  -     traMADol (ULTRAM) 50 MG tablet; Take 1 tablet by mouth every 6 hours as needed for Pain for up to 30 days. DDD (degenerative disc disease), lumbar  -     cyclobenzaprine (FLEXERIL) 10 MG tablet;  Take 1 tablet by mouth 3 times daily as needed for Muscle spasms  -     traMADol (ULTRAM) 50 MG tablet; Take 1 tablet by mouth every 6 hours as needed for Pain for up to 30 days. Type 2 diabetes mellitus with diabetic polyneuropathy, without long-term current use of insulin (HCC)  -     glimepiride (AMARYL) 2 MG tablet; Take 1 tablet by mouth every morning (before breakfast)  -     Hemoglobin A1C; Future    Failed back syndrome of lumbar spine  -     traMADol (ULTRAM) 50 MG tablet; Take 1 tablet by mouth every 6 hours as needed for Pain for up to 30 days. Lumbar disc disease with radiculopathy  -     traMADol (ULTRAM) 50 MG tablet; Take 1 tablet by mouth every 6 hours as needed for Pain for up to 30 days. Chronic fatigue  -     CBC Auto Differential; Future  -     Comprehensive Metabolic Panel; Future  -     TSH without Reflex; Future  -     T4, Free; Future    Routine general medical examination at a health care facility             reviewed health maintenance report. Patient is aware of deficiencies and suggested preventative tests.

## 2021-10-30 DIAGNOSIS — E11.42 TYPE 2 DIABETES MELLITUS WITH DIABETIC POLYNEUROPATHY, WITHOUT LONG-TERM CURRENT USE OF INSULIN (HCC): ICD-10-CM

## 2021-10-30 DIAGNOSIS — E78.2 MIXED HYPERLIPIDEMIA: ICD-10-CM

## 2021-11-01 DIAGNOSIS — E78.2 MIXED HYPERLIPIDEMIA: ICD-10-CM

## 2021-11-01 DIAGNOSIS — E11.42 TYPE 2 DIABETES MELLITUS WITH DIABETIC POLYNEUROPATHY, WITHOUT LONG-TERM CURRENT USE OF INSULIN (HCC): ICD-10-CM

## 2021-11-01 DIAGNOSIS — I48.91 ATRIAL FIBRILLATION, UNSPECIFIED TYPE (HCC): ICD-10-CM

## 2021-11-01 RX ORDER — METOPROLOL SUCCINATE 50 MG/1
50 TABLET, EXTENDED RELEASE ORAL 2 TIMES DAILY
Qty: 180 TABLET | Refills: 3 | Status: SHIPPED
Start: 2021-11-01 | End: 2022-10-20 | Stop reason: SDUPTHER

## 2021-11-01 RX ORDER — PRAVASTATIN SODIUM 10 MG
TABLET ORAL
Qty: 90 TABLET | Refills: 5 | Status: SHIPPED
Start: 2021-11-01 | End: 2022-01-05

## 2021-11-01 RX ORDER — PRAVASTATIN SODIUM 10 MG
TABLET ORAL
Qty: 90 TABLET | Refills: 5 | OUTPATIENT
Start: 2021-11-01

## 2021-11-01 RX ORDER — CLOPIDOGREL BISULFATE 75 MG/1
75 TABLET ORAL DAILY
Qty: 90 TABLET | Refills: 3 | Status: SHIPPED
Start: 2021-11-01 | End: 2022-11-04

## 2021-11-01 NOTE — TELEPHONE ENCOUNTER
Patient called for refills. States need new Rx's per pharmacy.      Last seen 9/29/2021  Next appt 3/29/2022    [address] [facility]

## 2021-12-15 ENCOUNTER — OFFICE VISIT (OUTPATIENT)
Dept: FAMILY MEDICINE CLINIC | Age: 76
End: 2021-12-15
Payer: MEDICARE

## 2021-12-15 VITALS
DIASTOLIC BLOOD PRESSURE: 80 MMHG | SYSTOLIC BLOOD PRESSURE: 134 MMHG | HEIGHT: 69 IN | OXYGEN SATURATION: 97 % | WEIGHT: 204 LBS | HEART RATE: 76 BPM | RESPIRATION RATE: 18 BRPM | BODY MASS INDEX: 30.21 KG/M2

## 2021-12-15 DIAGNOSIS — N64.4 NIPPLE TENDERNESS: ICD-10-CM

## 2021-12-15 DIAGNOSIS — R73.01 IFG (IMPAIRED FASTING GLUCOSE): ICD-10-CM

## 2021-12-15 DIAGNOSIS — N64.4 NIPPLE TENDERNESS: Primary | ICD-10-CM

## 2021-12-15 DIAGNOSIS — E03.9 ACQUIRED HYPOTHYROIDISM: ICD-10-CM

## 2021-12-15 DIAGNOSIS — L50.9 URTICARIA: ICD-10-CM

## 2021-12-15 DIAGNOSIS — R53.82 CHRONIC FATIGUE: ICD-10-CM

## 2021-12-15 DIAGNOSIS — E78.2 MIXED HYPERLIPIDEMIA: ICD-10-CM

## 2021-12-15 DIAGNOSIS — E11.42 TYPE 2 DIABETES MELLITUS WITH DIABETIC POLYNEUROPATHY, WITHOUT LONG-TERM CURRENT USE OF INSULIN (HCC): ICD-10-CM

## 2021-12-15 LAB
ALBUMIN SERPL-MCNC: 4.5 G/DL (ref 3.5–5.2)
ALP BLD-CCNC: 126 U/L (ref 40–129)
ALT SERPL-CCNC: 22 U/L (ref 0–40)
ANION GAP SERPL CALCULATED.3IONS-SCNC: 16 MMOL/L (ref 7–16)
AST SERPL-CCNC: 28 U/L (ref 0–39)
BILIRUB SERPL-MCNC: 0.5 MG/DL (ref 0–1.2)
BUN BLDV-MCNC: 14 MG/DL (ref 6–23)
CALCIUM SERPL-MCNC: 10.7 MG/DL (ref 8.6–10.2)
CHLORIDE BLD-SCNC: 100 MMOL/L (ref 98–107)
CHOLESTEROL, TOTAL: 184 MG/DL (ref 0–199)
CO2: 23 MMOL/L (ref 22–29)
CREAT SERPL-MCNC: 1.1 MG/DL (ref 0.7–1.2)
FOLATE: >20 NG/ML (ref 4.8–24.2)
GFR AFRICAN AMERICAN: >60
GFR NON-AFRICAN AMERICAN: >60 ML/MIN/1.73
GLUCOSE BLD-MCNC: 238 MG/DL (ref 74–99)
HBA1C MFR BLD: 8.9 % (ref 4–5.6)
HDLC SERPL-MCNC: 39 MG/DL
LDL CHOLESTEROL CALCULATED: 86 MG/DL (ref 0–99)
POTASSIUM SERPL-SCNC: 5.3 MMOL/L (ref 3.5–5)
PROLACTIN: 34.75 NG/ML
SODIUM BLD-SCNC: 139 MMOL/L (ref 132–146)
T4 FREE: 1.06 NG/DL (ref 0.93–1.7)
TESTOSTERONE TOTAL: 177.8 NG/DL
TOTAL PROTEIN: 8 G/DL (ref 6.4–8.3)
TRIGL SERPL-MCNC: 293 MG/DL (ref 0–149)
TSH SERPL DL<=0.05 MIU/L-ACNC: 3.29 UIU/ML (ref 0.27–4.2)
VITAMIN B-12: 455 PG/ML (ref 211–946)
VLDLC SERPL CALC-MCNC: 59 MG/DL

## 2021-12-15 PROCEDURE — G8417 CALC BMI ABV UP PARAM F/U: HCPCS | Performed by: FAMILY MEDICINE

## 2021-12-15 PROCEDURE — G8484 FLU IMMUNIZE NO ADMIN: HCPCS | Performed by: FAMILY MEDICINE

## 2021-12-15 PROCEDURE — 99213 OFFICE O/P EST LOW 20 MIN: CPT | Performed by: FAMILY MEDICINE

## 2021-12-15 PROCEDURE — 3052F HG A1C>EQUAL 8.0%<EQUAL 9.0%: CPT | Performed by: FAMILY MEDICINE

## 2021-12-15 PROCEDURE — 1123F ACP DISCUSS/DSCN MKR DOCD: CPT | Performed by: FAMILY MEDICINE

## 2021-12-15 PROCEDURE — 1036F TOBACCO NON-USER: CPT | Performed by: FAMILY MEDICINE

## 2021-12-15 PROCEDURE — 4040F PNEUMOC VAC/ADMIN/RCVD: CPT | Performed by: FAMILY MEDICINE

## 2021-12-15 PROCEDURE — G8427 DOCREV CUR MEDS BY ELIG CLIN: HCPCS | Performed by: FAMILY MEDICINE

## 2021-12-15 RX ORDER — HYDROXYZINE HYDROCHLORIDE 25 MG/1
25 TABLET, FILM COATED ORAL NIGHTLY
Qty: 30 TABLET | Refills: 0 | Status: SHIPPED
Start: 2021-12-15 | End: 2022-01-02 | Stop reason: ALTCHOICE

## 2021-12-15 SDOH — ECONOMIC STABILITY: FOOD INSECURITY: WITHIN THE PAST 12 MONTHS, YOU WORRIED THAT YOUR FOOD WOULD RUN OUT BEFORE YOU GOT MONEY TO BUY MORE.: NEVER TRUE

## 2021-12-15 SDOH — ECONOMIC STABILITY: FOOD INSECURITY: WITHIN THE PAST 12 MONTHS, THE FOOD YOU BOUGHT JUST DIDN'T LAST AND YOU DIDN'T HAVE MONEY TO GET MORE.: NEVER TRUE

## 2021-12-15 ASSESSMENT — SOCIAL DETERMINANTS OF HEALTH (SDOH): HOW HARD IS IT FOR YOU TO PAY FOR THE VERY BASICS LIKE FOOD, HOUSING, MEDICAL CARE, AND HEATING?: NOT VERY HARD

## 2021-12-17 ASSESSMENT — ENCOUNTER SYMPTOMS
FACIAL SWELLING: 0
ABDOMINAL DISTENTION: 0
DIARRHEA: 0
RECTAL PAIN: 0
COUGH: 0
BLOOD IN STOOL: 0
WHEEZING: 0
ANAL BLEEDING: 0
PHOTOPHOBIA: 0
SORE THROAT: 0
SINUS PRESSURE: 0
ABDOMINAL PAIN: 0
RHINORRHEA: 0
EYE DISCHARGE: 0
STRIDOR: 0
VOICE CHANGE: 0
CONSTIPATION: 0
EYE ITCHING: 0
COLOR CHANGE: 0
BACK PAIN: 0
NAUSEA: 0
CHOKING: 0
TROUBLE SWALLOWING: 0
VOMITING: 0
APNEA: 0
SHORTNESS OF BREATH: 0
CHEST TIGHTNESS: 0
EYE REDNESS: 0
EYE PAIN: 0

## 2021-12-17 NOTE — PROGRESS NOTES
Yessica Brown is a 64 y.o. male  . Subjective:      Patient complains of nipple tenderness. Although he has been on it prior he just restarted finasteride and this could be what is causing nipple tenderness. We will stop finasteride first for the time being. If this does not improve we will eliminate afluzosin. We will obtain blood work including his normal blood work. Patient also having some hives  If this does not resolve in a week he is to let us know. Review of Systems   Constitutional: Negative for activity change, appetite change, chills, diaphoresis, fatigue, fever and unexpected weight change. HENT: Negative for congestion, dental problem, drooling, ear discharge, ear pain, facial swelling, hearing loss, mouth sores, nosebleeds, postnasal drip, rhinorrhea, sinus pressure, sneezing, sore throat, tinnitus, trouble swallowing and voice change. Eyes: Negative for photophobia, pain, discharge, redness, itching and visual disturbance. Respiratory: Negative for apnea, cough, choking, chest tightness, shortness of breath, wheezing and stridor. Cardiovascular: Negative for chest pain, palpitations and leg swelling. Gastrointestinal: Negative for abdominal distention, abdominal pain, anal bleeding, blood in stool, constipation, diarrhea, nausea, rectal pain and vomiting. Endocrine: Negative for cold intolerance, heat intolerance, polydipsia, polyphagia and polyuria. Genitourinary: Negative for decreased urine volume, difficulty urinating, dysuria, enuresis, flank pain, frequency, genital sores, hematuria, penile discharge, penile pain, penile swelling, scrotal swelling, testicular pain and urgency. Musculoskeletal: Negative for arthralgias, back pain, gait problem, joint swelling, myalgias, neck pain and neck stiffness. Skin: Positive for rash. Negative for color change, pallor and wound.    Allergic/Immunologic: Negative for environmental allergies, food allergies and immunocompromised state.   Neurological: Negative for dizziness, tremors, seizures, syncope, facial asymmetry, speech difficulty, weakness, light-headedness, numbness and headaches. Hematological: Negative for adenopathy. Does not bruise/bleed easily. Psychiatric/Behavioral: Negative for agitation, behavioral problems, confusion, decreased concentration, dysphoric mood, hallucinations, self-injury, sleep disturbance and suicidal ideas. The patient is not nervous/anxious and is not hyperactive. Past Medical History:   Diagnosis Date    Abnormal computed tomography angiography of heart 2019    Calcified plaque proximal RCA with 50-70% stenosis, proximal LAD 50% stenosis, mild LAD 30-50% stenosis, groundglass infiltrates, area of consoldidation left lung base posteriorly.       Arthritis     Atrial fibrillation (HCC)     Burning pain     CAD (coronary artery disease)     Degenerative lumbar disc     Diabetes ([facility] [address]     Edentulous     upper denture    H/O cardiovascular stress test 2020    Lexiscan    Herniated cervical disc     History of echocardiogram 2017    EF 60-65%    Hx of blood clots     Hyperlipidemia     Hypertension     Stented coronary artery     Tinnitus     Urinary retention 3/17/2017       Social History     Socioeconomic History    Marital status:      Spouse name: Not on file    Number of children: Not on file    Years of education: Not on file    Highest education level: Not on file   Occupational History    Not on file   Tobacco Use    Smoking status: Former Smoker     Packs/day: 0.00     Years: 16.00     Pack years: 0.00     Quit date: 1977     Years since quittin.9    Smokeless tobacco: Former User    Tobacco comment: quit in    Vaping Use    Vaping Use: Never used   Substance and Sexual Activity    Alcohol use: No     Alcohol/week: 0.0 standard drinks     Comment: 1-2 cups coffee daily    Drug use: No    Sexual activity: Never   Other Topics Concern    Not on file   Social History Narrative    Not on file     Social Determinants of Health     Financial Resource Strain: Low Risk     Difficulty of Paying Living Expenses: Not very hard   Food Insecurity: No Food Insecurity    Worried About Running Out of Food in the Last Year: Never true    Yuli of Food in the Last Year: Never true   Transportation Needs:     Lack of Transportation (Medical): Not on file    Lack of Transportation (Non-Medical):  Not on file   Physical Activity:     Days of Exercise per Week: Not on file    Minutes of Exercise per Session: Not on file   Stress:     Feeling of Stress : Not on file   Social Connections:     Frequency of Communication with Friends and Family: Not on file    Frequency of Social Gatherings with Friends and Family: Not on file    Attends Sabianism Services: Not on file    Active Member of [address] [place] [address] [email or web address] or Organizations: Not on file    Attends Club or Organization Meetings: Not on file    Marital Status: Not on file   Intimate Partner Violence:     Fear of Current or Ex-Partner: Not on file    Emotionally Abused: Not on file    Physically Abused: Not on file    Sexually Abused: Not on file   Housing Stability:     Unable to Pay for Housing in the Last Year: Not on file    Number of [place] in the Last Year: Not on file    Unstable Housing in the Last Year: Not on file       Family History   Problem Relation Age of Onset    Stroke Mother     Other Father     Kidney Disease Brother     Arrhythmia Brother     Cancer Brother     Kidney Disease Sister     Hypertension Other     Diabetes Other        Current Outpatient Medications on File Prior to Visit   Medication Sig Dispense Refill    clopidogrel (PLAVIX) 75 MG tablet Take 1 tablet by mouth daily 90 tablet 3    pravastatin (PRAVACHOL) 10 MG tablet TAKE 1 TABLET BY MOUTH DAILY 90 tablet 5    metoprolol succinate (TOPROL XL) 50 MG extended release tablet Take 1 tablet by mouth 2 times daily 180 tablet 3    cyclobenzaprine (FLEXERIL) 10 MG tablet Take 1 tablet by mouth 3 times daily as needed for Muscle spasms 270 tablet 5    glimepiride (AMARYL) 2 MG tablet Take 1 tablet by mouth every morning (before breakfast) 90 tablet 5    alfuzosin (UROXATRAL) 10 MG extended release tablet       hydrALAZINE (APRESOLINE) 25 MG tablet Take 1 tablet by mouth 4 times daily 270 tablet 3    pantoprazole (PROTONIX) 40 MG tablet Take 1 tablet by mouth daily as needed (Reflux) 90 tablet 5    glycopyrrolate (ROBINUL) 1 MG tablet Take 1 tablet by mouth 3 times daily 962 tablet 3    folic acid (FOLVITE) 1 MG tablet Take 1 tablet by mouth daily 90 tablet 5    ammonium lactate (LAC-HYDRIN) 12 % lotion Apply 1 g topically daily as needed for Dry Skin      aspirin 81 MG tablet Take 81 mg by mouth Daily with lunch        No current facility-administered medications on file prior to visit. Allergies   Allergen Reactions    Imdur [Isosorbide Nitrate] Other (See Comments)     headache    Oxycodone Rash    Simvastatin Other (See Comments)     Muscle weakness    Bactrim [Sulfamethoxazole-Trimethoprim] Nausea Only    Ciprofloxacin Hcl Other (See Comments)     \"Heel/ tendon pain\"    Gabapentin Other (See Comments)     Constipation      Hydrocodone Nausea Only    Norco [Hydrocodone-Acetaminophen] Nausea Only and Other (See Comments)     Causes sick feeling in head         I have reviewed his allergies, medications, problem list, medical,social and family history and have updated as needed in the electronic medical record. Objective:     Physical Exam  Vitals and nursing note reviewed. Constitutional:       General: He is not in acute distress. Appearance: He is well-developed. He is not diaphoretic. HENT:      Head: Normocephalic and atraumatic.       Right Ear: External ear normal.      Left Ear: External ear normal.      Nose: Nose normal.      Mouth/Throat:      Pharynx: No oropharyngeal T4, Free; Future  -     Lipid Panel; Future  -     Vitamin B12 & Folate; Future  -     PROLACTIN; Future  -     TESTOSTERONE; Future  -     PROLACTIN; Future    IFG (impaired fasting glucose)  -     Comprehensive Metabolic Panel; Future  -     Hemoglobin A1C; Future  -     TSH without Reflex; Future  -     T4, Free; Future  -     Lipid Panel; Future  -     Vitamin B12 & Folate; Future  -     PROLACTIN; Future  -     TESTOSTERONE; Future    Acquired hypothyroidism  -     Comprehensive Metabolic Panel; Future  -     Hemoglobin A1C; Future  -     TSH without Reflex; Future  -     T4, Free; Future  -     Lipid Panel; Future  -     Vitamin B12 & Folate; Future  -     PROLACTIN; Future  -     TESTOSTERONE; Future    Chronic fatigue  -     Comprehensive Metabolic Panel; Future  -     Hemoglobin A1C; Future  -     TSH without Reflex; Future  -     T4, Free; Future  -     Lipid Panel; Future  -     Vitamin B12 & Folate; Future  -     PROLACTIN; Future  -     TESTOSTERONE; Future    Urticaria  -     Comprehensive Metabolic Panel; Future  -     Hemoglobin A1C; Future  -     TSH without Reflex; Future  -     T4, Free; Future  -     Lipid Panel; Future  -     Vitamin B12 & Folate; Future  -     PROLACTIN; Future  -     TESTOSTERONE; Future  -     hydrOXYzine (ATARAX) 25 MG tablet; Take 1 tablet by mouth nightly    Type 2 diabetes mellitus with diabetic polyneuropathy, without long-term current use of insulin (HCC)  -     Comprehensive Metabolic Panel; Future  -     Hemoglobin A1C; Future  -     TSH without Reflex; Future  -     T4, Free; Future  -     Lipid Panel; Future  -     Vitamin B12 & Folate; Future  -     PROLACTIN; Future  -     TESTOSTERONE; Future    Mixed hyperlipidemia  -     Comprehensive Metabolic Panel; Future  -     Hemoglobin A1C; Future  -     TSH without Reflex; Future  -     T4, Free; Future  -     Lipid Panel; Future  -     Vitamin B12 & Folate;  Future  -     PROLACTIN; Future  -     TESTOSTERONE; Future    let us know in a week if not improved. Reviewed healthmaintenance report. Patient is aware of deficiencies and suggested preventative tests.

## 2021-12-21 ENCOUNTER — OFFICE VISIT (OUTPATIENT)
Dept: FAMILY MEDICINE CLINIC | Age: 76
End: 2021-12-21
Payer: MEDICARE

## 2021-12-21 VITALS — HEIGHT: 69 IN | BODY MASS INDEX: 30.21 KG/M2 | WEIGHT: 204 LBS

## 2021-12-21 DIAGNOSIS — N64.4 NIPPLE TENDERNESS: Primary | ICD-10-CM

## 2021-12-21 DIAGNOSIS — R53.82 CHRONIC FATIGUE: ICD-10-CM

## 2021-12-21 DIAGNOSIS — L50.9 URTICARIA: ICD-10-CM

## 2021-12-21 DIAGNOSIS — E11.42 TYPE 2 DIABETES MELLITUS WITH DIABETIC POLYNEUROPATHY, WITHOUT LONG-TERM CURRENT USE OF INSULIN (HCC): ICD-10-CM

## 2021-12-21 PROCEDURE — 3052F HG A1C>EQUAL 8.0%<EQUAL 9.0%: CPT | Performed by: FAMILY MEDICINE

## 2021-12-21 PROCEDURE — G8427 DOCREV CUR MEDS BY ELIG CLIN: HCPCS | Performed by: FAMILY MEDICINE

## 2021-12-21 PROCEDURE — 1036F TOBACCO NON-USER: CPT | Performed by: FAMILY MEDICINE

## 2021-12-21 PROCEDURE — 99214 OFFICE O/P EST MOD 30 MIN: CPT | Performed by: FAMILY MEDICINE

## 2021-12-21 PROCEDURE — G8484 FLU IMMUNIZE NO ADMIN: HCPCS | Performed by: FAMILY MEDICINE

## 2021-12-21 PROCEDURE — G8417 CALC BMI ABV UP PARAM F/U: HCPCS | Performed by: FAMILY MEDICINE

## 2021-12-21 PROCEDURE — 4040F PNEUMOC VAC/ADMIN/RCVD: CPT | Performed by: FAMILY MEDICINE

## 2021-12-21 PROCEDURE — 1123F ACP DISCUSS/DSCN MKR DOCD: CPT | Performed by: FAMILY MEDICINE

## 2021-12-21 RX ORDER — GLIMEPIRIDE 2 MG/1
2 TABLET ORAL
Qty: 90 TABLET | Refills: 5 | Status: SHIPPED
Start: 2021-12-21 | End: 2022-04-06 | Stop reason: SDUPTHER

## 2021-12-21 NOTE — PROGRESS NOTES
Charlene Hinojosa is a 64 y.o. male  . Subjective:      Patient stopped uroxatral and finasteride for a few days. During this period rash and nipple tenderness improved. Restarted uroxatral and symptoms returned. We will have him hold off uroxatral and if in two weeks it is resolved he is to call urology for alternative. If no improvement we will have to look deeper into it. Will consider endocrinology consult. We also discussed elevated sugars, patient states that diet has been very poor and will watch diet. Does not want change medication because he states he has that he has not been taking amaryl. Will restart and we will reevaluate. Extended visit greater than 35 min. Review of Systems   Constitutional: Negative for activity change, appetite change, chills, diaphoresis, fatigue, fever and unexpected weight change. HENT: Negative for congestion, dental problem, ear discharge, ear pain, facial swelling, hearing loss, mouth sores, nosebleeds, postnasal drip, rhinorrhea, sinus pressure, sneezing, sore throat, tinnitus, trouble swallowing and voice change. Respiratory: Negative for apnea, cough, choking, chest tightness, shortness of breath, wheezing and stridor. Cardiovascular: Negative for chest pain, palpitations and leg swelling. Gastrointestinal: Negative for abdominal distention, abdominal pain, anal bleeding, blood in stool, constipation, diarrhea, nausea, rectal pain and vomiting. Endocrine: Negative for cold intolerance, heat intolerance, polydipsia, polyphagia and polyuria. Skin: Negative for color change, pallor, rash and wound. Hives and nipple tenderness. Psychiatric/Behavioral: Negative for dysphoric mood, self-injury, sleep disturbance and suicidal ideas. The patient is not nervous/anxious.         Past Medical History:   Diagnosis Date    Abnormal computed tomography angiography of heart 07/19/2019    Calcified plaque proximal RCA with 50-70% stenosis, proximal LAD 50% stenosis, mild LAD 30-50% stenosis, groundglass infiltrates, area of consoldidation left lung base posteriorly.  Arthritis     Atrial fibrillation (HCC)     Burning pain     CAD (coronary artery disease)     Degenerative lumbar disc     Diabetes ([facility] [address]     Edentulous     upper denture    H/O cardiovascular stress test 2020    Lexiscan    Herniated cervical disc     History of echocardiogram 2017    EF 60-65%    Hx of blood clots     Hyperlipidemia     Hypertension     Stented coronary artery     Tinnitus     Urinary retention 3/17/2017       Social History     Socioeconomic History    Marital status:      Spouse name: Not on file    Number of children: Not on file    Years of education: Not on file    Highest education level: Not on file   Occupational History    Not on file   Tobacco Use    Smoking status: Former Smoker     Packs/day: 0.00     Years: 16.00     Pack years: 0.00     Quit date: 1977     Years since quittin.0    Smokeless tobacco: Former User    Tobacco comment: quit in    Vaping Use    Vaping Use: Never used   Substance and Sexual Activity    Alcohol use: No     Alcohol/week: 0.0 standard drinks     Comment: 1-2 cups coffee daily    Drug use: No    Sexual activity: Never   Other Topics Concern    Not on file   Social History Narrative    Not on file     Social Determinants of Health     Financial Resource Strain: Low Risk     Difficulty of Paying Living Expenses: Not very hard   Food Insecurity: No Food Insecurity    Worried About Running Out of Food in the Last Year: Never true    Yuli of Food in the Last Year: Never true   Transportation Needs:     Lack of Transportation (Medical): Not on file    Lack of Transportation (Non-Medical):  Not on file   Physical Activity:     Days of Exercise per Week: Not on file    Minutes of Exercise per Session: Not on file   Stress:     Feeling of Stress : Not on file   Social Connections:  Frequency of Communication with Friends and Family: Not on file    Frequency of Social Gatherings with Friends and Family: Not on file    Attends Confucianist Services: Not on file    Active Member of Clubs or Organizations: Not on file    Attends Club or Organization Meetings: Not on file    Marital Status: Not on file   Intimate Partner Violence:     Fear of Current or Ex-Partner: Not on file    Emotionally Abused: Not on file    Physically Abused: Not on file    Sexually Abused: Not on file   Housing Stability:     Unable to Pay for Housing in the Last Year: Not on file    Number of [place] in the Last Year: Not on file    Unstable Housing in the Last Year: Not on file       Family History   Problem Relation Age of Onset    Stroke Mother     Other Father     Kidney Disease Brother     Arrhythmia Brother     Cancer Brother     Kidney Disease Sister     Hypertension Other     Diabetes Other        Current Outpatient Medications on File Prior to Visit   Medication Sig Dispense Refill    hydrOXYzine (ATARAX) 25 MG tablet Take 1 tablet by mouth nightly 30 tablet 0    clopidogrel (PLAVIX) 75 MG tablet Take 1 tablet by mouth daily 90 tablet 3    pravastatin (PRAVACHOL) 10 MG tablet TAKE 1 TABLET BY MOUTH DAILY 90 tablet 5    metoprolol succinate (TOPROL XL) 50 MG extended release tablet Take 1 tablet by mouth 2 times daily 180 tablet 3    cyclobenzaprine (FLEXERIL) 10 MG tablet Take 1 tablet by mouth 3 times daily as needed for Muscle spasms 270 tablet 5    glimepiride (AMARYL) 2 MG tablet Take 1 tablet by mouth every morning (before breakfast) 90 tablet 5    alfuzosin (UROXATRAL) 10 MG extended release tablet       hydrALAZINE (APRESOLINE) 25 MG tablet Take 1 tablet by mouth 4 times daily 270 tablet 3    pantoprazole (PROTONIX) 40 MG tablet Take 1 tablet by mouth daily as needed (Reflux) 90 tablet 5    glycopyrrolate (ROBINUL) 1 MG tablet Take 1 tablet by mouth 3 times daily 270 tablet 3    folic acid (FOLVITE) 1 MG tablet Take 1 tablet by mouth daily 90 tablet 5    ammonium lactate (LAC-HYDRIN) 12 % lotion Apply 1 g topically daily as needed for Dry Skin      aspirin 81 MG tablet Take 81 mg by mouth Daily with lunch        No current facility-administered medications on file prior to visit. Allergies   Allergen Reactions    Imdur [Isosorbide Nitrate] Other (See Comments)     headache    Oxycodone Rash    Simvastatin Other (See Comments)     Muscle weakness    Bactrim [Sulfamethoxazole-Trimethoprim] Nausea Only    Ciprofloxacin Hcl Other (See Comments)     \"Heel/ tendon pain\"    Gabapentin Other (See Comments)     Constipation      Hydrocodone Nausea Only    Norco [Hydrocodone-Acetaminophen] Nausea Only and Other (See Comments)     Causes sick feeling in head         I have reviewedher allergies, medications, problem list, medical, social and family history and have updated as needed in the electronic medical record. Objective:     Physical Exam  Constitutional:       General: He is not in acute distress. Appearance: Normal appearance. He is normal weight. He is not ill-appearing, toxic-appearing or diaphoretic. HENT:      Head: Normocephalic and atraumatic. Right Ear: Tympanic membrane normal.      Left Ear: Tympanic membrane normal.      Nose: Nose normal.      Mouth/Throat:      Mouth: Mucous membranes are dry. Pharynx: Oropharynx is clear. Eyes:      Extraocular Movements: Extraocular movements intact. Conjunctiva/sclera: Conjunctivae normal.      Pupils: Pupils are equal, round, and reactive to light. Cardiovascular:      Rate and Rhythm: Normal rate and regular rhythm. Pulses: Normal pulses. Heart sounds: Normal heart sounds. Pulmonary:      Effort: Pulmonary effort is normal. No respiratory distress. Breath sounds: Normal breath sounds. No stridor. No wheezing, rhonchi or rales. Chest:      Chest wall: No tenderness. Genitourinary:     Comments: Deferred by patient  Musculoskeletal:         General: Normal range of motion. Cervical back: Normal range of motion and neck supple. Skin:     General: Skin is warm and dry. Neurological:      General: No focal deficit present. Mental Status: He is alert and oriented to person, place, and time. Psychiatric:         Mood and Affect: Mood normal.         Behavior: Behavior normal.         Thought Content: Thought content normal.         Judgment: Judgment normal.         Assessment / Plan:   Festus Morton was seen today for discuss labs. Diagnoses and all orders for this visit:    Nipple tenderness    Type 2 diabetes mellitus with diabetic polyneuropathy, without long-term current use of insulin (HCC)  -     glimepiride (AMARYL) 2 MG tablet; Take 1 tablet by mouth every morning (before breakfast)    Urticaria    Chronic fatigue        Follow up with urology. Reviewed health maintenance report. Patient is aware of deficiencies and suggested preventative tests.

## 2021-12-24 ASSESSMENT — ENCOUNTER SYMPTOMS
STRIDOR: 0
NAUSEA: 0
ANAL BLEEDING: 0
SORE THROAT: 0
CONSTIPATION: 0
APNEA: 0
SHORTNESS OF BREATH: 0
COUGH: 0
TROUBLE SWALLOWING: 0
SINUS PRESSURE: 0
COLOR CHANGE: 0
RECTAL PAIN: 0
DIARRHEA: 0
BLOOD IN STOOL: 0
ABDOMINAL PAIN: 0
CHEST TIGHTNESS: 0
VOICE CHANGE: 0
CHOKING: 0
RHINORRHEA: 0
VOMITING: 0
ABDOMINAL DISTENTION: 0
WHEEZING: 0
FACIAL SWELLING: 0

## 2022-01-02 ENCOUNTER — HOSPITAL ENCOUNTER (EMERGENCY)
Age: 77
Discharge: HOME | End: 2022-01-02
Payer: MEDICARE

## 2022-01-02 VITALS
SYSTOLIC BLOOD PRESSURE: 173 MMHG | TEMPERATURE: 98.1 F | WEIGHT: 197 LBS | OXYGEN SATURATION: 97 % | RESPIRATION RATE: 18 BRPM | BODY MASS INDEX: 29.09 KG/M2 | HEART RATE: 77 BPM | DIASTOLIC BLOOD PRESSURE: 87 MMHG

## 2022-01-02 DIAGNOSIS — L29.9 PRURITIC DISORDER: Primary | ICD-10-CM

## 2022-01-02 PROCEDURE — 99211 OFF/OP EST MAY X REQ PHY/QHP: CPT

## 2022-01-02 RX ORDER — CETIRIZINE HYDROCHLORIDE 10 MG/1
10 TABLET ORAL DAILY
Qty: 30 TABLET | Refills: 0 | Status: SHIPPED | OUTPATIENT
Start: 2022-01-02 | End: 2022-04-06

## 2022-01-02 RX ORDER — CETIRIZINE HYDROCHLORIDE 10 MG/1
10 TABLET ORAL DAILY
Qty: 30 TABLET | Refills: 0 | Status: SHIPPED | OUTPATIENT
Start: 2022-01-02 | End: 2022-01-02 | Stop reason: SDUPTHER

## 2022-01-02 NOTE — ED PROVIDER NOTES
Department of Emergency Medicine  [address] [place] [address]  Provider Note  Admit Date/Time: 2022  2:00 PM  Room:   NAME: Alexandro Burkitt  : 1945  MRN: [id number]     Chief Complaint:  Pruritis (face is itching and his nipples hurt, he thought it was finasteride or uroxatral, so pcp discontinued them but the problem is back)    History of Present Illness        Alexandro Burkitt is a 64 y.o. male who has a past medical history of:   Past Medical History:   Diagnosis Date    Abnormal computed tomography angiography of heart 2019    Calcified plaque proximal RCA with 50-70% stenosis, proximal LAD 50% stenosis, mild LAD 30-50% stenosis, groundglass infiltrates, area of consoldidation left lung base posteriorly.  Arthritis     Atrial fibrillation (HCC)     Burning pain     CAD (coronary artery disease)     Degenerative lumbar disc     Diabetes ([address]     Edentulous     upper denture    H/O cardiovascular stress test 2020    Lexiscan    Herniated cervical disc     History of echocardiogram 2017    EF 60-65%    Hx of blood clots     Hyperlipidemia     Hypertension     Stented coronary artery     Tinnitus     Urinary retention 3/17/2017    presents to the urgent care center by private car for evaluation his face is itching and he has nipple pain bilaterally he said his doctor is aware of this and he has been seeing him for it over the past month or so. Put him on Atarax that did not help he also stopped some of his medications and that did not help he said he still has a problem he has an appointment with his doctor coming up on the fifth. He is not complaining of fever, sore throat, cough chest pain, fever shortness of breath or throat tightness  ROS    Pertinent positives and negatives are stated within HPI, all other systems reviewed and are negative.     Past Surgical History:   Procedure Laterality Date    ABLATION OF DYSRHYTHMIC FOCUS   APPROX 30-35 YRS AGO    BACK SURGERY      CARDIAC CATHETERIZATION  02/20/2020    Dr Schultz Alpha N/A 10/9/2020    ANTERIOR CERVICAL FUSION  C3-C6, PARTIAL CORPECTOMY C3-C6, ANTERIOR OSTEOPHYTECTOMY C5-T1 ---AUDIOLOGY, SPINAL ELEMENTS performed by Monster Dominique DO at [address]  2010 sigmoid    COLONOSCOPY  5/2018 [facility]    CORONARY ANGIOPLASTY WITH STENT PLACEMENT      DIAGNOSTIC CARDIAC CATH LAB PROCEDURE      ENDOSCOPY, COLON, DIAGNOSTIC      EPIDURAL STEROID INJECTION N/A 12/11/2018    C7-T1 EPIDURAL STEROID INJECTION performed by Kimberly Pate DO at [address]  1/8/2019    C7 - T1 EPIDURAL STEROID INJECTION #2 performed by Kimberly Pate DO at [facility] implants   [facility]  2008    LAMINECTOMY N/A 6/5/2020    LUMBAR LAMINECTOMY L3-4 , L5-S1, HARDWARE REMOVAL L4-5, DURAL REPAIR performed by Monster Dominique DO at [facility] N/A 8/7/2020    LUMBAR DECOMPRESSION L4-S1; IRRIGATION AND DEBRIDEMENT OF LUMBAR HEMATOMA Mamie Minium; REMOVAL SYNOVIAL CYST L5-S1 LEFT performed by Monster Dominique DO at [address] [place] [address]  03/06/2017    NASAL SINUS SURGERY  12/07/2017    Submucosal Resection of Inferior Turbinate    NERVE BLOCK Bilateral 10 12 2015    bilateral sacroiliac joint injection #1    NERVE BLOCK Bilateral 10/22/15    sacroiliac joint #2    NERVE BLOCK Bilateral 11 02 2015    Sacroiliac joint bilateral #3    NERVE BLOCK Left 12/2/15    lumbar transforaminal #1    NERVE BLOCK Left 12 16 15    NERVE BLOCK Left 12 28 2015    transforaminal nerve block left lumbar #3    NERVE BLOCK Left 1 20 16    radiofrequency     NERVE BLOCK  12/11/2018    C7-T1 epidural    NERVE BLOCK N/A 01/08/2019    cervical epidural steroid injection    OTHER SURGICAL HISTORY  1945    had a feeding tube as an infant, scar    PROSTATE SURGERY  2017    PTCA  09/30/2019    2.5 x 38 mm Resolute [place] RAFI mid OM1, 2.25 x 8 mm Resolute [place] RAFI ostium of dx    UPPER GASTROINTESTINAL ENDOSCOPY  5/2018 [facility]    UPPER GASTROINTESTINAL ENDOSCOPY N/A 6/4/2019    EGD BIOPSY performed by Hannah Hartley MD at [address] [place] [address] History:  reports that he quit smoking about 45 years ago. He smoked 0.00 packs per day for 16.00 years. He has quit using smokeless tobacco. He reports that he does not drink alcohol and does not use drugs. Family History: family history includes Arrhythmia in his brother; Cancer in his brother; Diabetes in an other family member; Hypertension in an other family member; Kidney Disease in his brother and sister; Other in his father; Stroke in his mother. Allergies: Imdur [isosorbide nitrate], Oxycodone, Simvastatin, Bactrim [sulfamethoxazole-trimethoprim], Ciprofloxacin hcl, Gabapentin, Hydrocodone, and Norco [hydrocodone-acetaminophen]    Physical Exam   Oxygen Saturation Interpretation: Normal.   ED Triage Vitals [01/02/22 1405]   BP Temp Temp src Pulse Resp SpO2 Height Weight   (!) 173/87 98.1 °F (36.7 °C) -- 77 18 97 % -- 197 lb (89.4 kg)       Physical Exam  · Constitutional/General: Alert and oriented x3, well appearing, non toxic in NAD  · HEENT:  NC/NT. PERRLA,  Airway patent. No pharyngeal edema no tonsillar enlargement no uvular swelling no rash noted on the face, no facial swelling seen  · Neck: Supple, full ROM,   · Respiratory: Lungs clear to auscultation bilaterally, no wheezes, rales, or rhonchi. Not in respiratory distress  · CV:  Regular rate. Regular rhythm. · Chest: No chest wall tenderness I do not see any abnormalities with the nipples there is no edema there is no erythema there are  no visible abnormalities  · GI:  Abdomen Soft, Non tender,  · Musculoskeletal: Moves all extremities x 4.   · Integument: skin warm and dry. No rashes.    · Lymphatic: no lymphadenopathy noted  · Neurologic: GCS 15, no focal deficits,  · Psychiatric: Normal Affect    Lab / Imaging Results   (All laboratory and radiology results have been personally reviewed by myself)  Labs:  No results found for this visit on 01/02/22. Imaging: All Radiology results interpreted by Radiologist unless otherwise noted. No orders to display       ED Course / Medical Decision Making   Medications - No data to display       Consult(s):   None      MDM:   Has had facial itching and nipple pain for over a month now he has been seeing his doctor he has another appointment coming up January 5. I told him for the nipple pain I do not see any abnormalities he can take Tylenol or ibuprofen as needed for pain. The Atarax is not helping with the itching so I did switch him to Zyrtec advised him to see his doctor. Assessment      1. Pruritic disorder      Plan   Discharge to home and advised to contact Maribel Gannon, [address] [place] [id number] [phone]    Schedule an appointment as soon as possible for a visit      Patient condition is good    New Medications     New Prescriptions    CETIRIZINE (ZYRTEC) 10 MG TABLET    Take 1 tablet by mouth daily     Electronically signed by HARMAN Armendariz CNP   DD: 1/2/22  **This report was transcribed using voice recognition software. Every effort was made to ensure accuracy; however, inadvertent computerized transcription errors may be present.   END OF ED PROVIDER NOTE     HARMAN Armendariz CNP  01/02/22 2720

## 2022-01-04 ENCOUNTER — OFFICE VISIT (OUTPATIENT)
Dept: FAMILY MEDICINE CLINIC | Age: 77
End: 2022-01-04
Payer: MEDICARE

## 2022-01-04 VITALS
SYSTOLIC BLOOD PRESSURE: 172 MMHG | WEIGHT: 197 LBS | OXYGEN SATURATION: 96 % | TEMPERATURE: 98.1 F | BODY MASS INDEX: 29.18 KG/M2 | HEART RATE: 68 BPM | RESPIRATION RATE: 16 BRPM | DIASTOLIC BLOOD PRESSURE: 85 MMHG | HEIGHT: 69 IN

## 2022-01-04 DIAGNOSIS — H57.9 EYE PROBLEM: ICD-10-CM

## 2022-01-04 DIAGNOSIS — L29.3 GENITAL PRURITUS: Primary | ICD-10-CM

## 2022-01-04 PROCEDURE — 1036F TOBACCO NON-USER: CPT | Performed by: STUDENT IN AN ORGANIZED HEALTH CARE EDUCATION/TRAINING PROGRAM

## 2022-01-04 PROCEDURE — 4040F PNEUMOC VAC/ADMIN/RCVD: CPT | Performed by: STUDENT IN AN ORGANIZED HEALTH CARE EDUCATION/TRAINING PROGRAM

## 2022-01-04 PROCEDURE — G8484 FLU IMMUNIZE NO ADMIN: HCPCS | Performed by: STUDENT IN AN ORGANIZED HEALTH CARE EDUCATION/TRAINING PROGRAM

## 2022-01-04 PROCEDURE — 99212 OFFICE O/P EST SF 10 MIN: CPT | Performed by: STUDENT IN AN ORGANIZED HEALTH CARE EDUCATION/TRAINING PROGRAM

## 2022-01-04 PROCEDURE — 1123F ACP DISCUSS/DSCN MKR DOCD: CPT | Performed by: STUDENT IN AN ORGANIZED HEALTH CARE EDUCATION/TRAINING PROGRAM

## 2022-01-04 PROCEDURE — G8427 DOCREV CUR MEDS BY ELIG CLIN: HCPCS | Performed by: STUDENT IN AN ORGANIZED HEALTH CARE EDUCATION/TRAINING PROGRAM

## 2022-01-04 PROCEDURE — G8417 CALC BMI ABV UP PARAM F/U: HCPCS | Performed by: STUDENT IN AN ORGANIZED HEALTH CARE EDUCATION/TRAINING PROGRAM

## 2022-01-04 NOTE — PROGRESS NOTES
Chief Complaint       Eye Pain (x 4 wks )      History of Present Illness   Source of history provided by:  patient. Radha Chandler is a 64 y.o. male who  has a past medical history of Abnormal computed tomography angiography of heart, Arthritis, Atrial fibrillation (HCC), Burning pain, CAD (coronary artery disease), Degenerative lumbar disc, Diabetes ([address] Edentulous, H/O cardiovascular stress test, Herniated cervical disc, History of echocardiogram, Hx of blood clots, Hyperlipidemia, Hypertension, Stented coronary artery, Tinnitus, and Urinary retention. presenting to the walk in clinic for evaluation of chronic eye itching which has been present for the past 3 weeks. The complaint has been constant in severity. Patient has tried taking hydroxyzine and eye drops at home without symptomatic relief but has not taken this in the past 2 weeks. He has seen his PCP for this before and has an appointment tomorrow. He denies any rashes, vision changes, eye pain. ROS    Unless otherwise stated in this report or unable to obtain because of the patient's clinical or mental status as evidenced by the medical record, this patients's positive and negative responses for Review of Systems, constitutional, psych, eyes, ENT, cardiovascular, respiratory, gastrointestinal, neurological, genitourinary, musculoskeletal, integument systems and systems related to the presenting problem are either stated in the preceding or were not pertinent or were negative for the symptoms and/or complaints related to the medical problem.     Past Medical History:  has a past medical history of Abnormal computed tomography angiography of heart, Arthritis, Atrial fibrillation (HCC), Burning pain, CAD (coronary artery disease), Degenerative lumbar disc, Diabetes ([address] Edentulous, H/O cardiovascular stress test, Herniated cervical disc, History of echocardiogram, Hx of blood clots, Hyperlipidemia, Hypertension, Stented coronary artery, Tinnitus, and Urinary retention. Past Surgical History:  has a past surgical history that includes colectomy (2010 sigmoid); Hemorrhoid surgery (2008); Prostate surgery (2017); Colonoscopy (5/2018 [facility]); Nerve Block (Bilateral, 10 12 2015); Nerve Block (Bilateral, 10/22/15); Nerve Block (Bilateral, 11 02 2015); Nerve Block (Left, 12/2/15); Nerve Block (Left, 12 16 15); Nerve Block (Left, 12 28 2015); Nerve Block (Left, 1 20 16); ablation of dysrhythmic focus (1980); lumbar fusion (03/06/2017); Nasal sinus surgery (12/07/2017); Upper gastrointestinal endoscopy (5/2018 [facility]); Nerve Block (12/11/2018); epidural steroid injection (N/A, 12/11/2018); Nerve Block (N/A, 01/08/2019); epidural steroid injection (N/A, 1/8/2019); Endoscopy, colon, diagnostic; other surgical history (1945); Upper gastrointestinal endoscopy (N/A, 6/4/2019); Diagnostic Cardiac Cath Lab Procedure; Percutaneous Transluminal Coronary Angio (09/30/2019); Coronary angioplasty with stent; Cardiac catheterization (02/20/2020); eye surgery; laminectomy (N/A, 6/5/2020); laminectomy (N/A, 8/7/2020); cervical fusion (N/A, 10/9/2020); and back surgery. Social History:  reports that he quit smoking about 45 years ago. He smoked 0.00 packs per day for 16.00 years. He has quit using smokeless tobacco. He reports that he does not drink alcohol and does not use drugs. Family History: family history includes Arrhythmia in his brother; Cancer in his brother; Diabetes in an other family member; Hypertension in an other family member; Kidney Disease in his brother and sister; Other in his father; Stroke in his mother.    Allergies: Imdur [isosorbide nitrate], Oxycodone, Simvastatin, Bactrim [sulfamethoxazole-trimethoprim], Ciprofloxacin hcl, Gabapentin, Hydrocodone, and Norco [hydrocodone-acetaminophen]    Physical Exam         VS:  BP (!) 172/85   Pulse 68   Temp 98.1 °F (36.7 °C)   Resp 16   Ht 5' 9\" (1.753 m)   Wt 197 lb (89.4 kg)   SpO2 96%   BMI 29.09 kg/m²    Oxygen Saturation Interpretation: Normal.  Constitutional:  Alert, development consistent with age. Eyes:  PERRL, EOMI, no discharge or conjunctival injection. Ears:  External ears without lesions. TM's clear without erythema or perforation bilaterally. Throat:  Pharynx without injection, exudate, or tonsillar hypertrophy. Airway patient. Neck:  Normal ROM. Supple. Lungs:  Clear to auscultation and breath sounds equal.  Heart:  Regular rate and rhythm, normal heart sounds, without pathological murmurs, ectopy, gallops, or rubs. Abdomen:  Soft, nontender, good bowel sounds. No firm or pulsatile mass. Back:  No costovertebral tenderness. Skin:  Normal turgor. Warm, dry, without visible rash, unless noted elsewhere. Neurological:  Alert and oriented. Motor functions intact. Lab / Imaging Results   (All laboratory and radiology results have been personally reviewed by myself)  Labs:  No results found for this visit on 01/04/22. Imaging: All Radiology results interpreted by Radiologist unless otherwise noted. Assessment / Plan     Impression(s):  Ricki Cruz was seen today for eye pain. Diagnoses and all orders for this visit:    Genital pruritus    Eye problem    Advised patient to restart his hydroxazine and follow up with PCP. Symptoms seem to be unchanged and no concerning findings on exam    Disposition:  Disposition: Discharge to home. Pt advised to f/u with PCP in 5-7 days for continued management and further care. ER immediately if symptoms worsen or change. Red flag symptoms discussed. Patient states understanding is in agreement with this care plan. All questions answered.     Sean Walters MD

## 2022-01-05 ENCOUNTER — OFFICE VISIT (OUTPATIENT)
Dept: FAMILY MEDICINE CLINIC | Age: 77
End: 2022-01-05
Payer: MEDICARE

## 2022-01-05 VITALS
BODY MASS INDEX: 30.36 KG/M2 | SYSTOLIC BLOOD PRESSURE: 134 MMHG | OXYGEN SATURATION: 96 % | HEIGHT: 69 IN | DIASTOLIC BLOOD PRESSURE: 80 MMHG | HEART RATE: 64 BPM | RESPIRATION RATE: 18 BRPM | WEIGHT: 205 LBS

## 2022-01-05 DIAGNOSIS — E78.2 MIXED HYPERLIPIDEMIA: Primary | ICD-10-CM

## 2022-01-05 DIAGNOSIS — L50.9 URTICARIA: ICD-10-CM

## 2022-01-05 DIAGNOSIS — H10.13 ALLERGIC CONJUNCTIVITIS OF BOTH EYES: ICD-10-CM

## 2022-01-05 DIAGNOSIS — N40.1 BENIGN PROSTATIC HYPERPLASIA WITH URINARY FREQUENCY: ICD-10-CM

## 2022-01-05 DIAGNOSIS — I48.91 ATRIAL FIBRILLATION, UNSPECIFIED TYPE (HCC): ICD-10-CM

## 2022-01-05 DIAGNOSIS — R35.0 BENIGN PROSTATIC HYPERPLASIA WITH URINARY FREQUENCY: ICD-10-CM

## 2022-01-05 DIAGNOSIS — E11.42 TYPE 2 DIABETES MELLITUS WITH DIABETIC POLYNEUROPATHY, WITHOUT LONG-TERM CURRENT USE OF INSULIN (HCC): ICD-10-CM

## 2022-01-05 PROCEDURE — 4040F PNEUMOC VAC/ADMIN/RCVD: CPT | Performed by: FAMILY MEDICINE

## 2022-01-05 PROCEDURE — G8427 DOCREV CUR MEDS BY ELIG CLIN: HCPCS | Performed by: FAMILY MEDICINE

## 2022-01-05 PROCEDURE — G8417 CALC BMI ABV UP PARAM F/U: HCPCS | Performed by: FAMILY MEDICINE

## 2022-01-05 PROCEDURE — 1123F ACP DISCUSS/DSCN MKR DOCD: CPT | Performed by: FAMILY MEDICINE

## 2022-01-05 PROCEDURE — 1036F TOBACCO NON-USER: CPT | Performed by: FAMILY MEDICINE

## 2022-01-05 PROCEDURE — G8484 FLU IMMUNIZE NO ADMIN: HCPCS | Performed by: FAMILY MEDICINE

## 2022-01-05 PROCEDURE — 99213 OFFICE O/P EST LOW 20 MIN: CPT | Performed by: FAMILY MEDICINE

## 2022-01-05 RX ORDER — FINASTERIDE 5 MG/1
5 TABLET, FILM COATED ORAL DAILY
Qty: 90 TABLET | Refills: 5
Start: 2022-01-05 | End: 2022-04-06 | Stop reason: SINTOL

## 2022-01-05 RX ORDER — HYDROXYZINE HYDROCHLORIDE 25 MG/1
25 TABLET, FILM COATED ORAL NIGHTLY
Qty: 30 TABLET | Refills: 5 | Status: SHIPPED | OUTPATIENT
Start: 2022-01-05 | End: 2022-02-04

## 2022-01-05 RX ORDER — ALFUZOSIN HYDROCHLORIDE 10 MG/1
10 TABLET, EXTENDED RELEASE ORAL DAILY
Qty: 90 TABLET | Refills: 5
Start: 2022-01-05

## 2022-01-05 RX ORDER — ROSUVASTATIN CALCIUM 5 MG/1
5 TABLET, COATED ORAL NIGHTLY
Qty: 90 TABLET | Refills: 3 | Status: SHIPPED
Start: 2022-01-05 | End: 2022-04-06

## 2022-01-05 RX ORDER — OLOPATADINE HYDROCHLORIDE 1 MG/ML
1 SOLUTION/ DROPS OPHTHALMIC 2 TIMES DAILY
Qty: 5 ML | Refills: 5 | Status: SHIPPED | OUTPATIENT
Start: 2022-01-05 | End: 2022-02-04

## 2022-01-05 NOTE — PROGRESS NOTES
Clement Katz is a 64 y.o. male  . Subjective:      Nipple tenderness improved but still itchy. Was wondering if it could be pravastatin. Will restart medications for prostate if urology agrees. Not having any issues with restarting glimepiride. We will reevaluate sugars. If still elevated and still having nipple tenderness, we will set up with endocrinology. Consider allergy testing. Seeing cardiology yearly. Seeing Jessica. Seeing pulmonology and ENT. Review of Systems   Constitutional: Positive for fatigue. Negative for activity change, appetite change, chills, diaphoresis, fever and unexpected weight change. HENT: Negative for congestion, dental problem, drooling, ear discharge, ear pain, facial swelling, hearing loss, mouth sores, nosebleeds, postnasal drip, rhinorrhea, sinus pressure, sneezing, sore throat, tinnitus, trouble swallowing and voice change. Eyes: Negative for photophobia, pain, discharge, redness, itching and visual disturbance. Respiratory: Negative for apnea, cough, choking, chest tightness, shortness of breath, wheezing and stridor. Cardiovascular: Negative for chest pain, palpitations and leg swelling. Gastrointestinal: Negative for abdominal distention, abdominal pain, anal bleeding, blood in stool, constipation, diarrhea, nausea, rectal pain and vomiting. Endocrine: Negative for cold intolerance, heat intolerance, polydipsia, polyphagia and polyuria. Genitourinary: Negative for decreased urine volume, difficulty urinating, dysuria, enuresis, flank pain, frequency, genital sores, hematuria, penile discharge, penile pain, penile swelling, scrotal swelling, testicular pain and urgency. Musculoskeletal: Negative for arthralgias, back pain, gait problem, joint swelling, myalgias, neck pain and neck stiffness. Skin: Positive for rash. Negative for color change, pallor and wound.    Allergic/Immunologic: Negative for environmental allergies, food allergies and immunocompromised state. Neurological: Negative for dizziness, tremors, seizures, syncope, facial asymmetry, speech difficulty, weakness, light-headedness, numbness and headaches. Hematological: Negative for adenopathy. Does not bruise/bleed easily. Psychiatric/Behavioral: Negative for agitation, behavioral problems, confusion, decreased concentration, dysphoric mood, hallucinations, self-injury, sleep disturbance and suicidal ideas. The patient is not nervous/anxious and is not hyperactive. Past Medical History:   Diagnosis Date    Abnormal computed tomography angiography of heart 2019    Calcified plaque proximal RCA with 50-70% stenosis, proximal LAD 50% stenosis, mild LAD 30-50% stenosis, groundglass infiltrates, area of consoldidation left lung base posteriorly.       Arthritis     Atrial fibrillation (HCC)     Burning pain     CAD (coronary artery disease)     Degenerative lumbar disc     Diabetes ([facility] [address]     Edentulous     upper denture    H/O cardiovascular stress test 2020    Lexiscan    Herniated cervical disc     History of echocardiogram 2017    EF 60-65%    Hx of blood clots     Hyperlipidemia     Hypertension     Stented coronary artery     Tinnitus     Urinary retention 3/17/2017       Social History     Socioeconomic History    Marital status:      Spouse name: Not on file    Number of children: Not on file    Years of education: Not on file    Highest education level: Not on file   Occupational History    Not on file   Tobacco Use    Smoking status: Former Smoker     Packs/day: 0.00     Years: 16.00     Pack years: 0.00     Quit date: 1977     Years since quittin.0    Smokeless tobacco: Former User    Tobacco comment: quit in    Vaping Use    Vaping Use: Never used   Substance and Sexual Activity    Alcohol use: No     Alcohol/week: 0.0 standard drinks     Comment: 1-2 cups coffee daily    Drug use: No    Sexual activity: Never   Other Topics Concern    Not on file   Social History Narrative    Not on file     Social Determinants of Health     Financial Resource Strain: Low Risk     Difficulty of Paying Living Expenses: Not very hard   Food Insecurity: No Food Insecurity    Worried About Running Out of Food in the Last Year: Never true    Yuli of Food in the Last Year: Never true   Transportation Needs:     Lack of Transportation (Medical): Not on file    Lack of Transportation (Non-Medical):  Not on file   Physical Activity:     Days of Exercise per Week: Not on file    Minutes of Exercise per Session: Not on file   Stress:     Feeling of Stress : Not on file   Social Connections:     Frequency of Communication with Friends and Family: Not on file    Frequency of Social Gatherings with Friends and Family: Not on file    Attends Buddhism Services: Not on file    Active Member of [address] [place] [address] [employer] or Organizations: Not on file    Attends Club or Organization Meetings: Not on file    Marital Status: Not on file   Intimate Partner Violence:     Fear of Current or Ex-Partner: Not on file    Emotionally Abused: Not on file    Physically Abused: Not on file    Sexually Abused: Not on file   Housing Stability:     Unable to Pay for Housing in the Last Year: Not on file    Number of [place] in the Last Year: Not on file    Unstable Housing in the Last Year: Not on file       Family History   Problem Relation Age of Onset    Stroke Mother     Other Father     Kidney Disease Brother     Arrhythmia Brother     Cancer Brother     Kidney Disease Sister     Hypertension Other     Diabetes Other        Current Outpatient Medications on File Prior to Visit   Medication Sig Dispense Refill    cetirizine (ZYRTEC) 10 MG tablet Take 1 tablet by mouth daily 30 tablet 0    glimepiride (AMARYL) 2 MG tablet Take 1 tablet by mouth every morning (before breakfast) 90 tablet 5    clopidogrel (PLAVIX) 75 MG tablet Take 1 tablet by mouth daily 90 tablet 3    metoprolol succinate (TOPROL XL) 50 MG extended release tablet Take 1 tablet by mouth 2 times daily 180 tablet 3    cyclobenzaprine (FLEXERIL) 10 MG tablet Take 1 tablet by mouth 3 times daily as needed for Muscle spasms 270 tablet 5    hydrALAZINE (APRESOLINE) 25 MG tablet Take 1 tablet by mouth 4 times daily 270 tablet 3    pantoprazole (PROTONIX) 40 MG tablet Take 1 tablet by mouth daily as needed (Reflux) 90 tablet 5    glycopyrrolate (ROBINUL) 1 MG tablet Take 1 tablet by mouth 3 times daily 914 tablet 3    folic acid (FOLVITE) 1 MG tablet Take 1 tablet by mouth daily 90 tablet 5    ammonium lactate (LAC-HYDRIN) 12 % lotion Apply 1 g topically daily as needed for Dry Skin      aspirin 81 MG tablet Take 81 mg by mouth Daily with lunch        No current facility-administered medications on file prior to visit. Allergies   Allergen Reactions    Imdur [Isosorbide Nitrate] Other (See Comments)     headache    Oxycodone Rash    Simvastatin Other (See Comments)     Muscle weakness    Bactrim [Sulfamethoxazole-Trimethoprim] Nausea Only    Ciprofloxacin Hcl Other (See Comments)     \"Heel/ tendon pain\"    Gabapentin Other (See Comments)     Constipation      Hydrocodone Nausea Only    Norco [Hydrocodone-Acetaminophen] Nausea Only and Other (See Comments)     Causes sick feeling in head         I have reviewed his allergies, medications, problem list, medical,social and family history and have updated as needed in the electronic medical record. Objective:     Physical Exam  Constitutional:       General: He is not in acute distress. Appearance: Normal appearance. He is normal weight. He is not ill-appearing, toxic-appearing or diaphoretic. HENT:      Head: Normocephalic and atraumatic. Right Ear: Tympanic membrane normal.      Left Ear: Tympanic membrane normal.      Nose: Nose normal.      Mouth/Throat:      Mouth: Mucous membranes are dry.       Pharynx: Oropharynx is clear. Eyes:      Extraocular Movements: Extraocular movements intact. Conjunctiva/sclera: Conjunctivae normal.      Pupils: Pupils are equal, round, and reactive to light. Cardiovascular:      Rate and Rhythm: Normal rate and regular rhythm. Pulses: Normal pulses. Heart sounds: Normal heart sounds. Pulmonary:      Effort: Pulmonary effort is normal. No respiratory distress. Breath sounds: Normal breath sounds. No stridor. No wheezing, rhonchi or rales. Chest:      Chest wall: No tenderness. Genitourinary:     Comments: Deferred by patient  Musculoskeletal:         General: Normal range of motion. Cervical back: Normal range of motion and neck supple. Skin:     General: Skin is warm and dry. Neurological:      General: No focal deficit present. Mental Status: He is alert and oriented to person, place, and time. Psychiatric:         Mood and Affect: Mood normal.         Behavior: Behavior normal.         Thought Content: Thought content normal.         Judgment: Judgment normal.         Assessment / Plan:   Sameer German was seen today for 2 week follow-up. Diagnoses and all orders for this visit:    Mixed hyperlipidemia  -     rosuvastatin (CRESTOR) 5 MG tablet; Take 1 tablet by mouth nightly    Benign prostatic hyperplasia with urinary frequency  -     alfuzosin (UROXATRAL) 10 MG extended release tablet; Take 1 tablet by mouth daily  -     finasteride (PROSCAR) 5 MG tablet; Take 1 tablet by mouth daily    Type 2 diabetes mellitus with diabetic polyneuropathy, without long-term current use of insulin (HCC)    Atrial fibrillation, unspecified type (HCC)    Urticaria  -     hydrOXYzine (ATARAX) 25 MG tablet; Take 1 tablet by mouth nightly    Allergic conjunctivitis of both eyes  -     olopatadine (PATANOL) 0.1 % ophthalmic solution; Place 1 drop into both eyes 2 times daily        Reviewed healthmaintenance report.  Patient is aware of deficiencies and suggested preventative tests.

## 2022-01-10 ASSESSMENT — ENCOUNTER SYMPTOMS
VOICE CHANGE: 0
EYE REDNESS: 0
PHOTOPHOBIA: 0
CONSTIPATION: 0
COUGH: 0
CHOKING: 0
FACIAL SWELLING: 0
TROUBLE SWALLOWING: 0
SORE THROAT: 0
EYE DISCHARGE: 0
WHEEZING: 0
STRIDOR: 0
BLOOD IN STOOL: 0
RHINORRHEA: 0
SHORTNESS OF BREATH: 0
NAUSEA: 0
CHEST TIGHTNESS: 0
DIARRHEA: 0
EYE PAIN: 0
EYE ITCHING: 0
ANAL BLEEDING: 0
SINUS PRESSURE: 0
RECTAL PAIN: 0
ABDOMINAL PAIN: 0
APNEA: 0
BACK PAIN: 0
ABDOMINAL DISTENTION: 0
COLOR CHANGE: 0
VOMITING: 0

## 2022-03-03 ENCOUNTER — HOSPITAL ENCOUNTER (OUTPATIENT)
Dept: CT IMAGING | Age: 77
Discharge: HOME | End: 2022-03-03
Payer: MEDICARE

## 2022-03-03 DIAGNOSIS — J34.89 SINUS DRAINAGE: ICD-10-CM

## 2022-03-03 PROCEDURE — 70486 CT MAXILLOFACIAL W/O DYE: CPT

## 2022-04-06 ENCOUNTER — OFFICE VISIT (OUTPATIENT)
Dept: FAMILY MEDICINE CLINIC | Age: 77
End: 2022-04-06
Payer: MEDICARE

## 2022-04-06 VITALS
BODY MASS INDEX: 29.33 KG/M2 | HEIGHT: 69 IN | WEIGHT: 198 LBS | RESPIRATION RATE: 18 BRPM | OXYGEN SATURATION: 98 % | DIASTOLIC BLOOD PRESSURE: 82 MMHG | HEART RATE: 72 BPM | SYSTOLIC BLOOD PRESSURE: 132 MMHG

## 2022-04-06 DIAGNOSIS — R53.82 CHRONIC FATIGUE: ICD-10-CM

## 2022-04-06 DIAGNOSIS — R68.2 DRY MOUTH: Primary | ICD-10-CM

## 2022-04-06 DIAGNOSIS — R68.2 DRY MOUTH: ICD-10-CM

## 2022-04-06 DIAGNOSIS — E78.2 MIXED HYPERLIPIDEMIA: ICD-10-CM

## 2022-04-06 DIAGNOSIS — E11.42 TYPE 2 DIABETES MELLITUS WITH DIABETIC POLYNEUROPATHY, WITHOUT LONG-TERM CURRENT USE OF INSULIN (HCC): ICD-10-CM

## 2022-04-06 DIAGNOSIS — E11.65 TYPE 2 DIABETES MELLITUS WITH HYPERGLYCEMIA, WITHOUT LONG-TERM CURRENT USE OF INSULIN (HCC): ICD-10-CM

## 2022-04-06 LAB
ALBUMIN SERPL-MCNC: 4.6 G/DL (ref 3.5–5.2)
ALP BLD-CCNC: 94 U/L (ref 40–129)
ALT SERPL-CCNC: 29 U/L (ref 0–40)
ANION GAP SERPL CALCULATED.3IONS-SCNC: 12 MMOL/L (ref 7–16)
AST SERPL-CCNC: 36 U/L (ref 0–39)
BILIRUB SERPL-MCNC: 0.7 MG/DL (ref 0–1.2)
BUN BLDV-MCNC: 12 MG/DL (ref 6–23)
CALCIUM SERPL-MCNC: 10.1 MG/DL (ref 8.6–10.2)
CHLORIDE BLD-SCNC: 100 MMOL/L (ref 98–107)
CHOLESTEROL, TOTAL: 126 MG/DL (ref 0–199)
CO2: 25 MMOL/L (ref 22–29)
CREAT SERPL-MCNC: 1.1 MG/DL (ref 0.7–1.2)
FOLATE: >20 NG/ML (ref 4.8–24.2)
GFR AFRICAN AMERICAN: >60
GFR NON-AFRICAN AMERICAN: >60 ML/MIN/1.73
GLUCOSE BLD-MCNC: 131 MG/DL (ref 74–99)
HBA1C MFR BLD: 7.4 % (ref 4–5.6)
HCT VFR BLD CALC: 47.7 % (ref 37–54)
HDLC SERPL-MCNC: 41 MG/DL
HEMOGLOBIN: 16.5 G/DL (ref 12.5–16.5)
LDL CHOLESTEROL CALCULATED: 50 MG/DL (ref 0–99)
MCH RBC QN AUTO: 30.6 PG (ref 26–35)
MCHC RBC AUTO-ENTMCNC: 34.6 % (ref 32–34.5)
MCV RBC AUTO: 88.3 FL (ref 80–99.9)
PDW BLD-RTO: 13.2 FL (ref 11.5–15)
PLATELET # BLD: 220 E9/L (ref 130–450)
PMV BLD AUTO: 10.8 FL (ref 7–12)
POTASSIUM SERPL-SCNC: 4.7 MMOL/L (ref 3.5–5)
RBC # BLD: 5.4 E12/L (ref 3.8–5.8)
SODIUM BLD-SCNC: 137 MMOL/L (ref 132–146)
TOTAL PROTEIN: 7.7 G/DL (ref 6.4–8.3)
TRIGL SERPL-MCNC: 177 MG/DL (ref 0–149)
TSH SERPL DL<=0.05 MIU/L-ACNC: 3.64 UIU/ML (ref 0.27–4.2)
VITAMIN B-12: 451 PG/ML (ref 211–946)
VLDLC SERPL CALC-MCNC: 35 MG/DL
WBC # BLD: 7.1 E9/L (ref 4.5–11.5)

## 2022-04-06 PROCEDURE — 1123F ACP DISCUSS/DSCN MKR DOCD: CPT | Performed by: FAMILY MEDICINE

## 2022-04-06 PROCEDURE — 4040F PNEUMOC VAC/ADMIN/RCVD: CPT | Performed by: FAMILY MEDICINE

## 2022-04-06 PROCEDURE — 3051F HG A1C>EQUAL 7.0%<8.0%: CPT | Performed by: FAMILY MEDICINE

## 2022-04-06 PROCEDURE — G8417 CALC BMI ABV UP PARAM F/U: HCPCS | Performed by: FAMILY MEDICINE

## 2022-04-06 PROCEDURE — 1036F TOBACCO NON-USER: CPT | Performed by: FAMILY MEDICINE

## 2022-04-06 PROCEDURE — G8427 DOCREV CUR MEDS BY ELIG CLIN: HCPCS | Performed by: FAMILY MEDICINE

## 2022-04-06 PROCEDURE — 99214 OFFICE O/P EST MOD 30 MIN: CPT | Performed by: FAMILY MEDICINE

## 2022-04-06 RX ORDER — PRAVASTATIN SODIUM 10 MG
TABLET ORAL
Qty: 90 TABLET | Refills: 5 | Status: SHIPPED
Start: 2022-04-06 | End: 2022-06-13

## 2022-04-06 RX ORDER — GLIMEPIRIDE 2 MG/1
2 TABLET ORAL 2 TIMES DAILY
Qty: 180 TABLET | Refills: 5 | Status: SHIPPED
Start: 2022-04-06 | End: 2022-06-13 | Stop reason: SDUPTHER

## 2022-04-06 RX ORDER — CEVIMELINE HYDROCHLORIDE 30 MG/1
30 CAPSULE ORAL 3 TIMES DAILY
Qty: 90 CAPSULE | Refills: 5 | Status: SHIPPED | OUTPATIENT
Start: 2022-04-06

## 2022-04-07 ASSESSMENT — ENCOUNTER SYMPTOMS
VOMITING: 0
ABDOMINAL DISTENTION: 0
CHOKING: 0
CHEST TIGHTNESS: 0
SORE THROAT: 0
EYE PAIN: 0
ANAL BLEEDING: 0
NAUSEA: 0
EYE DISCHARGE: 0
CONSTIPATION: 0
VOICE CHANGE: 0
TROUBLE SWALLOWING: 0
WHEEZING: 0
FACIAL SWELLING: 0
COLOR CHANGE: 0
BACK PAIN: 0
RECTAL PAIN: 0
EYE ITCHING: 0
EYE REDNESS: 0
BLOOD IN STOOL: 0
COUGH: 0
STRIDOR: 0
SHORTNESS OF BREATH: 0
APNEA: 0
ABDOMINAL PAIN: 0
PHOTOPHOBIA: 0
RHINORRHEA: 1
DIARRHEA: 0
SINUS PRESSURE: 0

## 2022-04-07 NOTE — PROGRESS NOTES
Mike Lakhani is a 64 y.o. male  . Subjective:      Patient's nipple tenderness, rash and pruritus completely resolved since discontinuing finasteride. Patient however is noticed since starting Cytomel his mouth is becoming extremely dry. He has been drinking an excessive amount of water every day due to the dry mouth. He stopped the glycopyrrolate that he had been taking for mucus in stool. This was started by surgery. And states his nose is felt stuffy since starting the Crestor. States his sugars have been higher than usual lately. Also explained this may have something to do with his dry mouth. His urination has been normal.  No wheezing or trouble breathing. Review of Systems   Constitutional: Positive for fatigue. Negative for activity change, appetite change, chills, diaphoresis, fever and unexpected weight change. HENT: Positive for postnasal drip and rhinorrhea. Negative for congestion, dental problem, drooling, ear discharge, ear pain, facial swelling, hearing loss, mouth sores, nosebleeds, sinus pressure, sneezing, sore throat, tinnitus, trouble swallowing and voice change. Dry mouth   Eyes: Negative for photophobia, pain, discharge, redness, itching and visual disturbance. Respiratory: Negative for apnea, cough, choking, chest tightness, shortness of breath, wheezing and stridor. Cardiovascular: Negative for chest pain, palpitations and leg swelling. Gastrointestinal: Negative for abdominal distention, abdominal pain, anal bleeding, blood in stool, constipation, diarrhea, nausea, rectal pain and vomiting. Endocrine: Negative for cold intolerance, heat intolerance, polydipsia, polyphagia and polyuria. Genitourinary: Negative for decreased urine volume, difficulty urinating, dysuria, enuresis, flank pain, frequency, genital sores, hematuria, penile discharge, penile pain, penile swelling, scrotal swelling, testicular pain and urgency.    Musculoskeletal: Negative for arthralgias, back pain, gait problem, joint swelling, myalgias, neck pain and neck stiffness. Skin: Negative for color change, pallor, rash and wound. Allergic/Immunologic: Negative for environmental allergies, food allergies and immunocompromised state. Neurological: Negative for dizziness, tremors, seizures, syncope, facial asymmetry, speech difficulty, weakness, light-headedness, numbness and headaches. Hematological: Negative for adenopathy. Does not bruise/bleed easily. Psychiatric/Behavioral: Negative for agitation, behavioral problems, confusion, decreased concentration, dysphoric mood, hallucinations, self-injury, sleep disturbance and suicidal ideas. The patient is not nervous/anxious and is not hyperactive. Past Medical History:   Diagnosis Date    Abnormal computed tomography angiography of heart 2019    Calcified plaque proximal RCA with 50-70% stenosis, proximal LAD 50% stenosis, mild LAD 30-50% stenosis, groundglass infiltrates, area of consoldidation left lung base posteriorly.       Arthritis     Atrial fibrillation (HCC)     Burning pain     CAD (coronary artery disease)     Degenerative lumbar disc     Diabetes ([facility] [address]     Edentulous     upper denture    H/O cardiovascular stress test 2020    Lexiscan    Herniated cervical disc     History of echocardiogram 2017    EF 60-65%    Hx of blood clots     Hyperlipidemia     Hypertension     Stented coronary artery     Tinnitus     Urinary retention 3/17/2017       Social History     Socioeconomic History    Marital status:      Spouse name: Not on file    Number of children: Not on file    Years of education: Not on file    Highest education level: Not on file   Occupational History    Not on file   Tobacco Use    Smoking status: Former Smoker     Packs/day: 0.00     Years: 16.00     Pack years: 0.00     Quit date: 1977     Years since quittin.2    Smokeless tobacco: Former User    Tobacco comment: quit in 1978   Vaping Use    Vaping Use: Never used   Substance and Sexual Activity    Alcohol use: No     Alcohol/week: 0.0 standard drinks     Comment: 1-2 cups coffee daily    Drug use: No    Sexual activity: Never   Other Topics Concern    Not on file   Social History Narrative    Not on file     Social Determinants of Health     Financial Resource Strain: Low Risk     Difficulty of Paying Living Expenses: Not very hard   Food Insecurity: No Food Insecurity    Worried About Running Out of Food in the Last Year: Never true    Yuli of Food in the Last Year: Never true   Transportation Needs:     Lack of Transportation (Medical): Not on file    Lack of Transportation (Non-Medical):  Not on file   Physical Activity:     Days of Exercise per Week: Not on file    Minutes of Exercise per Session: Not on file   Stress:     Feeling of Stress : Not on file   Social Connections:     Frequency of Communication with Friends and Family: Not on file    Frequency of Social Gatherings with Friends and Family: Not on file    Attends Pentecostal Services: Not on file    Active Member of Clubs or Organizations: Not on file    Attends Club or Organization Meetings: Not on file    Marital Status: Not on file   Intimate Partner Violence:     Fear of Current or Ex-Partner: Not on file    Emotionally Abused: Not on file    Physically Abused: Not on file    Sexually Abused: Not on file   Housing Stability:     Unable to Pay for Housing in the Last Year: Not on file    Number of [place] in the Last Year: Not on file    Unstable Housing in the Last Year: Not on file       Family History   Problem Relation Age of Onset    Stroke Mother     Other Father     Kidney Disease Brother     Arrhythmia Brother     Cancer Brother     Kidney Disease Sister     Hypertension Other     Diabetes Other        Current Outpatient Medications on File Prior to Visit   Medication Sig Dispense Refill    alfuzosin (UROXATRAL) 10 MG extended release tablet Take 1 tablet by mouth daily 90 tablet 5    clopidogrel (PLAVIX) 75 MG tablet Take 1 tablet by mouth daily 90 tablet 3    metoprolol succinate (TOPROL XL) 50 MG extended release tablet Take 1 tablet by mouth 2 times daily 180 tablet 3    cyclobenzaprine (FLEXERIL) 10 MG tablet Take 1 tablet by mouth 3 times daily as needed for Muscle spasms 270 tablet 5    hydrALAZINE (APRESOLINE) 25 MG tablet Take 1 tablet by mouth 4 times daily 270 tablet 3    pantoprazole (PROTONIX) 40 MG tablet Take 1 tablet by mouth daily as needed (Reflux) 90 tablet 5    folic acid (FOLVITE) 1 MG tablet Take 1 tablet by mouth daily 90 tablet 5    ammonium lactate (LAC-HYDRIN) 12 % lotion Apply 1 g topically daily as needed for Dry Skin      aspirin 81 MG tablet Take 81 mg by mouth Daily with lunch       glycopyrrolate (ROBINUL) 1 MG tablet Take 1 tablet by mouth 3 times daily 270 tablet 3     No current facility-administered medications on file prior to visit. Allergies   Allergen Reactions    Imdur [Isosorbide Nitrate] Other (See Comments)     headache    Oxycodone Rash    Simvastatin Other (See Comments)     Muscle weakness    Bactrim [Sulfamethoxazole-Trimethoprim] Nausea Only    Ciprofloxacin Hcl Other (See Comments)     \"Heel/ tendon pain\"    Gabapentin Other (See Comments)     Constipation      Hydrocodone Nausea Only    Norco [Hydrocodone-Acetaminophen] Nausea Only and Other (See Comments)     Causes sick feeling in head         I have reviewed his allergies, medications, problem list, medical,social and family history and have updated as needed in the electronic medical record. Objective:     Physical Exam  Vitals and nursing note reviewed. Constitutional:       General: He is not in acute distress. Appearance: He is well-developed. He is not diaphoretic. HENT:      Head: Normocephalic and atraumatic.       Right Ear: External ear normal.      Left Ear: External ear normal.      Nose: Nose normal.      Mouth/Throat:      Pharynx: No oropharyngeal exudate. Eyes:      General: No scleral icterus. Right eye: No discharge. Left eye: No discharge. Conjunctiva/sclera: Conjunctivae normal.      Pupils: Pupils are equal, round, and reactive to light. Neck:      Thyroid: No thyromegaly. Vascular: No JVD. Trachea: No tracheal deviation. Cardiovascular:      Rate and Rhythm: Normal rate and regular rhythm. Heart sounds: Normal heart sounds. No murmur heard. No friction rub. No gallop. Pulmonary:      Effort: Pulmonary effort is normal. No respiratory distress. Breath sounds: Normal breath sounds. No stridor. No wheezing or rales. Chest:      Chest wall: No tenderness. Abdominal:      General: Bowel sounds are normal. There is no distension. Palpations: Abdomen is soft. There is no mass. Tenderness: There is no abdominal tenderness. There is no guarding or rebound. Genitourinary:     Comments: Deferred by patient  Musculoskeletal:         General: No tenderness. Normal range of motion. Cervical back: Normal range of motion and neck supple. Lymphadenopathy:      Cervical: No cervical adenopathy. Skin:     General: Skin is warm and dry. Coloration: Skin is not pale. Findings: No erythema or rash. Neurological:      Mental Status: He is alert and oriented to person, place, and time. Cranial Nerves: No cranial nerve deficit. Motor: No abnormal muscle tone. Coordination: Coordination normal.      Deep Tendon Reflexes: Reflexes are normal and symmetric. Reflexes normal.   Psychiatric:         Behavior: Behavior normal.         Thought Content: Thought content normal.         Judgment: Judgment normal.         Assessment / Plan:   Sheldon Benjamin was seen today for 3 month follow-up. Diagnoses and all orders for this visit:    Dry mouth  -     cevimeline (EVOXAC) 30 MG capsule;  Take 1 capsule by mouth 3 times daily  -     CBC; Future  -     Comprehensive Metabolic Panel; Future  -     TSH; Future  -     SJOGREN'S ANTIBODIES (SS-A, SS-B); Future    Mixed hyperlipidemia  -     pravastatin (PRAVACHOL) 10 MG tablet; TAKE 1 TABLET BY MOUTH DAILY  -     CBC; Future  -     Comprehensive Metabolic Panel; Future  -     TSH; Future  -     Hemoglobin A1C; Future  -     Lipid Panel; Future    Type 2 diabetes mellitus with diabetic polyneuropathy, without long-term current use of insulin (HCC)  -     pravastatin (PRAVACHOL) 10 MG tablet; TAKE 1 TABLET BY MOUTH DAILY  -     glimepiride (AMARYL) 2 MG tablet; Take 1 tablet by mouth in the morning and at bedtime  -     CBC; Future  -     Comprehensive Metabolic Panel; Future  -     TSH; Future    Type 2 diabetes mellitus with hyperglycemia, without long-term current use of insulin (HCC)  -     glimepiride (AMARYL) 2 MG tablet; Take 1 tablet by mouth in the morning and at bedtime    Chronic fatigue  -     Vitamin B12 & Folate; Future    We will have patient stop Crestor. We will keep him off of statin for 2 weeks. Then he could restart pravastatin. We will increase the glimepiride for now to twice a day. Were going to provide some Evoxac for dry mouth for at least the next month. He is to use over-the-counter Biotene rinse or spray. We will perform blood work today if hemoglobin A1c is okay we will have him drop the glimepiride back down to once a day. However he feels more comfortable in increasing it today. If dry mouth does not resolve we will further the work-up however we will check in for Sjogren's today as well. We will have patient follow-up if he develops any more symptoms in the meantime of dry mouth is not improving he is to let us know. This was an extended management visit that was 35 minutes. Reviewed healthmaintenance report. Patient is aware of deficiencies and suggested preventative tests.

## 2022-05-29 ENCOUNTER — HOSPITAL ENCOUNTER (EMERGENCY)
Age: 77
Discharge: HOME | End: 2022-05-29
Attending: EMERGENCY MEDICINE
Payer: MEDICARE

## 2022-05-29 VITALS
DIASTOLIC BLOOD PRESSURE: 79 MMHG | TEMPERATURE: 98 F | RESPIRATION RATE: 16 BRPM | HEART RATE: 61 BPM | WEIGHT: 195 LBS | BODY MASS INDEX: 28.88 KG/M2 | OXYGEN SATURATION: 94 % | HEIGHT: 69 IN | SYSTOLIC BLOOD PRESSURE: 166 MMHG

## 2022-05-29 DIAGNOSIS — R13.10 DYSPHAGIA, UNSPECIFIED TYPE: Primary | ICD-10-CM

## 2022-05-29 PROCEDURE — 99282 EMERGENCY DEPT VISIT SF MDM: CPT

## 2022-05-29 ASSESSMENT — PAIN SCALES - GENERAL: PAINLEVEL_OUTOF10: 8

## 2022-05-29 ASSESSMENT — ENCOUNTER SYMPTOMS
TROUBLE SWALLOWING: 1
SHORTNESS OF BREATH: 0
CHEST TIGHTNESS: 0

## 2022-05-29 ASSESSMENT — PAIN DESCRIPTION - LOCATION: LOCATION: NECK

## 2022-05-29 NOTE — ED PROVIDER NOTES
64-year-old male presenting from home with concern about a foreign body stuck in his throat. At around 4 PM he was eating a pizza felt like the pizza was stuck. He did have any difficulty breathing and felt an irritation in his throat. He has a history of spinal stenosis requiring cervical surgery about 1 year ago he is a chronic neck pain since then. For about 1 month he has had concerned that he has had harder time swallowing things. He is going to see a surgeon to have an endoscopy performed at some point. Upon arrival he is awake and alert and oriented x4, no distress, this is a chronic problem, persistent, gradual onset, worsening.          Family History   Problem Relation Age of Onset   Daria Palma Stroke Mother     Other Father     Kidney Disease Brother     Arrhythmia Brother     Cancer Brother     Kidney Disease Sister     Hypertension Other     Diabetes Other      Past Surgical History:   Procedure Laterality Date    ABLATION OF DYSRHYTHMIC FOCUS  1980    APPROX 30-35 YRS AGO    BACK SURGERY      CARDIAC CATHETERIZATION  02/20/2020    Dr Lou Nunez N/A 10/9/2020    ANTERIOR CERVICAL FUSION  C3-C6, PARTIAL CORPECTOMY C3-C6, ANTERIOR OSTEOPHYTECTOMY C5-T1 ---AUDIOLOGY, SPINAL ELEMENTS performed by Danni Anton DO [facility]  2010 sigmoid    COLONOSCOPY  5/2018 [facility]    CORONARY ANGIOPLASTY WITH STENT PLACEMENT      DIAGNOSTIC CARDIAC CATH LAB PROCEDURE      ENDOSCOPY, COLON, DIAGNOSTIC      EPIDURAL STEROID INJECTION N/A 12/11/2018    C7-T1 EPIDURAL STEROID INJECTION performed by Heriberto Freeman DO at [place] [address] N/A 1/8/2019    C7 - T1 EPIDURAL STEROID INJECTION #2 performed by Heriberto Freeman DO at [address]      zachery lense implants    HEMORRHOID SURGERY  2008    LAMINECTOMY N/A 6/5/2020    LUMBAR LAMINECTOMY L3-4 , L5-S1, HARDWARE REMOVAL L4-5, DURAL REPAIR performed by Danni Anton DO at [facility] OR    LAMINECTOMY N/A 8/7/2020    LUMBAR DECOMPRESSION L4-S1; IRRIGATION AND DEBRIDEMENT OF LUMBAR HEMATOMA /SEROMA; REMOVAL SYNOVIAL CYST L5-S1 LEFT performed by Rahul Sandoval DO at [address] [place] [address]  03/06/2017    NASAL SINUS SURGERY  12/07/2017    Submucosal Resection of Inferior Turbinate    NERVE BLOCK Bilateral 10 12 2015    bilateral sacroiliac joint injection #1    NERVE BLOCK Bilateral 10/22/15    sacroiliac joint #2    NERVE BLOCK Bilateral 11 02 2015    Sacroiliac joint bilateral #3    NERVE BLOCK Left 12/2/15    lumbar transforaminal #1    NERVE BLOCK Left 12 16 15    NERVE BLOCK Left 12 28 2015    transforaminal nerve block left lumbar #3    NERVE BLOCK Left 1 20 16    radiofrequency     NERVE BLOCK  12/11/2018    C7-T1 epidural    NERVE BLOCK N/A 01/08/2019    cervical epidural steroid injection    OTHER SURGICAL HISTORY  1943    had a feeding tube as an infant, scar    PROSTATE SURGERY  2017    PTCA  09/30/2019    2.5 x 38 mm Resolute Lito RAFI mid OM1, 2.25 x 8 mm Resolute Lito RAFI ostium of dx    UPPER GASTROINTESTINAL ENDOSCOPY  5/2018 [facility]    UPPER GASTROINTESTINAL ENDOSCOPY N/A 6/4/2019    EGD BIOPSY performed by Britta Uribe MD at [address] [place] [address]   Constitutional: Negative for chills and fever. HENT: Positive for trouble swallowing. Respiratory: Negative for chest tightness and shortness of breath. All other systems reviewed and are negative. Physical Exam  Constitutional:       General: He is not in acute distress. Appearance: He is well-developed. HENT:      Head: Normocephalic and atraumatic. Mouth/Throat:      Pharynx: No posterior oropharyngeal erythema. Comments: No edema in the posterior oropharynx, patient tolerating oral intake    Patient is able to drink apple juice, he did not throw up, he is tolerating his own secretions. Eyes:      Pupils: Pupils are equal, round, and reactive to light.    Neck: Thyroid: No thyromegaly. Cardiovascular:      Rate and Rhythm: Normal rate and regular rhythm. Pulmonary:      Effort: Pulmonary effort is normal. No respiratory distress. Breath sounds: Normal breath sounds. No wheezing. Abdominal:      General: There is no distension. Palpations: Abdomen is soft. There is no mass. Tenderness: There is no abdominal tenderness. There is no guarding or rebound. Musculoskeletal:         General: No tenderness. Normal range of motion. Cervical back: Normal range of motion and neck supple. Skin:     General: Skin is warm and dry. Findings: No erythema. Neurological:      Mental Status: He is alert and oriented to person, place, and time. Cranial Nerves: No cranial nerve deficit. Procedures     MDM              --------------------------------------------- PAST HISTORY ---------------------------------------------  Past Medical History:  has a past medical history of Abnormal computed tomography angiography of heart, Arthritis, Atrial fibrillation (HCC), Burning pain, CAD (coronary artery disease), Degenerative lumbar disc, Diabetes ([facility] [address] Edentulous, H/O cardiovascular stress test, Herniated cervical disc, History of echocardiogram, Hx of blood clots, Hyperlipidemia, Hypertension, Stented coronary artery, Tinnitus, and Urinary retention. Past Surgical History:  has a past surgical history that includes colectomy (2010 sigmoid); Hemorrhoid surgery (2008); Prostate surgery (2017); Colonoscopy (5/2018 [facility]); Nerve Block (Bilateral, 10 12 2015); Nerve Block (Bilateral, 10/22/15); Nerve Block (Bilateral, 11 02 2015); Nerve Block (Left, 12/2/15); Nerve Block (Left, 12 16 15); Nerve Block (Left, 12 28 2015); Nerve Block (Left, 1 20 16); ablation of dysrhythmic focus (1980); lumbar fusion (03/06/2017); Nasal sinus surgery (12/07/2017); Upper gastrointestinal endoscopy (5/2018 [facility]);  Nerve Block (12/11/2018); epidural steroid injection (N/A, 12/11/2018); Nerve Block (N/A, 01/08/2019); epidural steroid injection (N/A, 1/8/2019); Endoscopy, colon, diagnostic; other surgical history (1943); Upper gastrointestinal endoscopy (N/A, 6/4/2019); Diagnostic Cardiac Cath Lab Procedure; Percutaneous Transluminal Coronary Angio (09/30/2019); Coronary angioplasty with stent; Cardiac catheterization (02/20/2020); eye surgery; laminectomy (N/A, 6/5/2020); laminectomy (N/A, 8/7/2020); cervical fusion (N/A, 10/9/2020); and back surgery. Social History:  reports that he quit smoking about 45 years ago. He smoked 0.00 packs per day for 16.00 years. He has quit using smokeless tobacco. He reports that he does not drink alcohol and does not use drugs. Family History: family history includes Arrhythmia in his brother; Cancer in his brother; Diabetes in an other family member; Hypertension in an other family member; Kidney Disease in his brother and sister; Other in his father; Stroke in his mother. The patients home medications have been reviewed. Allergies: Imdur [isosorbide nitrate], Oxycodone, Simvastatin, Bactrim [sulfamethoxazole-trimethoprim], Ciprofloxacin hcl, Gabapentin, Hydrocodone, and Norco [hydrocodone-acetaminophen]    -------------------------------------------------- RESULTS -------------------------------------------------  Labs:  No results found for this visit on 05/29/22. Radiology:  No orders to display       ------------------------- NURSING NOTES AND VITALS REVIEWED ---------------------------  Date / Time Roomed:  5/29/2022  6:35 PM  ED Bed Assignment:  [id number]    The nursing notes within the ED encounter and vital signs as below have been reviewed.    BP (!) 166/79   Pulse 61   Temp 98 °F (36.7 °C) (Oral)   Resp 16   Ht 5' 9\" (1.753 m)   Wt 195 lb (88.5 kg)   SpO2 94%   BMI 28.80 kg/m²   Oxygen Saturation Interpretation: Normal      ------------------------------------------ PROGRESS NOTES ------------------------------------------  I have spoken with the patient and discussed todays results, in addition to providing specific details for the plan of care and counseling regarding the diagnosis and prognosis. Their questions are answered at this time and they are agreeable with the plan. I discussed at length with them reasons for immediate return here for re evaluation. They will followup with primary care by calling their office tomorrow. Patient able to tolerate oral intake. No longer has anything stuck in his mouth or throat. He understands to follow-up with a GI doctor. He has had Dr. Terrell Enriquez to scope for him before and wants to follow-up with Dr. Terrell Enriquez. I advised him that he still may need to see a GI physician at some point.  --------------------------------- ADDITIONAL PROVIDER NOTES ---------------------------------  At this time the patient is without objective evidence of an acute process requiring hospitalization or inpatient management. They have remained hemodynamically stable throughout their entire ED visit and are stable for discharge with outpatient follow-up. The plan has been discussed in detail and they are aware of the specific conditions for emergent return, as well as the importance of follow-up. Discharge Medication List as of 5/29/2022  8:01 PM          Diagnosis:  1. Dysphagia, unspecified type        Disposition:  Patient's disposition: Discharge to home  Patient's condition is stable.             Charlotte Olivier,   05/30/22 [id number]

## 2022-05-29 NOTE — ED NOTES
This RN assumes care of pt at this time, report received from JASMINΡΝΑDEVONTE, [facility].       Bernie Christensen, [facility]  05/29/22 [id number]

## 2022-05-29 NOTE — ED NOTES
States since cervical surgery he has been having ongoing issues with swallowing food and medications, this is not new today. He also has dry mouth that he 'feels does not help'. He is alert and breathing is WNL, speech clear and not gargled.       Ruben Cardenas RN  05/29/22 [id number]

## 2022-06-01 DIAGNOSIS — K21.9 GASTROESOPHAGEAL REFLUX DISEASE WITHOUT ESOPHAGITIS: ICD-10-CM

## 2022-06-01 RX ORDER — PANTOPRAZOLE SODIUM 40 MG/1
40 TABLET, DELAYED RELEASE ORAL DAILY PRN
Qty: 30 TABLET | Refills: 5 | Status: SHIPPED | OUTPATIENT
Start: 2022-06-01

## 2022-06-03 ENCOUNTER — TELEPHONE (OUTPATIENT)
Dept: SURGERY | Age: 77
End: 2022-06-03

## 2022-06-03 ENCOUNTER — OFFICE VISIT (OUTPATIENT)
Dept: SURGERY | Age: 77
End: 2022-06-03
Payer: MEDICARE

## 2022-06-03 VITALS
TEMPERATURE: 98.4 F | SYSTOLIC BLOOD PRESSURE: 139 MMHG | HEART RATE: 59 BPM | HEIGHT: 69 IN | BODY MASS INDEX: 28.44 KG/M2 | DIASTOLIC BLOOD PRESSURE: 74 MMHG | WEIGHT: 192 LBS

## 2022-06-03 DIAGNOSIS — R13.10 DYSPHAGIA, UNSPECIFIED TYPE: Primary | ICD-10-CM

## 2022-06-03 PROCEDURE — G8427 DOCREV CUR MEDS BY ELIG CLIN: HCPCS | Performed by: SURGERY

## 2022-06-03 PROCEDURE — 99204 OFFICE O/P NEW MOD 45 MIN: CPT | Performed by: SURGERY

## 2022-06-03 PROCEDURE — G8417 CALC BMI ABV UP PARAM F/U: HCPCS | Performed by: SURGERY

## 2022-06-03 RX ORDER — SODIUM CHLORIDE, SODIUM LACTATE, POTASSIUM CHLORIDE, CALCIUM CHLORIDE 600; 310; 30; 20 MG/100ML; MG/100ML; MG/100ML; MG/100ML
INJECTION, SOLUTION INTRAVENOUS CONTINUOUS
Status: CANCELLED | OUTPATIENT
Start: 2022-06-03

## 2022-06-03 NOTE — PROGRESS NOTES
Eusebio Hodgkin  6/3/2022  [facility] office consult    Eusebio Hodgkin is a 64 y.o., male with food getting stuck in the throat, went to the ER, everything was normal. Had neck surgery 10/2020 with fusion which helped the pain but has poor ROM. He had Dr. Abbie Landry do esophageal dilations in the past which worked. He is requesting another dilation. History:  EGD 6/4/19 for an esophageal mass, swelling seen on CT. No mass was seen on EGD, no hiatal hernia, no mucus. Still taking Protonix 40 mg daily for throat burning and excessive throat mucus production and mucus diarrhea year which improved with Robinul. CT scan abdomen 2017 showed a very low rectal anastomosis to a loop with the blind end 5 cm long. CT chest 2017 did not show esophageal swelling. Barium enema 2018 showed extensive diverticula of the entire left and sigmoid colon with a 5 cm blind pouch at the low rectal anastomosis. Diverticula pouches are seen on the blind pouch. He also has severe nasal sinus drainage. Both the nasal discharge and diarrhea resolved with Metamucil and Robinul 1 mg tid. Weight= 192 lb (87.1 kg) Body mass index is 28.35 kg/m². Past Medical History:   Diagnosis Date    Abnormal computed tomography angiography of heart 07/19/2019    Calcified plaque proximal RCA with 50-70% stenosis, proximal LAD 50% stenosis, mild LAD 30-50% stenosis, groundglass infiltrates, area of consoldidation left lung base posteriorly.       Arthritis     Atrial fibrillation (HCC)     Burning pain     CAD (coronary artery disease)     Degenerative lumbar disc     Diabetes ([address]     Edentulous     upper denture    H/O cardiovascular stress test 02/19/2020    Lexiscan    Herniated cervical disc     History of echocardiogram 03/16/2017    EF 60-65%    Hx of blood clots     Hyperlipidemia     Hypertension     Stented coronary artery     Tinnitus     Urinary retention 3/17/2017       Past Surgical History:   Procedure Laterality Date    ABLATION OF DYSRHYTHMIC FOCUS  1980    APPROX 30-35 YRS AGO    BACK SURGERY      CARDIAC CATHETERIZATION  02/20/2020    Dr Fernandez Ragland N/A 10/9/2020    ANTERIOR CERVICAL FUSION  C3-C6, PARTIAL CORPECTOMY C3-C6, ANTERIOR OSTEOPHYTECTOMY C5-T1 ---AUDIOLOGY, SPINAL ELEMENTS performed by Timoteo Donnelly DO at [address]  2010 sigmoid    [facility]ONOSCOPY  [facility]/2018 [facility]    CORONARY ANGIOPLASTY WITH STENT PLACEMENT      DIAGNOSTIC CARDIAC CATH LAB PROCEDURE      ENDOSCOPY, COLON, DIAGNOSTIC      EPIDURAL STEROID INJECTION N/A 12/11/2018    C7-T1 EPIDURAL STEROID INJECTION performed by Syed Lopez DO at [address]  1/8/2019    C7 - T1 EPIDURAL STEROID INJECTION #2 performed by Syed Lopez DO at [facility] [address]      zachery lense implants    HEMORRHOID SURGERY  2008    LAMINECTOMY N/A 6/5/2020    LUMBAR LAMINECTOMY L3-4 , L5-S1, HARDWARE REMOVAL L4-5, DURAL REPAIR performed by Timoteo Donnelly DO at [facility] N/A 8/7/2020    LUMBAR DECOMPRESSION L4-S1; IRRIGATION AND DEBRIDEMENT OF LUMBAR HEMATOMA Flavio Rivera; REMOVAL SYNOVIAL CYST L5-S1 LEFT performed by Timoteo Donnelly DO at [facility]  03/06/2017    NASAL SINUS SURGERY  12/07/2017    Submucosal Resection of Inferior Turbinate    NERVE BLOCK Bilateral 10 12 2015    bilateral sacroiliac joint injection #1    NERVE BLOCK Bilateral 10/22/15    sacroiliac joint #2    NERVE BLOCK Bilateral 11 02 2015    Sacroiliac joint bilateral #3    NERVE BLOCK Left 12/2/15    lumbar transforaminal #1    NERVE BLOCK Left 12 16 15    NERVE BLOCK Left 12 28 2015    transforaminal nerve block left lumbar #3    NERVE BLOCK Left 1 20 16    radiofrequency     NERVE BLOCK  12/11/2018    C7-T1 epidural    NERVE BLOCK N/A 01/08/2019    cervical epidural steroid injection    OTHER SURGICAL HISTORY  1943    had a feeding tube as an infant, scar    PROSTATE SURGERY  2017    PTCA  09/30/2019    2.5 x 38 mm Resolute Lito RAFI mid OM1, 2.25 x 8 mm Resolute Lito RAFI ostium of dx    UPPER GASTROINTESTINAL ENDOSCOPY  5/2018 [facility]    UPPER GASTROINTESTINAL ENDOSCOPY N/A 6/4/2019    EGD BIOPSY performed by Meme Luo MD at [address] [place] [address] Medications  Prior to Visit Medications    Medication Sig Taking?  Authorizing Provider   pantoprazole (PROTONIX) 40 MG tablet TAKE 1 TABLET BY MOUTH DAILY AS NEEDED (REFLUX)  Hunter Salgado,    pravastatin (PRAVACHOL) 10 MG tablet TAKE 1 TABLET BY MOUTH DAILY  Hunter Salgado,    cevimeline (EVOXAC) 30 MG capsule Take 1 capsule by mouth 3 times daily  Hunter Salgado,    glimepiride (AMARYL) 2 MG tablet Take 1 tablet by mouth in the morning and at bedtime  Hunter Salgado,    alfuzosin (UROXATRAL) 10 MG extended release tablet Take 1 tablet by mouth daily  Hunter Salgado, DO   clopidogrel (PLAVIX) 75 MG tablet Take 1 tablet by mouth daily  Hunter Salgado, DO   metoprolol succinate (TOPROL XL) 50 MG extended release tablet Take 1 tablet by mouth 2 times daily  Hunter Salgado,    cyclobenzaprine (FLEXERIL) 10 MG tablet Take 1 tablet by mouth 3 times daily as needed for Muscle spasms  Hunter Salgado,    hydrALAZINE (APRESOLINE) 25 MG tablet Take 1 tablet by mouth 4 times daily  Hunter Salgado, DO   glycopyrrolate (ROBINUL) 1 MG tablet Take 1 tablet by mouth 3 times daily  Hunter Salgado,    folic acid (FOLVITE) 1 MG tablet Take 1 tablet by mouth daily  Hunter Salgado,    ammonium lactate (LAC-HYDRIN) 12 % lotion Apply 1 g topically daily as needed for Dry Skin  Historical Provider, MD   aspirin 81 MG tablet Take 81 mg by mouth Daily with lunch   Historical Provider, MD       Allergies: Imdur [isosorbide nitrate], Oxycodone, Simvastatin, Bactrim [sulfamethoxazole-trimethoprim], Ciprofloxacin hcl, Gabapentin, Hydrocodone, and Norco [hydrocodone-acetaminophen]   Social History:   TOBACCO:   reports that he quit smoking about 45 years ago. He smoked 0.00 packs per day for 16.00 years. He has quit using smokeless tobacco.  All smokers must join the free smoking cessation program and stop smoking for 3 months before having any Bariatric surgery. ETOH:    reports no history of alcohol use. Family History   Problem Relation Age of Onset   Rhina Yousuf Stroke Mother     Other Father     Kidney Disease Brother     Arrhythmia Brother     Cancer Brother     Kidney Disease Sister     Hypertension Other     Diabetes Other      Review of Systems:  Psychiatric:  depression and anxiety  Respiratory: cough, asthma  Cardiovascular: Htn  Gastrointestinal: excessive mucus  Musculoskeletal:negative  All others reviewed, negative    Physical Exam:   VITALS: Blood pressure 139/74, pulse 59, temperature 98.4 °F (36.9 °C), temperature source Temporal, height 5' 9\" (1.753 m), weight 192 lb (87.1 kg). General appearance: alert, appears stated age and cooperative, does ambulate easily  Head: Normocephalic, without obvious abnormality, atraumatic  Eyes: PERRL  Ears/mouth/throat:  Ears clear, mouth normal, throat no redness  Neck: no adenopathy, no JVD, supple, symmetrical, trachea midline and thyroid not enlarged  Lungs: clear to auscultation bilaterally  Heart: regular rate and rhythm  Abdomen: soft, non-tender; bowel sounds normal; no masses,  no organomegaly  Extremities: extremities normal, atraumatic, no cyanosis or edema  Skin: no open wounds    Assessment:  Dysphagia, feels like things get stuck in the neck. Plan:  EGD with dilation.     Physician Signature: Electronically signed by Dr. Palma Denise MD    Send copy to Judy Levi DO

## 2022-06-13 ENCOUNTER — OFFICE VISIT (OUTPATIENT)
Dept: FAMILY MEDICINE CLINIC | Age: 77
End: 2022-06-13
Payer: MEDICARE

## 2022-06-13 VITALS
RESPIRATION RATE: 18 BRPM | HEIGHT: 69 IN | OXYGEN SATURATION: 98 % | BODY MASS INDEX: 29.03 KG/M2 | WEIGHT: 196 LBS | SYSTOLIC BLOOD PRESSURE: 124 MMHG | DIASTOLIC BLOOD PRESSURE: 80 MMHG | HEART RATE: 69 BPM

## 2022-06-13 DIAGNOSIS — E11.65 TYPE 2 DIABETES MELLITUS WITH HYPERGLYCEMIA, WITHOUT LONG-TERM CURRENT USE OF INSULIN (HCC): ICD-10-CM

## 2022-06-13 DIAGNOSIS — I10 ESSENTIAL HYPERTENSION: ICD-10-CM

## 2022-06-13 DIAGNOSIS — R53.82 CHRONIC FATIGUE: ICD-10-CM

## 2022-06-13 DIAGNOSIS — E53.8 VITAMIN B 12 DEFICIENCY: ICD-10-CM

## 2022-06-13 DIAGNOSIS — E11.42 TYPE 2 DIABETES MELLITUS WITH DIABETIC POLYNEUROPATHY, WITHOUT LONG-TERM CURRENT USE OF INSULIN (HCC): ICD-10-CM

## 2022-06-13 DIAGNOSIS — E78.2 MIXED HYPERLIPIDEMIA: ICD-10-CM

## 2022-06-13 DIAGNOSIS — R19.5 MUCUS IN STOOL: ICD-10-CM

## 2022-06-13 DIAGNOSIS — M79.10 MYALGIA: ICD-10-CM

## 2022-06-13 DIAGNOSIS — M79.10 MYALGIA: Primary | ICD-10-CM

## 2022-06-13 DIAGNOSIS — E03.9 ACQUIRED HYPOTHYROIDISM: ICD-10-CM

## 2022-06-13 LAB
FOLATE: >20 NG/ML (ref 4.8–24.2)
HBA1C MFR BLD: 7.5 % (ref 4–5.6)
HCT VFR BLD CALC: 46.3 % (ref 37–54)
HEMOGLOBIN: 16 G/DL (ref 12.5–16.5)
MCH RBC QN AUTO: 30.7 PG (ref 26–35)
MCHC RBC AUTO-ENTMCNC: 34.6 % (ref 32–34.5)
MCV RBC AUTO: 88.7 FL (ref 80–99.9)
PDW BLD-RTO: 13.2 FL (ref 11.5–15)
PLATELET # BLD: 190 E9/L (ref 130–450)
PMV BLD AUTO: 11.1 FL (ref 7–12)
RBC # BLD: 5.22 E12/L (ref 3.8–5.8)
TSH SERPL DL<=0.05 MIU/L-ACNC: 3.55 UIU/ML (ref 0.27–4.2)
VITAMIN B-12: 473 PG/ML (ref 211–946)
WBC # BLD: 7.5 E9/L (ref 4.5–11.5)

## 2022-06-13 PROCEDURE — 3051F HG A1C>EQUAL 7.0%<8.0%: CPT | Performed by: FAMILY MEDICINE

## 2022-06-13 PROCEDURE — 1123F ACP DISCUSS/DSCN MKR DOCD: CPT | Performed by: FAMILY MEDICINE

## 2022-06-13 PROCEDURE — 99214 OFFICE O/P EST MOD 30 MIN: CPT | Performed by: FAMILY MEDICINE

## 2022-06-13 PROCEDURE — G8427 DOCREV CUR MEDS BY ELIG CLIN: HCPCS | Performed by: FAMILY MEDICINE

## 2022-06-13 PROCEDURE — 1036F TOBACCO NON-USER: CPT | Performed by: FAMILY MEDICINE

## 2022-06-13 PROCEDURE — G8417 CALC BMI ABV UP PARAM F/U: HCPCS | Performed by: FAMILY MEDICINE

## 2022-06-13 RX ORDER — GLYCOPYRROLATE 1 MG/1
1 TABLET ORAL 3 TIMES DAILY
Qty: 270 TABLET | Refills: 3 | Status: SHIPPED | OUTPATIENT
Start: 2022-06-13 | End: 2023-06-13

## 2022-06-13 RX ORDER — GLIMEPIRIDE 2 MG/1
2 TABLET ORAL 2 TIMES DAILY
Qty: 180 TABLET | Refills: 5 | Status: SHIPPED
Start: 2022-06-13 | End: 2022-06-16 | Stop reason: SDUPTHER

## 2022-06-13 RX ORDER — HYDRALAZINE HYDROCHLORIDE 25 MG/1
25 TABLET, FILM COATED ORAL 4 TIMES DAILY
Qty: 270 TABLET | Refills: 3 | Status: SHIPPED | OUTPATIENT
Start: 2022-06-13

## 2022-06-13 ASSESSMENT — PATIENT HEALTH QUESTIONNAIRE - PHQ9
2. FEELING DOWN, DEPRESSED OR HOPELESS: 0
SUM OF ALL RESPONSES TO PHQ QUESTIONS 1-9: 0
SUM OF ALL RESPONSES TO PHQ9 QUESTIONS 1 & 2: 0
1. LITTLE INTEREST OR PLEASURE IN DOING THINGS: 0
SUM OF ALL RESPONSES TO PHQ QUESTIONS 1-9: 0

## 2022-06-13 ASSESSMENT — LIFESTYLE VARIABLES: HOW OFTEN DO YOU HAVE A DRINK CONTAINING ALCOHOL: NEVER

## 2022-06-14 LAB
ALBUMIN SERPL-MCNC: 4.6 G/DL (ref 3.5–5.2)
ALP BLD-CCNC: 94 U/L (ref 40–129)
ALT SERPL-CCNC: 29 U/L (ref 0–40)
ANION GAP SERPL CALCULATED.3IONS-SCNC: 17 MMOL/L (ref 7–16)
AST SERPL-CCNC: 35 U/L (ref 0–39)
BILIRUB SERPL-MCNC: 0.4 MG/DL (ref 0–1.2)
BUN BLDV-MCNC: 11 MG/DL (ref 6–23)
CALCIUM SERPL-MCNC: 9.9 MG/DL (ref 8.6–10.2)
CHLORIDE BLD-SCNC: 99 MMOL/L (ref 98–107)
CHOLESTEROL, TOTAL: 155 MG/DL (ref 0–199)
CO2: 18 MMOL/L (ref 22–29)
CREAT SERPL-MCNC: 1.1 MG/DL (ref 0.7–1.2)
GFR AFRICAN AMERICAN: >60
GFR NON-AFRICAN AMERICAN: >60 ML/MIN/1.73
GLUCOSE BLD-MCNC: 113 MG/DL (ref 74–99)
HDLC SERPL-MCNC: 41 MG/DL
LDL CHOLESTEROL CALCULATED: 77 MG/DL (ref 0–99)
POTASSIUM SERPL-SCNC: 4.3 MMOL/L (ref 3.5–5)
SODIUM BLD-SCNC: 134 MMOL/L (ref 132–146)
TOTAL CK: 60 U/L (ref 20–200)
TOTAL PROTEIN: 7.7 G/DL (ref 6.4–8.3)
TRIGL SERPL-MCNC: 184 MG/DL (ref 0–149)
VLDLC SERPL CALC-MCNC: 37 MG/DL

## 2022-06-14 ASSESSMENT — ENCOUNTER SYMPTOMS
CHOKING: 0
VOMITING: 0
DIARRHEA: 0
RHINORRHEA: 1
BLOOD IN STOOL: 0
WHEEZING: 0
ABDOMINAL DISTENTION: 0
EYE PAIN: 0
COLOR CHANGE: 0
CHEST TIGHTNESS: 0
STRIDOR: 0
SORE THROAT: 0
EYE ITCHING: 0
ANAL BLEEDING: 0
CONSTIPATION: 0
ABDOMINAL PAIN: 0
PHOTOPHOBIA: 0
TROUBLE SWALLOWING: 0
SHORTNESS OF BREATH: 0
NAUSEA: 0
VOICE CHANGE: 0
BACK PAIN: 1
COUGH: 0
SINUS PRESSURE: 0
FACIAL SWELLING: 0
EYE REDNESS: 0
RECTAL PAIN: 0
EYE DISCHARGE: 0
APNEA: 0

## 2022-06-14 NOTE — PROGRESS NOTES
[address] [facility]                                                                                                                    PRE OP INSTRUCTIONS FOR  Vashti Hill        Date: 6/14/2022    Date of surgery: 6/15/2022   Arrival Time: Hospital will call you between 5pm and 7pm with your final arrival time for surgery    1. Nothing by mouth (NPO) as instructed. __midnight _________________________________________________________________    2. Take the following medications with a small sip of water on the morning of Surgery:  Bring amaryl   Take hydralazine and metoprolol    3. Diabetics may take evening dose of insulin but none after midnight. If you feel symptomatic or low blood sugar morning of surgery drink 1-2 ounces of apple juice only. 4. Aspirin, Ibuprofen, Advil, Naproxen, Vitamin E and other Anti-inflammatory products should be stopped  before surgery  as directed by your physician. Take Tylenol only unless instructed otherwise by your surgeon. 5. Check with your Doctor regarding stopping Plavix, Coumadin, Lovenox, Eliquis, Effient, or other blood thinners. 6. Do not smoke,use illicit drugs and do not drink any alcoholic beverages 24 hours prior to surgery. 7. You may brush your teeth the morning of surgery. DO NOT SWALLOW WATER    8. You MUST make arrangements for a responsible adult to take you home after your surgery. You will not be allowed to leave alone or drive yourself home. It is strongly suggested someone stay with you the first 24 hrs. Your surgery will be cancelled if you do not have a ride home. 9. PEDIATRIC PATIENTS ONLY:  A parent/legal guardian must accompany a child scheduled for surgery and plan to stay at the hospital until the child is discharged. Please do not bring other children with you.     10. Please wear simple, loose fitting clothing to the hospital.  Do not bring valuables (money, credit cards, checkbooks, etc.) Do not wear any makeup (including no eye makeup) or nail polish on your fingers or toes. 11. DO NOT wear any jewelry or piercings on day of surgery. All body piercing jewelry must be removed. 12. Shower the night before surgery with __x_Antibacterial soap /SALLY WIPES________    13. TOTAL JOINT REPLACEMENT/HYSTERECTOMY PATIENTS ONLY---Remember to bring Blood Bank bracelet to the hospital on the day of surgery. 14. If you have a Living Will and Durable Power of  for Healthcare, please bring in a copy. 15. If appropriate bring crutches, inspirex, WALKER, CANE etc... 12. Notify your Surgeon if you develop any illness between now and surgery time, cough, cold, fever, sore throat, nausea, vomiting, etc.  Please notify your surgeon if you experience dizziness, shortness of breath or blurred vision between now & the time of your surgery. 17. If you have ___dentures, they will be removed before going to the OR; we will provide you a container. If you wear ___contact lenses or ___glasses, they will be removed; please bring a case for them. 18. To provide excellent care visitors will be limited to 2 in the room at any given time. 19. Please bring picture ID and insurance card. 20. During flu season no children under the age of 15 are permitted in the hospital for the safety of all patients. 21. Other                  Please call AMBULATORY CARE if you have any further questions.    [address]     [address]

## 2022-06-14 NOTE — PROGRESS NOTES
Marcellus Mclaughlin is a 64 y.o. male  . Subjective:      Patient complains of bilateral leg muscle pain. Complains of worsening of peripheral neuropathy and burning to feet. States his eyes have been dry and itchy. Normal issues with urination. Was out of glycopyrrolate noticed mucus increased both to sinuses and in stool. Blood pressures been well controlled. Sugars have been fluctuating. But running a little high recently. We will go ahead and stop pravastatin and see if this does not help with the pain. We will check blood work and follow-up in 2 weeks. If pain does not resolve we will try some specifically for neuropathy including possible gabapentin, Lyrica or Cymbalta. We will consider changing plate patient's sugar medication or increasing medication. We will keep all medications same for now until follow-up except for pravastatin which will stop      Review of Systems   Constitutional: Negative for activity change, appetite change, chills, diaphoresis, fatigue, fever and unexpected weight change. HENT: Positive for postnasal drip and rhinorrhea. Negative for congestion, dental problem, drooling, ear discharge, ear pain, facial swelling, hearing loss, mouth sores, nosebleeds, sinus pressure, sneezing, sore throat, tinnitus, trouble swallowing and voice change. Eyes: Negative for photophobia, pain, discharge, redness, itching and visual disturbance. Respiratory: Negative for apnea, cough, choking, chest tightness, shortness of breath, wheezing and stridor. Cardiovascular: Negative for chest pain, palpitations and leg swelling. Gastrointestinal: Negative for abdominal distention, abdominal pain, anal bleeding, blood in stool, constipation, diarrhea, nausea, rectal pain and vomiting. Endocrine: Negative for cold intolerance, heat intolerance, polydipsia, polyphagia and polyuria.    Genitourinary: Negative for decreased urine volume, difficulty urinating, dysuria, enuresis, flank pain, frequency, genital sores, hematuria, penile discharge, penile pain, penile swelling, scrotal swelling, testicular pain and urgency. Musculoskeletal: Positive for arthralgias, back pain, gait problem and myalgias. Negative for joint swelling, neck pain and neck stiffness. Skin: Negative for color change, pallor, rash and wound. Allergic/Immunologic: Negative for environmental allergies, food allergies and immunocompromised state. Neurological: Negative for dizziness, tremors, seizures, syncope, facial asymmetry, speech difficulty, weakness, light-headedness, numbness and headaches. Hematological: Negative for adenopathy. Does not bruise/bleed easily. Psychiatric/Behavioral: Negative for agitation, behavioral problems, confusion, decreased concentration, dysphoric mood, hallucinations, self-injury, sleep disturbance and suicidal ideas. The patient is not nervous/anxious and is not hyperactive. Past Medical History:   Diagnosis Date    Abnormal computed tomography angiography of heart 07/19/2019    Calcified plaque proximal RCA with 50-70% stenosis, proximal LAD 50% stenosis, mild LAD 30-50% stenosis, groundglass infiltrates, area of consoldidation left lung base posteriorly.       Arthritis     Atrial fibrillation (HCC)     Burning pain     CAD (coronary artery disease)     Degenerative lumbar disc     Diabetes ([facility] [address]     Edentulous     upper denture    H/O cardiovascular stress test 02/19/2020    Lexiscan    Herniated cervical disc     History of echocardiogram 03/16/2017    EF 60-65%    Hx of blood clots     Hyperlipidemia     Hypertension     Stented coronary artery     Tinnitus     Urinary retention 3/17/2017       Social History     Socioeconomic History    Marital status:      Spouse name: Not on file    Number of children: Not on file    Years of education: Not on file    Highest education level: Not on file   Occupational History    Not on file   Tobacco Use    Smoking status: Former Smoker     Packs/day: 0.00     Years: 16.00     Pack years: 0.00     Quit date: 1977     Years since quittin.4    Smokeless tobacco: Former User    Tobacco comment: quit in    Vaping Use    Vaping Use: Never used   Substance and Sexual Activity    Alcohol use: No     Alcohol/week: 0.0 standard drinks     Comment: 1-2 cups coffee daily    Drug use: No    Sexual activity: Never   Other Topics Concern    Not on file   Social History Narrative    Not on file     Social Determinants of Health     Financial Resource Strain: Low Risk     Difficulty of Paying Living Expenses: Not very hard   Food Insecurity: No Food Insecurity    Worried About Running Out of Food in the Last Year: Never true    Yuli of Food in the Last Year: Never true   Transportation Needs:     Lack of Transportation (Medical): Not on file    Lack of Transportation (Non-Medical):  Not on file   Physical Activity:     Days of Exercise per Week: Not on file    Minutes of Exercise per Session: Not on file   Stress:     Feeling of Stress : Not on file   Social Connections:     Frequency of Communication with Friends and Family: Not on file    Frequency of Social Gatherings with Friends and Family: Not on file    Attends Denominational Services: Not on file    Active Member of [address] [place] [address] [employer] or Organizations: Not on file    Attends Club or Organization Meetings: Not on file    Marital Status: Not on file   Intimate Partner Violence:     Fear of Current or Ex-Partner: Not on file    Emotionally Abused: Not on file    Physically Abused: Not on file    Sexually Abused: Not on file   Housing Stability:     Unable to Pay for Housing in the Last Year: Not on file    Number of [place] in the Last Year: Not on file    Unstable Housing in the Last Year: Not on file       Family History   Problem Relation Age of Onset    Stroke Mother     Other Father     Kidney Disease Brother     Arrhythmia Brother     Cancer Brother     Kidney Disease Sister     Hypertension Other     Diabetes Other        Current Outpatient Medications on File Prior to Visit   Medication Sig Dispense Refill    pantoprazole (PROTONIX) 40 MG tablet TAKE 1 TABLET BY MOUTH DAILY AS NEEDED (REFLUX) 30 tablet 5    cevimeline (EVOXAC) 30 MG capsule Take 1 capsule by mouth 3 times daily 90 capsule 5    alfuzosin (UROXATRAL) 10 MG extended release tablet Take 1 tablet by mouth daily 90 tablet 5    clopidogrel (PLAVIX) 75 MG tablet Take 1 tablet by mouth daily 90 tablet 3    metoprolol succinate (TOPROL XL) 50 MG extended release tablet Take 1 tablet by mouth 2 times daily 180 tablet 3    cyclobenzaprine (FLEXERIL) 10 MG tablet Take 1 tablet by mouth 3 times daily as needed for Muscle spasms 373 tablet 5    folic acid (FOLVITE) 1 MG tablet Take 1 tablet by mouth daily 90 tablet 5    ammonium lactate (LAC-HYDRIN) 12 % lotion Apply 1 g topically daily as needed for Dry Skin      aspirin 81 MG tablet Take 81 mg by mouth Daily with lunch        No current facility-administered medications on file prior to visit. Allergies   Allergen Reactions    Imdur [Isosorbide Nitrate] Other (See Comments)     headache    Oxycodone Rash    Simvastatin Other (See Comments)     Muscle weakness    Bactrim [Sulfamethoxazole-Trimethoprim] Nausea Only    Ciprofloxacin Hcl Other (See Comments)     \"Heel/ tendon pain\"    Gabapentin Other (See Comments)     Constipation      Hydrocodone Nausea Only    Norco [Hydrocodone-Acetaminophen] Nausea Only and Other (See Comments)     Causes sick feeling in head         I have reviewed his allergies, medications, problem list, medical,social and family history and have updated as needed in the electronic medical record. Objective:     Physical Exam  Vitals and nursing note reviewed. Constitutional:       General: He is not in acute distress. Appearance: He is well-developed. He is not diaphoretic.    HENT: Head: Normocephalic and atraumatic. Right Ear: External ear normal.      Left Ear: External ear normal.      Nose: Congestion and rhinorrhea present. Mouth/Throat:      Pharynx: No oropharyngeal exudate. Eyes:      General: No scleral icterus. Right eye: No discharge. Left eye: No discharge. Conjunctiva/sclera: Conjunctivae normal.      Pupils: Pupils are equal, round, and reactive to light. Neck:      Thyroid: No thyromegaly. Vascular: No JVD. Trachea: No tracheal deviation. Cardiovascular:      Rate and Rhythm: Normal rate and regular rhythm. Heart sounds: Normal heart sounds. No murmur heard. No friction rub. No gallop. Pulmonary:      Effort: Pulmonary effort is normal. No respiratory distress. Breath sounds: Normal breath sounds. No stridor. No wheezing or rales. Chest:      Chest wall: No tenderness. Abdominal:      General: Bowel sounds are normal. There is no distension. Palpations: Abdomen is soft. There is no mass. Tenderness: There is no abdominal tenderness. There is no guarding or rebound. Genitourinary:     Comments: Deferred by patient  Musculoskeletal:         General: No tenderness. Normal range of motion. Cervical back: Normal range of motion and neck supple. Comments: Multiple tender points noted to upper and lower extremities as well as trunk    Lymphadenopathy:      Cervical: No cervical adenopathy. Skin:     General: Skin is warm and dry. Coloration: Skin is not pale. Findings: No erythema or rash. Neurological:      Mental Status: He is alert and oriented to person, place, and time. Cranial Nerves: No cranial nerve deficit. Motor: No abnormal muscle tone. Coordination: Coordination normal.      Deep Tendon Reflexes: Reflexes are normal and symmetric. Reflexes normal.   Psychiatric:         Behavior: Behavior normal.         Thought Content:  Thought content normal. Judgment: Judgment normal.         Assessment / Plan:   Sheldon Benjamin was seen today for follow-up. Diagnoses and all orders for this visit:    Myalgia  -     CK; Future  -     ALDOLASE; Future    Mucus in stool  -     glycopyrrolate (ROBINUL) 1 MG tablet; Take 1 tablet by mouth 3 times daily    Essential hypertension  -     hydrALAZINE (APRESOLINE) 25 MG tablet; Take 1 tablet by mouth 4 times daily    Type 2 diabetes mellitus with diabetic polyneuropathy, without long-term current use of insulin (HCC)  -     glimepiride (AMARYL) 2 MG tablet; Take 1 tablet by mouth in the morning and at bedtime  -     Hemoglobin A1C; Future    Type 2 diabetes mellitus with hyperglycemia, without long-term current use of insulin (HCC)  -     glimepiride (AMARYL) 2 MG tablet; Take 1 tablet by mouth in the morning and at bedtime    Mixed hyperlipidemia  -     Lipid Panel; Future    Acquired hypothyroidism    Chronic fatigue  -     CBC; Future  -     Comprehensive Metabolic Panel; Future  -     TSH; Future    Vitamin B 12 deficiency  -     Vitamin B12 & Folate; Future        Reviewed healthmaintenance report. Patient is aware of deficiencies and suggested preventative tests.

## 2022-06-15 ENCOUNTER — HOSPITAL ENCOUNTER (OUTPATIENT)
Age: 77
Setting detail: OUTPATIENT SURGERY
Discharge: HOME | End: 2022-06-15
Attending: SURGERY | Admitting: SURGERY
Payer: MEDICARE

## 2022-06-15 ENCOUNTER — ANESTHESIA (OUTPATIENT)
Dept: ENDOSCOPY | Age: 77
End: 2022-06-15
Payer: MEDICARE

## 2022-06-15 ENCOUNTER — ANESTHESIA EVENT (OUTPATIENT)
Dept: ENDOSCOPY | Age: 77
End: 2022-06-15
Payer: MEDICARE

## 2022-06-15 VITALS
HEART RATE: 61 BPM | DIASTOLIC BLOOD PRESSURE: 70 MMHG | TEMPERATURE: 97.3 F | BODY MASS INDEX: 28.29 KG/M2 | HEIGHT: 69 IN | SYSTOLIC BLOOD PRESSURE: 132 MMHG | RESPIRATION RATE: 16 BRPM | WEIGHT: 191 LBS | OXYGEN SATURATION: 95 %

## 2022-06-15 DIAGNOSIS — R13.10 DYSPHAGIA, UNSPECIFIED TYPE: ICD-10-CM

## 2022-06-15 LAB — METER GLUCOSE: 118 MG/DL (ref 74–99)

## 2022-06-15 PROCEDURE — 43239 EGD BIOPSY SINGLE/MULTIPLE: CPT | Performed by: SURGERY

## 2022-06-15 PROCEDURE — 2580000003 HC RX 258: Performed by: SURGERY

## 2022-06-15 PROCEDURE — 2709999900 HC NON-CHARGEABLE SUPPLY: Performed by: SURGERY

## 2022-06-15 PROCEDURE — 3700000000 HC ANESTHESIA ATTENDED CARE: Performed by: SURGERY

## 2022-06-15 PROCEDURE — 3700000001 HC ADD 15 MINUTES (ANESTHESIA): Performed by: SURGERY

## 2022-06-15 PROCEDURE — 7100000010 HC PHASE II RECOVERY - FIRST 15 MIN: Performed by: SURGERY

## 2022-06-15 PROCEDURE — 87081 CULTURE SCREEN ONLY: CPT

## 2022-06-15 PROCEDURE — 6360000002 HC RX W HCPCS: Performed by: NURSE ANESTHETIST, CERTIFIED REGISTERED

## 2022-06-15 PROCEDURE — 7100000011 HC PHASE II RECOVERY - ADDTL 15 MIN: Performed by: SURGERY

## 2022-06-15 PROCEDURE — 3609012700 HC EGD DILATION SAVORY: Performed by: SURGERY

## 2022-06-15 PROCEDURE — 43248 EGD GUIDE WIRE INSERTION: CPT | Performed by: SURGERY

## 2022-06-15 PROCEDURE — 82962 GLUCOSE BLOOD TEST: CPT

## 2022-06-15 PROCEDURE — 36415 COLL VENOUS BLD VENIPUNCTURE: CPT

## 2022-06-15 PROCEDURE — 3609017100 HC EGD: Performed by: SURGERY

## 2022-06-15 RX ORDER — SODIUM CHLORIDE 0.9 % (FLUSH) 0.9 %
5-40 SYRINGE (ML) INJECTION PRN
Status: DISCONTINUED | OUTPATIENT
Start: 2022-06-15 | End: 2022-06-15 | Stop reason: HOSPADM

## 2022-06-15 RX ORDER — PROPOFOL 10 MG/ML
INJECTION, EMULSION INTRAVENOUS CONTINUOUS PRN
Status: DISCONTINUED | OUTPATIENT
Start: 2022-06-15 | End: 2022-06-15 | Stop reason: SDUPTHER

## 2022-06-15 RX ORDER — SODIUM CHLORIDE, SODIUM LACTATE, POTASSIUM CHLORIDE, CALCIUM CHLORIDE 600; 310; 30; 20 MG/100ML; MG/100ML; MG/100ML; MG/100ML
INJECTION, SOLUTION INTRAVENOUS CONTINUOUS
Status: DISCONTINUED | OUTPATIENT
Start: 2022-06-15 | End: 2022-06-15 | Stop reason: HOSPADM

## 2022-06-15 RX ADMIN — SODIUM CHLORIDE, POTASSIUM CHLORIDE, SODIUM LACTATE AND CALCIUM CHLORIDE: 600; 310; 30; 20 INJECTION, SOLUTION INTRAVENOUS at 12:51

## 2022-06-15 RX ADMIN — PROPOFOL 100 MCG/KG/MIN: 10 INJECTION, EMULSION INTRAVENOUS at 13:04

## 2022-06-15 ASSESSMENT — PAIN - FUNCTIONAL ASSESSMENT: PAIN_FUNCTIONAL_ASSESSMENT: 0-10

## 2022-06-15 ASSESSMENT — PAIN DESCRIPTION - DESCRIPTORS: DESCRIPTORS: ACHING;TENDER

## 2022-06-15 NOTE — ANESTHESIA PRE PROCEDURE
Department of Anesthesiology  Preprocedure Note       Name:  Anne Muse   Age:  64 y.o.  :  1945                                          MRN:  [id number]         Date:  6/15/2022      Surgeon: Tiffanie Tavera):  Pepper Carlisle MD    Procedure: Procedure(s):  EGD ESOPHAGOGASTRODUODENOSCOPY WITH DILATION    Medications prior to admission:   Prior to Admission medications    Medication Sig Start Date End Date Taking?  Authorizing Provider   glycopyrrolate (ROBINUL) 1 MG tablet Take 1 tablet by mouth 3 times daily 22  [facility]   hydrALAZINE (APRESOLINE) 25 MG tablet Take 1 tablet by mouth 4 times daily 22   [facility], DO   glimepiride (AMARYL) 2 MG tablet Take 1 tablet by mouth in the morning and at bedtime 22   [facility], DO   pantoprazole (PROTONIX) 40 MG tablet TAKE 1 TABLET BY MOUTH DAILY AS NEEDED (REFLUX) 22   [facility],    [facility] 30 MG capsule Take 1 capsule by mouth 3 times daily 22   [facility], DO   alfuzosin (UROXATRAL) 10 MG extended release tablet Take 1 tablet by mouth daily 22   [facility], DO   clopidogrel (PLAVIX) 75 MG tablet Take 1 tablet by mouth daily  Patient taking differently: Take 75 mg by mouth daily Ld / 21   [facility]   metoprolol succinate (TOPROL XL) 50 MG extended release tablet Take 1 tablet by mouth 2 times daily 21   [facility], DO   cyclobenzaprine (FLEXERIL) 10 MG tablet Take 1 tablet by mouth 3 times daily as needed for Muscle spasms 21   [facility], DO   folic acid (FOLVITE) 1 MG tablet Take 1 tablet by mouth daily  Patient taking differently: Take 1 mg by mouth daily Ran out 3/29/21   Kristina Mariia, DO   ammonium lactate (LAC-HYDRIN) 12 % lotion Apply 1 g topically daily as needed for Dry Skin    Historical Provider, MD   aspirin 81 MG tablet Take 81 mg by mouth Daily with lunch     Historical Provider, MD       Current medications:    Current Facility-Administered Medications   Medication Dose Route Frequency Provider Last Rate Last Admin    sodium chloride flush 0.9 % injection 5-40 mL  5-40 mL IntraVENous PRN Ailyn Akbar MD        lactated ringers infusion   IntraVENous Continuous Colletta Platts, MD           Allergies: Allergies   Allergen Reactions    Imdur [Isosorbide Nitrate] Other (See Comments)     headache    Oxycodone Rash    Simvastatin Other (See Comments)     Muscle weakness    Bactrim [Sulfamethoxazole-Trimethoprim] Nausea Only    Ciprofloxacin Hcl Other (See Comments)     \"Heel/ tendon pain\"    Gabapentin Other (See Comments)     Constipation      Hydrocodone Nausea Only    Norco [Hydrocodone-Acetaminophen] Nausea Only and Other (See Comments)     Causes sick feeling in head         Problem List:    Patient Active Problem List   Diagnosis Code    DDD (degenerative disc disease), cervical M50.30    Cervical radiculopathy M54.12    Atrial fibrillation ([facility]) I48.91    Essential hypertension I10    Type 2 diabetes mellitus with diabetic polyneuropathy, without long-term current use of insulin ([facility]) E11.42    Chronic pain syndrome G89.4    Diabetic foot infection ([facility] [address] E11.628, L08.9    Hypothyroidism E03.9    Anemia D64.9    Coronary artery disease involving native coronary artery of native heart without angina pectoris I25.10    Chest pain with moderate risk for cardiac etiology R07.9    Bradycardia R00.1    Lumbar foraminal stenosis M48.061    S/P lumbar laminectomy Z98.890    Benign prostatic hyperplasia with urinary frequency N40.1, R35.0    Synovial cyst of lumbar spine M71.38    Cervical stenosis of spine M48.02       Past Medical History:        Diagnosis Date    Abnormal computed tomography angiography of heart 07/19/2019    Calcified plaque proximal RCA with 50-70% stenosis, proximal LAD 50% stenosis, mild LAD 30-50% stenosis, groundglass infiltrates, area of consoldidation left lung base posteriorly.  Arthritis     Atrial fibrillation (HCC)     Burning pain     CAD (coronary artery disease)     Degenerative lumbar disc     Diabetes ([address]     Edentulous     upper denture    H/O cardiovascular stress test 02/19/2020    Lexiscan    Herniated cervical disc     History of echocardiogram 03/16/2017    EF 60-65%    Hx of blood clots     Hyperlipidemia     Hypertension     Stented coronary artery     Tinnitus     Urinary retention 3/17/2017       Past Surgical History:        Procedure Laterality Date    ABLATION OF DYSRHYTHMIC FOCUS  1980    APPROX 30-35 YRS AGO    BACK SURGERY      CARDIAC CATHETERIZATION  02/20/2020    Dr Masoud Chan N/A 10/9/2020    ANTERIOR CERVICAL FUSION  C3-C6, PARTIAL CORPECTOMY C3-C6, ANTERIOR OSTEOPHYTECTOMY C5-T1 ---AUDIOLOGY, SPINAL ELEMENTS performed by Hayder Magaña DO at [address]  2010 sigmoid    [facility]OSCOPY  5/2018 [facility]    CORONARY ANGIOPLASTY WITH STENT PLACEMENT      DIAGNOSTIC CARDIAC CATH LAB PROCEDURE      ENDOSCOPY, COLON, DIAGNOSTIC      EPIDURAL STEROID INJECTION N/A 12/11/2018    C7-T1 EPIDURAL STEROID INJECTION performed by Saman Calderón DO at [address]  1/8/2019    C7 - T1 EPIDURAL STEROID INJECTION #2 performed by Saman Calderón DO at [address]      zachery lense implants   [address]  2008    LAMINECTOMY N/A 6/5/2020    LUMBAR LAMINECTOMY L3-4 , L5-S1, HARDWARE REMOVAL L4-5, DURAL REPAIR performed by Hayder Magaña DO at [facility] N/A 8/7/2020    LUMBAR DECOMPRESSION L4-S1; IRRIGATION AND DEBRIDEMENT OF LUMBAR HEMATOMA /SEROMA; REMOVAL SYNOVIAL CYST L5-S1 LEFT performed by Hayder Magaña DO at [address] [place] [address]  03/06/2017    NASAL SINUS SURGERY  12/07/2017    Submucosal Resection of Inferior Turbinate    NERVE BLOCK Bilateral 10 12 2015    bilateral sacroiliac joint injection #1    NERVE BLOCK Bilateral 10/22/15 sacroiliac joint #2    NERVE BLOCK Bilateral 11 02 2015    Sacroiliac joint bilateral #3    NERVE BLOCK Left 12/2/15    lumbar transforaminal #1    NERVE BLOCK Left 12 16 15    NERVE BLOCK Left 12 28 2015    transforaminal nerve block left lumbar #3    NERVE BLOCK Left 1 20 16    radiofrequency     NERVE BLOCK  2018    C7-T1 epidural    NERVE BLOCK N/A 2019    cervical epidural steroid injection    OTHER SURGICAL HISTORY  1945    had a feeding tube as an infant, scar    PROSTATE SURGERY      PTCA  2019    2.5 x 38 mm Resolute [place] RAFI mid OM1, 2.25 x 8 mm Resolute [place] RAFI ostium of dx    UPPER GASTROINTESTINAL ENDOSCOPY  2018 [place]    UPPER GASTROINTESTINAL ENDOSCOPY N/A 2019    EGD BIOPSY performed by Lennie Ortega MD at [address] History:    Social History     Tobacco Use    Smoking status: Former Smoker     Packs/day: 0.00     Years: 16.00     Pack years: 0.00     Quit date: 1977     Years since quittin.4    Smokeless tobacco: Never Used    Tobacco comment: quit in    Substance Use Topics    Alcohol use: No     Alcohol/week: 0.0 standard drinks     Comment: 1-2 cups coffee daily                                Counseling given: Not Answered  Comment: quit in       Vital Signs (Current):   Vitals:    22 1148 06/15/22 1139   BP:  (!) 145/70   Pulse:  63   Resp:  18   Temp:  98.6 °F (37 °C)   TempSrc:  Temporal   SpO2:  93%   Weight: 190 lb (86.2 kg) 191 lb (86.6 kg)   Height: 5' 9\" (1.753 m) 5' 9\" (1.753 m)                                              BP Readings from Last 3 Encounters:   06/15/22 (!) 145/70   22 124/80   22 139/74       NPO Status: Time of last liquid consumption: 2100                        Time of last solid consumption: 1500                        Date of last liquid consumption: 22                        Date of last solid food consumption: 22    BMI:   Wt Readings from Last 3 Encounters:   06/15/22 191 lb (86.6 kg)   06/13/22 196 lb (88.9 kg)   06/03/22 192 lb (87.1 kg)     Body mass index is 28.21 kg/m². CBC:   Lab Results   Component Value Date    WBC 7.5 06/13/2022    RBC 5.22 06/13/2022    HGB 16.0 06/13/2022    HCT 46.3 06/13/2022    MCV 88.7 06/13/2022    RDW 13.2 06/13/2022     06/13/2022       CMP:   Lab Results   Component Value Date     06/13/2022    K 4.3 06/13/2022    K 4.3 02/03/2021    CL 99 06/13/2022    CO2 18 06/13/2022    BUN 11 06/13/2022    CREATININE 1.1 06/13/2022    GFRAA >60 06/13/2022    LABGLOM >60 06/13/2022    GLUCOSE 113 06/13/2022    PROT 7.7 06/13/2022    CALCIUM 9.9 06/13/2022    BILITOT 0.4 06/13/2022    ALKPHOS 94 06/13/2022    AST 35 06/13/2022    ALT 29 06/13/2022       POC Tests: No results for input(s): POCGLU, POCNA, POCK, POCCL, POCBUN, POCHEMO, POCHCT in the last 72 hours. Coags:   Lab Results   Component Value Date    PROTIME 11.1 10/05/2020    INR 1.0 10/05/2020    APTT 92.2 02/19/2020       HCG (If Applicable): No results found for: PREGTESTUR, PREGSERUM, HCG, HCGQUANT     ABGs: No results found for: PHART, PO2ART, LDL8AKY, VMS3TYB, BEART, L3OPJWQJ     Type & Screen (If Applicable):  No results found for: LABABO, LABRH    Drug/Infectious Status (If Applicable):  No results found for: HIV, HEPCAB    COVID-19 Screening (If Applicable):   Lab Results   Component Value Date    COVID19 Not Detected 10/05/2020     EKG 5/21/20  Narrative & Impression     Normal sinus rhythm  Left axis deviation due to left anterior hemiblock  Right bundle branch block  Possible remote anteroseptal infarct  Abnormal ECG     Confirmed by Lavon Leonard ([id number]) on 5/21/2020 12:53:26 PM     Cardiac Catheterization 2/20/20  IMPRESSION:  1. Stenosis of the mid RCA distal to the previously deployed stent that  was around 80%, reduced to 20% to 30% after IC nitroglycerin, suspect  vasospasm.   2.  Patent stents in diagonal branch with 40% proximal LAD disease. 3.  Patent stent in OM branch and no significant disease in left  circumflex artery. 4.  Patent stent in RCA.     RECOMMENDATIONS:  We will start the patient on isosorbide, continue  dual-antiplatelet therapy, continue aggressive coronary artery disease  risk factor modifications. The patient will be observed for two hours  and discharge home if he meets discharge criteria.           Colby Rivera MD     D: 02/20/2020 14:29:17       T: 02/20/2020 15:23:55     [email or web address]  Job#: [id number]     Doc#: [id number]     NM Cardiac Stress Test 2/18/20  Narrative    Location: [facility]         Exam: NM MYOCARDIAL SPECT REST EXERCISE OR RX         Comparison: None         History: Chest pain         Technique: Myocardial perfusion SPECT images were obtained after    administration of 26.4 mCi Tc99m Sestamibi during stress and  9.3 mCi    during rest. The patient received 0.4 mg of IV Lexiscan.         Findings:    Both the stress and resting myocardial images are normal.         The gated left ventricular ejection fraction is 70 %.                Impression    No evidence of stress-induced ischemia. ECHO 3/15/17  Findings      Left Ventricle   Left ventricular internal dimensions were normal in diastole and systole. Borderline concentric left ventricular hypertrophy. Normal left ventricular ejection fraction. Ejection fraction is visually estimated at 60-65%. Normal left ventricular diastolic filling pattern for age. Right Ventricle   Normal right ventricular size and function. Left Atrium   Normal sized left atrium. Interatrial septum appears intact. Right Atrium   Normal right atrium size. Mitral Valve   Structurally normal mitral valve. Mild mitral regurgitation is present. Tricuspid Valve   The tricuspid valve appears structurally normal.   Mild tricuspid regurgitation. RVSP is 35-40 mmHg. Pulmonary hypertension is mild .       Aortic Valve   Structurally normal aortic valve.      Pulmonic Valve   The pulmonic valve was not well visualized. Pericardial Effusion   Fat pad versus small effusion cannot be ruled out. Aorta   Aortic root dimension within normal limits. Conclusions      Summary   Left ventricular internal dimensions were normal in diastole and systole. Borderline concentric left ventricular hypertrophy. Normal left ventricular ejection fraction. Mild mitral regurgitation is present. Mild tricuspid regurgitation. Pulmonary hypertension is mild . Fat pad versus small effusion cannot be ruled out. Signature      ----------------------------------------------------------------   Electronically signed by Regulo Doyle MD(Interpreting   physician) on 03/17/2017 06:15 AM    Anesthesia Evaluation  Patient summary reviewed no history of anesthetic complications:   Airway: Mallampati: IV  TM distance: >3 FB   Neck ROM: limited  Mouth opening: > = 3 FB   Dental:    (+) edentulous and upper dentures      Pulmonary:Negative Pulmonary ROS breath sounds clear to auscultation                             Cardiovascular:    (+) hypertension:, CAD:, dysrhythmias: atrial fibrillation, pulmonary hypertension:, hyperlipidemia      ECG reviewed  Rhythm: regular  Rate: normal  Echocardiogram reviewed  Stress test reviewed       Beta Blocker:  Dose within 24 Hrs         Neuro/Psych:      (-) neuromuscular disease            ROS comment: DDD (degenerative disc disease), cervical   Cervical radiculopathy  Tinnitus   GI/Hepatic/Renal:            ROS comment: Dysphagia . Endo/Other:    (+) DiabetesType II DM, , hypothyroidism, blood dyscrasia: anemia, arthritis:., .                 Abdominal:             Vascular: negative vascular ROS. Other Findings:             Anesthesia Plan      MAC     ASA 3       Induction: intravenous. MIPS: Prophylactic antiemetics administered. Anesthetic plan and risks discussed with patient.       Plan discussed with DO Joyce  6/15/2022  12:04 PM

## 2022-06-15 NOTE — H&P
Procedure Laterality Date    ABLATION OF DYSRHYTHMIC FOCUS  1980    APPROX 30-35 YRS AGO    BACK SURGERY      CARDIAC CATHETERIZATION  02/20/2020    Dr Krista Chaidez N/A 10/9/2020    ANTERIOR CERVICAL FUSION  C3-C6, PARTIAL CORPECTOMY C3-C6, ANTERIOR OSTEOPHYTECTOMY C5-T1 ---AUDIOLOGY, SPINAL ELEMENTS performed by Chiki Bae, DO at [address]  2010 sigmoid    COLONOSCOPY  5/2018 [facility]    CORONARY ANGIOPLASTY WITH STENT PLACEMENT      DIAGNOSTIC CARDIAC CATH LAB PROCEDURE      ENDOSCOPY, COLON, DIAGNOSTIC      EPIDURAL STEROID INJECTION N/A 12/11/2018    C7-T1 EPIDURAL STEROID INJECTION performed by Isabel Scott DO at [address]  1/8/2019    C7 - T1 EPIDURAL STEROID INJECTION #2 performed by Isabel Scott DO at [facility]   [address]  2008    LAMINECTOMY N/A 6/5/2020    LUMBAR LAMINECTOMY L3-4 , L5-S1, HARDWARE REMOVAL L4-5, DURAL REPAIR performed by Chiki Bae, DO at [facility] N/A 8/7/2020    LUMBAR DECOMPRESSION L4-S1; IRRIGATION AND DEBRIDEMENT OF LUMBAR HEMATOMA Marvene Flatness; REMOVAL SYNOVIAL CYST L5-S1 LEFT performed by Chiki Bae, DO at [address] [facility] [address]  03/06/2017    NASAL SINUS SURGERY  12/07/2017    Submucosal Resection of Inferior Turbinate    NERVE BLOCK Bilateral 10 12 2015    bilateral sacroiliac joint injection #1    NERVE BLOCK Bilateral 10/22/15    sacroiliac joint #2    NERVE BLOCK Bilateral 11 02 2015    Sacroiliac joint bilateral #3    NERVE BLOCK Left 12/2/15    lumbar transforaminal #1    NERVE BLOCK Left 12 16 15    NERVE BLOCK Left 12 28 2015    transforaminal nerve block left lumbar #3    NERVE BLOCK Left 1 20 16    radiofrequency     NERVE BLOCK  12/11/2018    C7-T1 epidural    NERVE BLOCK N/A 01/08/2019    cervical epidural steroid injection    OTHER SURGICAL HISTORY  1945    had a feeding tube as an infant, scar    PROSTATE SURGERY  2017    PTCA  09/30/2019    2.5 x 38 mm Resolute [place] RAFI mid OM1, 2.25 x 8 mm Resolute [place] RAFI ostium of dx    UPPER GASTROINTESTINAL ENDOSCOPY  5/2018 [facility]    UPPER GASTROINTESTINAL ENDOSCOPY N/A 6/4/2019    EGD BIOPSY performed by Nicolette Vee MD at [address] [place] [address] Medications  Prior to Visit Medications    Medication Sig Taking?  Authorizing Provider   glycopyrrolate (ROBINUL) 1 MG tablet Take 1 tablet by mouth 3 times daily  Rodolfo Luo, DO   hydrALAZINE (APRESOLINE) 25 MG tablet Take 1 tablet by mouth 4 times daily  Rodolfo Luo, DO   glimepiride (AMARYL) 2 MG tablet Take 1 tablet by mouth in the morning and at bedtime  Rodolfo Luo, DO   pantoprazole (PROTONIX) 40 MG tablet TAKE 1 TABLET BY MOUTH DAILY AS NEEDED (REFLUX)  Rodolfo Luo,    cevimeline (EVOXAC) 30 MG capsule Take 1 capsule by mouth 3 times daily  Rodolfo Luo, DO   alfuzosin (UROXATRAL) 10 MG extended release tablet Take 1 tablet by mouth daily  Rodolfo Luo, DO   clopidogrel (PLAVIX) 75 MG tablet Take 1 tablet by mouth daily  Patient taking differently: Take 75 mg by mouth daily Ld 6/8/  Rodolfo Luo, DO   metoprolol succinate (TOPROL XL) 50 MG extended release tablet Take 1 tablet by mouth 2 times daily  Rodolfo Luo, DO   cyclobenzaprine (FLEXERIL) 10 MG tablet Take 1 tablet by mouth 3 times daily as needed for Muscle spasms  Rodolfo Luo,    folic acid (FOLVITE) 1 MG tablet Take 1 tablet by mouth daily  Patient taking differently: Take 1 mg by mouth daily Ran out  Rodolfo Luo,    ammonium lactate (LAC-HYDRIN) 12 % lotion Apply 1 g topically daily as needed for Dry Skin  Historical Provider, MD   aspirin 81 MG tablet Take 81 mg by mouth Daily with lunch   Historical Provider, MD       Allergies: Imdur [isosorbide nitrate], Oxycodone, Simvastatin, Bactrim [sulfamethoxazole-trimethoprim], Ciprofloxacin hcl, Gabapentin, Hydrocodone, and Norco [hydrocodone-acetaminophen]   Social History:   TOBACCO:   reports that he quit smoking about 45 years ago. He smoked 0.00 packs per day for 16.00 years. He has never used smokeless tobacco.  All smokers must join the free smoking cessation program and stop smoking for 3 months before having any Bariatric surgery. ETOH:    reports no history of alcohol use. Family History   Problem Relation Age of Onset    Stroke Mother     Other Father     Kidney Disease Brother     Arrhythmia Brother     Cancer Brother     Kidney Disease Sister     Hypertension Other     Diabetes Other      Review of Systems:  Psychiatric:  depression and anxiety  Respiratory: cough, asthma  Cardiovascular: Htn  Gastrointestinal: excessive mucus  Musculoskeletal:negative  All others reviewed, negative    Physical Exam:   VITALS: Blood pressure (!) 145/70, pulse 63, temperature 98.6 °F (37 °C), temperature source Temporal, resp. rate 18, height 5' 9\" (1.753 m), weight 191 lb (86.6 kg), SpO2 93 %. General appearance: alert, appears stated age and cooperative, does ambulate easily  Head: Normocephalic, without obvious abnormality, atraumatic  Eyes: PERRL  Ears/mouth/throat:  Ears clear, mouth normal, throat no redness  Neck: no adenopathy, no JVD, supple, symmetrical, trachea midline and thyroid not enlarged  Lungs: clear to auscultation bilaterally  Heart: regular rate and rhythm  Abdomen: soft, non-tender; bowel sounds normal; no masses,  no organomegaly  Extremities: extremities normal, atraumatic, no cyanosis or edema  Skin: no open wounds    Assessment:  Dysphagia, feels like things get stuck in the neck. Plan:  EGD with dilation.     Physician Signature: Electronically signed by Dr. Jim Sawant MD    Send copy to Valorie Heath DO

## 2022-06-15 NOTE — OP NOTE
[facility]    Operative Report    DATE OF PROCEDURE: 6/15/2022    SURGEON: Dr. Philip Saldivar MD     Surgical Assistant: Isai Tucker RN  Resident: Maggy Wei MD     PRE-OP DIAGNOSIS:     dysphagia     POSTOPERATIVE DIAGNOSES:    same    OPERATION:    EGD 6/15/2022 with gastric biopsy and esophageal dilation with a 47 F Savory dilator    ANESTHESIA: LMAC. BLOOD LOSS: none  SPECIMEN: gastric antrum    CONSENT AND INDICATIONS:  This is a 64y.o. year old male with dysphagia, feels like things get stuck in the throat, dilations have helped in the past and he wants another. I have discussed with the patient the indication, alternatives, and the possible risks and/or complications of the planned procedure and the anesthesia methods. We discussed the risk of perforation and requiring further procedures. The patient appears to understand and agree to proceed. Monitoring and Safety:  Anaesthesia monitored vital signs, BP cuff, pulse oximetry, cardiac monitor and level of consciousness until recovered. Complications: none    OPERATIONS: The patient was placed on the table and sedated by anaesthesia. Bite block was placed. A lubricated scope was easily passed into the upper esophagus which looked normal. The distal esophagus looked normal.  The scope was passed into the stomach which looked normal, biopsy taken to check for H.pylori. The scope was retroflexed and there was a 1 cm hiatal hernia. The scope was passed through the pylorus, the duodenum was normal.  The scope was as passed as far down as possible and no pathology was seen. The wire was passed through the scope and left in the stomach and the scope was removed. The 47 F Savory dilator was placed over the wire into the stomach and held for 30 seconds then removed. The patient tolerated the procedure well.     Plan:    He will be discharged on a liquid protein diet for 4 days, then slowly advanced. Needs to make sure nothing gets stuck that could cause gagging and retching which could lead to more strictures. May need another dilation in a few weeks. Electronically signed Dr. Timur Linares. M.D.

## 2022-06-15 NOTE — ANESTHESIA POSTPROCEDURE EVALUATION
Department of Anesthesiology  Postprocedure Note    Patient: Marylin Desai  MRN: [id number]  YOB: 1945  Date of evaluation: 6/15/2022  Time:  2:21 PM     Procedure Summary     Date: 06/15/22 Room / Location: [facility] 01 / [facility]    Anesthesia Start: 1300 Anesthesia Stop: 0669    Procedures:       EGD ESOPHAGOGASTRODUODENOSCOPY WITH DILATION (N/A )      EGD DILATION SAVORY Diagnosis:       Dysphagia, unspecified type      (Dysphagia, unspecified type [R13.10])    Surgeons: Xena Harman MD Responsible Provider: Ashley Qureshi DO    Anesthesia Type: MAC ASA Status: 3          Anesthesia Type: No value filed. Danya Phase I: Danya Score: 10    Danya Phase II: Danya Score: 10    Last vitals: Reviewed and per EMR flowsheets.        Anesthesia Post Evaluation    Patient location during evaluation: PACU  Patient participation: complete - patient participated  Level of consciousness: awake and alert  Airway patency: patent  Nausea & Vomiting: no nausea and no vomiting  Complications: no  Cardiovascular status: hemodynamically stable  Respiratory status: acceptable  Hydration status: euvolemic

## 2022-06-16 DIAGNOSIS — E11.42 TYPE 2 DIABETES MELLITUS WITH DIABETIC POLYNEUROPATHY, WITHOUT LONG-TERM CURRENT USE OF INSULIN (HCC): ICD-10-CM

## 2022-06-16 DIAGNOSIS — E11.65 TYPE 2 DIABETES MELLITUS WITH HYPERGLYCEMIA, WITHOUT LONG-TERM CURRENT USE OF INSULIN (HCC): ICD-10-CM

## 2022-06-16 LAB — ALDOLASE: 4.7 U/L (ref 1.2–7.6)

## 2022-06-16 RX ORDER — GLIMEPIRIDE 4 MG/1
4 TABLET ORAL 2 TIMES DAILY WITH MEALS
Qty: 60 TABLET | Refills: 5 | Status: SHIPPED | OUTPATIENT
Start: 2022-06-16

## 2022-06-17 LAB — CLOTEST: NORMAL

## 2022-06-21 ENCOUNTER — HOSPITAL ENCOUNTER (OUTPATIENT)
Dept: ULTRASOUND IMAGING | Age: 77
Discharge: HOME | End: 2022-06-21
Payer: MEDICARE

## 2022-06-21 ENCOUNTER — HOSPITAL ENCOUNTER (OUTPATIENT)
Age: 77
Discharge: HOME | End: 2022-06-21
Payer: MEDICARE

## 2022-06-21 DIAGNOSIS — N40.1 BENIGN PROSTATIC HYPERPLASIA WITH LOWER URINARY TRACT SYMPTOMS, SYMPTOM DETAILS UNSPECIFIED: ICD-10-CM

## 2022-06-21 LAB — PROSTATE SPECIFIC ANTIGEN: 1.46 NG/ML (ref 0–4)

## 2022-06-21 PROCEDURE — 76770 US EXAM ABDO BACK WALL COMP: CPT

## 2022-06-21 PROCEDURE — G0103 PSA SCREENING: HCPCS

## 2022-06-21 PROCEDURE — 36415 COLL VENOUS BLD VENIPUNCTURE: CPT

## 2022-07-01 ENCOUNTER — OFFICE VISIT (OUTPATIENT)
Dept: SURGERY | Age: 77
End: 2022-07-01
Payer: MEDICARE

## 2022-07-01 ENCOUNTER — HOSPITAL ENCOUNTER (EMERGENCY)
Age: 77
Discharge: HOME | End: 2022-07-01
Attending: EMERGENCY MEDICINE
Payer: MEDICARE

## 2022-07-01 VITALS
TEMPERATURE: 98 F | HEART RATE: 82 BPM | WEIGHT: 188 LBS | BODY MASS INDEX: 27.85 KG/M2 | DIASTOLIC BLOOD PRESSURE: 87 MMHG | RESPIRATION RATE: 18 BRPM | HEIGHT: 69 IN | SYSTOLIC BLOOD PRESSURE: 170 MMHG | OXYGEN SATURATION: 98 %

## 2022-07-01 VITALS — SYSTOLIC BLOOD PRESSURE: 134 MMHG | DIASTOLIC BLOOD PRESSURE: 83 MMHG | HEART RATE: 77 BPM | TEMPERATURE: 98.1 F

## 2022-07-01 DIAGNOSIS — T18.108A FOREIGN BODY IN ESOPHAGUS, INITIAL ENCOUNTER: Primary | ICD-10-CM

## 2022-07-01 DIAGNOSIS — R13.19 ESOPHAGEAL DYSPHAGIA: Primary | ICD-10-CM

## 2022-07-01 PROCEDURE — 99284 EMERGENCY DEPT VISIT MOD MDM: CPT

## 2022-07-01 PROCEDURE — 2500000003 HC RX 250 WO HCPCS: Performed by: EMERGENCY MEDICINE

## 2022-07-01 PROCEDURE — 1036F TOBACCO NON-USER: CPT | Performed by: SURGERY

## 2022-07-01 PROCEDURE — G8417 CALC BMI ABV UP PARAM F/U: HCPCS | Performed by: SURGERY

## 2022-07-01 PROCEDURE — 1123F ACP DISCUSS/DSCN MKR DOCD: CPT | Performed by: SURGERY

## 2022-07-01 PROCEDURE — 99213 OFFICE O/P EST LOW 20 MIN: CPT | Performed by: SURGERY

## 2022-07-01 PROCEDURE — G8427 DOCREV CUR MEDS BY ELIG CLIN: HCPCS | Performed by: SURGERY

## 2022-07-01 RX ADMIN — GLUCAGON HYDROCHLORIDE 1 MG: KIT at 14:21

## 2022-07-01 ASSESSMENT — ENCOUNTER SYMPTOMS
EYES NEGATIVE: 1
VOMITING: 1
DIARRHEA: 0
CONSTIPATION: 0
TROUBLE SWALLOWING: 0
NAUSEA: 1
SHORTNESS OF BREATH: 0
ABDOMINAL PAIN: 0

## 2022-07-01 NOTE — ED PROVIDER NOTES
Presents emergency department with complaint of foreign body stuck in his esophagus. He states that he was seen by Dr. Mar this morning he did a scope and attempted to dilate his esophagus. He went home and he tried to swallow ground beef and states that it has gotten stuck. He is unable to handle his secretions. The history is provided by the patient. Illness   The current episode started today. The onset was gradual. The problem has been unchanged. Nothing relieves the symptoms. The symptoms are aggravated by eating. Associated symptoms include nausea and vomiting. Pertinent negatives include no fever, no abdominal pain, no constipation, no diarrhea and no muscle aches. Review of Systems   Constitutional: Negative for activity change, appetite change, chills, diaphoresis, fatigue and fever. HENT: Negative for trouble swallowing. Eyes: Negative. Respiratory: Negative for shortness of breath. Cardiovascular: Negative for chest pain, palpitations and leg swelling. Gastrointestinal: Positive for nausea and vomiting. Negative for abdominal pain, constipation and diarrhea. Genitourinary: Negative. Musculoskeletal: Negative. Skin: Negative. Neurological: Negative. Hematological: Negative. Psychiatric/Behavioral: Negative. Physical Exam  Vitals and nursing note reviewed. Constitutional:       General: He is not in acute distress. Appearance: Normal appearance. He is well-developed and normal weight. He is not ill-appearing, toxic-appearing or diaphoretic. HENT:      Head: Normocephalic and atraumatic. Nose: Nose normal.      Mouth/Throat:      Mouth: Mucous membranes are moist.      Pharynx: Oropharynx is clear. Eyes:      Extraocular Movements: Extraocular movements intact. Conjunctiva/sclera: Conjunctivae normal.      Pupils: Pupils are equal, round, and reactive to light. Cardiovascular:      Rate and Rhythm: Normal rate and regular rhythm. Pulses: Normal pulses. Heart sounds: Normal heart sounds. No murmur heard. Pulmonary:      Effort: Pulmonary effort is normal. No respiratory distress. Breath sounds: Normal breath sounds. No wheezing or rales. Abdominal:      General: Abdomen is flat. Bowel sounds are normal.      Palpations: Abdomen is soft. Tenderness: There is no abdominal tenderness. There is no guarding or rebound. Musculoskeletal:      Cervical back: Normal range of motion and neck supple. Skin:     General: Skin is warm and dry. Capillary Refill: Capillary refill takes less than 2 seconds. Coloration: Skin is not pale. Neurological:      General: No focal deficit present. Mental Status: He is alert and oriented to person, place, and time. Mental status is at baseline. Cranial Nerves: No cranial nerve deficit. Coordination: Coordination normal.   Psychiatric:         Mood and Affect: Mood normal.         Behavior: Behavior normal.         Thought Content: Thought content normal.         Judgment: Judgment normal.         Procedures    MDM             --------------------------------------------- PAST HISTORY ---------------------------------------------  Past Medical History:  has a past medical history of Abnormal computed tomography angiography of heart, Arthritis, Atrial fibrillation (HCC), Burning pain, CAD (coronary artery disease), Degenerative lumbar disc, Diabetes ([facility] [address] Dysphagia, Edentulous, H/O cardiovascular stress test, Herniated cervical disc, History of echocardiogram, Hx of blood clots, Hyperlipidemia, Hypertension, Stented coronary artery, Tinnitus, and Urinary retention. Past Surgical History:  has a past surgical history that includes colectomy (2010 sigmoid); Hemorrhoid surgery (2008); Prostate surgery (2017); Colonoscopy (5/2018 [facility]); Nerve Block (Bilateral, 10 12 2015); Nerve Block (Bilateral, 10/22/15); Nerve Block (Bilateral, 11 02 2015);  Nerve Block (Left, 12/2/15); Nerve Block (Left, 12 16 15); Nerve Block (Left, 12 28 2015); Nerve Block (Left, 1 20 16); ablation of dysrhythmic focus (1980); lumbar fusion (03/06/2017); Nasal sinus surgery (12/07/2017); Upper gastrointestinal endoscopy (5/2018 [facility]); Nerve Block (12/11/2018); epidural steroid injection (N/A, 12/11/2018); Nerve Block (N/A, 01/08/2019); epidural steroid injection (N/A, 1/8/2019); Endoscopy, colon, diagnostic; other surgical history (1943); Upper gastrointestinal endoscopy (N/A, 6/4/2019); Diagnostic Cardiac Cath Lab Procedure; Percutaneous Transluminal Coronary Angio (09/30/2019); Coronary angioplasty with stent; Cardiac catheterization (02/20/2020); eye surgery; laminectomy (N/A, 6/5/2020); laminectomy (N/A, 8/7/2020); cervical fusion (N/A, 10/9/2020); back surgery; Upper gastrointestinal endoscopy (N/A, 6/15/2022); and Upper gastrointestinal endoscopy (6/15/2022). Social History:  reports that he quit smoking about 45 years ago. He smoked 0.00 packs per day for 16.00 years. He has never used smokeless tobacco. He reports that he does not drink alcohol and does not use drugs. Family History: family history includes Arrhythmia in his brother; Cancer in his brother; Diabetes in an other family member; Hypertension in an other family member; Kidney Disease in his brother and sister; Other in his father; Stroke in his mother. The patients home medications have been reviewed. Allergies: Imdur [isosorbide nitrate], Oxycodone, Simvastatin, Bactrim [sulfamethoxazole-trimethoprim], Ciprofloxacin hcl, Gabapentin, Hydrocodone, and Norco [hydrocodone-acetaminophen]    -------------------------------------------------- RESULTS -------------------------------------------------  Labs:  No results found for this visit on 07/01/22.     Radiology:  No orders to display       ------------------------- NURSING NOTES AND VITALS REVIEWED ---------------------------  Date / Time Roomed:  7/1/2022  1:48 PM  ED

## 2022-07-01 NOTE — PROGRESS NOTES
Vashti Hill  7/1/2022  [address] [place] [address] Drive office visit    aVshti Hill is a 64 y.o., male post EGD 6/15/2022 with esophageal dilation with a 47 F Savory dilator, biopsy negative for H.pylori, dilation did not improve the nasal drainage or feeling like things get stuck in the throat. History:    10/2020 food getting stuck in the throat, went to the ER, everything was normal. Had neck surgery  with fusion which helped the pain but has poor ROM. He had Dr. Tyler Kohler do esophageal dilations in the past which worked. EGD 6/4/19 for an esophageal mass, swelling seen on CT. No mass was seen on EGD, no hiatal hernia, no mucus. Still taking Protonix 40 mg daily for throat burning and excessive throat mucus production and mucus diarrhea year which improved with Robinul. CT scan abdomen 2017 showed a very low rectal anastomosis to a loop with the blind end 5 cm long. CT chest 2017 did not show esophageal swelling. Barium enema 2018 showed extensive diverticula of the entire left and sigmoid colon with a 5 cm blind pouch at the low rectal anastomosis. Diverticula pouches are seen on the blind pouch. He also has severe nasal sinus drainage. Both the nasal discharge and diarrhea resolved with Metamucil and Robinul 1 mg tid. Weight=   There is no height or weight on file to calculate BMI. Past Medical History:   Diagnosis Date    Abnormal computed tomography angiography of heart 07/19/2019    Calcified plaque proximal RCA with 50-70% stenosis, proximal LAD 50% stenosis, mild LAD 30-50% stenosis, groundglass infiltrates, area of consoldidation left lung base posteriorly.       Arthritis     Atrial fibrillation (HCC)     Burning pain     CAD (coronary artery disease)     Degenerative lumbar disc     Diabetes ([address]     Dysphagia     Edentulous     upper denture    H/O cardiovascular stress test 02/19/2020    Lexiscan    Herniated cervical disc     History of echocardiogram 03/16/2017    EF 60-65%    Hx of blood clots     Hyperlipidemia     Hypertension     Stented coronary artery     Tinnitus     Urinary retention 3/17/2017       Past Surgical History:   Procedure Laterality Date    ABLATION OF DYSRHYTHMIC FOCUS  1980    APPROX 30-35 YRS AGO    BACK SURGERY      CARDIAC CATHETERIZATION  02/20/2020    Dr Tico Truong N/A 10/9/2020    ANTERIOR CERVICAL FUSION  C3-C6, PARTIAL CORPECTOMY C3-C6, ANTERIOR OSTEOPHYTECTOMY C5-T1 ---AUDIOLOGY, SPINAL ELEMENTS performed by Jose R Dobson DO at [address] sigmoid    COLONOSCOPY  5/2018 [facility]    CORONARY ANGIOPLASTY WITH STENT PLACEMENT      DIAGNOSTIC CARDIAC CATH LAB PROCEDURE      ENDOSCOPY, COLON, DIAGNOSTIC      EPIDURAL STEROID INJECTION N/A 12/11/2018    C7-T1 EPIDURAL STEROID INJECTION performed by Mireille Mandujano DO at [address]  1/8/2019    C7 - T1 EPIDURAL STEROID INJECTION #2 [facility]   [address]  2008    LAMINECTOMY N/A 6/5/2020    LUMBAR LAMINECTOMY L3-4 , L5-S1, HARDWARE REMOVAL L4-5, DURAL REPAIR performed by Jose R Dobson DO at [facility] N/A 8/7/2020    LUMBAR DECOMPRESSION L4-S1; IRRIGATION AND DEBRIDEMENT OF LUMBAR HEMATOMA /SEROMA; REMOVAL SYNOVIAL CYST L5-S1 LEFT performed by Jose R Dobson DO at [address] [place] [address]  03/06/2017    NASAL SINUS SURGERY  12/07/2017    Submucosal Resection of Inferior Turbinate    NERVE BLOCK Bilateral 10 12 2015    bilateral sacroiliac joint injection #1    NERVE BLOCK Bilateral 10/22/15    sacroiliac joint #2    NERVE BLOCK Bilateral 11 02 2015    Sacroiliac joint bilateral #3    NERVE BLOCK Left 12/2/15    lumbar transforaminal #1    NERVE BLOCK Left 12 16 15    NERVE BLOCK Left 12 28 2015    transforaminal nerve block left lumbar #3    NERVE BLOCK Left 1 20 16 radiofrequency     NERVE BLOCK  12/11/2018    C7-T1 epidural    NERVE BLOCK N/A 01/08/2019    cervical epidural steroid injection    OTHER SURGICAL HISTORY  1943    had a feeding tube as an infant, scar    PROSTATE SURGERY  2017    PTCA  09/30/2019    2.5 x 38 mm Resolute [place] RAFI mid OM1, 2.25 x 8 mm Resolute [place] RAFI ostium of dx    UPPER GASTROINTESTINAL ENDOSCOPY  5/2018 [facility]    UPPER GASTROINTESTINAL ENDOSCOPY N/A 6/4/2019    EGD BIOPSY performed by Jessica Mccray MD at [place] [address] N/A 6/15/2022    EGD ESOPHAGOGASTRODUODENOSCOPY WITH DILATION performed by Jessica Mccray MD at [place] [address]  6/15/2022    EGD DILATION SAVORY performed by Jessica Mccray MD at [address] [place] [address] Medications  Prior to Visit Medications    Medication Sig Taking?  Authorizing Provider   glimepiride (AMARYL) 4 MG tablet Take 1 tablet by mouth 2 times daily (with meals) Yes Virginia Erp, DO   glycopyrrolate (ROBINUL) 1 MG tablet Take 1 tablet by mouth 3 times daily Yes Virginia Erp, DO   hydrALAZINE (APRESOLINE) 25 MG tablet Take 1 tablet by mouth 4 times daily Yes Virginia Erp, DO   pantoprazole (PROTONIX) 40 MG tablet TAKE 1 TABLET BY MOUTH DAILY AS NEEDED (REFLUX) Yes Virginia Erp, DO   cevimeline (EVOXAC) 30 MG capsule Take 1 capsule by mouth 3 times daily Yes Virginia Erp, DO   alfuzosin (UROXATRAL) 10 MG extended release tablet Take 1 tablet by mouth daily Yes Virginia Erp, DO   clopidogrel (PLAVIX) 75 MG tablet Take 1 tablet by mouth daily  Patient taking differently: Take 75 mg by mouth daily Ld 6/8/ Yes Virginia Erp, DO   metoprolol succinate (TOPROL XL) 50 MG extended release tablet Take 1 tablet by mouth 2 times daily Yes Virginia Erp, DO   cyclobenzaprine (FLEXERIL) 10 MG tablet Take 1 tablet by mouth 3 times daily as needed for Muscle spasms Yes Virginia Erp, DO   folic acid (FOLVITE) 1 MG tablet Take improvement in the dysphagia post dilation, feels like things get stuck in the neck, still has severe nasal drainage. Plan:  Ok to follow up with ENT to see if they can help.     Physician Signature: Electronically signed by Dr. You Garcia MD    Send copy to Mignon Chandler,

## 2022-07-05 ENCOUNTER — OFFICE VISIT (OUTPATIENT)
Dept: FAMILY MEDICINE CLINIC | Age: 77
End: 2022-07-05
Payer: MEDICARE

## 2022-07-05 VITALS
HEART RATE: 78 BPM | DIASTOLIC BLOOD PRESSURE: 80 MMHG | SYSTOLIC BLOOD PRESSURE: 132 MMHG | WEIGHT: 190 LBS | OXYGEN SATURATION: 99 % | BODY MASS INDEX: 28.14 KG/M2 | TEMPERATURE: 97.2 F | HEIGHT: 69 IN | RESPIRATION RATE: 18 BRPM

## 2022-07-05 DIAGNOSIS — E11.42 TYPE 2 DIABETES MELLITUS WITH DIABETIC POLYNEUROPATHY, WITHOUT LONG-TERM CURRENT USE OF INSULIN (HCC): ICD-10-CM

## 2022-07-05 DIAGNOSIS — M50.90 CERVICAL DISC DISEASE: ICD-10-CM

## 2022-07-05 DIAGNOSIS — R13.10 DYSPHAGIA, UNSPECIFIED TYPE: ICD-10-CM

## 2022-07-05 DIAGNOSIS — J30.1 SEASONAL ALLERGIC RHINITIS DUE TO POLLEN: ICD-10-CM

## 2022-07-05 DIAGNOSIS — E07.89 THYROID FULLNESS: Primary | ICD-10-CM

## 2022-07-05 DIAGNOSIS — E78.2 MIXED HYPERLIPIDEMIA: ICD-10-CM

## 2022-07-05 PROCEDURE — G8427 DOCREV CUR MEDS BY ELIG CLIN: HCPCS | Performed by: FAMILY MEDICINE

## 2022-07-05 PROCEDURE — 1036F TOBACCO NON-USER: CPT | Performed by: FAMILY MEDICINE

## 2022-07-05 PROCEDURE — 1123F ACP DISCUSS/DSCN MKR DOCD: CPT | Performed by: FAMILY MEDICINE

## 2022-07-05 PROCEDURE — G8417 CALC BMI ABV UP PARAM F/U: HCPCS | Performed by: FAMILY MEDICINE

## 2022-07-05 PROCEDURE — 99213 OFFICE O/P EST LOW 20 MIN: CPT | Performed by: FAMILY MEDICINE

## 2022-07-05 PROCEDURE — 3051F HG A1C>EQUAL 7.0%<8.0%: CPT | Performed by: FAMILY MEDICINE

## 2022-07-05 RX ORDER — AZELASTINE 1 MG/ML
2 SPRAY, METERED NASAL 2 TIMES DAILY
Qty: 120 ML | Refills: 1 | Status: SHIPPED | OUTPATIENT
Start: 2022-07-05

## 2022-07-05 RX ORDER — PRAVASTATIN SODIUM 10 MG
TABLET ORAL
Qty: 90 TABLET | Refills: 5
Start: 2022-07-05

## 2022-07-06 ENCOUNTER — APPOINTMENT (OUTPATIENT)
Dept: GENERAL RADIOLOGY | Age: 77
End: 2022-07-06
Payer: MEDICARE

## 2022-07-06 ENCOUNTER — HOSPITAL ENCOUNTER (EMERGENCY)
Age: 77
Discharge: HOME | End: 2022-07-06
Attending: EMERGENCY MEDICINE
Payer: MEDICARE

## 2022-07-06 VITALS
DIASTOLIC BLOOD PRESSURE: 97 MMHG | TEMPERATURE: 98.2 F | RESPIRATION RATE: 16 BRPM | OXYGEN SATURATION: 95 % | SYSTOLIC BLOOD PRESSURE: 160 MMHG | HEART RATE: 58 BPM | HEIGHT: 69 IN | BODY MASS INDEX: 28.14 KG/M2 | WEIGHT: 190 LBS

## 2022-07-06 DIAGNOSIS — R13.10 DYSPHAGIA, UNSPECIFIED TYPE: Primary | ICD-10-CM

## 2022-07-06 LAB
ALBUMIN SERPL-MCNC: 4.7 G/DL (ref 3.5–5.2)
ALP BLD-CCNC: 105 U/L (ref 40–129)
ALT SERPL-CCNC: 25 U/L (ref 0–40)
ANION GAP SERPL CALCULATED.3IONS-SCNC: 18 MMOL/L (ref 7–16)
AST SERPL-CCNC: 31 U/L (ref 0–39)
BACTERIA: NORMAL /HPF
BASOPHILS ABSOLUTE: 0.04 E9/L (ref 0–0.2)
BASOPHILS RELATIVE PERCENT: 0.4 % (ref 0–2)
BILIRUB SERPL-MCNC: 0.6 MG/DL (ref 0–1.2)
BILIRUBIN URINE: NEGATIVE
BLOOD, URINE: NEGATIVE
BUN BLDV-MCNC: 9 MG/DL (ref 6–23)
CALCIUM SERPL-MCNC: 9.8 MG/DL (ref 8.6–10.2)
CHLORIDE BLD-SCNC: 96 MMOL/L (ref 98–107)
CLARITY: CLEAR
CO2: 20 MMOL/L (ref 22–29)
COLOR: YELLOW
CREAT SERPL-MCNC: 1 MG/DL (ref 0.7–1.2)
EKG ATRIAL RATE: 65 BPM
EKG P AXIS: 81 DEGREES
EKG P-R INTERVAL: 176 MS
EKG Q-T INTERVAL: 452 MS
EKG QRS DURATION: 152 MS
EKG QTC CALCULATION (BAZETT): 470 MS
EKG R AXIS: -81 DEGREES
EKG T AXIS: 77 DEGREES
EKG VENTRICULAR RATE: 65 BPM
EOSINOPHILS ABSOLUTE: 0.09 E9/L (ref 0.05–0.5)
EOSINOPHILS RELATIVE PERCENT: 0.9 % (ref 0–6)
GFR AFRICAN AMERICAN: >60
GFR NON-AFRICAN AMERICAN: >60 ML/MIN/1.73
GLUCOSE BLD-MCNC: 133 MG/DL (ref 74–99)
GLUCOSE URINE: NEGATIVE MG/DL
HCT VFR BLD CALC: 47 % (ref 37–54)
HEMOGLOBIN: 16.6 G/DL (ref 12.5–16.5)
IMMATURE GRANULOCYTES #: 0.04 E9/L
IMMATURE GRANULOCYTES %: 0.4 % (ref 0–5)
INFLUENZA A BY PCR: NOT DETECTED
INFLUENZA B BY PCR: NOT DETECTED
KETONES, URINE: NEGATIVE MG/DL
LACTIC ACID: 0.8 MMOL/L (ref 0.5–2.2)
LACTIC ACID: 4.3 MMOL/L (ref 0.5–2.2)
LEUKOCYTE ESTERASE, URINE: ABNORMAL
LIPASE: 35 U/L (ref 13–60)
LYMPHOCYTES ABSOLUTE: 3.04 E9/L (ref 1.5–4)
LYMPHOCYTES RELATIVE PERCENT: 30.8 % (ref 20–42)
MCH RBC QN AUTO: 30.9 PG (ref 26–35)
MCHC RBC AUTO-ENTMCNC: 35.3 % (ref 32–34.5)
MCV RBC AUTO: 87.5 FL (ref 80–99.9)
MONOCYTES ABSOLUTE: 0.65 E9/L (ref 0.1–0.95)
MONOCYTES RELATIVE PERCENT: 6.6 % (ref 2–12)
NEUTROPHILS ABSOLUTE: 6.02 E9/L (ref 1.8–7.3)
NEUTROPHILS RELATIVE PERCENT: 60.9 % (ref 43–80)
NITRITE, URINE: NEGATIVE
PDW BLD-RTO: 12.9 FL (ref 11.5–15)
PH UA: 7 (ref 5–9)
PLATELET # BLD: 199 E9/L (ref 130–450)
PMV BLD AUTO: 10.3 FL (ref 7–12)
POTASSIUM SERPL-SCNC: 3.8 MMOL/L (ref 3.5–5)
PROTEIN UA: NEGATIVE MG/DL
RBC # BLD: 5.37 E12/L (ref 3.8–5.8)
RBC UA: NORMAL /HPF (ref 0–2)
REASON FOR REJECTION: NORMAL
REJECTED TEST: NORMAL
SARS-COV-2, NAAT: NOT DETECTED
SODIUM BLD-SCNC: 134 MMOL/L (ref 132–146)
SPECIFIC GRAVITY UA: <=1.005 (ref 1–1.03)
TOTAL PROTEIN: 8.2 G/DL (ref 6.4–8.3)
TROPONIN, HIGH SENSITIVITY: 12 NG/L (ref 0–11)
TROPONIN, HIGH SENSITIVITY: 16 NG/L (ref 0–11)
UROBILINOGEN, URINE: 1 E.U./DL
WBC # BLD: 9.9 E9/L (ref 4.5–11.5)
WBC UA: NORMAL /HPF (ref 0–5)

## 2022-07-06 PROCEDURE — 80053 COMPREHEN METABOLIC PANEL: CPT

## 2022-07-06 PROCEDURE — 84484 ASSAY OF TROPONIN QUANT: CPT

## 2022-07-06 PROCEDURE — 87502 INFLUENZA DNA AMP PROBE: CPT

## 2022-07-06 PROCEDURE — 2580000003 HC RX 258: Performed by: STUDENT IN AN ORGANIZED HEALTH CARE EDUCATION/TRAINING PROGRAM

## 2022-07-06 PROCEDURE — 93005 ELECTROCARDIOGRAM TRACING: CPT | Performed by: NURSE PRACTITIONER

## 2022-07-06 PROCEDURE — 87635 SARS-COV-2 COVID-19 AMP PRB: CPT

## 2022-07-06 PROCEDURE — 83690 ASSAY OF LIPASE: CPT

## 2022-07-06 PROCEDURE — 81001 URINALYSIS AUTO W/SCOPE: CPT

## 2022-07-06 PROCEDURE — 71046 X-RAY EXAM CHEST 2 VIEWS: CPT

## 2022-07-06 PROCEDURE — 99285 EMERGENCY DEPT VISIT HI MDM: CPT

## 2022-07-06 PROCEDURE — 85025 COMPLETE CBC W/AUTO DIFF WBC: CPT

## 2022-07-06 PROCEDURE — 83605 ASSAY OF LACTIC ACID: CPT

## 2022-07-06 PROCEDURE — 6370000000 HC RX 637 (ALT 250 FOR IP): Performed by: NURSE PRACTITIONER

## 2022-07-06 RX ORDER — 0.9 % SODIUM CHLORIDE 0.9 %
1000 INTRAVENOUS SOLUTION INTRAVENOUS ONCE
Status: COMPLETED | OUTPATIENT
Start: 2022-07-06 | End: 2022-07-06

## 2022-07-06 RX ADMIN — ALUMINUM HYDROXIDE, MAGNESIUM HYDROXIDE, AND SIMETHICONE: 200; 200; 20 SUSPENSION ORAL at 15:00

## 2022-07-06 RX ADMIN — SODIUM CHLORIDE 1000 ML: 9 INJECTION, SOLUTION INTRAVENOUS at 14:48

## 2022-07-06 RX ADMIN — SODIUM CHLORIDE 1000 ML: 9 INJECTION, SOLUTION INTRAVENOUS at 16:20

## 2022-07-06 ASSESSMENT — ENCOUNTER SYMPTOMS
SHORTNESS OF BREATH: 0
BACK PAIN: 0
CHEST TIGHTNESS: 0
ANAL BLEEDING: 0
TROUBLE SWALLOWING: 0
SHORTNESS OF BREATH: 0
FACIAL SWELLING: 0
STRIDOR: 0
SORE THROAT: 0
COLOR CHANGE: 0
SINUS PAIN: 0
BACK PAIN: 0
NAUSEA: 0
SINUS PRESSURE: 0
EYE REDNESS: 0
PHOTOPHOBIA: 0
BLOOD IN STOOL: 0
SINUS PRESSURE: 0
APNEA: 0
VOICE CHANGE: 0
WHEEZING: 0
DIARRHEA: 1
EYE PAIN: 0
ABDOMINAL DISTENTION: 0
VOMITING: 0
EYE REDNESS: 0
VOMITING: 0
COUGH: 0
EYE ITCHING: 0
DIARRHEA: 0
EYE PAIN: 0
EYE DISCHARGE: 0
CONSTIPATION: 0
CHEST TIGHTNESS: 0
TROUBLE SWALLOWING: 1
RHINORRHEA: 0
SORE THROAT: 0
ABDOMINAL PAIN: 0
NAUSEA: 0
RECTAL PAIN: 0
ABDOMINAL PAIN: 0
COUGH: 0
CHOKING: 0
CONSTIPATION: 0

## 2022-07-06 NOTE — ED NOTES
Department of Emergency Medicine  FIRST PROVIDER TRIAGE NOTE             Independent MLP           7/6/22  11:02 AM EDT    Date of Encounter: 7/6/22   MRN: [id number]      HPI: Mj Morales is a 64 y.o. male who presents to the ED for Dysphagia (difficulty swallowing since end of may, not able to eat, diarrhea)      ROS: Negative for cp or sob. PE: Gen Appearance/Constitutional: alert  Musculoskeletal: moves all extremities x 4     Initial Plan of Care: All treatment areas with department are currently occupied. Plan to order/Initiate the following while awaiting opening in ED: labs, EKG and imaging studies.   Initiate Treatment-Testing, Proceed toTreatment Area When Bed Available for ED Attending/MLP to Continue Care    Electronically signed by HARMAN Rodriguez CNP   DD: 7/6/22       HARMAN Fraser CNP  07/06/22 1103

## 2022-07-06 NOTE — ED NOTES
Lab called to inform that the trop that was drawn at 1355 was hemolyzed.       Tim Hodgkins, RN  07/06/22 [id number]

## 2022-07-06 NOTE — ED PROVIDER NOTES
[facility]  Department of Emergency Medicine     Written by: Kandace Sena DO  Patient Name: Joseph Arthur  Attending Provider: Jemima Zapata MD  Admit Date: 2022 12:46 PM  MRN: [id number]                   : 1945        Chief Complaint   Patient presents with    Dysphagia     difficulty swallowing since end of may, not able to eat, diarrhea    - Chief complaint    Mr. Emelia Fisher is a 64year old male who presents to the ED due to dysphagia. Patient has been dealing with dysphagia since the end of May. He notes that it started when he was eating a piece of pizza and was seen by Dr. Kyle James for an EGD on Elisa 15. He underwent esophageal dilation at that time but has reported minimal relief of his symptoms since then. Dr. Kyle James stated from his standpoint his esophagus looked normal and he will need to follow-up with his orthopedic spine surgeon who completed his cervical discectomy if his symptoms continue. Patient is currently scheduled for a follow-up appointment with Dr. Liliana Mckeon next week but states that he has been on a liquid diet ever since his EGD. Patient notes that he had some minor diarrhea last week but this morning woke up and had 7-8 episodes of loose, watery bowel movements. Patient endorses mild neck soreness as well has been ongoing over the past several weeks. Patient symptoms have been constant since onset. States that he is able to tolerate liquids at home but unable to tolerate any solid intake. Denies any recent fevers, chills, nausea, vomiting, chest pain, shortness of breath, abdominal pain, urinary changes, headaches or vision changes. Review of Systems   Constitutional: Negative for chills, fatigue and fever. HENT: Positive for trouble swallowing. Negative for congestion, sinus pressure, sinus pain and sore throat. Eyes: Negative for pain, redness and visual disturbance.    Respiratory: Negative for cough, chest tightness and shortness of breath. Cardiovascular: Negative for chest pain, palpitations and leg swelling. Gastrointestinal: Positive for diarrhea. Negative for abdominal pain, constipation, nausea and vomiting. Genitourinary: Negative for dysuria, flank pain, frequency, hematuria and urgency. Musculoskeletal: Positive for neck pain (Soreness). Negative for arthralgias, back pain, gait problem, joint swelling and myalgias. Skin: Negative for rash. Neurological: Negative for dizziness, tremors, light-headedness and headaches. Physical Exam  Constitutional:       General: He is not in acute distress. Appearance: Normal appearance. He is normal weight. He is not ill-appearing. HENT:      Head: Normocephalic and atraumatic. Right Ear: External ear normal.      Left Ear: External ear normal.      Nose: Nose normal.      Mouth/Throat:      Mouth: Mucous membranes are moist.      Pharynx: Oropharynx is clear. Eyes:      Extraocular Movements: Extraocular movements intact. Conjunctiva/sclera: Conjunctivae normal.   Cardiovascular:      Rate and Rhythm: Normal rate and regular rhythm. Pulses: Normal pulses. Heart sounds: Normal heart sounds. Pulmonary:      Effort: Pulmonary effort is normal. No respiratory distress. Breath sounds: Normal breath sounds. No wheezing. Abdominal:      General: Abdomen is flat. Bowel sounds are normal.      Palpations: Abdomen is soft. Tenderness: There is no abdominal tenderness. There is no guarding or rebound. Musculoskeletal:         General: No swelling or tenderness. Normal range of motion. Cervical back: Normal range of motion and neck supple. No rigidity. Skin:     General: Skin is warm and dry. Neurological:      General: No focal deficit present. Mental Status: He is alert and oriented to person, place, and time. Mental status is at baseline. Sensory: No sensory deficit. Motor: No weakness.    Psychiatric: Mood and Affect: Mood normal.         Behavior: Behavior normal.          Procedures       MDM  Number of Diagnoses or Management Options  Dysphagia, unspecified type  Diagnosis management comments: This is a 64year old male who presents to the ED due to dysphagia. Patient was initially ordered 1 L normal saline bolus and a GI cocktail. Patient's CBC was markedly benign within normal limits. CMP revealed decreased bicarb of 29 with a mildly elevated anion gap of 18. Initial troponin was 16 with repeat currently pending. Lipase was normal and urinalysis revealed trace leukocytes without any other signs of infection. Initial lactic acid was 4.3 and patient was ordered another liter of normal saline. Tested negative for both COVID and flu. Chest x-ray revealed no acute process. Repeat Swati Aver was 12 and repeat lactic was 0.8. Patient will be discharged home at this time. He has been told to follow-up with his gastroenterologist, spine surgeon and primary care provider. He has been told to come back to the ED if her symptoms return, worsen or change in any time. Amount and/or Complexity of Data Reviewed  Clinical lab tests: reviewed  Tests in the radiology section of CPT®: reviewed  Tests in the medicine section of CPT®: reviewed  Decide to obtain previous medical records or to obtain history from someone other than the patient: yes                --------------------------------------------- PAST HISTORY ---------------------------------------------  Past Medical History:  has a past medical history of Abnormal computed tomography angiography of heart, Arthritis, Atrial fibrillation (HCC), Burning pain, CAD (coronary artery disease), Degenerative lumbar disc, Diabetes ([facility] [address] Dysphagia, Edentulous, H/O cardiovascular stress test, Herniated cervical disc, History of echocardiogram, Hx of blood clots, Hyperlipidemia, Hypertension, Stented coronary artery, Tinnitus, and Urinary retention.     Past Surgical History:  has a past surgical history that includes colectomy (2010 sigmoid); Hemorrhoid surgery (2008); Prostate surgery (2017); Colonoscopy (5/2018 Dod); Nerve Block (Bilateral, 10 12 2015); Nerve Block (Bilateral, 10/22/15); Nerve Block (Bilateral, 11 02 2015); Nerve Block (Left, 12/2/15); Nerve Block (Left, 12 16 15); Nerve Block (Left, 12 28 2015); Nerve Block (Left, 1 20 16); ablation of dysrhythmic focus (1980); lumbar fusion (03/06/2017); Nasal sinus surgery (12/07/2017); Upper gastrointestinal endoscopy (5/2018 Dod); Nerve Block (12/11/2018); epidural steroid injection (N/A, 12/11/2018); Nerve Block (N/A, 01/08/2019); epidural steroid injection (N/A, 1/8/2019); Endoscopy, colon, diagnostic; other surgical history (1945); Upper gastrointestinal endoscopy (N/A, 6/4/2019); Diagnostic Cardiac Cath Lab Procedure; Percutaneous Transluminal Coronary Angio (09/30/2019); Coronary angioplasty with stent; Cardiac catheterization (02/20/2020); eye surgery; laminectomy (N/A, 6/5/2020); laminectomy (N/A, 8/7/2020); cervical fusion (N/A, 10/9/2020); back surgery; Upper gastrointestinal endoscopy (N/A, 6/15/2022); and Upper gastrointestinal endoscopy (6/15/2022). Social History:  reports that he quit smoking about 45 years ago. He smoked 0.00 packs per day for 16.00 years. He has never used smokeless tobacco. He reports that he does not drink alcohol and does not use drugs. Family History: family history includes Arrhythmia in his brother; Cancer in his brother; Diabetes in an other family member; Hypertension in an other family member; Kidney Disease in his brother and sister; Other in his father; Stroke in his mother. The patients home medications have been reviewed.     Allergies: Imdur [isosorbide nitrate], Oxycodone, Simvastatin, Bactrim [sulfamethoxazole-trimethoprim], Ciprofloxacin hcl, Gabapentin, Hydrocodone, and Norco [hydrocodone-acetaminophen]    -------------------------------------------------- RESULTS -------------------------------------------------  EKG: This EKG is signed and interpreted by me.     Rate: 65  Rhythm: Sinus  Interpretation: right bundle branch block  Comparison: changes compared to previous EKG    Labs:  Results for orders placed or performed during the hospital encounter of 07/06/22   COVID-19, Rapid    Specimen: Nasopharyngeal Swab   Result Value Ref Range    SARS-CoV-2, NAAT Not Detected Not Detected   Rapid influenza A/B antigens    Specimen: Nasopharyngeal   Result Value Ref Range    Influenza A by PCR Not Detected Not Detected    Influenza B by PCR Not Detected Not Detected   CBC with Auto Differential   Result Value Ref Range    WBC 9.9 4.5 - 11.5 E9/L    RBC 5.37 3.80 - 5.80 E12/L    Hemoglobin 16.6 (H) 12.5 - 16.5 g/dL    Hematocrit 47.0 37.0 - 54.0 %    MCV 87.5 80.0 - 99.9 fL    MCH 30.9 26.0 - 35.0 pg    MCHC 35.3 (H) 32.0 - 34.5 %    RDW 12.9 11.5 - 15.0 fL    Platelets 882 735 - 350 E9/L    MPV 10.3 7.0 - 12.0 fL    Neutrophils % 60.9 43.0 - 80.0 %    Immature Granulocytes % 0.4 0.0 - 5.0 %    Lymphocytes % 30.8 20.0 - 42.0 %    Monocytes % 6.6 2.0 - 12.0 %    Eosinophils % 0.9 0.0 - 6.0 %    Basophils % 0.4 0.0 - 2.0 %    Neutrophils Absolute 6.02 1.80 - 7.30 E9/L    Immature Granulocytes # 0.04 E9/L    Lymphocytes Absolute 3.04 1.50 - 4.00 E9/L    Monocytes Absolute 0.65 0.10 - 0.95 E9/L    Eosinophils Absolute 0.09 0.05 - 0.50 E9/L    Basophils Absolute 0.04 0.00 - 0.20 E9/L   Comprehensive Metabolic Panel   Result Value Ref Range    Sodium 134 132 - 146 mmol/L    Potassium 3.8 3.5 - 5.0 mmol/L    Chloride 96 (L) 98 - 107 mmol/L    CO2 20 (L) 22 - 29 mmol/L    Anion Gap 18 (H) 7 - 16 mmol/L    Glucose 133 (H) 74 - 99 mg/dL    BUN 9 6 - 23 mg/dL    CREATININE 1.0 0.7 - 1.2 mg/dL    GFR Non-African American >60 >=60 mL/min/1.73    GFR African American >60     Calcium 9.8 8.6 - 10.2 mg/dL Total Protein 8.2 6.4 - 8.3 g/dL    Albumin 4.7 3.5 - 5.2 g/dL    Total Bilirubin 0.6 0.0 - 1.2 mg/dL    Alkaline Phosphatase 105 40 - 129 U/L    ALT 25 0 - 40 U/L    AST 31 0 - 39 U/L   Troponin   Result Value Ref Range    Troponin, High Sensitivity 16 (H) 0 - 11 ng/L   Lactic Acid   Result Value Ref Range    Lactic Acid 4.3 (HH) 0.5 - 2.2 mmol/L   Lipase   Result Value Ref Range    Lipase 35 13 - 60 U/L   Urinalysis   Result Value Ref Range    Color, UA Yellow Straw/Yellow    Clarity, UA Clear Clear    Glucose, Ur Negative Negative mg/dL    Bilirubin Urine Negative Negative    Ketones, Urine Negative Negative mg/dL    Specific Gravity, UA <=1.005 1.005 - 1.030    Blood, Urine Negative Negative    pH, UA 7.0 5.0 - 9.0    Protein, UA Negative Negative mg/dL    Urobilinogen, Urine 1.0 <2.0 E.U./dL    Nitrite, Urine Negative Negative    Leukocyte Esterase, Urine TRACE (A) Negative   Microscopic Urinalysis   Result Value Ref Range    WBC, UA 0-1 0 - 5 /HPF    RBC, UA NONE 0 - 2 /HPF    Bacteria, UA NONE SEEN None Seen /HPF   SPECIMEN REJECTION   Result Value Ref Range    Rejected Test trp     Reason for Rejection see below    EKG 12 Lead   Result Value Ref Range    Ventricular Rate 65 BPM    Atrial Rate 65 BPM    P-R Interval 176 ms    QRS Duration 152 ms    Q-T Interval 452 ms    QTc Calculation (Bazett) 470 ms    P Axis 81 degrees    R Axis -81 degrees    T Axis 77 degrees       Radiology:  XR CHEST (2 VW)   Final Result   No acute process. ------------------------- NURSING NOTES AND VITALS REVIEWED ---------------------------  Date / Time Roomed:  7/6/2022 12:46 PM  ED Bed Assignment:  37/37    The nursing notes within the ED encounter and vital signs as below have been reviewed.    BP (!) 177/101   Pulse 80   Temp 98.2 °F (36.8 °C) (Oral)   Resp 17   Ht 5' 9\" (1.753 m)   Wt 190 lb (86.2 kg)   SpO2 96%   BMI 28.06 kg/m²   Oxygen Saturation Interpretation: Normal      ------------------------------------------ PROGRESS NOTES ------------------------------------------  3:59 PM EDT  I have spoken with the patient and discussed todays results, in addition to providing specific details for the plan of care and counseling regarding the diagnosis and prognosis. Their questions are answered at this time and they are agreeable with the plan. I discussed at length with them reasons for immediate return here for re evaluation. They will followup with their spine surgeon, and primary care provider and general surgeon by calling their office tomorrow. --------------------------------- ADDITIONAL PROVIDER NOTES ---------------------------------  At this time the patient is without objective evidence of an acute process requiring hospitalization or inpatient management. They have remained hemodynamically stable throughout their entire ED visit and are stable for discharge with outpatient follow-up. The plan has been discussed in detail and they are aware of the specific conditions for emergent return, as well as the importance of follow-up. New Prescriptions    No medications on file       Diagnosis:  1. Dysphagia, unspecified type        Disposition:  Patient's disposition: Discharge to home  Patient's condition is stable. Patient was seen and evaluated by myself and my attending Kleber Villegas MD. Assessment and Plan discussed with attending provider, please see attestation for final plan of care.      DO Gia Chapman DO  Resident  07/07/22 1997

## 2022-07-25 ENCOUNTER — HOSPITAL ENCOUNTER (OUTPATIENT)
Dept: CT IMAGING | Age: 77
Discharge: HOME | End: 2022-07-25
Payer: MEDICARE

## 2022-07-25 DIAGNOSIS — M54.12 RADICULOPATHY, CERVICAL REGION: ICD-10-CM

## 2022-07-25 DIAGNOSIS — R20.0 NUMBNESS OF FEET: ICD-10-CM

## 2022-07-25 DIAGNOSIS — R13.10 DYSPHAGIA, UNSPECIFIED TYPE: ICD-10-CM

## 2022-07-25 DIAGNOSIS — E07.9 THYROID MASS: ICD-10-CM

## 2022-07-25 DIAGNOSIS — M43.22 FUSION OF SPINE OF CERVICAL REGION: ICD-10-CM

## 2022-07-25 DIAGNOSIS — M48.02 SPINAL STENOSIS IN CERVICAL REGION: ICD-10-CM

## 2022-07-25 DIAGNOSIS — M54.2 NECK PAIN: ICD-10-CM

## 2022-07-25 PROCEDURE — 70490 CT SOFT TISSUE NECK W/O DYE: CPT

## 2022-07-28 ENCOUNTER — HOSPITAL ENCOUNTER (OUTPATIENT)
Dept: ULTRASOUND IMAGING | Age: 77
Discharge: HOME | End: 2022-07-28
Payer: MEDICARE

## 2022-07-28 ENCOUNTER — HOSPITAL ENCOUNTER (OUTPATIENT)
Dept: GENERAL RADIOLOGY | Age: 77
Discharge: HOME | End: 2022-07-30
Payer: MEDICARE

## 2022-07-28 DIAGNOSIS — R22.1 NECK MASS: ICD-10-CM

## 2022-07-28 DIAGNOSIS — R13.10 PROBLEMS WITH SWALLOWING: ICD-10-CM

## 2022-07-28 PROCEDURE — 74230 X-RAY XM SWLNG FUNCJ C+: CPT

## 2022-07-28 PROCEDURE — 92526 ORAL FUNCTION THERAPY: CPT | Performed by: SPEECH-LANGUAGE PATHOLOGIST

## 2022-07-28 PROCEDURE — 92611 MOTION FLUOROSCOPY/SWALLOW: CPT | Performed by: SPEECH-LANGUAGE PATHOLOGIST

## 2022-07-28 PROCEDURE — 76536 US EXAM OF HEAD AND NECK: CPT

## 2022-07-28 PROCEDURE — 2500000003 HC RX 250 WO HCPCS: Performed by: OTOLARYNGOLOGY

## 2022-07-28 RX ADMIN — BARIUM SULFATE 45 ML: 400 SUSPENSION ORAL at 10:04

## 2022-07-28 RX ADMIN — BARIUM SULFATE 45 ML: 0.81 POWDER, FOR SUSPENSION ORAL at 10:03

## 2022-07-28 RX ADMIN — BARIUM SULFATE 45 ML: 400 PASTE ORAL at 10:04

## 2022-07-28 NOTE — PROGRESS NOTES
SPEECH/LANGUAGE PATHOLOGY  VIDEOFLUOROSCOPIC STUDY OF SWALLOWING (MBS)   and PLAN OF CARE    PATIENT NAME:  Thuy Mendoza  (male)     MRN:  [id number]    :  1945  (64 y.o.)  STATUS:  Outpatient    TODAY'S DATE:  2022  REFERRING PROVIDER:   Dr. Darek Linn: [place] modified barium swallow with video  Date of order:  2022   REASON FOR REFERRAL: dysphagia    EVALUATING THERAPIST: MER Conroy      RESULTS:    (patient has disc to bring to Dr. Rudie Hodgkin)    DYSPHAGIA DIAGNOSIS:  moderate oropharyngeal phase dysphagia     DIET RECOMMENDATIONS:  Pureed consistency solids (IDDSI level 4) with  thin liquids (IDDSI level 0)    FEEDING RECOMMENDATIONS:    Assistance level:  No assistance needed     Compensatory strategies recommended: Double swallow, Effortful swallow, Chin neutral to slightly tucked, Small bites/sips, Alternate solids and liquids, and Throat clear     Discussed recommendations with nursing and/or faxed report to referring provider: Yes    Laryngeal Penetration and Aspiration:  Penetration WITHOUT aspiration was observed in today's study with  thin liquid    SPEECH THERAPY  PLAN OF CARE   The dysphagia POC is established based on physician order and dysphagia diagnosis    Outpatient OR Home Care Skilled SLP intervention for dysphagia management is recommended 1-2 times per week to address the established treatment plan      Conditions Requiring Skilled Therapeutic Intervention for dysphagia:    Reduced pharyngeal clearing of the bolus  Reduced laryngeal closure resulting in penetration  Swallow triggered when bolus head at level of laryngeal surface of epiglottis increasing risk of aspiration  Decreased UES opening     SPECIFIC DYSPHAGIA INTERVENTIONS TO INCLUDE:     Training in positioning for improved integrity of swallow  Compensatory strategy training   Therapeutic exercises  Trials of upgraded diet/liquid   Deep Pharyngeal Neuromuscular Stimulation (DPNS) if available Specific instructions for next treatment:  development and training of compensatory swallow strategies to improve airway protection and swallow function  Treatment Goals:    Short Term Goals:  Pt will implement identified compensatory swallowing strategies on 90% of opportunities or greater to improve airway protection and swallow function. Pt will complete BOTR strength/ ROM exercises to reduce pharyngeal residuals and improve epiglottic inversion minimal verbal prompts  Pt will complete laryngeal strength/ ROM therapeutic exercises to improve airway protection for the least restrictive PO diet minimal verbal prompts  Pt will complete Effortful Swallow therapeutically to target increased oral and base of tongue pressure, increased pharyngeal constrictor contractions, and increased UES relaxation duration to reduce pharyngeal residue with minimal verbal prompts   Pt will complete Lashay Maneuver therapeutically to target increased pharyngeal constrictor contractions to reduce pharyngeal residue with minimal verbal prompts   Pt will practice chin down posture to target earlier laryngeal closure with minimal verbal prompts   Pt will participate in DPNS to address functional deficits identified during swallow evaluation    Long Term Goals:   Pt will improve oropharyngeal swallow function to ensure airway protection during PO intake to maintain adequate nutrition/hydration and decrease signs/symptoms of aspiration to less than 1 x/day.    OTHER RECOMMENDATIONS:  An esophagram may be beneficial to formally evaluate esophageal motility to determine if this is a secondary deficit in addition to his pharyngeal deficit      Patient/family Goal:    To be able to eat regular foods and drink regular liquids    Plan of care discussed with Patient   The Patient did not demonstrate complete understanding of the diagnosis, prognosis and plan of care     Rehabilitation Potential/Prognosis: fair                      ADMITTING DIAGNOSIS: Problems with swallowing [R13.10]  Neck mass [R22.1]     VISIT DIAGNOSIS:   Visit Diagnoses         Codes    Problems with swallowing     R13.10    Neck mass     R22.1                PATIENT REPORT/COMPLAINT: food sticking in the throat  coughing with all consistencies    PRIOR LEVEL OF SWALLOW FUNCTION:    Past History of Dysphagia?:  yes    Home diet: Regular consistency solids (IDDSI level 7) with  thin liquids (IDDSI level 0)  Current Diet Order:  No diet orders on file    PROCEDURE:  Consistencies Administered During the Evaluation   Liquids: thin liquid, nectar thick liquid, and honey thick liquid   Solids:  pureed foods and solid cookie deemed not appropriate      Method of Intake:   cup, straw, spoon  Self fed      Position:   Seated, upright, Lateral plane, A-P plane    INSTRUMENTAL ASSESSMENT:    MBSImP Results:   Lip closure for intraoral bolus containment resulted in no labial escape. Tongue control during bolus hold resulted in posterior escape of less then 1/2 of the bolus. Bolus preparation and mastication solids not appropriate due to the degree of pharyngeal residue with puree and his piecemeal swallow with other consistencies. Bolus transport/lingual motion was with repetitive tongue motion. Oral residue was observed and required several swallows to clear. Initiation of the pharyngeal swallow occurred as the bolus head reached the pyriform sinuses. Soft palate elevation resulted in no bolus between the soft palate and the pharyngeal wall. Laryngeal elevation demonstrated partial superior movement of the thyroid cartilage with partial approximation of the arytenoids to the epiglottic petiole. Anterior hyoid excursion demonstrated partial anterior movement. Epiglottic movement resulted in partial inversion. Laryngeal vestibule closure was incomplete, as indicated by a narrow column of air or contrast within the laryngeal vestibule at the height of the swallow.  Pharyngeal stripping wave was present but diminished. Pharyngeal contraction decreased. Pharyngoesophageal segment opening was incompletely distended for incomplete duration with significant obstruction of bolus flow with thicker items. Tongue base retraction allowed a wide collection of residue between the retracted tongue base and the posterior pharyngeal wall especially with puree. A collection of residue remained within or on the pharyngeal structures Esophageal clearance in the upright position was reduced with a standing column of barium being noted inconsistently after intake in both the Lateral and A-P view. PENETRATION-ASPIRATION SCALE (PAS):  THIN 3 = Material enters the airway, remains above the vocal folds, and is not ejected from the airway  MILDLY THICK 1 = Material does not enter the airway  MODERATELY THICK 1 = Material does not enter the airway  PUREE 1 = Material does not enter the airway  HARD SOLID item not administered       COMPENSATORY STRATEGIES    Compensatory strategies that were beneficial included Multiple swallow, Chin tuck, Small bites/sips, Alternate solids and liquids, and Throat clear      STRUCTURAL/FUNCTIONAL ANOMALIES   Anatomical changes consistent with post surgical status              ___________    Cognition:   Within functional limits for this exam    Oral Peripheral Examination   Adequate lingual/labial strength     Current Respiratory Status   room air     Parameters of Speech Production  Respiration:  Adequate for speech production  Quality:   Within functional limits  Intensity: Within functional limits    Pain: No pain reported. EDUCATION:   The Speech Language Pathologist (SLP) completed education regarding results of evaluation and that intervention is warranted at this time. Learner: Patient  Education: Reviewed results and recommendations of this evaluation  Evaluation of Education:  Needs further instruction    This plan may be re-evaluated and revised as warranted. Evaluation Time includes thorough review of current medical information, gathering information on past medical history/social history and prior level of function, completion of standardized testing/informal observation of tasks, assessment of data and education on plan of care and goals. [x]The admitting diagnosis and active problem list, have been reviewed prior to initiation of this evaluation. CPT Code: 68101  dysphagia study    ACTIVE PROBLEM LIST:   Patient Active Problem List   Diagnosis    DDD (degenerative disc disease), cervical    Cervical radiculopathy    Atrial fibrillation ([facility] [address]    Essential hypertension    Type 2 diabetes mellitus with diabetic polyneuropathy, without long-term current use of insulin (HCC)    Chronic pain syndrome    Diabetic foot infection ([facility] [address]    Dysphagia    Hypothyroidism    Anemia    Coronary artery disease involving native coronary artery of native heart without angina pectoris    Chest pain with moderate risk for cardiac etiology    Bradycardia    Lumbar foraminal stenosis    S/P lumbar laminectomy    Benign prostatic hyperplasia with urinary frequency    Synovial cyst of lumbar spine    Cervical stenosis of spine         Patient seen for swallow therapy 10 minutes. Reviewed current solid/liquid consistency diet recommendation for   Pureed consistency solids (IDDSI level 4) with thin liquids (IDDSI level 0)and discussed compensatory strategies to ensure safe PO intake. Reviewed aspiration precautions. Discussed use of Throat clear to decrease risk of aspiration with implementation of strengthening exercises to improve swallow function as the long term goal.  Patient was not able to restate compensatory strategies during session. .  Patient will require ongoing education regarding dysphagia secondary to decreased ability to fully demonstrate and or restate compensatory strategies and or diet modifications during session. .        [id number]  dysphagia tx    Ramy Martinez MSCCC/SLP  Speech Language Pathologist  [id number]

## 2022-08-19 DIAGNOSIS — R13.10 DYSPHAGIA, UNSPECIFIED TYPE: ICD-10-CM

## 2022-08-19 DIAGNOSIS — R13.19 ESOPHAGEAL DYSPHAGIA: Primary | ICD-10-CM

## 2022-08-24 ENCOUNTER — HOSPITAL ENCOUNTER (EMERGENCY)
Age: 77
Discharge: HOME | End: 2022-08-24
Attending: EMERGENCY MEDICINE
Payer: MEDICARE

## 2022-08-24 VITALS
BODY MASS INDEX: 27.32 KG/M2 | WEIGHT: 185 LBS | DIASTOLIC BLOOD PRESSURE: 90 MMHG | HEART RATE: 72 BPM | OXYGEN SATURATION: 98 % | RESPIRATION RATE: 18 BRPM | SYSTOLIC BLOOD PRESSURE: 172 MMHG | TEMPERATURE: 98.3 F

## 2022-08-24 DIAGNOSIS — K11.7 XEROSTOMIA: Primary | ICD-10-CM

## 2022-08-24 DIAGNOSIS — E87.1 HYPONATREMIA: ICD-10-CM

## 2022-08-24 LAB
ALBUMIN SERPL-MCNC: 4 G/DL (ref 3.5–5.2)
ALP BLD-CCNC: 93 U/L (ref 40–129)
ALT SERPL-CCNC: 21 U/L (ref 0–40)
ANION GAP SERPL CALCULATED.3IONS-SCNC: 11 MMOL/L (ref 7–16)
AST SERPL-CCNC: 25 U/L (ref 0–39)
BASOPHILS ABSOLUTE: 0.04 E9/L (ref 0–0.2)
BASOPHILS RELATIVE PERCENT: 0.5 % (ref 0–2)
BILIRUB SERPL-MCNC: 0.3 MG/DL (ref 0–1.2)
BILIRUBIN URINE: NEGATIVE
BLOOD, URINE: NEGATIVE
BUN BLDV-MCNC: 13 MG/DL (ref 6–23)
CALCIUM SERPL-MCNC: 9.3 MG/DL (ref 8.6–10.2)
CHLORIDE BLD-SCNC: 95 MMOL/L (ref 98–107)
CLARITY: CLEAR
CO2: 23 MMOL/L (ref 22–29)
COLOR: YELLOW
CREAT SERPL-MCNC: 0.8 MG/DL (ref 0.7–1.2)
EOSINOPHILS ABSOLUTE: 0.08 E9/L (ref 0.05–0.5)
EOSINOPHILS RELATIVE PERCENT: 1 % (ref 0–6)
GFR AFRICAN AMERICAN: >60
GFR NON-AFRICAN AMERICAN: >60 ML/MIN/1.73
GLUCOSE BLD-MCNC: 107 MG/DL (ref 74–99)
GLUCOSE URINE: NEGATIVE MG/DL
HCT VFR BLD CALC: 44.1 % (ref 37–54)
HEMOGLOBIN: 15.6 G/DL (ref 12.5–16.5)
IMMATURE GRANULOCYTES #: 0.04 E9/L
IMMATURE GRANULOCYTES %: 0.5 % (ref 0–5)
KETONES, URINE: NEGATIVE MG/DL
LEUKOCYTE ESTERASE, URINE: NEGATIVE
LYMPHOCYTES ABSOLUTE: 2.5 E9/L (ref 1.5–4)
LYMPHOCYTES RELATIVE PERCENT: 31.6 % (ref 20–42)
MCH RBC QN AUTO: 31.5 PG (ref 26–35)
MCHC RBC AUTO-ENTMCNC: 35.4 % (ref 32–34.5)
MCV RBC AUTO: 88.9 FL (ref 80–99.9)
MONOCYTES ABSOLUTE: 0.7 E9/L (ref 0.1–0.95)
MONOCYTES RELATIVE PERCENT: 8.8 % (ref 2–12)
NEUTROPHILS ABSOLUTE: 4.56 E9/L (ref 1.8–7.3)
NEUTROPHILS RELATIVE PERCENT: 57.6 % (ref 43–80)
NITRITE, URINE: NEGATIVE
PDW BLD-RTO: 12.8 FL (ref 11.5–15)
PH UA: 6 (ref 5–9)
PLATELET # BLD: 192 E9/L (ref 130–450)
PMV BLD AUTO: 10.5 FL (ref 7–12)
POTASSIUM REFLEX MAGNESIUM: 4.3 MMOL/L (ref 3.5–5)
PROTEIN UA: NEGATIVE MG/DL
RBC # BLD: 4.96 E12/L (ref 3.8–5.8)
SODIUM BLD-SCNC: 129 MMOL/L (ref 132–146)
SPECIFIC GRAVITY UA: <=1.005 (ref 1–1.03)
TOTAL PROTEIN: 7.1 G/DL (ref 6.4–8.3)
UROBILINOGEN, URINE: 0.2 E.U./DL
WBC # BLD: 7.9 E9/L (ref 4.5–11.5)

## 2022-08-24 PROCEDURE — 80053 COMPREHEN METABOLIC PANEL: CPT

## 2022-08-24 PROCEDURE — 85025 COMPLETE CBC W/AUTO DIFF WBC: CPT

## 2022-08-24 PROCEDURE — 2580000003 HC RX 258: Performed by: EMERGENCY MEDICINE

## 2022-08-24 PROCEDURE — 96360 HYDRATION IV INFUSION INIT: CPT

## 2022-08-24 PROCEDURE — 99284 EMERGENCY DEPT VISIT MOD MDM: CPT

## 2022-08-24 PROCEDURE — 81003 URINALYSIS AUTO W/O SCOPE: CPT

## 2022-08-24 RX ORDER — 0.9 % SODIUM CHLORIDE 0.9 %
1000 INTRAVENOUS SOLUTION INTRAVENOUS ONCE
Status: COMPLETED | OUTPATIENT
Start: 2022-08-24 | End: 2022-08-24

## 2022-08-24 RX ADMIN — SODIUM CHLORIDE 1000 ML: 9 INJECTION, SOLUTION INTRAVENOUS at 09:58

## 2022-08-24 ASSESSMENT — ENCOUNTER SYMPTOMS
VOMITING: 0
SHORTNESS OF BREATH: 0
ABDOMINAL PAIN: 0
DIARRHEA: 0
COUGH: 0
NAUSEA: 0
TROUBLE SWALLOWING: 1
RHINORRHEA: 1
COLOR CHANGE: 0

## 2022-08-24 NOTE — ED PROVIDER NOTES
Pharynx: No posterior oropharyngeal erythema. Eyes:      General: No scleral icterus. Conjunctiva/sclera: Conjunctivae normal.   Cardiovascular:      Rate and Rhythm: Normal rate and regular rhythm. Heart sounds: Normal heart sounds. No murmur heard. Pulmonary:      Effort: Pulmonary effort is normal. No respiratory distress. Breath sounds: Normal breath sounds. No wheezing or rales. Abdominal:      General: Bowel sounds are normal.      Palpations: Abdomen is soft. Tenderness: There is no abdominal tenderness. There is no guarding or rebound. Musculoskeletal:      Cervical back: Normal range of motion and neck supple. No tenderness. Lymphadenopathy:      Cervical: No cervical adenopathy. Skin:     General: Skin is warm and dry. Coloration: Skin is not jaundiced or pale. Neurological:      Mental Status: He is alert and oriented to person, place, and time. Procedures     MDM  Presented to the ED with concerns for dehydration. Patient mostly complains of dry mouth. States that he was recently at [facility] for a similar complaint. Patient voiced no other complaints. No nausea, vomiting, diarrhea, or abdominal pain. No dizziness or lightheadedness. He states has been urinating normally. Labs were assessed. CBC showed no evidence of anemia or leukocytosis. BMP showed no electrolyte abnormalities other than mild hyponatremia. Patient was given IV fluids in the ED. No renal insufficiency. Urinalysis was unremarkable showing no signs of blood or infection. Normal specific gravity. Discussed with patient that he is not dehydrated and that he should get lemon drops or other sour candy to stimulate salivary production. He will follow-up with his PCP. ED Course as of 08/24/22 1154   Wed Aug 24, 2022   1048 Test results of labs with patient. Discussed that he has no evidence of renal insufficiency.   His urine was unremarkable showing a normal specific gravity. He did have a depressed sodium at 129. This is most likely due to his excessive water intake. Advised him not to drink excessive amounts of water. Discussed that he has no clinical findings or lab values that indicate that he is dehydrated. CBC was also unremarkable. No evidence of hemoconcentration. Would like the patient to follow-up with his PCP. Advised him to use lemon drops or other sour candies to stimulate saliva production. Also advised him to watch the amount of water he is drinking. Advised him that drinking large amounts of plain water can be dangerous. [MS]      ED Course User Index  [MS] Ju Miramontes, DO       --------------------------------------------- PAST HISTORY ---------------------------------------------  Past Medical History:  has a past medical history of Abnormal computed tomography angiography of heart, Arthritis, Atrial fibrillation ([facility] [address] Burning pain, CAD (coronary artery disease), Degenerative lumbar disc, Diabetes ([address] Dysphagia, Edentulous, H/O cardiovascular stress test, Herniated cervical disc, History of echocardiogram, Hx of blood clots, Hyperlipidemia, Hypertension, Stented coronary artery, Tinnitus, and Urinary retention. Past Surgical History:  has a past surgical history that includes colectomy (2010 sigmoid); Hemorrhoid surgery (2008); Prostate surgery (2017); Colonoscopy (5/2018 [facility]); Nerve Block (Bilateral, 10 12 2015); Nerve Block (Bilateral, 10/22/15); Nerve Block (Bilateral, 11 02 2015); Nerve Block (Left, 12/2/15); Nerve Block (Left, 12 16 15); Nerve Block (Left, 12 28 2015); Nerve Block (Left, 1 20 16); ablation of dysrhythmic focus (1980); lumbar fusion (03/06/2017); Nasal sinus surgery (12/07/2017); Upper gastrointestinal endoscopy (5/2018 [facility]); Nerve Block (12/11/2018); epidural steroid injection (N/A, 12/11/2018); Nerve Block (N/A, 01/08/2019); epidural steroid injection (N/A, 1/8/2019);  Endoscopy, colon, diagnostic; other surgical history (1943); Upper gastrointestinal endoscopy (N/A, 6/4/2019); Diagnostic Cardiac Cath Lab Procedure; Percutaneous Transluminal Coronary Angio (09/30/2019); Coronary angioplasty with stent; Cardiac catheterization (02/20/2020); eye surgery; laminectomy (N/A, 6/5/2020); laminectomy (N/A, 8/7/2020); cervical fusion (N/A, 10/9/2020); back surgery; Upper gastrointestinal endoscopy (N/A, 6/15/2022); and Upper gastrointestinal endoscopy (6/15/2022). Social History:  reports that he quit smoking about 45 years ago. He has never used smokeless tobacco. He reports that he does not drink alcohol and does not use drugs. Family History: family history includes Arrhythmia in his brother; Cancer in his brother; Diabetes in an other family member; Hypertension in an other family member; Kidney Disease in his brother and sister; Other in his father; Stroke in his mother. The patients home medications have been reviewed.     Allergies: Imdur [isosorbide nitrate], Oxycodone, Simvastatin, Bactrim [sulfamethoxazole-trimethoprim], Ciprofloxacin hcl, Gabapentin, Hydrocodone, and Norco [hydrocodone-acetaminophen]    -------------------------------------------------- RESULTS -------------------------------------------------  Labs:  Results for orders placed or performed during the hospital encounter of 08/24/22   Comprehensive Metabolic Panel w/ Reflex to MG   Result Value Ref Range    Sodium 129 (L) 132 - 146 mmol/L    Potassium reflex Magnesium 4.3 3.5 - 5.0 mmol/L    Chloride 95 (L) 98 - 107 mmol/L    CO2 23 22 - 29 mmol/L    Anion Gap 11 7 - 16 mmol/L    Glucose 107 (H) 74 - 99 mg/dL    BUN 13 6 - 23 mg/dL    Creatinine 0.8 0.7 - 1.2 mg/dL    GFR Non-African American >60 >=60 mL/min/1.73    GFR African American >60     Calcium 9.3 8.6 - 10.2 mg/dL    Total Protein 7.1 6.4 - 8.3 g/dL    Albumin 4.0 3.5 - 5.2 g/dL    Total Bilirubin 0.3 0.0 - 1.2 mg/dL    Alkaline Phosphatase 93 40 - 129 U/L    ALT 21 0 - 40 U/L    AST 25 0 - 39 U/L   CBC with Auto Differential   Result Value Ref Range    WBC 7.9 4.5 - 11.5 E9/L    RBC 4.96 3.80 - 5.80 E12/L    Hemoglobin 15.6 12.5 - 16.5 g/dL    Hematocrit 44.1 37.0 - 54.0 %    MCV 88.9 80.0 - 99.9 fL    MCH 31.5 26.0 - 35.0 pg    MCHC 35.4 (H) 32.0 - 34.5 %    RDW 12.8 11.5 - 15.0 fL    Platelets 324 847 - 762 E9/L    MPV 10.5 7.0 - 12.0 fL    Neutrophils % 57.6 43.0 - 80.0 %    Immature Granulocytes % 0.5 0.0 - 5.0 %    Lymphocytes % 31.6 20.0 - 42.0 %    Monocytes % 8.8 2.0 - 12.0 %    Eosinophils % 1.0 0.0 - 6.0 %    Basophils % 0.5 0.0 - 2.0 %    Neutrophils Absolute 4.56 1.80 - 7.30 E9/L    Immature Granulocytes # 0.04 E9/L    Lymphocytes Absolute 2.50 1.50 - 4.00 E9/L    Monocytes Absolute 0.70 0.10 - 0.95 E9/L    Eosinophils Absolute 0.08 0.05 - 0.50 E9/L    Basophils Absolute 0.04 0.00 - 0.20 E9/L   Urinalysis   Result Value Ref Range    Color, UA Yellow Straw/Yellow    Clarity, UA Clear Clear    Glucose, Ur Negative Negative mg/dL    Bilirubin Urine Negative Negative    Ketones, Urine Negative Negative mg/dL    Specific Gravity, UA <=1.005 1.005 - 1.030    Blood, Urine Negative Negative    pH, UA 6.0 5.0 - 9.0    Protein, UA Negative Negative mg/dL    Urobilinogen, Urine 0.2 <2.0 E.U./dL    Nitrite, Urine Negative Negative    Leukocyte Esterase, Urine Negative Negative       Radiology:  No orders to display       ------------------------- NURSING NOTES AND VITALS REVIEWED ---------------------------  Date / Time Roomed:  8/24/2022  9:23 AM  ED Bed Assignment:  15/15    The nursing notes within the ED encounter and vital signs as below have been reviewed.    BP (!) 172/90   Pulse 72   Temp 98.3 °F (36.8 °C) (Oral)   Resp 18   Wt 185 lb (83.9 kg)   SpO2 98%   BMI 27.32 kg/m²   Oxygen Saturation Interpretation: Normal      ------------------------------------------ PROGRESS NOTES ------------------------------------------  I have spoken with the patient and discussed todays results, in addition to providing specific details for the plan of care and counseling regarding the diagnosis and prognosis. Their questions are answered at this time and they are agreeable with the plan. I discussed at length with them reasons for immediate return here for re evaluation. They will followup with primary care by calling their office tomorrow. --------------------------------- ADDITIONAL PROVIDER NOTES ---------------------------------  At this time the patient is without objective evidence of an acute process requiring hospitalization or inpatient management. They have remained hemodynamically stable throughout their entire ED visit and are stable for discharge with outpatient follow-up. The plan has been discussed in detail and they are aware of the specific conditions for emergent return, as well as the importance of follow-up. Discharge Medication List as of 8/24/2022 10:56 AM          Diagnosis:  1. Xerostomia    2. Hyponatremia        Disposition:  Patient's disposition: Discharge to home  Patient's condition is stable.              Jaime Real DO  08/24/22 1155

## 2022-08-24 NOTE — ED NOTES
Discharge instructions given and pt verbalized understanding. Gait steady. No distress noted.      Maddi Peng RN  08/24/22 2878

## 2022-09-03 ENCOUNTER — HOSPITAL ENCOUNTER (EMERGENCY)
Age: 77
Discharge: HOME | End: 2022-09-03
Attending: EMERGENCY MEDICINE
Payer: MEDICARE

## 2022-09-03 VITALS
SYSTOLIC BLOOD PRESSURE: 178 MMHG | DIASTOLIC BLOOD PRESSURE: 87 MMHG | BODY MASS INDEX: 27.32 KG/M2 | WEIGHT: 185 LBS | TEMPERATURE: 97.8 F | OXYGEN SATURATION: 96 % | RESPIRATION RATE: 20 BRPM | HEART RATE: 69 BPM

## 2022-09-03 DIAGNOSIS — R13.10 DYSPHAGIA, UNSPECIFIED TYPE: Primary | ICD-10-CM

## 2022-09-03 PROCEDURE — 96374 THER/PROPH/DIAG INJ IV PUSH: CPT

## 2022-09-03 PROCEDURE — 2500000003 HC RX 250 WO HCPCS

## 2022-09-03 PROCEDURE — 99284 EMERGENCY DEPT VISIT MOD MDM: CPT

## 2022-09-03 RX ADMIN — GLUCAGON HYDROCHLORIDE 1 MG: KIT at 08:27

## 2022-09-03 ASSESSMENT — ENCOUNTER SYMPTOMS
VOICE CHANGE: 0
RHINORRHEA: 0
CHOKING: 0
PHOTOPHOBIA: 0
TROUBLE SWALLOWING: 0
NAUSEA: 0
COUGH: 0
VOMITING: 0
BACK PAIN: 0
ABDOMINAL PAIN: 0
WHEEZING: 0
SHORTNESS OF BREATH: 0
DIARRHEA: 0

## 2022-09-03 NOTE — ED PROVIDER NOTES
Cardiovascular:      Rate and Rhythm: Normal rate and regular rhythm. Heart sounds: No friction rub. No gallop. Pulmonary:      Effort: Pulmonary effort is normal. No respiratory distress. Breath sounds: Normal breath sounds. No stridor. Abdominal:      General: There is no distension. Tenderness: There is no abdominal tenderness. There is no guarding or rebound. Musculoskeletal:         General: No deformity or signs of injury. Cervical back: Normal range of motion and neck supple. No rigidity or tenderness. Skin:     Coloration: Skin is not jaundiced. Neurological:      Mental Status: He is alert. Sensory: No sensory deficit. Motor: No weakness. Psychiatric:         Mood and Affect: Mood normal.         Behavior: Behavior normal.      Negaitive  Procedures     MDM  65 y/o male with h/o dysphagia. Presents to the ED after eating scrambled eggs and white bread. Pt has a follow up scheduled with Dr. Amara Joseph for another EGD in October. Pt is in no acute distress. Hemodynamically stable. Physical exam was only impressive for cough. We gave patient 1 mg glucagon IV as smooth muscle relaxer. Pt was reevaluated with improvement. Pt stopped coughing and feels better. Pt was told to let his PCP know about today's visit. He can also try carbonated drink at home if he feels food is getting stuck. He was also instructed to come back to the ED while he is waiting for EGD is symptoms come back again. Pt agreed to the plan. ED Course as of 09/03/22 1904   Sat Sep 03, 2022   0820   ATTENDING PROVIDER ATTESTATION:     I have personally performed and/or participated in the history, exam, medical decision making, and procedures and agree with all pertinent clinical information unless otherwise noted. I have also reviewed and agree with the past medical, family and social history unless otherwise noted.     I have discussed this patient in detail with the resident and provided the vomiting. My findings: Malik Harley is a 64 y.o. male whom is in no distress. Physical exam reveals patient sitting at the bedside no distress. Occasionally coughing. Handling secretions without difficulty. No swelling or erythema of the posterior oropharynx. No wheezing or stridor appreciated. My plan: Symptomatic and supportive care. Electronically signed by Xavier Jo DO on 9/3/22 at 8:20 AM EDT       [MS]      ED Course User Index  [MS] Xavier Jo DO       --------------------------------------------- PAST HISTORY ---------------------------------------------  Past Medical History:  has a past medical history of Abnormal computed tomography angiography of heart, Arthritis, Atrial fibrillation ([facility] [address] Burning pain, CAD (coronary artery disease), Degenerative lumbar disc, Diabetes ([facility] [address] Dysphagia, Edentulous, H/O cardiovascular stress test, Herniated cervical disc, History of echocardiogram, Hx of blood clots, Hyperlipidemia, Hypertension, Stented coronary artery, Tinnitus, and Urinary retention. Past Surgical History:  has a past surgical history that includes colectomy (2010 sigmoid); Hemorrhoid surgery (2008); Prostate surgery (2017); Colonoscopy (5/2018 [facility]); Nerve Block (Bilateral, 10 12 2015); Nerve Block (Bilateral, 10/22/15); Nerve Block (Bilateral, 11 02 2015); Nerve Block (Left, 12/2/15); Nerve Block (Left, 12 16 15); Nerve Block (Left, 12 28 2015); Nerve Block (Left, 1 20 16); ablation of dysrhythmic focus (1980); lumbar fusion (03/06/2017); Nasal sinus surgery (12/07/2017); Upper gastrointestinal endoscopy (5/2018 [facility]); Nerve Block (12/11/2018); epidural steroid injection (N/A, 12/11/2018); Nerve Block (N/A, 01/08/2019); epidural steroid injection (N/A, 1/8/2019); Endoscopy, colon, diagnostic; other surgical history (1943); Upper gastrointestinal endoscopy (N/A, 6/4/2019);  Diagnostic Cardiac Cath Lab Procedure; Percutaneous Transluminal Coronary Angio (09/30/2019); Coronary angioplasty with stent; Cardiac catheterization (02/20/2020); eye surgery; laminectomy (N/A, 6/5/2020); laminectomy (N/A, 8/7/2020); cervical fusion (N/A, 10/9/2020); back surgery; Upper gastrointestinal endoscopy (N/A, 6/15/2022); and Upper gastrointestinal endoscopy (6/15/2022). Social History:  reports that he quit smoking about 45 years ago. He has never used smokeless tobacco. He reports that he does not drink alcohol and does not use drugs. Family History: family history includes Arrhythmia in his brother; Cancer in his brother; Diabetes in an other family member; Hypertension in an other family member; Kidney Disease in his brother and sister; Other in his father; Stroke in his mother. The patients home medications have been reviewed. Allergies: Imdur [isosorbide nitrate], Oxycodone, Simvastatin, Bactrim [sulfamethoxazole-trimethoprim], Ciprofloxacin hcl, Gabapentin, Hydrocodone, and Norco [hydrocodone-acetaminophen]    -------------------------------------------------- RESULTS -------------------------------------------------  Labs:  No results found for this visit on 09/03/22. Radiology:  No orders to display       ------------------------- NURSING NOTES AND VITALS REVIEWED ---------------------------  Date / Time Roomed:  9/3/2022  7:24 AM  ED Bed Assignment:  12/12    The nursing notes within the ED encounter and vital signs as below have been reviewed. BP (!) 178/87   Pulse 69   Temp 97.8 °F (36.6 °C)   Resp 20   Wt 185 lb (83.9 kg)   SpO2 96%   BMI 27.32 kg/m²   Oxygen Saturation Interpretation: Normal      ------------------------------------------ PROGRESS NOTES ------------------------------------------  I have spoken with the patient and discussed todays results, in addition to providing specific details for the plan of care and counseling regarding the diagnosis and prognosis. Their questions are answered at this time and they are agreeable with the plan.  I discussed at length with them reasons for immediate return here for re evaluation. They will followup with primary care by calling their office tomorrow. --------------------------------- ADDITIONAL PROVIDER NOTES ---------------------------------  At this time the patient is without objective evidence of an acute process requiring hospitalization or inpatient management. They have remained hemodynamically stable throughout their entire ED visit and are stable for discharge with outpatient follow-up. The plan has been discussed in detail and they are aware of the specific conditions for emergent return, as well as the importance of follow-up. Discharge Medication List as of 9/3/2022  9:37 AM          Diagnosis:  1. Dysphagia, unspecified type Controlled       Disposition:  Patient's disposition: Discharge to home  Patient's condition is stable.             Love Robin, DO  Resident  09/03/22 1910

## 2022-09-03 NOTE — ED NOTES
The pt says that he is now able to swallow. His coughing has stopped.      Shane Fontenot RN  09/03/22 8026

## 2022-09-09 ENCOUNTER — TELEPHONE (OUTPATIENT)
Dept: FAMILY MEDICINE CLINIC | Age: 77
End: 2022-09-09

## 2022-09-09 NOTE — TELEPHONE ENCOUNTER
----- Message from [address] sent at 9/9/2022  1:34 PM EDT -----  Subject: Message to Provider    QUESTIONS  Information for Provider? Joey Adams was following up on a fax from [phone] [facility]   Neurology. Please contact if it has been received. This needs done ASAP. Thank you  ---------------------------------------------------------------------------  --------------  Pat[facility] INFO  [phone]; OK to leave message on voicemail  ---------------------------------------------------------------------------  --------------  SCRIPT ANSWERS  Relationship to Patient?  Self

## 2022-10-18 DIAGNOSIS — I48.91 ATRIAL FIBRILLATION, UNSPECIFIED TYPE (HCC): ICD-10-CM

## 2022-10-18 NOTE — TELEPHONE ENCOUNTER
Pt called for refill    Last seen 7/5/2022  Next appt 11/16/2022  [address] [facility]

## 2022-10-20 RX ORDER — METOPROLOL SUCCINATE 50 MG/1
50 TABLET, EXTENDED RELEASE ORAL 2 TIMES DAILY
Qty: 180 TABLET | Refills: 3 | Status: SHIPPED | OUTPATIENT
Start: 2022-10-20

## 2022-10-25 ENCOUNTER — HOSPITAL ENCOUNTER (EMERGENCY)
Age: 77
Discharge: HOME | End: 2022-10-25
Attending: EMERGENCY MEDICINE
Payer: MEDICARE

## 2022-10-25 ENCOUNTER — APPOINTMENT (OUTPATIENT)
Dept: ULTRASOUND IMAGING | Age: 77
End: 2022-10-25
Payer: MEDICARE

## 2022-10-25 VITALS
DIASTOLIC BLOOD PRESSURE: 87 MMHG | HEART RATE: 90 BPM | RESPIRATION RATE: 20 BRPM | OXYGEN SATURATION: 97 % | WEIGHT: 180 LBS | SYSTOLIC BLOOD PRESSURE: 208 MMHG | TEMPERATURE: 98.2 F | BODY MASS INDEX: 26.58 KG/M2

## 2022-10-25 DIAGNOSIS — I82.411 ACUTE DEEP VEIN THROMBOSIS (DVT) OF FEMORAL VEIN OF RIGHT LOWER EXTREMITY (HCC): Primary | ICD-10-CM

## 2022-10-25 PROCEDURE — 93971 EXTREMITY STUDY: CPT

## 2022-10-25 PROCEDURE — 99284 EMERGENCY DEPT VISIT MOD MDM: CPT

## 2022-10-25 PROCEDURE — 6370000000 HC RX 637 (ALT 250 FOR IP): Performed by: EMERGENCY MEDICINE

## 2022-10-25 RX ADMIN — APIXABAN 10 MG: 5 TABLET, FILM COATED ORAL at 22:25

## 2022-10-25 ASSESSMENT — ENCOUNTER SYMPTOMS
WHEEZING: 0
BACK PAIN: 0
CHEST TIGHTNESS: 0
NAUSEA: 0
DIARRHEA: 0
SHORTNESS OF BREATH: 0
VOMITING: 0
ABDOMINAL PAIN: 0
SORE THROAT: 0
COUGH: 0

## 2022-10-25 NOTE — ED PROVIDER NOTES
Pupils are equal, round, and reactive to light. Cardiovascular:      Rate and Rhythm: Normal rate and regular rhythm. Heart sounds: Normal heart sounds. No murmur heard. Pulmonary:      Effort: Pulmonary effort is normal. No respiratory distress. Breath sounds: Normal breath sounds. No stridor, decreased air movement or transmitted upper airway sounds. No decreased breath sounds, wheezing, rhonchi or rales. Chest:      Chest wall: No tenderness. Abdominal:      General: Bowel sounds are normal. There is no distension. Palpations: Abdomen is soft. Tenderness: There is no abdominal tenderness. There is no right CVA tenderness, left CVA tenderness, guarding or rebound. Musculoskeletal:         General: Swelling present. No tenderness, deformity or signs of injury. Cervical back: Full passive range of motion without pain, normal range of motion and neck supple. Right lower leg: No edema. Left lower leg: No edema. Comments: Trace right ankle swelling compared to left with no gross right leg pretibial edema or calf pain compared to the left. Both feet are neurovascular intact, strong right foot dorsal pedal pulse. Right great toe with partial nail removal with no purulent drainage or cellulitis or swelling, it appears to be healing well. Right foot with no signs of cellulitis or abscess. Skin:     General: Skin is warm and dry. Coloration: Skin is not jaundiced or pale. Findings: No bruising, erythema or rash. Neurological:      General: No focal deficit present. Mental Status: He is alert and oriented to person, place, and time. Cranial Nerves: No cranial nerve deficit. Coordination: Coordination normal.   Psychiatric:         Behavior: Behavior is cooperative.         Procedures     [facility]                 --------------------------------------------- PAST HISTORY ---------------------------------------------  Past Medical History:  has a past medical history of Abnormal computed tomography angiography of heart, Arthritis, Atrial fibrillation (HCC), Burning pain, CAD (coronary artery disease), Degenerative lumbar disc, Diabetes ([facility] [address] Dysphagia, Edentulous, H/O cardiovascular stress test, Herniated cervical disc, History of echocardiogram, Hx of blood clots, Hyperlipidemia, Hypertension, Stented coronary artery, Tinnitus, and Urinary retention. Past Surgical History:  has a past surgical history that includes colectomy (2010 sigmoid); Hemorrhoid surgery (2008); Prostate surgery (2017); Colonoscopy (5/2018 [facility]); Nerve Block (Bilateral, 10 12 2015); Nerve Block (Bilateral, 10/22/15); Nerve Block (Bilateral, 11 02 2015); Nerve Block (Left, 12/2/15); Nerve Block (Left, 12 16 15); Nerve Block (Left, 12 28 2015); Nerve Block (Left, 1 20 16); ablation of dysrhythmic focus (1980); lumbar fusion (03/06/2017); Nasal sinus surgery (12/07/2017); Upper gastrointestinal endoscopy (5/2018 [facility]); Nerve Block (12/11/2018); epidural steroid injection (N/A, 12/11/2018); Nerve Block (N/A, 01/08/2019); epidural steroid injection (N/A, 1/8/2019); Endoscopy, colon, diagnostic; other surgical history (1943); Upper gastrointestinal endoscopy (N/A, 6/4/2019); Diagnostic Cardiac Cath Lab Procedure; Percutaneous Transluminal Coronary Angio (09/30/2019); Coronary angioplasty with stent; Cardiac catheterization (02/20/2020); eye surgery; laminectomy (N/A, 6/5/2020); laminectomy (N/A, 8/7/2020); cervical fusion (N/A, 10/9/2020); back surgery; Upper gastrointestinal endoscopy (N/A, 6/15/2022); and Upper gastrointestinal endoscopy (6/15/2022). Social History:  reports that he quit smoking about 45 years ago. He has never used smokeless tobacco. He reports that he does not drink alcohol and does not use drugs.     Family History: family history includes Arrhythmia in his brother; Cancer in his brother; Diabetes in an other family member; Hypertension in an other family member; Kidney Disease in his brother and sister; Other in his father; Stroke in his mother. The patients home medications have been reviewed. Allergies: Imdur [isosorbide nitrate], Oxycodone, Simvastatin, Bactrim [sulfamethoxazole-trimethoprim], Ciprofloxacin hcl, Gabapentin, Hydrocodone, and Norco [hydrocodone-acetaminophen]    -------------------------------------------------- RESULTS -------------------------------------------------  Labs:  No results found for this visit on 10/25/22. Radiology:  US DUP LOWER EXTREMITY RIGHT CARO   Final Result   Findings consistent with acute thrombosis of right distal superficial femoral   through popliteal and calf veins as described above.             ------------------------- NURSING NOTES AND VITALS REVIEWED ---------------------------  Date / Time Roomed:  10/25/2022  7:18 PM  ED Bed Assignment:  23/23    The nursing notes within the ED encounter and vital signs as below have been reviewed. BP (!) 208/87   Pulse 90   Temp 98.2 °F (36.8 °C)   Resp 20   Wt 180 lb (81.6 kg)   SpO2 97%   BMI 26.58 kg/m²   Oxygen Saturation Interpretation: Normal      ------------------------------------------ PROGRESS NOTES ------------------------------------------  I have spoken with the patient and discussed todays results, in addition to providing specific details for the plan of care and counseling regarding the diagnosis and prognosis. Their questions are answered at this time and they are agreeable with the plan. I discussed at length with them reasons for immediate return here for re evaluation. They will followup with primary care by calling their office tomorrow. --------------------------------- ADDITIONAL PROVIDER NOTES ---------------------------------  At this time the patient is without objective evidence of an acute process requiring hospitalization or inpatient management.   They have remained hemodynamically stable throughout their entire ED visit and are stable for discharge with outpatient follow-up. The plan has been discussed in detail and they are aware of the specific conditions for emergent return, as well as the importance of follow-up. New Prescriptions    APIXABAN STARTER PACK (ELIQUIS DVT/PE STARTER PACK) 5 MG TBPK TABLET    Take 1 tablet by mouth See Admin Instructions       Diagnosis:  1. Acute deep vein thrombosis (DVT) of femoral vein of right lower extremity (HCC)        Disposition:  Patient's disposition: Discharge to home  Patient's condition is stable.          Pepe Shelton, DO  10/25/22 6221

## 2022-10-26 NOTE — DISCHARGE INSTRUCTIONS
Return if symptoms change or worsen. STOP your Plavix. Start taking Aspirin 81 mg daily. Thank you for the opportunity to care for you during this emergency department visit. I hope you are doing better. We strive to always improve our care. You may receive a survey and I hope you had a positive experience here. We greatly appreciate your 5 scores.

## 2022-11-02 ENCOUNTER — APPOINTMENT (OUTPATIENT)
Dept: CT IMAGING | Age: 77
End: 2022-11-02
Payer: MEDICARE

## 2022-11-02 ENCOUNTER — HOSPITAL ENCOUNTER (EMERGENCY)
Age: 77
Discharge: HOME | End: 2022-11-03
Attending: EMERGENCY MEDICINE
Payer: MEDICARE

## 2022-11-02 VITALS
HEART RATE: 72 BPM | DIASTOLIC BLOOD PRESSURE: 78 MMHG | BODY MASS INDEX: 26.36 KG/M2 | TEMPERATURE: 98.3 F | RESPIRATION RATE: 16 BRPM | HEIGHT: 69 IN | OXYGEN SATURATION: 98 % | WEIGHT: 178 LBS | SYSTOLIC BLOOD PRESSURE: 144 MMHG

## 2022-11-02 DIAGNOSIS — Z53.29 LEFT AGAINST MEDICAL ADVICE: ICD-10-CM

## 2022-11-02 DIAGNOSIS — R07.9 CHEST PAIN, UNSPECIFIED TYPE: Primary | ICD-10-CM

## 2022-11-02 LAB
ALBUMIN SERPL-MCNC: 4.1 G/DL (ref 3.5–5.2)
ALP BLD-CCNC: 104 U/L (ref 40–129)
ALT SERPL-CCNC: 23 U/L (ref 0–40)
ANION GAP SERPL CALCULATED.3IONS-SCNC: 13 MMOL/L (ref 7–16)
AST SERPL-CCNC: 43 U/L (ref 0–39)
BASOPHILS ABSOLUTE: 0.04 E9/L (ref 0–0.2)
BASOPHILS RELATIVE PERCENT: 0.6 % (ref 0–2)
BILIRUB SERPL-MCNC: 0.5 MG/DL (ref 0–1.2)
BUN BLDV-MCNC: 10 MG/DL (ref 6–23)
CALCIUM SERPL-MCNC: 9.6 MG/DL (ref 8.6–10.2)
CHLORIDE BLD-SCNC: 99 MMOL/L (ref 98–107)
CO2: 21 MMOL/L (ref 22–29)
CREAT SERPL-MCNC: 0.9 MG/DL (ref 0.7–1.2)
EOSINOPHILS ABSOLUTE: 0.12 E9/L (ref 0.05–0.5)
EOSINOPHILS RELATIVE PERCENT: 1.7 % (ref 0–6)
GFR SERPL CREATININE-BSD FRML MDRD: >60 ML/MIN/1.73
GLUCOSE BLD-MCNC: 127 MG/DL (ref 74–99)
HCT VFR BLD CALC: 46.2 % (ref 37–54)
HEMOGLOBIN: 16.3 G/DL (ref 12.5–16.5)
IMMATURE GRANULOCYTES #: 0.03 E9/L
IMMATURE GRANULOCYTES %: 0.4 % (ref 0–5)
LYMPHOCYTES ABSOLUTE: 2.37 E9/L (ref 1.5–4)
LYMPHOCYTES RELATIVE PERCENT: 33.4 % (ref 20–42)
MCH RBC QN AUTO: 31.1 PG (ref 26–35)
MCHC RBC AUTO-ENTMCNC: 35.3 % (ref 32–34.5)
MCV RBC AUTO: 88.2 FL (ref 80–99.9)
MONOCYTES ABSOLUTE: 0.6 E9/L (ref 0.1–0.95)
MONOCYTES RELATIVE PERCENT: 8.5 % (ref 2–12)
NEUTROPHILS ABSOLUTE: 3.93 E9/L (ref 1.8–7.3)
NEUTROPHILS RELATIVE PERCENT: 55.4 % (ref 43–80)
PDW BLD-RTO: 12.7 FL (ref 11.5–15)
PLATELET # BLD: 261 E9/L (ref 130–450)
PMV BLD AUTO: 10.1 FL (ref 7–12)
POTASSIUM REFLEX MAGNESIUM: 4.8 MMOL/L (ref 3.5–5)
RBC # BLD: 5.24 E12/L (ref 3.8–5.8)
REASON FOR REJECTION: NORMAL
REJECTED TEST: NORMAL
SODIUM BLD-SCNC: 133 MMOL/L (ref 132–146)
TOTAL PROTEIN: 8.1 G/DL (ref 6.4–8.3)
TROPONIN, HIGH SENSITIVITY: 14 NG/L (ref 0–11)
WBC # BLD: 7.1 E9/L (ref 4.5–11.5)

## 2022-11-02 PROCEDURE — 99285 EMERGENCY DEPT VISIT HI MDM: CPT

## 2022-11-02 PROCEDURE — 36415 COLL VENOUS BLD VENIPUNCTURE: CPT

## 2022-11-02 PROCEDURE — 6370000000 HC RX 637 (ALT 250 FOR IP): Performed by: EMERGENCY MEDICINE

## 2022-11-02 PROCEDURE — 84484 ASSAY OF TROPONIN QUANT: CPT

## 2022-11-02 PROCEDURE — 2500000003 HC RX 250 WO HCPCS: Performed by: EMERGENCY MEDICINE

## 2022-11-02 PROCEDURE — 96374 THER/PROPH/DIAG INJ IV PUSH: CPT

## 2022-11-02 PROCEDURE — 93005 ELECTROCARDIOGRAM TRACING: CPT | Performed by: EMERGENCY MEDICINE

## 2022-11-02 PROCEDURE — 80053 COMPREHEN METABOLIC PANEL: CPT

## 2022-11-02 PROCEDURE — 2580000003 HC RX 258

## 2022-11-02 PROCEDURE — 6360000004 HC RX CONTRAST MEDICATION: Performed by: RADIOLOGY

## 2022-11-02 PROCEDURE — 71275 CT ANGIOGRAPHY CHEST: CPT

## 2022-11-02 PROCEDURE — 85025 COMPLETE CBC W/AUTO DIFF WBC: CPT

## 2022-11-02 RX ORDER — WATER 1000 ML/1000ML
INJECTION, SOLUTION INTRAVENOUS
Status: COMPLETED
Start: 2022-11-02 | End: 2022-11-02

## 2022-11-02 RX ORDER — SODIUM CHLORIDE 0.9 % (FLUSH) 0.9 %
SYRINGE (ML) INJECTION
Status: COMPLETED
Start: 2022-11-02 | End: 2022-11-02

## 2022-11-02 RX ADMIN — IOPAMIDOL 75 ML: 755 INJECTION, SOLUTION INTRAVENOUS at 15:41

## 2022-11-02 RX ADMIN — APIXABAN 5 MG: 5 TABLET, FILM COATED ORAL at 22:43

## 2022-11-02 RX ADMIN — WATER 10 ML: 1 INJECTION INTRAMUSCULAR; INTRAVENOUS; SUBCUTANEOUS at 13:48

## 2022-11-02 RX ADMIN — Medication 5 ML: at 21:51

## 2022-11-02 RX ADMIN — GLUCAGON HYDROCHLORIDE 1 MG: 1 INJECTION, POWDER, FOR SOLUTION INTRAMUSCULAR; INTRAVENOUS; SUBCUTANEOUS at 13:47

## 2022-11-02 ASSESSMENT — PAIN SCALES - GENERAL
PAINLEVEL_OUTOF10: 4
PAINLEVEL_OUTOF10: 6

## 2022-11-02 ASSESSMENT — ENCOUNTER SYMPTOMS
VOMITING: 0
ABDOMINAL PAIN: 0
NAUSEA: 0
SHORTNESS OF BREATH: 0
EYE REDNESS: 0

## 2022-11-02 ASSESSMENT — PAIN DESCRIPTION - LOCATION: LOCATION: CHEST

## 2022-11-02 ASSESSMENT — PAIN - FUNCTIONAL ASSESSMENT
PAIN_FUNCTIONAL_ASSESSMENT: 0-10
PAIN_FUNCTIONAL_ASSESSMENT: 0-10

## 2022-11-02 NOTE — ED PROVIDER NOTES
Chief complaint: Chest pain and foreign body      HPI:  11/2/22, Time: 1:04 PM EDT    HPI             Deshawn Fountain is a 76 y.o. male presenting to the ED for chest pain and foreign body. History is obtained from the patient as well as patient's medical record. The patient is presenting emergency department chief complaint of chest pain and foreign body. The patient states that approximately half hour prior to arrival he was eating what he thought was chopped up food and began with a foreign body sensation in his throat. He states he does have a history of esophageal strictures has required dilations in the past.  States he has presented emergency department before and this has been improved with glucagon. He states over the last 5 weeks he has also had chest pain. The pain is located on the left side of his chest.  Is described as sharp. Nonradiating. He states this was suddenly worsened when he was becoming symptomatic from his esophageal obstruction. He does have a history of DVT was recently seen in our emergency department and no chest pain or shortness of breath at that time and was diagnosed with a DVT. He was started on Eliquis. He states he has been compliant with his medications. ROS:   Review of Systems   Constitutional:  Negative for chills and fever. HENT:  Negative for congestion. Eyes:  Negative for redness. Respiratory:  Negative for shortness of breath. Cardiovascular:  Positive for chest pain. Gastrointestinal:  Negative for abdominal pain, nausea and vomiting. Foreign body in esophagus     Genitourinary:  Negative for dysuria. Musculoskeletal:  Negative for arthralgias. Skin:  Negative for rash. Neurological:  Negative for light-headedness. Psychiatric/Behavioral:  Negative for confusion.     All other systems reviewed and are negative.    --------------------------------------------- PAST HISTORY ---------------------------------------------  Past Medical History:  has a past medical history of Abnormal computed tomography angiography of heart, Arthritis, Atrial fibrillation (HCC), Burning pain, CAD (coronary artery disease), Degenerative lumbar disc, Diabetes ([facility] [address] Dysphagia, Edentulous, H/O cardiovascular stress test, Herniated cervical disc, History of echocardiogram, Hx of blood clots, Hyperlipidemia, Hypertension, Stented coronary artery, Tinnitus, and Urinary retention. Past Surgical History:  has a past surgical history that includes colectomy (2010 sigmoid); Hemorrhoid surgery (2008); Prostate surgery (2017); Colonoscopy (5/2018 [facility]); Nerve Block (Bilateral, 10 12 2015); Nerve Block (Bilateral, 10/22/15); Nerve Block (Bilateral, 11 02 2015); Nerve Block (Left, 12/2/15); Nerve Block (Left, 12 16 15); Nerve Block (Left, 12 28 2015); Nerve Block (Left, 1 20 16); ablation of dysrhythmic focus (1980); lumbar fusion (03/06/2017); Nasal sinus surgery (12/07/2017); Upper gastrointestinal endoscopy (5/2018 [facility]); Nerve Block (12/11/2018); epidural steroid injection (N/A, 12/11/2018); Nerve Block (N/A, 01/08/2019); epidural steroid injection (N/A, 1/8/2019); Endoscopy, colon, diagnostic; other surgical history (1945); Upper gastrointestinal endoscopy (N/A, 6/4/2019); Diagnostic Cardiac Cath Lab Procedure; Percutaneous Transluminal Coronary Angio (09/30/2019); Coronary angioplasty with stent; Cardiac catheterization (02/20/2020); eye surgery; laminectomy (N/A, 6/5/2020); laminectomy (N/A, 8/7/2020); cervical fusion (N/A, 10/9/2020); back surgery; Upper gastrointestinal endoscopy (N/A, 6/15/2022); and Upper gastrointestinal endoscopy (6/15/2022). Social History:  reports that he quit smoking about 45 years ago. He has never used smokeless tobacco. He reports that he does not drink alcohol and does not use drugs.     Family History: family history includes Arrhythmia in his brother; Cancer in his brother; Diabetes in an other family member; Hypertension in an other family member; Kidney Disease in his brother and sister; Other in his father; Stroke in his mother. The patients home medications have been reviewed. Allergies: Imdur [isosorbide nitrate], Oxycodone, Simvastatin, Bactrim [sulfamethoxazole-trimethoprim], Ciprofloxacin hcl, Gabapentin, Hydrocodone, and Norco [hydrocodone-acetaminophen]    ---------------------------------------------------PHYSICAL EXAM--------------------------------------  Constitutional/General: Alert and oriented x3, well appearing, non toxic in NAD  Head: Normocephalic and atraumatic  Mouth: Oropharynx clear, handling secretions, no trismus  Neck: Supple, full ROM,  Pulmonary: Lungs clear to auscultation bilaterally, no wheezes, rales, or rhonchi. Not in respiratory distress  Cardiovascular:  Regular rate. Regular rhythm. No murmurs  Chest: no chest wall tenderness  Abdomen: Soft. Non tender. Non distended. No rebound, guarding, or rigidity. No pulsatile masses appreciated. Musculoskeletal: Moves all extremities x 4. Warm and well perfused, no clubbing, cyanosis, or edema. Capillary refill <3 seconds  Skin: warm and dry. No rashes. Neurologic: GCS 15, no gross focal neurologic deficits  Psych: Normal Affect    -------------------------------------------------- RESULTS -------------------------------------------------  I have personally reviewed all laboratory and imaging results for this patient. Results are listed below.      LABS:  Results for orders placed or performed during the hospital encounter of 11/02/22   CBC with Auto Differential   Result Value Ref Range    WBC 7.1 4.5 - 11.5 E9/L    RBC 5.24 3.80 - 5.80 E12/L    Hemoglobin 16.3 12.5 - 16.5 g/dL    Hematocrit 46.2 37.0 - 54.0 %    MCV 88.2 80.0 - 99.9 fL    MCH 31.1 26.0 - 35.0 pg    MCHC 35.3 (H) 32.0 - 34.5 %    RDW 12.7 11.5 - 15.0 fL    Platelets 563 501 - 419 E9/L    MPV 10.1 7.0 - 12.0 fL    Neutrophils % 55.4 43.0 - 80.0 %    Immature Granulocytes % 0.4 0.0 - 5.0 %    Lymphocytes % 33.4 20.0 - 42.0 %    Monocytes % 8.5 2.0 - 12.0 %    Eosinophils % 1.7 0.0 - 6.0 %    Basophils % 0.6 0.0 - 2.0 %    Neutrophils Absolute 3.93 1.80 - 7.30 E9/L    Immature Granulocytes # 0.03 E9/L    Lymphocytes Absolute 2.37 1.50 - 4.00 E9/L    Monocytes Absolute 0.60 0.10 - 0.95 E9/L    Eosinophils Absolute 0.12 0.05 - 0.50 E9/L    Basophils Absolute 0.04 0.00 - 0.20 E9/L   Comprehensive Metabolic Panel w/ Reflex to MG   Result Value Ref Range    Sodium 133 132 - 146 mmol/L    Potassium reflex Magnesium 4.8 3.5 - 5.0 mmol/L    Chloride 99 98 - 107 mmol/L    CO2 21 (L) 22 - 29 mmol/L    Anion Gap 13 7 - 16 mmol/L    Glucose 127 (H) 74 - 99 mg/dL    BUN 10 6 - 23 mg/dL    Creatinine 0.9 0.7 - 1.2 mg/dL    Est, Glom Filt Rate >60 >=60 mL/min/1.73    Calcium 9.6 8.6 - 10.2 mg/dL    Total Protein 8.1 6.4 - 8.3 g/dL    Albumin 4.1 3.5 - 5.2 g/dL    Total Bilirubin 0.5 0.0 - 1.2 mg/dL    Alkaline Phosphatase 104 40 - 129 U/L    ALT 23 0 - 40 U/L    AST 43 (H) 0 - 39 U/L   SPECIMEN REJECTION   Result Value Ref Range    Rejected Test trop     Reason for Rejection see below    SPECIMEN REJECTION   Result Value Ref Range    Rejected Test trop     Reason for Rejection see below    Troponin   Result Value Ref Range    Troponin, High Sensitivity 14 (H) 0 - 11 ng/L   SPECIMEN REJECTION   Result Value Ref Range    Rejected Test trop     Reason for Rejection see below    SPECIMEN REJECTION   Result Value Ref Range    Rejected Test trp     Reason for Rejection see below    SPECIMEN REJECTION   Result Value Ref Range    Rejected Test trp     Reason for Rejection see below    EKG 12 Lead   Result Value Ref Range    Ventricular Rate 77 BPM    Atrial Rate 77 BPM    P-R Interval 176 ms    QRS Duration 136 ms    Q-T Interval 430 ms    QTc Calculation (Bazett) 486 ms    P Axis 58 degrees    R Axis -73 degrees    T Axis 55 degrees       RADIOLOGY:  Interpreted by Radiologist.  CTA CHEST W CONTRAST   Final Result No evidence of pulmonary embolism or acute pulmonary abnormality. RECOMMENDATIONS:   Unavailable               ------------------------- NURSING NOTES AND VITALS REVIEWED ---------------------------   The nursing notes within the ED encounter and vital signs as below have been reviewed by myself. BP (!) 144/78   Pulse 72   Temp 98.3 °F (36.8 °C)   Resp 16   Ht 5' 9\" (1.753 m)   Wt 178 lb (80.7 kg)   SpO2 98%   BMI 26.29 kg/m²   Oxygen Saturation Interpretation: Normal    The patients available past medical records and past encounters were reviewed. ------------------------------ ED COURSE/MEDICAL DECISION MAKING----------------------  Medications   glucagon (rDNA) injection 1 mg (1 mg IntraVENous Given 11/2/22 1347)   sterile water injection (10 mLs  Given 11/2/22 1348)   iopamidol (ISOVUE-370) 76 % injection 75 mL (75 mLs IntraVENous Given 11/2/22 1541)   sodium chloride flush 0.9 % injection (5 mLs  Given 11/2/22 2151)   apixaban (ELIQUIS) tablet 5 mg (5 mg Oral Given 11/2/22 2243)             Medical Decision Making:      Ashwin Galicia is a 76 y.o. male who presents to the ED for foreign body sensation and chest pain. Patient did arrive blood pressure 138/83, temp of 98.3, heart rate of 87, respiratory rate of 16, pulse ox of 96%. The patient is sitting in the bed in no acute distress. Currently does appear to be handling his secretions differential diagnosis includes but is not limited to esophageal food bolus, MI, ACS, the patient does have a past medical history of   has a past medical history of Abnormal computed tomography angiography of heart, Arthritis, Atrial fibrillation (HCC), Burning pain, CAD (coronary artery disease), Degenerative lumbar disc, Diabetes ([address] Dysphagia, Edentulous, H/O cardiovascular stress test, Herniated cervical disc, History of echocardiogram, Hx of blood clots, Hyperlipidemia, Hypertension, Stented coronary artery, Tinnitus, and Urinary retention. Moustapha Jaime The patient was given glucagon. Labs and imaging obtained, reviewed. Patient treated symptomatically. Results discussed with patient. Re-Evaluations/Consultations:             ED Course as of 11/03/22 0118   Wed Nov 02, 2022   1655 Patient in the bed no acute distress. The results were discussed to this point. [MT]      ED Course User Index  [MT] Aracely Hunter DO       9:43 PM EDT  I have signed this patient out to the oncoming physician, Dr. Kiley Carroll. I have discussed the patient's initial exam, treatment and plan of care with the on coming physician. I have notified the patient / family of the change in treating physician and answered their questions to this point. 2143. I have assumed care of this patient from the departing physician, Dr. Romayne Medin. I have discussed the initial history and exam, diagnostics and treatment plan. I have introduced myself to the patient and answered all questions to this point. EKG:  Normal sinus rhythm with ventricular rate of 77. OK interval, QRS duration and QT interval within normal range. Left axis deviation. Right bundle branch block. Nonspecific ST segment and T wave changes. No change from previous EKG. Signout pending troponin and final disposition. Multiple samples sent to lab with rejection of troponin result. Patient does not want to wait any longer and wishes to sign out against medical advice. This patient has remained hemodynamically stable during their ED course. This patient has chosen to leave against medical advice. I have personally explained to them that choosing to do so may result in permanent bodily harm or death. I discussed at length that without further evaluation and monitoring there may be unforeseen circumstances and deterioration resulting in permanent bodily harm or death as a result of their choice. They are alert, oriented, and competent at this time.   They state that they are aware of the serious risks as explained, but they continue to wish to leave against medical advice. In light of their decision to leave against medical advice, follow-up has been arranged and they are aware of the importance of following up as instructed. They have been advised that they should return to the ED immediately if they change their mind at any time, or if their condition begins to change or worsen.              [address] [facility],   11/03/22 0613

## 2022-11-02 NOTE — PROGRESS NOTES
Radiology Procedure Waiver   Name: Vira Calloway  : 1945  MRN: [id number]    Date:  22    Time: 3:29 PM EDT    Benefits of immediately proceeding with Radiology exam(s) without pre-testing outweigh the risks or are not indicated as specified below and therefore the following is/are being waived:    [] Pregnancy test   [] Patients LMP on-time and regular.   [] Patient had Tubal Ligation or has other Contraception Device. [] Patient  is Menopausal or Premenarcheal.    [] Patient had Full or Partial Hysterectomy. [] Protocol for Iodine allergy    [] MRI Questionnaire     [x] BUN/Creatinine   [] Patient age w/no hx of renal dysfunction. [] Patient on Dialysis. [] Recent Normal Labs.   Electronically signed by Migue Chavez DO on 22 at 3:29 PM EDT

## 2022-11-03 ENCOUNTER — TELEPHONE (OUTPATIENT)
Dept: FAMILY MEDICINE CLINIC | Age: 77
End: 2022-11-03

## 2022-11-03 LAB
EKG ATRIAL RATE: 77 BPM
EKG P AXIS: 58 DEGREES
EKG P-R INTERVAL: 176 MS
EKG Q-T INTERVAL: 430 MS
EKG QRS DURATION: 136 MS
EKG QTC CALCULATION (BAZETT): 486 MS
EKG R AXIS: -73 DEGREES
EKG T AXIS: 55 DEGREES
EKG VENTRICULAR RATE: 77 BPM

## 2022-11-04 ENCOUNTER — TELEPHONE (OUTPATIENT)
Dept: VASCULAR SURGERY | Age: 77
End: 2022-11-04

## 2022-11-04 DIAGNOSIS — I82.4Y9 DEEP VEIN THROMBOSIS (DVT) OF PROXIMAL LOWER EXTREMITY, UNSPECIFIED CHRONICITY, UNSPECIFIED LATERALITY (HCC): ICD-10-CM

## 2022-11-04 DIAGNOSIS — D68.59 THROMBOPHILIA (HCC): ICD-10-CM

## 2022-11-04 DIAGNOSIS — I48.91 ATRIAL FIBRILLATION, UNSPECIFIED TYPE (HCC): Primary | ICD-10-CM

## 2022-11-04 DIAGNOSIS — I48.91 ATRIAL FIBRILLATION, UNSPECIFIED TYPE (HCC): ICD-10-CM

## 2022-11-04 RX ORDER — CLOPIDOGREL BISULFATE 75 MG/1
75 TABLET ORAL DAILY
Qty: 90 TABLET | Refills: 3
Start: 2022-11-04 | End: 2022-11-16 | Stop reason: SDUPTHER

## 2022-11-04 NOTE — TELEPHONE ENCOUNTER
Received referral from Dr. Jay Mayo for DVT, attempted to schedule patient to see Dr. Nevaeh Ontiveros, he refused and stated that he felt fine and would call the office should he need to be seen in the future.

## 2022-11-04 NOTE — TELEPHONE ENCOUNTER
Patient changed his mind and requested to be scheduled to see Dr. Randal Massey; received referral from Dr. Austin Meeks for DVT leg, scheduled appointment to see Dr. Randal Massey on 12-15-22 at 8:30 am.

## 2022-11-16 ENCOUNTER — OFFICE VISIT (OUTPATIENT)
Dept: FAMILY MEDICINE CLINIC | Age: 77
End: 2022-11-16
Payer: MEDICARE

## 2022-11-16 VITALS
BODY MASS INDEX: 26.66 KG/M2 | SYSTOLIC BLOOD PRESSURE: 136 MMHG | RESPIRATION RATE: 18 BRPM | DIASTOLIC BLOOD PRESSURE: 76 MMHG | HEART RATE: 74 BPM | HEIGHT: 69 IN | WEIGHT: 180 LBS | OXYGEN SATURATION: 98 %

## 2022-11-16 DIAGNOSIS — E78.2 MIXED HYPERLIPIDEMIA: ICD-10-CM

## 2022-11-16 DIAGNOSIS — Z00.00 MEDICARE ANNUAL WELLNESS VISIT, SUBSEQUENT: ICD-10-CM

## 2022-11-16 DIAGNOSIS — I48.91 ATRIAL FIBRILLATION, UNSPECIFIED TYPE (HCC): ICD-10-CM

## 2022-11-16 DIAGNOSIS — R20.2 PARESTHESIA OF BOTH LOWER EXTREMITIES: ICD-10-CM

## 2022-11-16 DIAGNOSIS — E11.42 TYPE 2 DIABETES MELLITUS WITH DIABETIC POLYNEUROPATHY, WITHOUT LONG-TERM CURRENT USE OF INSULIN (HCC): ICD-10-CM

## 2022-11-16 DIAGNOSIS — M51.36 DDD (DEGENERATIVE DISC DISEASE), LUMBAR: Chronic | ICD-10-CM

## 2022-11-16 DIAGNOSIS — R53.82 CHRONIC FATIGUE: ICD-10-CM

## 2022-11-16 DIAGNOSIS — J30.1 SEASONAL ALLERGIC RHINITIS DUE TO POLLEN: Primary | ICD-10-CM

## 2022-11-16 DIAGNOSIS — M48.061 NEURAL FORAMINAL STENOSIS OF LUMBAR SPINE: Chronic | ICD-10-CM

## 2022-11-16 DIAGNOSIS — M51.16 LUMBAR DISC DISEASE WITH RADICULOPATHY: ICD-10-CM

## 2022-11-16 DIAGNOSIS — M96.1 FAILED BACK SYNDROME OF LUMBAR SPINE: ICD-10-CM

## 2022-11-16 PROCEDURE — 3051F HG A1C>EQUAL 7.0%<8.0%: CPT | Performed by: FAMILY MEDICINE

## 2022-11-16 PROCEDURE — 3078F DIAST BP <80 MM HG: CPT | Performed by: FAMILY MEDICINE

## 2022-11-16 PROCEDURE — G8484 FLU IMMUNIZE NO ADMIN: HCPCS | Performed by: FAMILY MEDICINE

## 2022-11-16 PROCEDURE — G0439 PPPS, SUBSEQ VISIT: HCPCS | Performed by: FAMILY MEDICINE

## 2022-11-16 PROCEDURE — 3074F SYST BP LT 130 MM HG: CPT | Performed by: FAMILY MEDICINE

## 2022-11-16 PROCEDURE — 1123F ACP DISCUSS/DSCN MKR DOCD: CPT | Performed by: FAMILY MEDICINE

## 2022-11-16 RX ORDER — TRAMADOL HYDROCHLORIDE 50 MG/1
50 TABLET ORAL EVERY 6 HOURS PRN
Qty: 120 TABLET | Refills: 5 | Status: SHIPPED | OUTPATIENT
Start: 2022-11-16 | End: 2022-12-16

## 2022-11-16 RX ORDER — CLOPIDOGREL BISULFATE 75 MG/1
75 TABLET ORAL DAILY
Qty: 90 TABLET | Refills: 3 | Status: SHIPPED | OUTPATIENT
Start: 2022-11-16

## 2022-11-16 RX ORDER — CYCLOBENZAPRINE HCL 10 MG
10 TABLET ORAL 3 TIMES DAILY PRN
Qty: 270 TABLET | Refills: 5 | Status: SHIPPED | OUTPATIENT
Start: 2022-11-16

## 2022-11-16 RX ORDER — IPRATROPIUM BROMIDE 42 UG/1
2 SPRAY, METERED NASAL 4 TIMES DAILY
Qty: 15 ML | Refills: 3 | Status: SHIPPED | OUTPATIENT
Start: 2022-11-16

## 2022-11-16 ASSESSMENT — PATIENT HEALTH QUESTIONNAIRE - PHQ9
SUM OF ALL RESPONSES TO PHQ QUESTIONS 1-9: 0
SUM OF ALL RESPONSES TO PHQ QUESTIONS 1-9: 0
2. FEELING DOWN, DEPRESSED OR HOPELESS: 0
SUM OF ALL RESPONSES TO PHQ9 QUESTIONS 1 & 2: 0
SUM OF ALL RESPONSES TO PHQ QUESTIONS 1-9: 0
1. LITTLE INTEREST OR PLEASURE IN DOING THINGS: 0
SUM OF ALL RESPONSES TO PHQ QUESTIONS 1-9: 0

## 2022-11-16 ASSESSMENT — LIFESTYLE VARIABLES
HOW MANY STANDARD DRINKS CONTAINING ALCOHOL DO YOU HAVE ON A TYPICAL DAY: PATIENT DOES NOT DRINK
HOW OFTEN DO YOU HAVE A DRINK CONTAINING ALCOHOL: NEVER

## 2022-11-16 NOTE — PROGRESS NOTES
Deshawn Fountain is a 76 y.o. male  . Subjective: Had esophageal stretching. Has helped considerably . Still having a lot of drainage. Discussed changing astelin Had DVT while off of Plavix for procedure. Is on Eliquis for 6 months. Had appointment with vascular. Has upcoming appointment with hematology to rule out thrombophilia or clotting disorder. Tramadol working well for pain. Using intermittently. Patient understands habit forming nature of medication and assumes all risks. Patient will not drink alcohol , work or drive while on medication. Review of Systems    Past Medical History:   Diagnosis Date    Abnormal computed tomography angiography of heart 2019    Calcified plaque proximal RCA with 50-70% stenosis, proximal LAD 50% stenosis, mild LAD 30-50% stenosis, groundglass infiltrates, area of consoldidation left lung base posteriorly.       Arthritis     Atrial fibrillation (HCC)     Burning pain     CAD (coronary artery disease)     Degenerative lumbar disc     Diabetes (HCC)     Dysphagia     Edentulous     upper denture    H/O cardiovascular stress test 2020    Lexiscan    Herniated cervical disc     History of echocardiogram 2017    EF 60-65%    Hx of blood clots     Hyperlipidemia     Hypertension     Stented coronary artery     Tinnitus     Urinary retention 3/17/2017       Social History     Socioeconomic History    Marital status:      Spouse name: Not on file    Number of children: Not on file    Years of education: Not on file    Highest education level: Not on file   Occupational History    Not on file   Tobacco Use    Smoking status: Former     Packs/day: 0.00     Years: 16.00     Pack years: 0.00     Types: Cigarettes     Quit date: 1977     Years since quittin.9    Smokeless tobacco: Never    Tobacco comments:     quit in    Vaping Use    Vaping Use: Never used   Substance and Sexual Activity    Alcohol use: No     Alcohol/week: 0.0 standard drinks Comment: 1-2 cups coffee daily    Drug use: No    Sexual activity: Never   Other Topics Concern    Not on file   Social History Narrative    Not on file     Social Determinants of Health     Financial Resource Strain: Low Risk     Difficulty of Paying Living Expenses: Not very hard   Food Insecurity: No Food Insecurity    Worried About Running Out of Food in the Last Year: Never true    Ran Out of Food in the Last Year: Never true   Transportation Needs: Not on file   Physical Activity: Not on file   Stress: Not on file   Social Connections: Not on file   Intimate Partner Violence: Not on file   Housing Stability: Not on file       Family History   Problem Relation Age of Onset    Stroke Mother     Other Father     Kidney Disease Brother     Arrhythmia Brother     Cancer Brother     Kidney Disease Sister     Hypertension Other     Diabetes Other        Current Outpatient Medications on File Prior to Visit   Medication Sig Dispense Refill    apixaban (ELIQUIS) 5 MG TABS tablet Take 1 tablet by mouth 2 times daily 60 tablet 5    metoprolol succinate (TOPROL XL) 50 MG extended release tablet Take 1 tablet by mouth 2 times daily 180 tablet 3    pravastatin (PRAVACHOL) 10 MG tablet TAKE 1 TABLET BY MOUTH DAILY 90 tablet 5    azelastine (ASTELIN) 0.1 % nasal spray 2 sprays by Nasal route 2 times daily Use in each nostril as directed 120 mL 1    glimepiride (AMARYL) 4 MG tablet Take 1 tablet by mouth 2 times daily (with meals) 60 tablet 5    glycopyrrolate (ROBINUL) 1 MG tablet Take 1 tablet by mouth 3 times daily 270 tablet 3    hydrALAZINE (APRESOLINE) 25 MG tablet Take 1 tablet by mouth 4 times daily 270 tablet 3    pantoprazole (PROTONIX) 40 MG tablet TAKE 1 TABLET BY MOUTH DAILY AS NEEDED (REFLUX) 30 tablet 5    cevimeline (EVOXAC) 30 MG capsule Take 1 capsule by mouth 3 times daily 90 capsule 5    alfuzosin (UROXATRAL) 10 MG extended release tablet Take 1 tablet by mouth daily 90 tablet 5    folic acid (FOLVITE) 1 MG tablet Take 1 tablet by mouth daily (Patient taking differently: Take 1 mg by mouth daily Ran out) 90 tablet 5    ammonium lactate (LAC-HYDRIN) 12 % lotion Apply 1 g topically daily as needed for Dry Skin       No current facility-administered medications on file prior to visit. Allergies   Allergen Reactions    Imdur [Isosorbide Nitrate] Other (See Comments)     headache    Oxycodone Rash    Simvastatin Other (See Comments)     Muscle weakness    Bactrim [Sulfamethoxazole-Trimethoprim] Nausea Only    Ciprofloxacin Hcl Other (See Comments)     \"Heel/ tendon pain\"    Gabapentin Other (See Comments)     Constipation      Hydrocodone Nausea Only    Norco [Hydrocodone-Acetaminophen] Nausea Only and Other (See Comments)     Causes sick feeling in head         I have reviewed his allergies, medications, problem list, medical,social and family history and have updated as needed in the electronic medical record. Objective:     Physical Exam    Assessment / Plan:   Teresa Troy was seen today for 3 month follow-up. Diagnoses and all orders for this visit:    Atrial fibrillation, unspecified type ([facility] [address]  -     clopidogrel (PLAVIX) 75 MG tablet; Take 1 tablet by mouth daily    Neural foraminal stenosis of lumbar spine  -     cyclobenzaprine (FLEXERIL) 10 MG tablet; Take 1 tablet by mouth 3 times daily as needed for Muscle spasms    Paresthesia of both lower extremities  -     cyclobenzaprine (FLEXERIL) 10 MG tablet; Take 1 tablet by mouth 3 times daily as needed for Muscle spasms    DDD (degenerative disc disease), lumbar  -     cyclobenzaprine (FLEXERIL) 10 MG tablet; Take 1 tablet by mouth 3 times daily as needed for Muscle spasms        Reviewed healthmaintenance report. Patient is aware of deficiencies and suggested preventative tests.   Medicare Annual Wellness Visit    Daphne Gordon is here for 3 Month Follow-Up    Assessment & Plan   Seasonal allergic rhinitis due to pollen  -     ipratropium (ATROVENT) 0.06 % nasal spray; 2 sprays by Each Nostril route 4 times daily, Disp-15 mL, R-3Normal  Atrial fibrillation, unspecified type (HCC)  -     clopidogrel (PLAVIX) 75 MG tablet; Take 1 tablet by mouth daily, Disp-90 tablet, R-3Is going to hold until after done with EliquisNormal  Neural foraminal stenosis of lumbar spine  -     cyclobenzaprine (FLEXERIL) 10 MG tablet; Take 1 tablet by mouth 3 times daily as needed for Muscle spasms, Disp-270 tablet, R-5Normal  -     traMADol (ULTRAM) 50 MG tablet; Take 1 tablet by mouth every 6 hours as needed for Pain for up to 30 days. , Disp-120 tablet, R-5Normal  Paresthesia of both lower extremities  -     cyclobenzaprine (FLEXERIL) 10 MG tablet; Take 1 tablet by mouth 3 times daily as needed for Muscle spasms, Disp-270 tablet, R-5Normal  -     traMADol (ULTRAM) 50 MG tablet; Take 1 tablet by mouth every 6 hours as needed for Pain for up to 30 days. , Disp-120 tablet, R-5Normal  DDD (degenerative disc disease), lumbar  -     cyclobenzaprine (FLEXERIL) 10 MG tablet; Take 1 tablet by mouth 3 times daily as needed for Muscle spasms, Disp-270 tablet, R-5Normal  -     traMADol (ULTRAM) 50 MG tablet; Take 1 tablet by mouth every 6 hours as needed for Pain for up to 30 days. , Disp-120 tablet, R-5Normal  Failed back syndrome of lumbar spine  -     traMADol (ULTRAM) 50 MG tablet; Take 1 tablet by mouth every 6 hours as needed for Pain for up to 30 days. , Disp-120 tablet, R-5Normal  Lumbar disc disease with radiculopathy  -     traMADol (ULTRAM) 50 MG tablet; Take 1 tablet by mouth every 6 hours as needed for Pain for up to 30 days. , Disp-120 tablet, R-5Normal  Chronic fatigue  -     CBC with Auto Differential; Future  -     Comprehensive Metabolic Panel; Future  -     TSH; Future  -     T4, Free; Future  Mixed hyperlipidemia  -     Lipid Panel;  Future  Type 2 diabetes mellitus with diabetic polyneuropathy, without long-term current use of insulin ([facility])  -     Hemoglobin A1C; Future  Medicare annual wellness visit, subsequent    Recommendations for Preventive Services Due: see orders and patient instructions/AVS.  Recommended screening schedule for the next 5-10 years is provided to the patient in written form: see Patient Instructions/AVS.     Return for Medicare Annual Wellness Visit in 1 year. Subjective   The following acute and/or chronic problems were also addressed today:  Diabetes,lumbar back pain, HTN, allergic rhinitis , hyperlipidemia, IBS, gerd     Patient's complete Health Risk Assessment and screening values have been reviewed and are found in Flowsheets. The following problems were reviewed today and where indicated follow up appointments were made and/or referrals ordered. Positive Risk Factor Screenings with Interventions:             Opioid Risk: (Low risk score <55) Opioid risk score: 25    Patient is low risk for opioid use disorder or overdose. Last PDMP Zac Duran as Reviewed:  Review User Review Instant Review Result   Agustina CORRAL 10/3/2021 12:53 AM     Reviewed PDMP [1]     Last Controlled Substance Monitoring Documentation      [id number] ED from 9/3/2022 in [address] [facility] Emergency Department   Comments pt drank 240cc water without difficulty or c/o prior to dc filed at 09/03/2022 0950                  No Positive Risk Factors identified today. Objective   Vitals:    11/16/22 1029   BP: 136/76   Site: Left Upper Arm   Position: Sitting   Pulse: 74   Resp: 18   SpO2: 98%   Weight: 180 lb (81.6 kg)   Height: 5' 9\" (1.753 m)      Body mass index is 26.58 kg/m².              Allergies   Allergen Reactions    Imdur [Isosorbide Nitrate] Other (See Comments)     headache    Oxycodone Rash    Simvastatin Other (See Comments)     Muscle weakness    Bactrim [Sulfamethoxazole-Trimethoprim] Nausea Only    Ciprofloxacin Hcl Other (See Comments)     \"Heel/ tendon pain\"    Gabapentin Other (See Comments)     Constipation      Hydrocodone Nausea Only    Norco [Hydrocodone-Acetaminophen] Nausea Only and Other (See Comments)     Causes sick feeling in head       Prior to Visit Medications    Medication Sig Taking? Authorizing Provider   clopidogrel (PLAVIX) 75 MG tablet Take 1 tablet by mouth daily Yes Magalene Hallmark, DO   cyclobenzaprine (FLEXERIL) 10 MG tablet Take 1 tablet by mouth 3 times daily as needed for Muscle spasms Yes Magalene Hallmark, DO   traMADol (ULTRAM) 50 MG tablet Take 1 tablet by mouth every 6 hours as needed for Pain for up to 30 days.  Yes Magalene Hallmark, DO   ipratropium (ATROVENT) 0.06 % nasal spray 2 sprays by Each Nostril route 4 times daily Yes Magalene Hallmark, DO   apixaban (ELIQUIS) 5 MG TABS tablet Take 1 tablet by mouth 2 times daily Yes Magalene Hallmark, DO   metoprolol succinate (TOPROL XL) 50 MG extended release tablet Take 1 tablet by mouth 2 times daily Yes Magalene Hallmark, DO   pravastatin (PRAVACHOL) 10 MG tablet TAKE 1 TABLET BY MOUTH DAILY Yes Magalene Hallmark, DO   glimepiride (AMARYL) 4 MG tablet Take 1 tablet by mouth 2 times daily (with meals) Yes Magalene Hallmark, DO   glycopyrrolate (ROBINUL) 1 MG tablet Take 1 tablet by mouth 3 times daily Yes Magalene Hallmark, DO   hydrALAZINE (APRESOLINE) 25 MG tablet Take 1 tablet by mouth 4 times daily Yes Magalene Hallmark, DO   pantoprazole (PROTONIX) 40 MG tablet TAKE 1 TABLET BY MOUTH DAILY AS NEEDED (REFLUX) Yes Magalene Hallmark, DO   cevimeline (EVOXAC) 30 MG capsule Take 1 capsule by mouth 3 times daily Yes Magalene Hallmark, DO   alfuzosin (UROXATRAL) 10 MG extended release tablet Take 1 tablet by mouth daily Yes Magalene Hallmark, DO   folic acid (FOLVITE) 1 MG tablet Take 1 tablet by mouth daily  Patient taking differently: Take 1 mg by mouth daily Ran out Yes Magalene Hallmark, DO   ammonium lactate (LAC-HYDRIN) 12 % lotion Apply 1 g topically daily as needed for Dry Skin Yes Historical Provider, MD Norman (Including outside providers/suppliers regularly involved in providing care): Patient Care Team:  Leo Mckay DO as PCP - General (Family Medicine)  Leo Mckay DO as PCP - [facility] Empaneled Provider  Shiraz Aguilar DO as PCP - Orthopedics (Orthopedic Surgery)  Olena Cyr MD as Consulting Physician (Cardiology)  Damaso Enriquez (Ophthalmology)  Vito Andres DO as Consulting Physician (Cardiology)  Dahiana Louie MD as Consulting Physician (Cardiology)  HARMAN Joiner CNP (Otolaryngology)     Reviewed and updated this visit:  Tobacco  Allergies  Meds  Problems  Med Hx  Surg Hx  Soc Hx  Fam Hx            reviewed health maintenance report. Patient is aware of deficiencies and suggested preventative tests.

## 2022-11-20 NOTE — PATIENT INSTRUCTIONS
Personalized Preventive Plan for Wood Bonner - 11/16/2022  Medicare offers a range of preventive health benefits. Some of the tests and screenings are paid in full while other may be subject to a deductible, co-insurance, and/or copay. Some of these benefits include a comprehensive review of your medical history including lifestyle, illnesses that may run in your family, and various assessments and screenings as appropriate. After reviewing your medical record and screening and assessments performed today your provider may have ordered immunizations, labs, imaging, and/or referrals for you. A list of these orders (if applicable) as well as your Preventive Care list are included within your After Visit Summary for your review. Other Preventive Recommendations:    A preventive eye exam performed by an eye specialist is recommended every 1-2 years to screen for glaucoma; cataracts, macular degeneration, and other eye disorders. A preventive dental visit is recommended every 6 months. Try to get at least 150 minutes of exercise per week or 10,000 steps per day on a pedometer . Order or download the FREE \"Exercise & Physical Activity: Your Everyday Guide\" from [employer]. Call 8-742.502.1608 or search [employer] online. You need 7972-0547 mg of calcium and 9178-3565 IU of vitamin D per day. It is possible to meet your calcium requirement with diet alone, but a vitamin D supplement is usually necessary to meet this goal.  When exposed to the sun, use a sunscreen that protects against both UVA and UVB radiation with an SPF of 30 or greater. Reapply every 2 to 3 hours or after sweating, drying off with a towel, or swimming. Always wear a seat belt when traveling in a car. Always wear a helmet when riding a bicycle or motorcycle.

## 2022-12-15 ENCOUNTER — OFFICE VISIT (OUTPATIENT)
Dept: VASCULAR SURGERY | Age: 77
End: 2022-12-15
Payer: MEDICARE

## 2022-12-15 VITALS — HEIGHT: 69 IN | BODY MASS INDEX: 25.18 KG/M2 | WEIGHT: 170 LBS

## 2022-12-15 DIAGNOSIS — I82.411: ICD-10-CM

## 2022-12-15 DIAGNOSIS — Z86.718 HISTORY OF DEEP VEIN THROMBOSIS: ICD-10-CM

## 2022-12-15 PROBLEM — E11.628 DIABETIC FOOT INFECTION (HCC): Status: RESOLVED | Noted: 2019-05-24 | Resolved: 2022-12-15

## 2022-12-15 PROBLEM — L08.9 DIABETIC FOOT INFECTION (HCC): Status: RESOLVED | Noted: 2019-05-24 | Resolved: 2022-12-15

## 2022-12-15 PROCEDURE — G8484 FLU IMMUNIZE NO ADMIN: HCPCS | Performed by: SURGERY

## 2022-12-15 PROCEDURE — 99204 OFFICE O/P NEW MOD 45 MIN: CPT | Performed by: SURGERY

## 2022-12-15 PROCEDURE — 1036F TOBACCO NON-USER: CPT | Performed by: SURGERY

## 2022-12-15 PROCEDURE — 1123F ACP DISCUSS/DSCN MKR DOCD: CPT | Performed by: SURGERY

## 2022-12-15 PROCEDURE — G8417 CALC BMI ABV UP PARAM F/U: HCPCS | Performed by: SURGERY

## 2022-12-15 PROCEDURE — G8427 DOCREV CUR MEDS BY ELIG CLIN: HCPCS | Performed by: SURGERY

## 2022-12-15 NOTE — PROGRESS NOTES
Chief Complaint:   Chief Complaint   Patient presents with    Circulatory Problem     New pt.  Rt. Leg dvt has pain         HPI: Patient tells me that he has underlying coronary artery disease, history of coronary artery angioplasty and stent placement, has been on Plavix for a long time, recently had prostate surgery done, stop the Plavix for 7 days, subsequently also had endoscopy done, stop the Plavix for 7 more days, then noted, swelling and pain of right lower extremity, went to the emergency room, where he was found to have deep vein thrombosis of right lower extremity and patient was started on Plavix and patient was referred by his PCP for further evaluation    Patient has been more than 50 years ago he had what appears to be history of deep vein thrombosis, does not recall the details    Patient denies any chest pain or shortness of breath    At the present time he has no swelling of the right leg      Patient denies any focal lateralizing neurological symptoms like loss of speech, vision or loss of function of extremity    Patient can walk a few blocks without difficulty, and denies any symptoms of rest pain    Allergies   Allergen Reactions    Imdur [Isosorbide Nitrate] Other (See Comments)     headache    Oxycodone Rash    Simvastatin Other (See Comments)     Muscle weakness    Bactrim [Sulfamethoxazole-Trimethoprim] Nausea Only    Ciprofloxacin Hcl Other (See Comments)     \"Heel/ tendon pain\"    Gabapentin Other (See Comments)     Constipation      Hydrocodone Nausea Only    Norco [Hydrocodone-Acetaminophen] Nausea Only and Other (See Comments)     Causes sick feeling in head         Current Outpatient Medications   Medication Sig Dispense Refill    clopidogrel (PLAVIX) 75 MG tablet Take 1 tablet by mouth daily 90 tablet 3    cyclobenzaprine (FLEXERIL) 10 MG tablet Take 1 tablet by mouth 3 times daily as needed for Muscle spasms 270 tablet 5    traMADol (ULTRAM) 50 MG tablet Take 1 tablet by mouth every 03/16/2017    EF 60-65%    Hx of blood clots     Hyperlipidemia     Hypertension     Stented coronary artery     Tinnitus     Urinary retention 03/17/2017       Past Surgical History:   Procedure Laterality Date    ABLATION OF DYSRHYTHMIC FOCUS  1980    APPROX 30-35 YRS AGO    BACK SURGERY      CARDIAC CATHETERIZATION  02/20/2020    Dr Carlos Liriano N/A 10/9/2020    ANTERIOR CERVICAL FUSION  C3-C6, PARTIAL CORPECTOMY C3-C6, ANTERIOR OSTEOPHYTECTOMY C5-T1 ---AUDIOLOGY, SPINAL ELEMENTS performed by Mavis Hale DO at [address] sigmoid    COLONOSCOPY  5/2018 [facility]    CORONARY ANGIOPLASTY WITH STENT PLACEMENT      DIAGNOSTIC CARDIAC CATH LAB PROCEDURE      ENDOSCOPY, COLON, DIAGNOSTIC      EPIDURAL STEROID INJECTION N/A 12/11/2018    C7-T1 EPIDURAL STEROID INJECTION performed by Jaziel Koch DO at [facility] 1/8/2019    C7 - T1 EPIDURAL STEROID INJECTION #2 performed by Jaziel Koch DO at [address]  2008    LAMINECTOMY N/A 6/5/2020    LUMBAR LAMINECTOMY L3-4 , L5-S1, HARDWARE REMOVAL L4-5, DURAL REPAIR performed by Mavis Hale DO at [address] N/A 8/7/2020    LUMBAR DECOMPRESSION L4-S1; IRRIGATION AND DEBRIDEMENT OF LUMBAR HEMATOMA /SEROMA; REMOVAL SYNOVIAL CYST L5-S1 LEFT performed by Mavis Hale DO at [address]  03/06/2017    NASAL SINUS SURGERY  12/07/2017    Submucosal Resection of Inferior Turbinate    NERVE BLOCK Bilateral 10 12 2015    bilateral sacroiliac joint injection #1    NERVE BLOCK Bilateral 10/22/15    sacroiliac joint #2    NERVE BLOCK Bilateral 11 02 2015    Sacroiliac joint bilateral #3    NERVE BLOCK Left 12/2/15    lumbar transforaminal #1    NERVE BLOCK Left 12 16 15    NERVE BLOCK Left 12 28 2015    transforaminal nerve block left lumbar #3    NERVE BLOCK Left 1 20 16    radiofrequency     NERVE BLOCK  12/11/2018 C7-T1 epidural    NERVE BLOCK N/A 2019    cervical epidural steroid injection    OTHER SURGICAL HISTORY  1943    had a feeding tube as an infant, scar    PROSTATE SURGERY  2017    PTCA  2019    2.5 x 38 mm Resolute Lito RAFI mid OM1, 2.25 x 8 mm Resolute Bokeelia RAFI ostium of dx    UPPER GASTROINTESTINAL ENDOSCOPY  2018 [place]    UPPER GASTROINTESTINAL ENDOSCOPY N/A 2019    EGD BIOPSY performed by Cezar Pérez MD at [address] [place] [address] N/A 6/15/2022    EGD ESOPHAGOGASTRODUODENOSCOPY WITH DILATION performed by Cezar Pérez MD at [address] [place] [address]  6/15/2022    EGD DILATION SAVORY performed by Cezar Pérez MD at [address] [place] [address] History   Problem Relation Age of Onset    Stroke Mother     Other Father     Kidney Disease Brother     Arrhythmia Brother     Cancer Brother     Kidney Disease Sister     Hypertension Other     Diabetes Other        Social History     Socioeconomic History    Marital status:      Spouse name: Not on file    Number of children: Not on file    Years of education: Not on file    Highest education level: Not on file   Occupational History    Not on file   Tobacco Use    Smoking status: Former     Packs/day: 0.00     Years: 16.00     Pack years: 0.00     Types: Cigarettes     Quit date: 1977     Years since quittin.9    Smokeless tobacco: Never    Tobacco comments:     quit in    Vaping Use    Vaping Use: Never used   Substance and Sexual Activity    Alcohol use: No     Alcohol/week: 0.0 standard drinks     Comment: 1-2 cups coffee daily    Drug use: No    Sexual activity: Never   Other Topics Concern    Not on file   Social History Narrative    Not on file     Social Determinants of Health     Financial Resource Strain: Low Risk     Difficulty of Paying Living Expenses: Not very hard   Food Insecurity: No Food Insecurity    Worried About [facility] in the Last Year: Never true    Ran Out of Food in the Last Year: Never true   Transportation Needs: Not on file   Physical Activity: Not on file   Stress: Not on file   Social Connections: Not on file   Intimate Partner Violence: Not on file   Housing Stability: Not on file       Review of Systems:  Skin:  No abnormal pigmentation or rash  Eyes:  No blurring, diplopia or vision loss  Ears/Nose/Throat:  No hearing loss or vertigo  Respiratory:  No cough, pleuritic chest pain, dyspnea, or wheezing. Cardiovascular: No angina, palpitations . Coronary artery disease history of angioplasty stent placement, history of atrial fibrillation, hypertension, hyperlipidemia  Gastrointestinal:  No nausea or vomiting; no abdominal pain or rectal bleeding  Musculoskeletal:  No arthritis or weakness. History of back surgery, history of peripheral neuropathy  Neurologic:  No paralysis, paresis, paresthesia, seizures or headaches  Hematologic/Lymphatic/Immunologic:  No anemia, abnormal bleeding/bruising, fever, chills or night sweats. Endocrine:  No heat or cold intolerance. No polyphagia, polydipsia or polyuria. Diabetes mellitus      Physical Exam:  General appearance:  Alert, awake, oriented x 3. No distress. Skin:  Warm and dry  Head:  Normocephalic. No masses, lesions or tenderness  Eyes:  Conjunctivae appear normal; PERRL  Ears:  External ears normal  Nose/Sinuses:  Septum midline, mucosa normal; no drainage  Oropharynx:  Clear, no exudate noted  Neck:  No jugular venous distention, lymphadenopathy or thyromegaly. No evidence of carotid bruit, patient has a scar on the left side of neck, underwent cervical fusion surgery many years ago  Lungs:  Clear to ausculation bilaterally. No rhonchi, crackles, wheezes  Heart:  Regular rate and rhythm. No rub or murmur  Abdomen:  Soft, non-tender. No masses, organomegaly. Musculoskeletal : No joint effusions, tenderness swelling    Neuro: Speech is intact. Moving all extremities.  No focal motor or sensory deficits      Extremities:  Both feet are warm to touch. The color of both feet is normal.    No leg swelling noted, no calf tenderness    Pulses Right  Left    Brachial 3 3    Radial    3=normal   Femoral 2 2  2=diminished   Popliteal    1=barely palpable   Dorsalis pedis    0=absent   Posterior tibial 2 2  4=aneurysmal             Other pertinent information:1. The past medical records were reviewed. 2.  Venous ultrasound study that was done was personally reviewed by me, evidence of acute deep vein thrombosis, involving the distal superficial femoral vein and popliteal vein noted    Assessment:    1. Femoral vein thrombosis, right ([address]    2. History of deep vein thrombosis              Plan:       Discussed nature with patient, informed him, based upon personal review of the venous ultrasound study, he does have deep vein thrombosis of the distal superficial femoral vein and popliteal vein, patient was recommended anticoagulation, for total of 6 months, which will be April of next year, at that time if necessary we will do a venous ultrasound study and then make additional recommendations but in the meanwhile, call me as needed for symptoms of any increasing pain or swelling of the legs     All the questions were answered. Indicated follow-up: Return in about 4 months (around 4/15/2023), or if symptoms worsen or fail to improve.

## 2023-01-20 ENCOUNTER — OFFICE VISIT (OUTPATIENT)
Dept: FAMILY MEDICINE CLINIC | Age: 78
End: 2023-01-20

## 2023-01-20 VITALS
DIASTOLIC BLOOD PRESSURE: 84 MMHG | HEIGHT: 69 IN | BODY MASS INDEX: 25.92 KG/M2 | TEMPERATURE: 97.5 F | HEART RATE: 74 BPM | OXYGEN SATURATION: 97 % | RESPIRATION RATE: 16 BRPM | SYSTOLIC BLOOD PRESSURE: 138 MMHG | WEIGHT: 175 LBS

## 2023-01-20 DIAGNOSIS — G89.29 TOE PAIN, CHRONIC, RIGHT: Primary | ICD-10-CM

## 2023-01-20 DIAGNOSIS — M79.674 TOE PAIN, CHRONIC, RIGHT: Primary | ICD-10-CM

## 2023-01-20 RX ORDER — AMOXICILLIN AND CLAVULANATE POTASSIUM 875; 125 MG/1; MG/1
1 TABLET, FILM COATED ORAL 2 TIMES DAILY
Qty: 20 TABLET | Refills: 0 | Status: SHIPPED | OUTPATIENT
Start: 2023-01-20 | End: 2023-01-30

## 2023-01-20 NOTE — PROGRESS NOTES
23  Ramu Reza : 1945 Sex: male  Age 76 y.o. Subjective:  Chief Complaint   Patient presents with    Toe Injury     Right Big Toe, Painful       HPI:   Ramu Reza , 76 y.o. male presents to the clinic for evaluation of right big toe x 2 days. The patient reports associated painful toe. The patient denies known injury. The patient has taken tramadol for symptoms. The patient reports no improvement in symptoms over time. Denies any erythema, edema, ecchymosis, paresthesia, and loss of ROM / strength. The patient also denies headache, fever, chest pain, abdominal pain, shortness of breath, and nausea / vomiting / diarrhea. Pt has a hx of visits with podiatry with his big toe, the podiatrist has cut his nail and he is now having some pain and the cut is open. Pt will be seeing podiatry but appt is next week but wanted to ensure that his toe is not infected. ROS:   Unless otherwise stated in this report the patient's positive and negative responses for review of systems for constitutional, eyes, ENT, cardiovascular, respiratory, gastrointestinal, neurological, , musculoskeletal, and integument systems and related systems to the presenting problem are either stated in the history of present illness or were not pertinent or were negative for the symptoms and/or complaints related to the presenting medical problem. Positives and pertinent negatives as per HPI. All others reviewed and are negative. PMH:     Past Medical History:   Diagnosis Date    Abnormal computed tomography angiography of heart 2019    Calcified plaque proximal RCA with 50-70% stenosis, proximal LAD 50% stenosis, mild LAD 30-50% stenosis, groundglass infiltrates, area of consoldidation left lung base posteriorly.       Arthritis     Atrial fibrillation (HCC)     Burning pain     CAD (coronary artery disease)     Degenerative lumbar disc     Diabetes ([facility] [address]     DVT (deep venous thrombosis) ([facility])     Dysphagia Edentulous     upper denture    H/O cardiovascular stress test 02/19/2020    Lexiscan    Herniated cervical disc     History of echocardiogram 03/16/2017    EF 60-65%    Hx of blood clots     Hyperlipidemia     Hypertension     Stented coronary artery     Tinnitus     Urinary retention 03/17/2017       Past Surgical History:   Procedure Laterality Date    ABLATION OF DYSRHYTHMIC FOCUS  1980    APPROX 30-35 YRS AGO    BACK SURGERY      CARDIAC CATHETERIZATION  02/20/2020    Dr Landen Murphy N/A 10/9/2020    ANTERIOR CERVICAL FUSION  C3-C6, PARTIAL CORPECTOMY C3-C6, ANTERIOR OSTEOPHYTECTOMY C5-T1 ---AUDIOLOGY, SPINAL ELEMENTS performed by Joseph Moore DO at [address] sigmoid    COLONOSCOPY  5/2018 [facility]    CORONARY ANGIOPLASTY WITH STENT PLACEMENT      DIAGNOSTIC CARDIAC CATH LAB PROCEDURE      ENDOSCOPY, COLON, DIAGNOSTIC      EPIDURAL STEROID INJECTION N/A 12/11/2018    C7-T1 EPIDURAL STEROID INJECTION performed by Michael Morris DO at [facility] 1/8/2019    C7 - T1 EPIDURAL STEROID INJECTION #2 performed by Michael Morris DO at [address]  2008    LAMINECTOMY N/A 6/5/2020    LUMBAR LAMINECTOMY L3-4 , L5-S1, HARDWARE REMOVAL L4-5, DURAL REPAIR performed by Joseph Moore DO at [address] N/A 8/7/2020    LUMBAR DECOMPRESSION L4-S1; IRRIGATION AND DEBRIDEMENT OF LUMBAR HEMATOMA /SEROMA; REMOVAL SYNOVIAL CYST L5-S1 LEFT performed by Joseph Moore DO at [address]  03/06/2017    NASAL SINUS SURGERY  12/07/2017    Submucosal Resection of Inferior Turbinate    NERVE BLOCK Bilateral 10 12 2015    bilateral sacroiliac joint injection #1    NERVE BLOCK Bilateral 10/22/15    sacroiliac joint #2    NERVE BLOCK Bilateral 11 02 2015    Sacroiliac joint bilateral #3    NERVE BLOCK Left 12/2/15    lumbar transforaminal #1    NERVE BLOCK Left 12 16 15 NERVE BLOCK Left 12 28 2015    transforaminal nerve block left lumbar #3    NERVE BLOCK Left 1 20 16    radiofrequency     NERVE BLOCK  12/11/2018    C7-T1 epidural    NERVE BLOCK N/A 01/08/2019    cervical epidural steroid injection    OTHER SURGICAL HISTORY  1945    had a feeding tube as an infant, scar    PROSTATE SURGERY  2017    PTCA  09/30/2019    2.5 x 38 mm Resolute [place] RAFI mid OM1, 2.25 x 8 mm Resolute [place] RAFI ostium of dx    UPPER GASTROINTESTINAL ENDOSCOPY  5/2018 [facility]    UPPER GASTROINTESTINAL ENDOSCOPY N/A 6/4/2019    EGD BIOPSY performed by Palma Denise MD at [place] [address] N/A 6/15/2022    EGD ESOPHAGOGASTRODUODENOSCOPY WITH DILATION performed by Palma Denise MD at [place] [address]  6/15/2022    EGD DILATION SAVORY performed by Palma Denise MD at [facility] ENDOSCOPY       Family History   Problem Relation Age of Onset    Stroke Mother     Other Father     Kidney Disease Brother     Arrhythmia Brother     Cancer Brother     Kidney Disease Sister     Hypertension Other     Diabetes Other        Medications:     Current Outpatient Medications:     amoxicillin-clavulanate (AUGMENTIN) 875-125 MG per tablet, Take 1 tablet by mouth 2 times daily for 10 days, Disp: 20 tablet, Rfl: 0    clopidogrel (PLAVIX) 75 MG tablet, Take 1 tablet by mouth daily, Disp: 90 tablet, Rfl: 3    cyclobenzaprine (FLEXERIL) 10 MG tablet, Take 1 tablet by mouth 3 times daily as needed for Muscle spasms, Disp: 270 tablet, Rfl: 5    ipratropium (ATROVENT) 0.06 % nasal spray, 2 sprays by Each Nostril route 4 times daily, Disp: 15 mL, Rfl: 3    apixaban (ELIQUIS) 5 MG TABS tablet, Take 1 tablet by mouth 2 times daily, Disp: 60 tablet, Rfl: 5    metoprolol succinate (TOPROL XL) 50 MG extended release tablet, Take 1 tablet by mouth 2 times daily, Disp: 180 tablet, Rfl: 3    pravastatin (PRAVACHOL) 10 MG tablet, TAKE 1 TABLET BY MOUTH DAILY, Disp: 90 tablet, Rfl: 5    glimepiride (AMARYL) 4 MG tablet, Take 1 tablet by mouth 2 times daily (with meals), Disp: 60 tablet, Rfl: 5    glycopyrrolate (ROBINUL) 1 MG tablet, Take 1 tablet by mouth 3 times daily, Disp: 270 tablet, Rfl: 3    hydrALAZINE (APRESOLINE) 25 MG tablet, Take 1 tablet by mouth 4 times daily, Disp: 270 tablet, Rfl: 3    pantoprazole (PROTONIX) 40 MG tablet, TAKE 1 TABLET BY MOUTH DAILY AS NEEDED (REFLUX), Disp: 30 tablet, Rfl: 5    cevimeline (EVOXAC) 30 MG capsule, Take 1 capsule by mouth 3 times daily, Disp: 90 capsule, Rfl: 5    alfuzosin (UROXATRAL) 10 MG extended release tablet, Take 1 tablet by mouth daily, Disp: 90 tablet, Rfl: 5    folic acid (FOLVITE) 1 MG tablet, Take 1 tablet by mouth daily (Patient taking differently: Take 1 mg by mouth daily Ran out), Disp: 90 tablet, Rfl: 5    ammonium lactate (LAC-HYDRIN) 12 % lotion, Apply 1 g topically daily as needed for Dry Skin, Disp: , Rfl:     Allergies:      Allergies   Allergen Reactions    Imdur [Isosorbide Nitrate] Other (See Comments)     headache    Oxycodone Rash    Simvastatin Other (See Comments)     Muscle weakness    Bactrim [Sulfamethoxazole-Trimethoprim] Nausea Only    Ciprofloxacin Hcl Other (See Comments)     \"Heel/ tendon pain\"    Gabapentin Other (See Comments)     Constipation      Hydrocodone Nausea Only    Norco [Hydrocodone-Acetaminophen] Nausea Only and Other (See Comments)     Causes sick feeling in head         Social History:     Social History     Tobacco Use    Smoking status: Former     Packs/day: 0.00     Years: 16.00     Pack years: 0.00     Types: Cigarettes     Quit date: 1977     Years since quittin.0    Smokeless tobacco: Never    Tobacco comments:     quit in    Vaping Use    Vaping Use: Never used   Substance Use Topics    Alcohol use: No     Alcohol/week: 0.0 standard drinks     Comment: 1-2 cups coffee daily    Drug use: No       Physical Exam:     Vitals:    23 1008   BP: 138/84   Pulse: 74 Resp: 16   Temp: 97.5 °F (36.4 °C)   TempSrc: Temporal   SpO2: 97%   Weight: 175 lb (79.4 kg)   Height: 5' 9\" (1.753 m)       Physical Exam (PE)   Constitutional: Alert, development consistent with age. HENT:      Head: Normocephalic. Right Ear: External ear normal.      Left Ear: External ear normal.      Nose: Normal.      Mouth/Throat:     Mouth: Mucous membranes are moist.      Pharynx: Oropharynx is clear. Eyes: Pupils: Pupils are equal, round, and reactive to light. Neck: Normal ROM. Supple. Cardiovascular: Heart RRR without pathologic murmurs or gallops. Pulmonary: Respiratory effort normal.  Normal breath sounds. Abdomen: Soft, nontender, normal bowel sounds. Foot: right            Tenderness:  under big toe            Swelling: none              Deformity: No obvious deformity. ROM: WDL ROM. Neurovascular:              Sensory deficit: Sensation intact proximal and distal to the injury site. Pulse deficit: Pulses 2+ and bounding. Capillary refill: Less then 2 sec throughout. Skin:  No ecchymosis, rash, abrasions, or erythema noted. Gait: normal gait noted. Lymphatics: No lymphangitis or adenopathy noted. Neurological:  Alert and oriented. Motor functions intact. Psychiatric: Mood and Affect: Mood normal. Behavior: Behavior normal.    Testing:   (All laboratory and radiology results have been personally reviewed by myself)  Labs:  No results found for this visit on 01/20/23. Imaging: All Radiology results interpreted by Radiologist unless otherwise noted. No orders to display       Assessment / Plan:   The patient's vitals, allergies, medications, and past medical history have been reviewed. Shilo Gates was seen today for toe injury. Diagnoses and all orders for this visit:    Toe pain, chronic, right    Other orders  -     amoxicillin-clavulanate (AUGMENTIN) 875-125 MG per tablet;  Take 1 tablet by mouth 2 times daily for 10 days      - Disposition: home    - Educational material printed for patient's review and were included in patient instructions. After Visit Summary was given to patient at the end of visit. - The patient is instructed to avoid activities that exacerbate pain and benefits of RICE therapy. Discussed other symptomatic treatments with the patient today. The patient is to schedule a follow-up with PCP in the next 2-3 days for reevaluation. Red flag symptoms were also discussed with the patient today. If symptoms worsen the patient is to go directly to the emergency department for reevaluation and treatment. Pt verbalizes understanding and is in agreement with plan of care. All questions answered. SIGNATURE: HARMAN Fournier - CNP      *NOTE: This report was transcribed using voice recognition software. Every effort was made to ensure accuracy; however, inadvertent computerized transcription errors may be present.

## 2023-01-25 ENCOUNTER — OFFICE VISIT (OUTPATIENT)
Dept: PODIATRY | Age: 78
End: 2023-01-25
Payer: MEDICARE

## 2023-01-25 VITALS — WEIGHT: 168 LBS | BODY MASS INDEX: 24.88 KG/M2 | HEIGHT: 69 IN

## 2023-01-25 DIAGNOSIS — L30.9 DERMATITIS: ICD-10-CM

## 2023-01-25 DIAGNOSIS — S90.111A CONTUSION OF RIGHT GREAT TOE WITHOUT DAMAGE TO NAIL, INITIAL ENCOUNTER: Primary | ICD-10-CM

## 2023-01-25 PROCEDURE — 1036F TOBACCO NON-USER: CPT | Performed by: PODIATRIST

## 2023-01-25 PROCEDURE — G8420 CALC BMI NORM PARAMETERS: HCPCS | Performed by: PODIATRIST

## 2023-01-25 PROCEDURE — G8427 DOCREV CUR MEDS BY ELIG CLIN: HCPCS | Performed by: PODIATRIST

## 2023-01-25 PROCEDURE — G8484 FLU IMMUNIZE NO ADMIN: HCPCS | Performed by: PODIATRIST

## 2023-01-25 PROCEDURE — 1123F ACP DISCUSS/DSCN MKR DOCD: CPT | Performed by: PODIATRIST

## 2023-01-25 PROCEDURE — 99203 OFFICE O/P NEW LOW 30 MIN: CPT | Performed by: PODIATRIST

## 2023-01-25 RX ORDER — TRIAMCINOLONE ACETONIDE 1 MG/G
CREAM TOPICAL
Qty: 90 G | Refills: 2 | Status: SHIPPED | OUTPATIENT
Start: 2023-01-25

## 2023-01-25 NOTE — PROGRESS NOTES
23     Malik Harley    : 1945 Sex: male   Age: 76 y.o. Patient was referred by: Marcie Peters DO  Patient's PCP/Provider is:  Marcie Peters DO    Subjective:    Patient seen today for evaluation regarding right great toe injury due to improper trimming by another area physician. Chief Complaint   Patient presents with    Nail Problem     Right great toe        HPI: Patient has had chronic pain and irritation to the distal aspect right great toe. She was placed on an oral antibiotic which he was unable to take due to some GI irritation. Patient presents today to discuss other additional treatment options available. No other additional abnormalities noted. ROS:  Const: Positives and pertinent negatives as per HPI. Musculo: Denies symptoms other than stated above. Neuro: Denies symptoms other than stated above. Skin: Denies symptoms other than stated above. Current Medications:    Current Outpatient Medications:     triamcinolone (KENALOG) 0.1 % cream, Apply topically 2 times daily. , Disp: 90 g, Rfl: 2    clopidogrel (PLAVIX) 75 MG tablet, Take 1 tablet by mouth daily, Disp: 90 tablet, Rfl: 3    cyclobenzaprine (FLEXERIL) 10 MG tablet, Take 1 tablet by mouth 3 times daily as needed for Muscle spasms, Disp: 270 tablet, Rfl: 5    ipratropium (ATROVENT) 0.06 % nasal spray, 2 sprays by Each Nostril route 4 times daily, Disp: 15 mL, Rfl: 3    apixaban (ELIQUIS) 5 MG TABS tablet, Take 1 tablet by mouth 2 times daily, Disp: 60 tablet, Rfl: 5    metoprolol succinate (TOPROL XL) 50 MG extended release tablet, Take 1 tablet by mouth 2 times daily, Disp: 180 tablet, Rfl: 3    pravastatin (PRAVACHOL) 10 MG tablet, TAKE 1 TABLET BY MOUTH DAILY, Disp: 90 tablet, Rfl: 5    glimepiride (AMARYL) 4 MG tablet, Take 1 tablet by mouth 2 times daily (with meals), Disp: 60 tablet, Rfl: 5    glycopyrrolate (ROBINUL) 1 MG tablet, Take 1 tablet by mouth 3 times daily, Disp: 270 tablet, Rfl: 3 hydrALAZINE (APRESOLINE) 25 MG tablet, Take 1 tablet by mouth 4 times daily, Disp: 270 tablet, Rfl: 3    pantoprazole (PROTONIX) 40 MG tablet, TAKE 1 TABLET BY MOUTH DAILY AS NEEDED (REFLUX), Disp: 30 tablet, Rfl: 5    cevimeline (EVOXAC) 30 MG capsule, Take 1 capsule by mouth 3 times daily, Disp: 90 capsule, Rfl: 5    alfuzosin (UROXATRAL) 10 MG extended release tablet, Take 1 tablet by mouth daily, Disp: 90 tablet, Rfl: 5    folic acid (FOLVITE) 1 MG tablet, Take 1 tablet by mouth daily (Patient taking differently: Take 1 mg by mouth daily Ran out), Disp: 90 tablet, Rfl: 5    ammonium lactate (LAC-HYDRIN) 12 % lotion, Apply 1 g topically daily as needed for Dry Skin, Disp: , Rfl:     amoxicillin-clavulanate (AUGMENTIN) 875-125 MG per tablet, Take 1 tablet by mouth 2 times daily for 10 days (Patient not taking: Reported on 1/25/2023), Disp: 20 tablet, Rfl: 0    Allergies: Allergies   Allergen Reactions    Imdur [Isosorbide Nitrate] Other (See Comments)     headache    Oxycodone Rash    Simvastatin Other (See Comments)     Muscle weakness    Bactrim [Sulfamethoxazole-Trimethoprim] Nausea Only    Ciprofloxacin Hcl Other (See Comments)     \"Heel/ tendon pain\"    Gabapentin Other (See Comments)     Constipation      Hydrocodone Nausea Only    Norco [Hydrocodone-Acetaminophen] Nausea Only and Other (See Comments)     Causes sick feeling in head         Vitals:    01/25/23 0841   Weight: 168 lb (76.2 kg)   Height: 5' 9\" (1.753 m)        Past Medical History:   Diagnosis Date    Abnormal computed tomography angiography of heart 07/19/2019    Calcified plaque proximal RCA with 50-70% stenosis, proximal LAD 50% stenosis, mild LAD 30-50% stenosis, groundglass infiltrates, area of consoldidation left lung base posteriorly.       Arthritis     Atrial fibrillation (HCC)     Burning pain     CAD (coronary artery disease)     Degenerative lumbar disc     Diabetes ([address]     DVT (deep venous thrombosis) (HCC)     Dysphagia Edentulous     upper denture    H/O cardiovascular stress test 02/19/2020    Lexiscan    Herniated cervical disc     History of echocardiogram 03/16/2017    EF 60-65%    Hx of blood clots     Hyperlipidemia     Hypertension     Stented coronary artery     Tinnitus     Urinary retention 03/17/2017     Family History   Problem Relation Age of Onset    Stroke Mother     Other Father     Kidney Disease Brother     Arrhythmia Brother     Cancer Brother     Kidney Disease Sister     Hypertension Other     Diabetes Other      Past Surgical History:   Procedure Laterality Date    ABLATION OF DYSRHYTHMIC FOCUS  1980    APPROX 30-35 YRS AGO    BACK SURGERY      CARDIAC CATHETERIZATION  02/20/2020    Dr Lauryn Neely N/A 10/9/2020    ANTERIOR CERVICAL FUSION  C3-C6, PARTIAL CORPECTOMY C3-C6, ANTERIOR OSTEOPHYTECTOMY C5-T1 ---AUDIOLOGY, SPINAL ELEMENTS performed by Gilberto Johnson DO at [address] sigmoid    COLONOSCOPY  5/2018 [facility]    CORONARY ANGIOPLASTY WITH STENT PLACEMENT      DIAGNOSTIC CARDIAC CATH LAB PROCEDURE      ENDOSCOPY, COLON, DIAGNOSTIC      EPIDURAL STEROID INJECTION N/A 12/11/2018    C7-T1 EPIDURAL STEROID [facility] 1/8[facility]2019    C7 - T1 EPIDURAL STEROID INJECTION #2 performed by Jared Phipps DO at [address]      zachery lense implants    [facility] [address]  2008    LAMINECTOMY N/A 6/5/2020    LUMBAR LAMINECTOMY L3-4 , L5-S1, HARDWARE REMOVAL L4-5, DURAL REPAIR performed by Gilberto Johnson DO at [address] N/A 8/7/2020    LUMBAR DECOMPRESSION L4-S1; IRRIGATION AND DEBRIDEMENT OF LUMBAR HEMATOMA /SEROMA; REMOVAL SYNOVIAL CYST L5-S1 LEFT performed by Gilberto Johnson DO at [address]  03/06/2017    NASAL SINUS SURGERY  12/07/2017    Submucosal Resection of Inferior Turbinate    NERVE BLOCK Bilateral 10 12 2015    bilateral sacroiliac joint injection #1    NERVE BLOCK Bilateral 10/22/15    sacroiliac joint #2    NERVE BLOCK Bilateral 11 02     Sacroiliac joint bilateral #3    NERVE BLOCK Left 12/2/15    lumbar transforaminal #1    NERVE BLOCK Left 12 16 15    NERVE BLOCK Left 12 28 2015    transforaminal nerve block left lumbar #3    NERVE BLOCK Left 1 20 16    radiofrequency     NERVE BLOCK  2018    C7-T1 epidural    NERVE BLOCK N/A 2019    cervical epidural steroid injection    OTHER SURGICAL HISTORY  1945    had a feeding tube as an infant, scar    PROSTATE SURGERY      PTCA  2019    2.5 x 38 mm Resolute [place] RAFI mid OM1, 2.25 x 8 mm Resolute [place] RAFI ostium of dx    UPPER GASTROINTESTINAL ENDOSCOPY  2018 [facility]    UPPER GASTROINTESTINAL ENDOSCOPY N/A 2019    EGD BIOPSY performed by Phoenix Quinones MD at [place] [address] N/A 6/15/2022    EGD ESOPHAGOGASTRODUODENOSCOPY WITH DILATION performed by Phoenix Quinones MD at [place] [address]  6/15/2022    EGD DILATION SAVORY performed by Phoenix Quinones MD at [address] [facility] History     Tobacco Use    Smoking status: Former     Packs/day: 0.00     Years: 16.00     Pack years: 0.00     Types: Cigarettes     Quit date: 1977     Years since quittin.0    Smokeless tobacco: Never    Tobacco comments:     quit in    Vaping Use    Vaping Use: Never used   Substance Use Topics    Alcohol use: No     Alcohol/week: 0.0 standard drinks     Comment: 1-2 cups coffee daily    Drug use: No           Diagnostic studies:    No results found. Procedures:    None    Exam:  VASCULAR: Pedal pulses palpable right foot. CFT less than 5 seconds digits 1 through 5 right foot  NEUROLOGICAL: Epicritic sensations intact digital regions right foot  DERMATOLOGICAL: Dermatitis issues noted distal aspect right great toe secondary to contusion. No skin abrasions or any signs of infection noted right foot.   No edema or ecchymotic skin changes noted digital regions right foot. MUSCULOSKELETAL: Adequate range of motion digital regions right foot    Plan Per Assessment  Fatemeh William was seen today for nail problem. Diagnoses and all orders for this visit:    Contusion of right great toe without damage to nail, initial encounter    Dermatitis  -     triamcinolone (KENALOG) 0.1 % cream; Apply topically 2 times daily. New patient evaluation and management  We did discuss topical care options regarding the dermatitis issues distal aspect right great toe. Prescription given for topical Kenalog cream to be applied twice daily as instructed. Did dispense a Silipos toe sleeve as well to protect the distal and of the right great toe with every day ambulatory activities and shoe gear. Will be followed up at a later date for continued evaluation and management. Seen By:    Joseph Watson DPM    Electronically signed by Joseph Watson DPM on 1/25/2023 at 8:50 AM      This note was created using voice recognition software. The note was reviewed however may contain grammatical errors.

## 2023-01-25 NOTE — PROGRESS NOTES
Patient is in today for evaluation of right great toenail. Patient says he has blood under the toenail for a few months.  Pcp is Mayra Gonzalez DO  Last ov 11/16/22

## 2023-01-25 NOTE — LETTER
[address] [place] [address]  Phone: [phone]  Fax: [phone]    Jase Fay  [id number]   1945  1/25/2023      Dear Danyell Bee,    I would like to thank you for the kind referral of Belinda Fay. He presented to the office today for evaluation regarding injury and pain to the distal aspect right great toe. We did discuss topical care options due to the residual eczema/dermatitis issues present from previous treatments. We did dispense a nonmedicated Silipos toe sleeve as well which will help reduce current symptoms. We will have continued follow-up until issues are resolved. If you should have any questions concerning his visit today, please do not hesitate to contact me.     Sincerely,    Piter Moses DPM

## 2023-02-01 ENCOUNTER — OFFICE VISIT (OUTPATIENT)
Dept: PODIATRY | Age: 78
End: 2023-02-01
Payer: MEDICARE

## 2023-02-01 VITALS — HEIGHT: 69 IN | BODY MASS INDEX: 24.88 KG/M2 | WEIGHT: 168 LBS

## 2023-02-01 DIAGNOSIS — L30.9 DERMATITIS: ICD-10-CM

## 2023-02-01 DIAGNOSIS — S90.111A CONTUSION OF RIGHT GREAT TOE WITHOUT DAMAGE TO NAIL, INITIAL ENCOUNTER: Primary | ICD-10-CM

## 2023-02-01 PROCEDURE — G8427 DOCREV CUR MEDS BY ELIG CLIN: HCPCS | Performed by: PODIATRIST

## 2023-02-01 PROCEDURE — G8484 FLU IMMUNIZE NO ADMIN: HCPCS | Performed by: PODIATRIST

## 2023-02-01 PROCEDURE — 1036F TOBACCO NON-USER: CPT | Performed by: PODIATRIST

## 2023-02-01 PROCEDURE — 99213 OFFICE O/P EST LOW 20 MIN: CPT | Performed by: PODIATRIST

## 2023-02-01 PROCEDURE — G8420 CALC BMI NORM PARAMETERS: HCPCS | Performed by: PODIATRIST

## 2023-02-01 PROCEDURE — 1123F ACP DISCUSS/DSCN MKR DOCD: CPT | Performed by: PODIATRIST

## 2023-02-01 NOTE — PROGRESS NOTES
Patient is here today for a follow up to right great toe pain. He has been applying his topical cream as prescribed and wearing his toe sleeve. He states he is having less pain. PCP is Dr. Huey Shepard, last seen 11/16/2022.

## 2023-02-01 NOTE — PROGRESS NOTES
23     Anselmo Dry    : 1945   Sex: male    Age: 76 y.o. Patient's PCP/Provider is:  Mary Ochoa DO    Subjective:  Patient is seen today for follow-up regarding continued evaluation regarding contusion with associated dermatitis issues right great toe. Overall patient is doing well at this time with minimal issues noted. He denies any additional abnormalities. Is very pleased with care performed to this point in time. Chief Complaint   Patient presents with    Toe Pain     Right great toe pain       ROS:  Const: Positives and pertinent negatives as per HPI. Musculo: Denies symptoms other than stated above. Neuro: Denies symptoms other than stated above. Skin: Denies symptoms other than stated above. Current Medications:    Current Outpatient Medications:     triamcinolone (KENALOG) 0.1 % cream, Apply topically 2 times daily. , Disp: 90 g, Rfl: 2    clopidogrel (PLAVIX) 75 MG tablet, Take 1 tablet by mouth daily, Disp: 90 tablet, Rfl: 3    cyclobenzaprine (FLEXERIL) 10 MG tablet, Take 1 tablet by mouth 3 times daily as needed for Muscle spasms, Disp: 270 tablet, Rfl: 5    ipratropium (ATROVENT) 0.06 % nasal spray, 2 sprays by Each Nostril route 4 times daily, Disp: 15 mL, Rfl: 3    apixaban (ELIQUIS) 5 MG TABS tablet, Take 1 tablet by mouth 2 times daily, Disp: 60 tablet, Rfl: 5    metoprolol succinate (TOPROL XL) 50 MG extended release tablet, Take 1 tablet by mouth 2 times daily, Disp: 180 tablet, Rfl: 3    pravastatin (PRAVACHOL) 10 MG tablet, TAKE 1 TABLET BY MOUTH DAILY, Disp: 90 tablet, Rfl: 5    glimepiride (AMARYL) 4 MG tablet, Take 1 tablet by mouth 2 times daily (with meals), Disp: 60 tablet, Rfl: 5    glycopyrrolate (ROBINUL) 1 MG tablet, Take 1 tablet by mouth 3 times daily, Disp: 270 tablet, Rfl: 3    hydrALAZINE (APRESOLINE) 25 MG tablet, Take 1 tablet by mouth 4 times daily, Disp: 270 tablet, Rfl: 3    pantoprazole (PROTONIX) 40 MG tablet, TAKE 1 TABLET BY MOUTH DAILY AS NEEDED (REFLUX), Disp: 30 tablet, Rfl: 5    cevimeline (EVOXAC) 30 MG capsule, Take 1 capsule by mouth 3 times daily, Disp: 90 capsule, Rfl: 5    alfuzosin (UROXATRAL) 10 MG extended release tablet, Take 1 tablet by mouth daily, Disp: 90 tablet, Rfl: 5    folic acid (FOLVITE) 1 MG tablet, Take 1 tablet by mouth daily (Patient taking differently: Take 1 mg by mouth daily Ran out), Disp: 90 tablet, Rfl: 5    ammonium lactate (LAC-HYDRIN) 12 % lotion, Apply 1 g topically daily as needed for Dry Skin, Disp: , Rfl:     Allergies: Allergies   Allergen Reactions    Imdur [Isosorbide Nitrate] Other (See Comments)     headache    Oxycodone Rash    Simvastatin Other (See Comments)     Muscle weakness    Bactrim [Sulfamethoxazole-Trimethoprim] Nausea Only    Ciprofloxacin Hcl Other (See Comments)     \"Heel/ tendon pain\"    Gabapentin Other (See Comments)     Constipation      Hydrocodone Nausea Only    Norco [Hydrocodone-Acetaminophen] Nausea Only and Other (See Comments)     Causes sick feeling in head         Vitals:    02/01/23 1011   Weight: 168 lb (76.2 kg)   Height: 5' 9\" (1.753 m)       Exam:  Neurovascular status unchanged. Contusion site healed distal aspect right great toe. Dermatitis issues resolved right foot. Adequate range of motion noted digital regions right foot. Diagnostic Studies:     No results found. Procedures:    None    Plan Per Assessment  Gus Garcia was seen today for toe pain. Diagnoses and all orders for this visit:    Contusion of right great toe without damage to nail, initial encounter    Dermatitis      Evaluation and management  We did discuss continued treatment options with patient in detail today. Did recommend continued use of his good supportive shoe gear and topical medications as prescribed. Patient will be followed up at a later date for continued evaluation and management.       Seen By:    Brandi Quinones DPM    Electronically signed by Thai Muñoz Rebecca Rothman DPM on 2/1/2023 at 10:29 AM    This note was created using voice recognition software. The note was reviewed however may contain grammatical errors.

## 2023-02-07 DIAGNOSIS — E78.2 MIXED HYPERLIPIDEMIA: ICD-10-CM

## 2023-02-07 DIAGNOSIS — R53.82 CHRONIC FATIGUE: ICD-10-CM

## 2023-02-07 DIAGNOSIS — E11.42 TYPE 2 DIABETES MELLITUS WITH DIABETIC POLYNEUROPATHY, WITHOUT LONG-TERM CURRENT USE OF INSULIN (HCC): ICD-10-CM

## 2023-02-07 LAB
ALBUMIN SERPL-MCNC: 4.2 G/DL (ref 3.5–5.2)
ALP BLD-CCNC: 100 U/L (ref 40–129)
ALT SERPL-CCNC: 18 U/L (ref 0–40)
ANION GAP SERPL CALCULATED.3IONS-SCNC: 14 MMOL/L (ref 7–16)
AST SERPL-CCNC: 27 U/L (ref 0–39)
BASOPHILS ABSOLUTE: 0.05 E9/L (ref 0–0.2)
BASOPHILS RELATIVE PERCENT: 0.7 % (ref 0–2)
BILIRUB SERPL-MCNC: 0.5 MG/DL (ref 0–1.2)
BUN BLDV-MCNC: 9 MG/DL (ref 6–23)
CALCIUM SERPL-MCNC: 9.9 MG/DL (ref 8.6–10.2)
CHLORIDE BLD-SCNC: 106 MMOL/L (ref 98–107)
CHOLESTEROL, TOTAL: 173 MG/DL (ref 0–199)
CO2: 24 MMOL/L (ref 22–29)
CREAT SERPL-MCNC: 0.9 MG/DL (ref 0.7–1.2)
EOSINOPHILS ABSOLUTE: 0.11 E9/L (ref 0.05–0.5)
EOSINOPHILS RELATIVE PERCENT: 1.5 % (ref 0–6)
GFR SERPL CREATININE-BSD FRML MDRD: >60 ML/MIN/1.73
GLUCOSE BLD-MCNC: 113 MG/DL (ref 74–99)
HBA1C MFR BLD: 6.4 % (ref 4–5.6)
HCT VFR BLD CALC: 47.1 % (ref 37–54)
HDLC SERPL-MCNC: 50 MG/DL
HEMOGLOBIN: 16 G/DL (ref 12.5–16.5)
IMMATURE GRANULOCYTES #: 0.02 E9/L
IMMATURE GRANULOCYTES %: 0.3 % (ref 0–5)
LDL CHOLESTEROL CALCULATED: 90 MG/DL (ref 0–99)
LYMPHOCYTES ABSOLUTE: 2.77 E9/L (ref 1.5–4)
LYMPHOCYTES RELATIVE PERCENT: 38.8 % (ref 20–42)
MCH RBC QN AUTO: 30.2 PG (ref 26–35)
MCHC RBC AUTO-ENTMCNC: 34 % (ref 32–34.5)
MCV RBC AUTO: 88.9 FL (ref 80–99.9)
MONOCYTES ABSOLUTE: 0.53 E9/L (ref 0.1–0.95)
MONOCYTES RELATIVE PERCENT: 7.4 % (ref 2–12)
NEUTROPHILS ABSOLUTE: 3.65 E9/L (ref 1.8–7.3)
NEUTROPHILS RELATIVE PERCENT: 51.3 % (ref 43–80)
PDW BLD-RTO: 13.3 FL (ref 11.5–15)
PLATELET # BLD: 204 E9/L (ref 130–450)
PMV BLD AUTO: 11.1 FL (ref 7–12)
POTASSIUM SERPL-SCNC: 5.3 MMOL/L (ref 3.5–5)
RBC # BLD: 5.3 E12/L (ref 3.8–5.8)
SODIUM BLD-SCNC: 144 MMOL/L (ref 132–146)
T4 FREE: 1.06 NG/DL (ref 0.93–1.7)
TOTAL PROTEIN: 7.3 G/DL (ref 6.4–8.3)
TRIGL SERPL-MCNC: 166 MG/DL (ref 0–149)
TSH SERPL DL<=0.05 MIU/L-ACNC: 2.54 UIU/ML (ref 0.27–4.2)
VLDLC SERPL CALC-MCNC: 33 MG/DL
WBC # BLD: 7.1 E9/L (ref 4.5–11.5)

## 2023-02-16 ENCOUNTER — OFFICE VISIT (OUTPATIENT)
Dept: FAMILY MEDICINE CLINIC | Age: 78
End: 2023-02-16

## 2023-02-16 VITALS
BODY MASS INDEX: 26.05 KG/M2 | HEART RATE: 68 BPM | HEIGHT: 69 IN | RESPIRATION RATE: 18 BRPM | WEIGHT: 175.9 LBS | DIASTOLIC BLOOD PRESSURE: 78 MMHG | SYSTOLIC BLOOD PRESSURE: 138 MMHG | OXYGEN SATURATION: 97 %

## 2023-02-16 DIAGNOSIS — E11.42 TYPE 2 DIABETES MELLITUS WITH DIABETIC POLYNEUROPATHY, WITHOUT LONG-TERM CURRENT USE OF INSULIN (HCC): ICD-10-CM

## 2023-02-16 DIAGNOSIS — R53.82 CHRONIC FATIGUE: ICD-10-CM

## 2023-02-16 DIAGNOSIS — E78.2 MIXED HYPERLIPIDEMIA: ICD-10-CM

## 2023-02-16 DIAGNOSIS — I48.91 ATRIAL FIBRILLATION, UNSPECIFIED TYPE (HCC): ICD-10-CM

## 2023-02-16 DIAGNOSIS — I82.411: Primary | ICD-10-CM

## 2023-02-16 DIAGNOSIS — I10 ESSENTIAL HYPERTENSION: ICD-10-CM

## 2023-02-16 RX ORDER — CLOPIDOGREL BISULFATE 75 MG/1
75 TABLET ORAL DAILY
Qty: 90 TABLET | Refills: 3 | Status: SHIPPED | OUTPATIENT
Start: 2023-02-16

## 2023-02-16 RX ORDER — HYDRALAZINE HYDROCHLORIDE 25 MG/1
25 TABLET, FILM COATED ORAL 4 TIMES DAILY
Qty: 270 TABLET | Refills: 3 | Status: SHIPPED | OUTPATIENT
Start: 2023-02-16

## 2023-02-16 SDOH — ECONOMIC STABILITY: INCOME INSECURITY: HOW HARD IS IT FOR YOU TO PAY FOR THE VERY BASICS LIKE FOOD, HOUSING, MEDICAL CARE, AND HEATING?: NOT HARD AT ALL

## 2023-02-16 SDOH — ECONOMIC STABILITY: HOUSING INSECURITY
IN THE LAST 12 MONTHS, WAS THERE A TIME WHEN YOU DID NOT HAVE A STEADY PLACE TO SLEEP OR SLEPT IN A SHELTER (INCLUDING NOW)?: NO

## 2023-02-16 SDOH — ECONOMIC STABILITY: FOOD INSECURITY: WITHIN THE PAST 12 MONTHS, YOU WORRIED THAT YOUR FOOD WOULD RUN OUT BEFORE YOU GOT MONEY TO BUY MORE.: NEVER TRUE

## 2023-02-16 SDOH — ECONOMIC STABILITY: FOOD INSECURITY: WITHIN THE PAST 12 MONTHS, THE FOOD YOU BOUGHT JUST DIDN'T LAST AND YOU DIDN'T HAVE MONEY TO GET MORE.: NEVER TRUE

## 2023-02-16 ASSESSMENT — PATIENT HEALTH QUESTIONNAIRE - PHQ9
SUM OF ALL RESPONSES TO PHQ QUESTIONS 1-9: 0
SUM OF ALL RESPONSES TO PHQ QUESTIONS 1-9: 0
1. LITTLE INTEREST OR PLEASURE IN DOING THINGS: 0
SUM OF ALL RESPONSES TO PHQ9 QUESTIONS 1 & 2: 0
2. FEELING DOWN, DEPRESSED OR HOPELESS: 0
SUM OF ALL RESPONSES TO PHQ QUESTIONS 1-9: 0
SUM OF ALL RESPONSES TO PHQ QUESTIONS 1-9: 0

## 2023-02-16 NOTE — PROGRESS NOTES
Thuy Mendoza is a 76 y.o. male  . Subjective:      Patient saw vascular. He recommends eliquis until recheck of venous ultrasound in April. That will also be 6 months since onset DVT on right femoral vein. Eliquis has been very expensive. We will set up with financial assist. Kai Leung to allergist, was diagnosed with irritant rhinitis. Has eliminated a lot of irritants. Review of Systems   Constitutional:  Negative for activity change, appetite change, chills, diaphoresis, fatigue, fever and unexpected weight change. HENT:  Negative for congestion, dental problem, drooling, ear discharge, ear pain, facial swelling, hearing loss, mouth sores, nosebleeds, postnasal drip, rhinorrhea, sinus pressure, sneezing, sore throat, tinnitus, trouble swallowing and voice change. Eyes:  Negative for photophobia, pain, discharge, redness, itching and visual disturbance. Respiratory:  Negative for apnea, cough, choking, chest tightness, shortness of breath, wheezing and stridor. Cardiovascular:  Negative for chest pain, palpitations and leg swelling. Gastrointestinal:  Negative for abdominal distention, abdominal pain, anal bleeding, blood in stool, constipation, diarrhea, nausea, rectal pain and vomiting. Endocrine: Negative for cold intolerance, heat intolerance, polydipsia, polyphagia and polyuria. Genitourinary:  Negative for decreased urine volume, difficulty urinating, dysuria, enuresis, flank pain, frequency, genital sores, hematuria, penile discharge, penile pain, penile swelling, scrotal swelling, testicular pain and urgency. Musculoskeletal:  Negative for arthralgias, back pain, gait problem, joint swelling, myalgias, neck pain and neck stiffness. Skin:  Negative for color change, pallor, rash and wound. Allergic/Immunologic: Negative for environmental allergies, food allergies and immunocompromised state.    Neurological:  Negative for dizziness, tremors, seizures, syncope, facial asymmetry, speech difficulty, weakness, light-headedness, numbness and headaches. Hematological:  Negative for adenopathy. Does not bruise/bleed easily. Psychiatric/Behavioral:  Negative for agitation, behavioral problems, confusion, decreased concentration, dysphoric mood, hallucinations, self-injury, sleep disturbance and suicidal ideas. The patient is not nervous/anxious and is not hyperactive. Past Medical History:   Diagnosis Date    Abnormal computed tomography angiography of heart 2019    Calcified plaque proximal RCA with 50-70% stenosis, proximal LAD 50% stenosis, mild LAD 30-50% stenosis, groundglass infiltrates, area of consoldidation left lung base posteriorly.       Arthritis     Atrial fibrillation (HCC)     Burning pain     CAD (coronary artery disease)     Degenerative lumbar disc     Diabetes (HCC)     DVT (deep venous thrombosis) [facility])     Dysphagia     Edentulous     upper denture    H/O cardiovascular stress test 2020    Lexiscan    Herniated cervical disc     History of echocardiogram 2017    EF 60-65%    Hx of blood clots     Hyperlipidemia     Hypertension     Stented coronary artery     Tinnitus     Urinary retention 2017       Social History     Socioeconomic History    Marital status:      Spouse name: Not on file    Number of children: Not on file    Years of education: Not on file    Highest education level: Not on file   Occupational History    Not on file   Tobacco Use    Smoking status: Former     Packs/day: 0.00     Years: 16.00     Pack years: 0.00     Types: Cigarettes     Quit date: 1977     Years since quittin.1    Smokeless tobacco: Never    Tobacco comments:     quit in    Vaping Use    Vaping Use: Never used   Substance and Sexual Activity    Alcohol use: No     Alcohol/week: 0.0 standard drinks     Comment: 1-2 cups coffee daily    Drug use: No    Sexual activity: Never   Other Topics Concern    Not on file   Social History Narrative Not on file     Social Determinants of Health     Financial Resource Strain: Low Risk     Difficulty of Paying Living Expenses: Not hard at all   Food Insecurity: No Food Insecurity    Worried About [address] in the Last Year: Never true    [address] [facility] [address] in the Last Year: Never true   Transportation Needs: Unknown    Lack of Transportation (Medical): Not on file    Lack of Transportation (Non-Medical):  No   Physical Activity: Not on file   Stress: Not on file   Social Connections: Not on file   Intimate Partner Violence: Not on file   Housing Stability: Unknown    Unable to Pay for Housing in the Last Year: Not on file    Number of Places Lived in the Last Year: Not on file    Unstable Housing in the Last Year: No       Family History   Problem Relation Age of Onset    Stroke Mother     Other Father     Kidney Disease Brother     Arrhythmia Brother     Cancer Brother     Kidney Disease Sister     Hypertension Other     Diabetes Other        Current Outpatient Medications on File Prior to Visit   Medication Sig Dispense Refill    cyclobenzaprine (FLEXERIL) 10 MG tablet Take 1 tablet by mouth 3 times daily as needed for Muscle spasms 270 tablet 5    apixaban (ELIQUIS) 5 MG TABS tablet Take 1 tablet by mouth 2 times daily 60 tablet 5    metoprolol succinate (TOPROL XL) 50 MG extended release tablet Take 1 tablet by mouth 2 times daily 180 tablet 3    pravastatin (PRAVACHOL) 10 MG tablet TAKE 1 TABLET BY MOUTH DAILY 90 tablet 5    glimepiride (AMARYL) 4 MG tablet Take 1 tablet by mouth 2 times daily (with meals) 60 tablet 5    glycopyrrolate (ROBINUL) 1 MG tablet Take 1 tablet by mouth 3 times daily 270 tablet 3    hydrALAZINE (APRESOLINE) 25 MG tablet Take 1 tablet by mouth 4 times daily 270 tablet 3    pantoprazole (PROTONIX) 40 MG tablet TAKE 1 TABLET BY MOUTH DAILY AS NEEDED (REFLUX) 30 tablet 5    alfuzosin (UROXATRAL) 10 MG extended release tablet Take 1 tablet by mouth daily 90 tablet 5    ammonium lactate (LAC-HYDRIN) 12 % lotion Apply 1 g topically daily as needed for Dry Skin      triamcinolone (KENALOG) 0.1 % cream Apply topically 2 times daily. (Patient not taking: Reported on 2/16/2023) 90 g 2    clopidogrel (PLAVIX) 75 MG tablet Take 1 tablet by mouth daily (Patient not taking: Reported on 2/16/2023) 90 tablet 3    ipratropium (ATROVENT) 0.06 % nasal spray 2 sprays by Each Nostril route 4 times daily (Patient not taking: Reported on 2/16/2023) 15 mL 3    cevimeline (EVOXAC) 30 MG capsule Take 1 capsule by mouth 3 times daily (Patient not taking: Reported on 2/16/2023) 90 capsule 5    folic acid (FOLVITE) 1 MG tablet Take 1 tablet by mouth daily (Patient not taking: Reported on 2/16/2023) 90 tablet 5     No current facility-administered medications on file prior to visit. Allergies   Allergen Reactions    Imdur [Isosorbide Nitrate] Other (See Comments)     headache    Oxycodone Rash    Simvastatin Other (See Comments)     Muscle weakness    Bactrim [Sulfamethoxazole-Trimethoprim] Nausea Only    Ciprofloxacin Hcl Other (See Comments)     \"Heel/ tendon pain\"    Gabapentin Other (See Comments)     Constipation      Hydrocodone Nausea Only    Norco [Hydrocodone-Acetaminophen] Nausea Only and Other (See Comments)     Causes sick feeling in head         I have reviewed his allergies, medications, problem list, medical,social and family history and have updated as needed in the electronic medical record. Objective:     Physical Exam  Vitals and nursing note reviewed. Constitutional:       General: He is not in acute distress. Appearance: He is well-developed. He is not diaphoretic. HENT:      Head: Normocephalic and atraumatic. Right Ear: External ear normal.      Left Ear: External ear normal.      Nose: Nose normal.      Mouth/Throat:      Pharynx: No oropharyngeal exudate. Eyes:      General: No scleral icterus. Right eye: No discharge. Left eye: No discharge. Conjunctiva/sclera: Conjunctivae normal.      Pupils: Pupils are equal, round, and reactive to light. Neck:      Thyroid: No thyromegaly. Vascular: No JVD. Trachea: No tracheal deviation. Cardiovascular:      Rate and Rhythm: Normal rate and regular rhythm. Heart sounds: Normal heart sounds. No murmur heard. No friction rub. No gallop. Pulmonary:      Effort: Pulmonary effort is normal. No respiratory distress. Breath sounds: Normal breath sounds. No stridor. No wheezing or rales. Chest:      Chest wall: No tenderness. Abdominal:      General: Bowel sounds are normal. There is no distension. Palpations: Abdomen is soft. There is no mass. Tenderness: There is no abdominal tenderness. There is no guarding or rebound. Genitourinary:     Comments: Deferred by patient   Musculoskeletal:         General: No tenderness. Normal range of motion. Cervical back: Normal range of motion and neck supple. Lymphadenopathy:      Cervical: No cervical adenopathy. Skin:     General: Skin is warm and dry. Coloration: Skin is not pale. Findings: No erythema or rash. Neurological:      Mental Status: He is alert and oriented to person, place, and time. Cranial Nerves: No cranial nerve deficit. Motor: No abnormal muscle tone. Coordination: Coordination normal.      Deep Tendon Reflexes: Reflexes are normal and symmetric. Reflexes normal.   Psychiatric:         Behavior: Behavior normal.         Thought Content: Thought content normal.         Judgment: Judgment normal.       Assessment / Plan:   Jessica De Los Santos was seen today for diabetes, discuss labs and medication refill. Diagnoses and all orders for this visit:    Femoral vein thrombosis, right (HCC)    [facility]  -     clopidogrel (PLAVIX) 75 MG tablet; Take 1 tablet by mouth daily    Essential hypertension  -     hydrALAZINE (APRESOLINE) 25 MG tablet;  Take 1 tablet by mouth 4 times daily    Type 2 diabetes mellitus with diabetic polyneuropathy, without long-term current use of insulin (HCC)  -     Hemoglobin A1C; Future  -     MICROALBUMIN / CREATININE URINE RATIO; Future    Mixed hyperlipidemia    Chronic fatigue  -     CBC with Auto Differential; Future  -     Comprehensive Metabolic Panel; Future  -     TSH; Future      Reviewed healthmaintenance report. Patient is aware of deficiencies and suggested preventative tests.

## 2023-02-18 ASSESSMENT — ENCOUNTER SYMPTOMS
BLOOD IN STOOL: 0
FACIAL SWELLING: 0
PHOTOPHOBIA: 0
CHEST TIGHTNESS: 0
WHEEZING: 0
RECTAL PAIN: 0
BACK PAIN: 0
CHOKING: 0
DIARRHEA: 0
EYE PAIN: 0
CONSTIPATION: 0
VOMITING: 0
NAUSEA: 0
RHINORRHEA: 0
EYE ITCHING: 0
COLOR CHANGE: 0
COUGH: 0
VOICE CHANGE: 0
APNEA: 0
SORE THROAT: 0
TROUBLE SWALLOWING: 0
STRIDOR: 0
EYE DISCHARGE: 0
ABDOMINAL PAIN: 0
SINUS PRESSURE: 0
ANAL BLEEDING: 0
SHORTNESS OF BREATH: 0
ABDOMINAL DISTENTION: 0
EYE REDNESS: 0

## 2023-03-03 ENCOUNTER — TELEPHONE (OUTPATIENT)
Dept: FAMILY MEDICINE CLINIC | Age: 78
End: 2023-03-03

## 2023-03-03 DIAGNOSIS — M51.16 LUMBAR DISC DISEASE WITH RADICULOPATHY: ICD-10-CM

## 2023-03-03 DIAGNOSIS — M48.061 NEURAL FORAMINAL STENOSIS OF LUMBAR SPINE: Primary | ICD-10-CM

## 2023-03-03 NOTE — TELEPHONE ENCOUNTER
I called patient and informed referral has been placed and Dr. Arias [place] [facility] will call to schedule.

## 2023-03-03 NOTE — TELEPHONE ENCOUNTER
Patient called asking if Dr. Jenny Alvarez would put a referral in for him to see Dr. Janice Birch for pain in his lower back and possible injection. Patient stated he's had injections previously, through another provider.     Last seen 2/16/2023  Next appt 5/17/2023

## 2023-03-14 ENCOUNTER — HOSPITAL ENCOUNTER (EMERGENCY)
Age: 78
Discharge: HOME | End: 2023-03-14
Payer: MEDICARE

## 2023-03-14 ENCOUNTER — APPOINTMENT (OUTPATIENT)
Dept: CT IMAGING | Age: 78
End: 2023-03-14
Payer: MEDICARE

## 2023-03-14 ENCOUNTER — APPOINTMENT (OUTPATIENT)
Dept: GENERAL RADIOLOGY | Age: 78
End: 2023-03-14
Payer: MEDICARE

## 2023-03-14 VITALS
WEIGHT: 175 LBS | DIASTOLIC BLOOD PRESSURE: 83 MMHG | RESPIRATION RATE: 20 BRPM | TEMPERATURE: 97.5 F | BODY MASS INDEX: 25.84 KG/M2 | OXYGEN SATURATION: 99 % | HEART RATE: 66 BPM | SYSTOLIC BLOOD PRESSURE: 168 MMHG

## 2023-03-14 DIAGNOSIS — M54.32 BILATERAL SCIATICA: Primary | ICD-10-CM

## 2023-03-14 DIAGNOSIS — M54.31 BILATERAL SCIATICA: Primary | ICD-10-CM

## 2023-03-14 PROCEDURE — 6360000002 HC RX W HCPCS: Performed by: PHYSICIAN ASSISTANT

## 2023-03-14 PROCEDURE — 99284 EMERGENCY DEPT VISIT MOD MDM: CPT

## 2023-03-14 PROCEDURE — 96372 THER/PROPH/DIAG INJ SC/IM: CPT

## 2023-03-14 PROCEDURE — 73502 X-RAY EXAM HIP UNI 2-3 VIEWS: CPT

## 2023-03-14 PROCEDURE — 72131 CT LUMBAR SPINE W/O DYE: CPT

## 2023-03-14 RX ORDER — DEXAMETHASONE SODIUM PHOSPHATE 10 MG/ML
8 INJECTION INTRAMUSCULAR; INTRAVENOUS ONCE
Status: COMPLETED | OUTPATIENT
Start: 2023-03-14 | End: 2023-03-14

## 2023-03-14 RX ORDER — MORPHINE SULFATE 4 MG/ML
4 INJECTION, SOLUTION INTRAMUSCULAR; INTRAVENOUS ONCE
Status: COMPLETED | OUTPATIENT
Start: 2023-03-14 | End: 2023-03-14

## 2023-03-14 RX ORDER — PREDNISONE 10 MG/1
40 TABLET ORAL DAILY
Qty: 20 TABLET | Refills: 0 | Status: SHIPPED | OUTPATIENT
Start: 2023-03-14 | End: 2023-03-18

## 2023-03-14 RX ORDER — LIDOCAINE 50 MG/G
1 PATCH TOPICAL EVERY 24 HOURS
Qty: 10 PATCH | Refills: 0 | Status: SHIPPED | OUTPATIENT
Start: 2023-03-14 | End: 2023-03-24

## 2023-03-14 RX ADMIN — DEXAMETHASONE SODIUM PHOSPHATE 8 MG: 10 INJECTION INTRAMUSCULAR; INTRAVENOUS at 09:08

## 2023-03-14 RX ADMIN — MORPHINE SULFATE 4 MG: 4 INJECTION, SOLUTION INTRAMUSCULAR; INTRAVENOUS at 11:23

## 2023-03-14 ASSESSMENT — PAIN SCALES - GENERAL: PAINLEVEL_OUTOF10: 10

## 2023-03-14 NOTE — ED PROVIDER NOTES
Independent TRI Visit. HPI:  3/14/23, Time: 8:15 AM EDT         Harika Aguilar is a 76 y.o. male presenting to the ED for back pain, beginning chronic worse over the last couple weeks ago. The complaint has been constant, severe in severity, and worsened by movement of back. Patient comes in with complaint of lower back pain that radiates down bilateral lower legs to the feet with numbness tingling bilateral legs feet. States he has chronic numbness of the right anterior thigh status post laminectomy. He later had the hardware removed 2019. He has had chronic lower back pain that is been worse over the last couple weeks. He denies any abdominal pain no urinary or bowel incontinence no saddle paresthesias. Denies any fever or chills. Patient is presently taking all ultram Ffexeril with no improvement of his pain patient denies any urinary frequency urgency burning. Patient is presently on Eliquis    Review of Systems:   A complete review of systems was performed and pertinent positives and negatives are stated within HPI, all other systems reviewed and are negative.          --------------------------------------------- PAST HISTORY ---------------------------------------------  Past Medical History:  has a past medical history of Abnormal computed tomography angiography of heart, Arthritis, Atrial fibrillation (HCC), Burning pain, CAD (coronary artery disease), Degenerative lumbar disc, Diabetes ([address] DVT (deep venous thrombosis) ([facility] [address] Dysphagia, Edentulous, H/O cardiovascular stress test, Herniated cervical disc, History of echocardiogram, Hx of blood clots, Hyperlipidemia, Hypertension, Stented coronary artery, Tinnitus, and Urinary retention. Past Surgical History:  has a past surgical history that includes colectomy (2010 sigmoid); Hemorrhoid surgery (2008); Prostate surgery (2017); Colonoscopy (5/2018 [facility]); Nerve Block (Bilateral, 10 12 2015); Nerve Block (Bilateral, 10/22/15);  Nerve Block (Bilateral, 11 02 2015); Nerve Block (Left, 12/2/15); Nerve Block (Left, 12 16 15); Nerve Block (Left, 12 28 2015); Nerve Block (Left, 1 20 16); ablation of dysrhythmic focus (1980); lumbar fusion (03/06/2017); Nasal sinus surgery (12/07/2017); Upper gastrointestinal endoscopy (5/2018 [facility]); Nerve Block (12/11/2018); epidural steroid injection (N/A, 12/11/2018); Nerve Block (N/A, 01/08/2019); epidural steroid injection (N/A, 1/8/2019); Endoscopy, colon, diagnostic; other surgical history (1945); Upper gastrointestinal endoscopy (N/A, 6/4/2019); Diagnostic Cardiac Cath Lab Procedure; Percutaneous Transluminal Coronary Angio (09/30/2019); Coronary angioplasty with stent; Cardiac catheterization (02/20/2020); eye surgery; laminectomy (N/A, 6/5/2020); laminectomy (N/A, 8/7/2020); cervical fusion (N/A, 10/9/2020); back surgery; Upper gastrointestinal endoscopy (N/A, 6/15/2022); and Upper gastrointestinal endoscopy (6/15/2022). Social History:  reports that he quit smoking about 46 years ago. His smoking use included cigarettes. He has never used smokeless tobacco. He reports that he does not drink alcohol and does not use drugs. Family History: family history includes Arrhythmia in his brother; Cancer in his brother; Diabetes in an other family member; Hypertension in an other family member; Kidney Disease in his brother and sister; Other in his father; Stroke in his mother. The patients home medications have been reviewed. Allergies: Imdur [isosorbide nitrate], Oxycodone, Simvastatin, Bactrim [sulfamethoxazole-trimethoprim], Ciprofloxacin hcl, Gabapentin, Hydrocodone, and Norco [hydrocodone-acetaminophen]    -------------------------------------------------- RESULTS -------------------------------------------------  All laboratory and radiology results have been personally reviewed by myself   LABS:  No results found for this visit on 03/14/23.     RADIOLOGY:  Interpreted by Radiologist.  XR HIP LEFT (2-3 VIEWS)   Final Result   Unremarkable pelvis and left hip. CT LUMBAR SPINE WO CONTRAST   Final Result   1. No acute fracture or subluxation. 2.  Mild degenerative facet and degenerative disc disease. 3.  Previous interbody and posterolateral fusion at L4-L5 with laminectomy   change.             ------------------------- NURSING NOTES AND VITALS REVIEWED ---------------------------   The nursing notes within the ED encounter and vital signs as below have been reviewed. BP (!) 168/83   Pulse 66   Temp 97.5 °F (36.4 °C) (Infrared)   Resp 20   Wt 175 lb (79.4 kg)   SpO2 99%   BMI 25.84 kg/m²   Oxygen Saturation Interpretation: Normal      ---------------------------------------------------PHYSICAL EXAM--------------------------------------      Constitutional/General: Alert and oriented x3, well appearing, non toxic in NAD  Head: Normocephalic and atraumatic  Eyes: PERRL, EOMI  Mouth: Oropharynx clear, handling secretions, no trismus  Neck: Supple, full ROM,   Pulmonary: Lungs clear to auscultation bilaterally, no wheezes, rales, or rhonchi. Not in respiratory distress  Cardiovascular:  Regular rate and rhythm, no murmurs, gallops, or rubs. 2+ distal pulses  Abdomen: Soft, non tender, non distended,   Lower lumbar sacral paravertebral tenderness no bony point tenderness of the vertebrae  Extremities: Moves all extremities x 4. Warm and well perfused deep sensation of the right anterior thigh.   Normal strength normal sensation normal pulse left foot dopplerable pulse on the right  Skin: warm and dry without rash  Neurologic: GCS 15,  Psych: Normal Affect      ------------------------------ ED COURSE/MEDICAL DECISION MAKING----------------------  Medications   dexamethasone (DECADRON) injection 8 mg (8 mg IntraMUSCular Given 3/14/23 4753)   morphine (PF) injection 4 mg (4 mg IntraMUSCular Given 3/14/23 1123)         ED COURSE:  Medical Decision Making     Wood Bonner is a 76 y.o. male presenting to the ED for back pain, beginning chronic worse over the last couple weeks ago. The complaint has been constant, severe in severity, and worsened by movement of back. Patient comes in with complaint of lower back pain that radiates down bilateral lower legs to the feet with numbness tingling bilateral legs feet. States he has chronic numbness of the right anterior thigh status post laminectomy. He later had the hardware removed 2019. He has had chronic lower back pain that is been worse over the last couple weeks. He denies any abdominal pain no urinary or bowel incontinence no saddle paresthesias. Denies any fever or chills. Patient is presently taking all ultram Ffexeril with no improvement of his pain patient denies any urinary frequency urgency burning. Patient is presently on Eliquis. CT lumbar no acute findings findings, and degenerative disc disease. X-ray of the hip no fracture or dislocation. Patient's had chronic ongoing symptoms that have just progressively worsened over the last 3 weeks. He has had no fevers he has no abdominal pain no finding of discitis or cauda equina. Patient has a DVT of the right lower leg pulses are present. Patient is presently on Ultram and Flexeril. He has allergies intolerance to [employer]. We will place patient on prednisone burst and Lidoderm patch he is to follow-up with his primary care with referral to pain management     Amount and/or Complexity of Data Reviewed  External Data Reviewed: labs, radiology and notes. Radiology: ordered and independent interpretation performed. Decision-making details documented in ED Course. ECG/medicine tests: ordered and independent interpretation performed. Decision-making details documented in ED Course. Risk  Prescription drug management. Medical Decision Making    Patient presents to the ER for low back pain, bilateral leg pain.    Social Determinants include   Social Connections: Not on file Social Determinants : None. Chronic conditions    Past Medical History:   Diagnosis Date    Abnormal computed tomography angiography of heart 07/19/2019    Calcified plaque proximal RCA with 50-70% stenosis, proximal LAD 50% stenosis, mild LAD 30-50% stenosis, groundglass infiltrates, area of consoldidation left lung base posteriorly. Arthritis     Atrial fibrillation (HCC)     Burning pain     CAD (coronary artery disease)     Degenerative lumbar disc     Diabetes ([address]     DVT (deep venous thrombosis) ([facility])     Dysphagia     Edentulous     upper denture    H/O cardiovascular stress test 02/19/2020    Lexiscan    Herniated cervical disc     History of echocardiogram 03/16/2017    EF 60-65%    Hx of blood clots     Hyperlipidemia     Hypertension     Stented coronary artery     Tinnitus     Urinary retention 03/17/2017   . Physical exam Constitutional/General: Alert and oriented x3, well appearing, non toxic in NAD  Head: Normocephalic and atraumatic  Eyes: PERRL, EOMI  Mouth: Oropharynx clear, handling secretions, no trismus  Neck: Supple, full ROM,   Pulmonary: Lungs clear to auscultation bilaterally, no wheezes, rales, or rhonchi. Not in respiratory distress  Cardiovascular:  Regular rate and rhythm, no murmurs, gallops, or rubs. 2+ distal pulses  Abdomen: Soft, non tender, non distended,   Lower lumbar sacral paravertebral tenderness no bony point tenderness of the vertebrae  Extremities: Moves all extremities x 4. Warm and well perfused deep sensation of the right anterior thigh. Normal strength normal sensation normal pulse left foot dopplerable pulse on the right  Skin: warm and dry without rash  Neurologic: GCS 15,  Psych: Normal Affect. Vital signs /83Temp   97.5 °F    (36.4 °C)  Heart Rate 66Resp 20SpO2 99%Wt   175 lb    (79.4 kg)  Ht   5' 9\"    (175.3 cm)  BMI 25.84 kg/m².   Differential diagnoses include but not limited to lumbar radiculopathy, spinal stenosis, cauda equina, discitis, epidural abscess, epidural hematoma. Diagnostic studies revealed x-ray of the right hip unremarkable no acute fracture or dislocation CT lumbar no acute fracture or subluxation mild degenerative facet degenerative disc disease previously interbody and posterior lateral fusion at L4-5 laminectomy. Consults included none. Results were discussed with patient . Patient was given decadron8 mg Im  for their symptoms with mild improvement. Patient will be discharged home with the following prescriptions, Lidoderm patch, prednisone 40 mg for 5. Discussed appropriate use and potential side effects of starting the prescribed medications. Patient continues to be non-toxic on re-evaluation. Findings were discussed with the patient and reasons to immediately return to the ED were articulated to them. They will follow-up with their PMD       Discharge Instructions:   Patient referred to  Carolyne Roberson [address] [place] [id number] [phone]    Call in 1 day      MEDICATIONS:   DISCHARGE MEDICATIONS:  New Prescriptions    LIDOCAINE (LIDODERM) 5 %    Place 1 patch onto the skin every 24 hours for 10 days 12 hours on, 12 hours off. PREDNISONE (DELTASONE) 10 MG TABLET    Take 4 tablets by mouth daily for 5 days       DISCONTINUED MEDICATIONS:  Discontinued Medications    No medications on file       Record Review:  Records Reviewed : Inpatient Notes recent [facility] encounters       Disposition Considerations: This patient's ED course included: a personal history and physicial examination and re-evaluation prior to disposition  This patient has remained hemodynamically stable during their ED course. I emphasized the importance of follow-up with the physician I referred them to in the timeframe recommended. I discussed with the patient emergent symptoms and the need to immediately return to the ER. Written information was included in their discharge instructions.  Additional verbal discharge instructions were also given and discussed with the patient to supplement those generated by the EMR. We also discussed medications that were prescribed  (if any) including common side effects and interactions. The patient was advised to abstain from driving, operating heavy machinery or making significant decisions while taking medications such as opiates and muscle relaxers that may impair this. All questions were addressed. They understand return precautions and discharge instructions. The patient  expressed understanding. Vitals were stable and they were in no distress at discharge. Counseling: The emergency provider has spoken with the patient and discussed todays results, in addition to providing specific details for the plan of care and counseling regarding the diagnosis and prognosis. Questions are answered at this time and they are agreeable with the plan.      --------------------------------- IMPRESSION AND DISPOSITION ---------------------------------    IMPRESSION  1. Bilateral sciatica        DISPOSITION  Disposition: Discharge to home  Patient condition is good      NOTE: This report was transcribed using voice recognition software.  Every effort was made to ensure accuracy; however, inadvertent computerized transcription errors may be present      Harris Severe, [address]  03/14/23 4340

## 2023-03-18 ENCOUNTER — HOSPITAL ENCOUNTER (EMERGENCY)
Age: 78
Discharge: HOME | End: 2023-03-18
Attending: EMERGENCY MEDICINE
Payer: MEDICARE

## 2023-03-18 VITALS
HEART RATE: 67 BPM | DIASTOLIC BLOOD PRESSURE: 94 MMHG | HEIGHT: 69 IN | TEMPERATURE: 98.4 F | SYSTOLIC BLOOD PRESSURE: 174 MMHG | RESPIRATION RATE: 18 BRPM | OXYGEN SATURATION: 98 % | BODY MASS INDEX: 24.88 KG/M2 | WEIGHT: 168 LBS

## 2023-03-18 DIAGNOSIS — M54.50 ACUTE EXACERBATION OF CHRONIC LOW BACK PAIN: Primary | ICD-10-CM

## 2023-03-18 DIAGNOSIS — G89.29 ACUTE EXACERBATION OF CHRONIC LOW BACK PAIN: Primary | ICD-10-CM

## 2023-03-18 PROCEDURE — 96372 THER/PROPH/DIAG INJ SC/IM: CPT

## 2023-03-18 PROCEDURE — 99284 EMERGENCY DEPT VISIT MOD MDM: CPT

## 2023-03-18 PROCEDURE — 6360000002 HC RX W HCPCS: Performed by: EMERGENCY MEDICINE

## 2023-03-18 RX ORDER — CYCLOBENZAPRINE HCL 5 MG
5 TABLET ORAL 2 TIMES DAILY PRN
Qty: 10 TABLET | Refills: 0 | Status: SHIPPED | OUTPATIENT
Start: 2023-03-18 | End: 2023-03-28

## 2023-03-18 RX ORDER — ORPHENADRINE CITRATE 30 MG/ML
60 INJECTION INTRAMUSCULAR; INTRAVENOUS ONCE
Status: COMPLETED | OUTPATIENT
Start: 2023-03-18 | End: 2023-03-18

## 2023-03-18 RX ORDER — KETOROLAC TROMETHAMINE 30 MG/ML
30 INJECTION, SOLUTION INTRAMUSCULAR; INTRAVENOUS ONCE
Status: COMPLETED | OUTPATIENT
Start: 2023-03-18 | End: 2023-03-18

## 2023-03-18 RX ORDER — MORPHINE SULFATE 4 MG/ML
4 INJECTION, SOLUTION INTRAMUSCULAR; INTRAVENOUS ONCE
Status: COMPLETED | OUTPATIENT
Start: 2023-03-18 | End: 2023-03-18

## 2023-03-18 RX ORDER — METHYLPREDNISOLONE 4 MG/1
TABLET ORAL
Qty: 1 KIT | Refills: 0 | Status: SHIPPED | OUTPATIENT
Start: 2023-03-18 | End: 2023-03-24

## 2023-03-18 RX ADMIN — MORPHINE SULFATE 4 MG: 4 INJECTION, SOLUTION INTRAMUSCULAR; INTRAVENOUS at 15:39

## 2023-03-18 RX ADMIN — KETOROLAC TROMETHAMINE 30 MG: 30 INJECTION, SOLUTION INTRAMUSCULAR; INTRAVENOUS at 15:39

## 2023-03-18 RX ADMIN — ORPHENADRINE CITRATE 60 MG: 30 INJECTION INTRAMUSCULAR; INTRAVENOUS at 15:38

## 2023-03-18 ASSESSMENT — ENCOUNTER SYMPTOMS
ABDOMINAL PAIN: 0
SHORTNESS OF BREATH: 0
BACK PAIN: 1
CHEST TIGHTNESS: 0

## 2023-03-18 ASSESSMENT — PAIN DESCRIPTION - DESCRIPTORS
DESCRIPTORS: ACHING;SHARP
DESCRIPTORS: SHARP;ACHING

## 2023-03-18 ASSESSMENT — PAIN SCALES - GENERAL
PAINLEVEL_OUTOF10: 5
PAINLEVEL_OUTOF10: 10

## 2023-03-18 ASSESSMENT — PAIN DESCRIPTION - ORIENTATION
ORIENTATION: LOWER
ORIENTATION: LOWER

## 2023-03-18 ASSESSMENT — LIFESTYLE VARIABLES
HOW OFTEN DO YOU HAVE A DRINK CONTAINING ALCOHOL: NEVER
HOW MANY STANDARD DRINKS CONTAINING ALCOHOL DO YOU HAVE ON A TYPICAL DAY: PATIENT DOES NOT DRINK

## 2023-03-18 ASSESSMENT — PAIN DESCRIPTION - LOCATION
LOCATION: BACK
LOCATION: BACK

## 2023-03-18 ASSESSMENT — PAIN - FUNCTIONAL ASSESSMENT: PAIN_FUNCTIONAL_ASSESSMENT: 0-10

## 2023-03-20 ENCOUNTER — OFFICE VISIT (OUTPATIENT)
Dept: PHYSICAL MEDICINE AND REHAB | Age: 78
End: 2023-03-20
Payer: MEDICARE

## 2023-03-20 VITALS
BODY MASS INDEX: 25.77 KG/M2 | HEART RATE: 87 BPM | SYSTOLIC BLOOD PRESSURE: 144 MMHG | DIASTOLIC BLOOD PRESSURE: 85 MMHG | WEIGHT: 174 LBS | HEIGHT: 69 IN

## 2023-03-20 DIAGNOSIS — M48.061 LUMBAR FORAMINAL STENOSIS: Primary | ICD-10-CM

## 2023-03-20 DIAGNOSIS — M47.816 LUMBAR FACET ARTHROPATHY: ICD-10-CM

## 2023-03-20 PROCEDURE — 3077F SYST BP >= 140 MM HG: CPT | Performed by: PHYSICAL MEDICINE & REHABILITATION

## 2023-03-20 PROCEDURE — 3079F DIAST BP 80-89 MM HG: CPT | Performed by: PHYSICAL MEDICINE & REHABILITATION

## 2023-03-20 PROCEDURE — 99204 OFFICE O/P NEW MOD 45 MIN: CPT | Performed by: PHYSICAL MEDICINE & REHABILITATION

## 2023-03-20 PROCEDURE — G8417 CALC BMI ABV UP PARAM F/U: HCPCS | Performed by: PHYSICAL MEDICINE & REHABILITATION

## 2023-03-20 PROCEDURE — G8427 DOCREV CUR MEDS BY ELIG CLIN: HCPCS | Performed by: PHYSICAL MEDICINE & REHABILITATION

## 2023-03-20 PROCEDURE — G8484 FLU IMMUNIZE NO ADMIN: HCPCS | Performed by: PHYSICAL MEDICINE & REHABILITATION

## 2023-03-20 RX ORDER — PREGABALIN 75 MG/1
75 CAPSULE ORAL 2 TIMES DAILY
Qty: 60 CAPSULE | Refills: 2 | Status: SHIPPED | OUTPATIENT
Start: 2023-03-20 | End: 2023-04-19

## 2023-03-20 NOTE — PROGRESS NOTES
CO2 24 02/07/2023    BUN 9 02/07/2023    CREATININE 0.9 02/07/2023    GLUCOSE 113 (H) 02/07/2023    CALCIUM 9.9 02/07/2023    PROT 7.3 02/07/2023    LABALBU 4.2 02/07/2023    BILITOT 0.5 02/07/2023    ALKPHOS 100 02/07/2023    AST 27 02/07/2023    ALT 18 02/07/2023    LABGLOM >60 02/07/2023    GFRAA >60 08/24/2022     3. Review of prior imaging: CT lumbar spine showed facet  arthropathy, fusion L4-5. Past medical, surgical, family, social history, allergies and medications were otherwise reviewed in Epic. Review of systems was completed by patient and reviewed with me and is scanned in media separately. Physical Exam:   Blood pressure (!) 144/85, pulse 87, height 5' 9\" (1.753 m), weight 174 lb (78.9 kg). General: No apparent distress. Habitus: Body mass index is 25.7 kg/m². Overweight (BMI = 25.0-29. 9)   HEENT: No cervical bruit. CVS: No edema or cyanosis. No calf tenderness. : No costovertebral angle tenderness. ABD: No abdominal pulsatile mass. Lymphatic: No palpable lymphadenopathy. MSK: Lumbar:  Cervical lordosis increased,  thoracic kyphosis normal and lumbar lordosis reduced. No hairy patch, cafe au lait, nevi, hemangioma or dimpling over lumbar area. Pelvis level, no scoliosis, leg length equal. Seated and standing flexion tests negative. There is no step off deformity. No superficial or bony tenderness. Lumbar AROM in flexion is 60 degrees, in extension is 30 degrees, in left rotation is 30degrees, in right rotation is 30 degrees, in left lateral flexion is 30 degrees and in right lateral flexion is 30 degrees. There is tenderness to palpation at bilateral lumbar paraspinals. No tenderness to palpation at bilateral SIJ,  gluteal muscles,  pubic tubercle,  PSIS,  ischial tuberosity,  greater trochanter,  ASIS,  iliac crest.  No trigger points. No spasm. No edema, erythema, ecchymosis,  mass or deformity. Taut bands absent.    Popliteal angle is normal . Supine straight leg raise

## 2023-03-21 ENCOUNTER — TELEPHONE (OUTPATIENT)
Dept: PHYSICAL MEDICINE AND REHAB | Age: 78
End: 2023-03-21

## 2023-03-21 NOTE — TELEPHONE ENCOUNTER
Called patient to clarify medications and who prescribes them. Eliquis and Plavix. Patient is not taking the Plavix as of yet. Patient on Eliquis prescribed by Dr. Elida Treviño. Will sent clearance for that.

## 2023-03-22 ENCOUNTER — PROCEDURE VISIT (OUTPATIENT)
Dept: PHYSICAL MEDICINE AND REHAB | Age: 78
End: 2023-03-22

## 2023-03-22 VITALS — BODY MASS INDEX: 25.77 KG/M2 | HEIGHT: 69 IN | WEIGHT: 174 LBS

## 2023-03-22 DIAGNOSIS — R20.2 PARESTHESIA OF SKIN: ICD-10-CM

## 2023-03-22 DIAGNOSIS — G62.9 POLYNEUROPATHY: Primary | ICD-10-CM

## 2023-03-22 NOTE — PROGRESS NOTES
denervation signs were seen. Diagnostic Interpretation: This study was abnormal.     Electrodiagnosis: There is electrodiagnostic evidence of a peripheral polyneuropathy. Location: bilateral    Nature: [ X ] Axonal   [  ] Demyelinating  [  ] Mixed axonal and demyelinating     [  ] Sensory [  ] Motor               [ X ] Mixed sensorimotor     [  ] with active denervation       [ X] without active denervation  Duration: Acute  Severity: mild  Prognosis: Poor    Previous Study: There is not a prior study for comparison. Follow up EMG is recommended if clinically warranted. Technologist: Dinh Clements LPN  Physician:    Emily Burdick D.O., P.T. Board Certified Physical Medicine and Rehabilitation  Board Certified Electrodiagnostic Medicine      Nerve conduction studies and electromyography were performed according to our laboratory policies and procedures which can be provided upon request. All abnormal values are identified in the table.  Laboratory normal values can also be provided upon request.       Cc: Wilbert Nieves, DO Teresa Fonseca, DO

## 2023-03-22 NOTE — PATIENT INSTRUCTIONS
Electrodiagnotic Laboratory  Accredited by the [facility] with Exemplary status  STU Dejesus D.O. [facility]  [address] [place]  Phone: [phone]  Fax: [phone]        Today you had an electrodiagnostic exam which included nerve conduction studies (NCS) and electromyography (EMG). This test evaluated the electrical activity of your nerves and muscles to help determine if you have a nerve or muscle disease. This test can help determine the location and type of a nerve or muscle problem. This will help your referring doctor diagnose your condition and determine the appropriate next step in your treatment plan. After your test:    1. There are no long lasting side effects of the test.     2. You may resume your normal activities without restrictions. 3.  Resume any medications that were stopped for the test.     4  If you have sore areas or bruising in your muscles where the needle was placed, apply a cold pack to the sore area for 15-20 minutes three to four times a day as needed for pain. The soreness should go away in about 1-2 days. 5. Your results were provided  Briefly at the end of your test and the final detailed report will be provided to your referring physician, and/or primary care physician and any other parties you requested within 1-2 days of the examination. You may wish to contact your referring provider after a few days to determine what they would like you to do next. 6.  Please call [phone] with any questions or concerns and if you develop increased body temperature/fever, swelling, tenderness, increased pain and/or drainage from the sites where the needle was placed. Thank you for choosing us for your health care needs.

## 2023-03-24 ENCOUNTER — PREP FOR PROCEDURE (OUTPATIENT)
Dept: PHYSICAL MEDICINE AND REHAB | Age: 78
End: 2023-03-24

## 2023-03-27 ENCOUNTER — TELEPHONE (OUTPATIENT)
Dept: PHYSICAL MEDICINE AND REHAB | Age: 78
End: 2023-03-27

## 2023-03-27 RX ORDER — LIDOCAINE 4 G/G
1 PATCH TOPICAL DAILY
Qty: 30 PATCH | Refills: 0 | Status: SHIPPED | OUTPATIENT
Start: 2023-03-27 | End: 2023-04-26

## 2023-03-27 NOTE — TELEPHONE ENCOUNTER
Patient physically came to building this am stating how much pain he is having in his back. He is aware his procedure is scheduled this Thursday and her states he is taking the lyrica as prescribed with no relief so far. He is fearful he will end up back in emergency room.  Wants to see if he can get lidocaine patches and maybe prednisone pack again because he states that is what really helped him when he was seen in ER   please advise  thanks

## 2023-03-27 NOTE — TELEPHONE ENCOUNTER
Patients pharmacy called in asking if the order for lidocaine could be for 5% instead of 4%. Clarified with provider and she said it could be filled as 5%.

## 2023-03-27 NOTE — TELEPHONE ENCOUNTER
I dont want to give steroids this close to his MARIXA. The Lyrica takes some time to take effect. I sent the Lidocaine patch over to the pharmacy.

## 2023-03-28 RX ORDER — SODIUM CHLORIDE 0.9 % (FLUSH) 0.9 %
5-40 SYRINGE (ML) INJECTION EVERY 12 HOURS SCHEDULED
Status: CANCELLED | OUTPATIENT
Start: 2023-03-28

## 2023-03-28 RX ORDER — SODIUM CHLORIDE 9 MG/ML
INJECTION, SOLUTION INTRAVENOUS PRN
Status: CANCELLED | OUTPATIENT
Start: 2023-03-28

## 2023-03-28 RX ORDER — SODIUM CHLORIDE 0.9 % (FLUSH) 0.9 %
5-40 SYRINGE (ML) INJECTION PRN
Status: CANCELLED | OUTPATIENT
Start: 2023-03-28

## 2023-03-29 DIAGNOSIS — M79.89 LEG SWELLING: Primary | ICD-10-CM

## 2023-03-30 ENCOUNTER — HOSPITAL ENCOUNTER (OUTPATIENT)
Age: 78
Setting detail: OUTPATIENT SURGERY
Discharge: HOME | End: 2023-03-30
Attending: PHYSICAL MEDICINE & REHABILITATION | Admitting: PHYSICAL MEDICINE & REHABILITATION
Payer: MEDICARE

## 2023-03-30 ENCOUNTER — HOSPITAL ENCOUNTER (OUTPATIENT)
Dept: OPERATING ROOM | Age: 78
Setting detail: OUTPATIENT SURGERY
Discharge: HOME | End: 2023-03-30
Attending: PHYSICAL MEDICINE & REHABILITATION
Payer: MEDICARE

## 2023-03-30 VITALS
SYSTOLIC BLOOD PRESSURE: 136 MMHG | RESPIRATION RATE: 16 BRPM | OXYGEN SATURATION: 98 % | HEART RATE: 67 BPM | BODY MASS INDEX: 25.18 KG/M2 | HEIGHT: 69 IN | DIASTOLIC BLOOD PRESSURE: 71 MMHG | WEIGHT: 170 LBS | TEMPERATURE: 97.6 F

## 2023-03-30 DIAGNOSIS — M54.16 LUMBAR RADICULOPATHY: ICD-10-CM

## 2023-03-30 PROCEDURE — 6360000002 HC RX W HCPCS: Performed by: PHYSICAL MEDICINE & REHABILITATION

## 2023-03-30 PROCEDURE — 2580000003 HC RX 258: Performed by: PHYSICAL MEDICINE & REHABILITATION

## 2023-03-30 PROCEDURE — 3209999900 FLUORO FOR SURGICAL PROCEDURES

## 2023-03-30 PROCEDURE — 64493 INJ PARAVERT F JNT L/S 1 LEV: CPT | Performed by: PHYSICAL MEDICINE & REHABILITATION

## 2023-03-30 PROCEDURE — 6360000004 HC RX CONTRAST MEDICATION: Performed by: PHYSICAL MEDICINE & REHABILITATION

## 2023-03-30 PROCEDURE — 2500000003 HC RX 250 WO HCPCS: Performed by: PHYSICAL MEDICINE & REHABILITATION

## 2023-03-30 PROCEDURE — 3600000005 HC SURGERY LEVEL 5 BASE: Performed by: PHYSICAL MEDICINE & REHABILITATION

## 2023-03-30 PROCEDURE — A4216 STERILE WATER/SALINE, 10 ML: HCPCS | Performed by: PHYSICAL MEDICINE & REHABILITATION

## 2023-03-30 PROCEDURE — 7100000011 HC PHASE II RECOVERY - ADDTL 15 MIN: Performed by: PHYSICAL MEDICINE & REHABILITATION

## 2023-03-30 PROCEDURE — 7100000010 HC PHASE II RECOVERY - FIRST 15 MIN: Performed by: PHYSICAL MEDICINE & REHABILITATION

## 2023-03-30 PROCEDURE — 2709999900 HC NON-CHARGEABLE SUPPLY: Performed by: PHYSICAL MEDICINE & REHABILITATION

## 2023-03-30 RX ORDER — SODIUM CHLORIDE 0.9 % (FLUSH) 0.9 %
5-40 SYRINGE (ML) INJECTION PRN
Status: DISCONTINUED | OUTPATIENT
Start: 2023-03-30 | End: 2023-03-30 | Stop reason: HOSPADM

## 2023-03-30 RX ORDER — LIDOCAINE HYDROCHLORIDE 10 MG/ML
INJECTION, SOLUTION EPIDURAL; INFILTRATION; INTRACAUDAL; PERINEURAL PRN
Status: DISCONTINUED | OUTPATIENT
Start: 2023-03-30 | End: 2023-03-30 | Stop reason: ALTCHOICE

## 2023-03-30 RX ORDER — SODIUM CHLORIDE 0.9 % (FLUSH) 0.9 %
5-40 SYRINGE (ML) INJECTION EVERY 12 HOURS SCHEDULED
Status: DISCONTINUED | OUTPATIENT
Start: 2023-03-30 | End: 2023-03-30 | Stop reason: HOSPADM

## 2023-03-30 RX ORDER — SODIUM CHLORIDE 9 MG/ML
INJECTION, SOLUTION INTRAVENOUS PRN
Status: DISCONTINUED | OUTPATIENT
Start: 2023-03-30 | End: 2023-03-30 | Stop reason: HOSPADM

## 2023-03-30 ASSESSMENT — PAIN - FUNCTIONAL ASSESSMENT: PAIN_FUNCTIONAL_ASSESSMENT: 0-10

## 2023-03-30 ASSESSMENT — PAIN DESCRIPTION - DESCRIPTORS: DESCRIPTORS: ACHING;BURNING

## 2023-03-30 NOTE — DISCHARGE INSTRUCTIONS
Post-op instruction Block  Dr. Jose Juan Senior  [phone]      Rest 12-24 hours following procedure. DO NOT DRIVE till the following day. Keep dressing clean and dry. Do not bathe, shower, or sit in hot tub the day of procedure . You may remove the dressing following A.M. You may return to work/school tomorrow. Drink extra fluids for the next 24 hours. Resume your regular diet. Resume previously prescribed medications. Resume Coumadin, Plavix, NSAIDS, Ibuprofen products 24 hours after procedure. You need to have a responsible adult stay with you this evening. If you experience any discomfort relating to this procedure, you may take Tylenol 2 tablets every 4 hrs as needed for pain and/or apply ice to the affected area. Please follow up with Dr. Jose Juan Senior or Dr. Cathi James. If you were a hip injection follow up with your orthopedic Doctor. If you experience any unusual symptoms or problems, call your physicians answering service at [phone] or go directly to [facility] [place]. [facility] Emergency Department.

## 2023-03-30 NOTE — H&P
kg), SpO2 97 %. General: well developed and well nourished in no acute distress  Resp: symmetrical chest expansion, unlabored breathing, respirations unlabored. CV: Heart rate is regular. Peripheral pulses are palpable  Skin: No rashes or ecchymosis. Normal turgor.    MSK: ttp lumbar psps          Impression:     Lumbar spondylosis      Plan:   Injection as planned today           Florencia Roche DO [facility]   Board Certified Physical Medicine and Rehabilitation

## 2023-03-30 NOTE — OP NOTE
LUMBAR FACET JOINT INTRA-ARTICULAR INJECTION(S)      WITH FLUOROSCOPIC GUIDANCE     (Zygopophyseal Joint Injection)      Patient: Natalie Sneed                                                    MRN: [id number]  : 1945                                              Date of procedure: 3/30/2023    Physician Performing Procedure: Khanh Dukes DO      Diagnosis: lumbar spondylosis     Injectate: A total of 2cc, consisting of 1cc of Kenalog (40mg/cc), the remainder comprised of 2% lidocaine without epinephrine. Levels Treated: bilateral L5-S1 Facet Joints    Approach: Posterior oblique    Improvement after today's procedure: As per nursing record. Comments: None    Procedure: The patient was prepped and draped in a sterile fashion in the prone position after informed consent was signed and all patient questions were answered including the risks, benefits, alternative treatment options, and prognosis. The risks include but are not limited to infection, allergic reaction, nerve damage, paralysis, epidural hematoma, syncope, headache, pneumothorax, respiratory or cardiac arrest, and scar formation. The region overlying the above mentioned facet joints was localized under fluoroscopic visualization. A 3-4 cc. volume of 1% Lidocaine without Epinephrine was injected for local anesthesia. A 22 gauge, 3.5 inch spinal needle was inserted into each of the above mentioned facet joints. One cc of the steroid/anesthetic solution was then injected into each of the facet joints noted above. The patient tolerated the procedure well and was discharged after an appropriate period of observation. If there are any complications, the patient was instructed to call us. The patient is to folIow-up with the requesting physician within 4-6 weeks.     Khanh Dukes DO, [facility]   Board Certified Physical Medicine and Rehabilitation

## 2023-04-07 ENCOUNTER — HOSPITAL ENCOUNTER (EMERGENCY)
Age: 78
Discharge: HOME | End: 2023-04-07
Attending: EMERGENCY MEDICINE
Payer: MEDICARE

## 2023-04-07 VITALS
HEIGHT: 69 IN | OXYGEN SATURATION: 100 % | WEIGHT: 168 LBS | BODY MASS INDEX: 24.88 KG/M2 | TEMPERATURE: 98.2 F | HEART RATE: 76 BPM | SYSTOLIC BLOOD PRESSURE: 137 MMHG | RESPIRATION RATE: 18 BRPM | DIASTOLIC BLOOD PRESSURE: 90 MMHG

## 2023-04-07 DIAGNOSIS — G89.29 CHRONIC LEFT-SIDED LOW BACK PAIN WITH LEFT-SIDED SCIATICA: Primary | ICD-10-CM

## 2023-04-07 DIAGNOSIS — M54.42 CHRONIC LEFT-SIDED LOW BACK PAIN WITH LEFT-SIDED SCIATICA: Primary | ICD-10-CM

## 2023-04-07 PROCEDURE — 6360000002 HC RX W HCPCS: Performed by: EMERGENCY MEDICINE

## 2023-04-07 RX ORDER — DEXAMETHASONE SODIUM PHOSPHATE 10 MG/ML
10 INJECTION INTRAMUSCULAR; INTRAVENOUS ONCE
Status: COMPLETED | OUTPATIENT
Start: 2023-04-07 | End: 2023-04-07

## 2023-04-07 RX ORDER — MORPHINE SULFATE 10 MG/ML
7 INJECTION, SOLUTION INTRAMUSCULAR; INTRAVENOUS ONCE
Status: COMPLETED | OUTPATIENT
Start: 2023-04-07 | End: 2023-04-07

## 2023-04-07 RX ADMIN — DEXAMETHASONE SODIUM PHOSPHATE 10 MG: 10 INJECTION INTRAMUSCULAR; INTRAVENOUS at 06:53

## 2023-04-07 RX ADMIN — MORPHINE SULFATE 7 MG: 10 INJECTION, SOLUTION INTRAMUSCULAR; INTRAVENOUS at 06:56

## 2023-04-07 ASSESSMENT — PAIN SCALES - GENERAL: PAINLEVEL_OUTOF10: 10

## 2023-04-07 ASSESSMENT — ENCOUNTER SYMPTOMS
BOWEL INCONTINENCE: 0
ABDOMINAL SWELLING: 0
ABDOMINAL PAIN: 0
WHEEZING: 0
SINUS PRESSURE: 0
BACK PAIN: 1

## 2023-04-07 ASSESSMENT — PAIN DESCRIPTION - LOCATION: LOCATION: BACK

## 2023-04-07 ASSESSMENT — PAIN - FUNCTIONAL ASSESSMENT: PAIN_FUNCTIONAL_ASSESSMENT: 0-10

## 2023-04-07 NOTE — ED PROVIDER NOTES
and pain management doctor by calling their offices  today .      --------------------------------- ADDITIONAL PROVIDER NOTES ---------------------------------  At this time the patient is without objective evidence of an acute process requiring hospitalization or inpatient management. They have remained hemodynamically stable throughout their entire ED visit and are stable for discharge with outpatient follow-up. The plan has been discussed in detail and they are aware of the specific conditions for emergent return, as well as the importance of follow-up. New Prescriptions    No medications on file       Diagnosis:  1. Chronic left-sided low back pain with left-sided sciatica        Disposition:  Patient's disposition: Discharge to home  Patient's condition is stable.          Valentine Mcgarry, [place]  04/07/23 4992

## 2023-04-20 ENCOUNTER — HOSPITAL ENCOUNTER (OUTPATIENT)
Age: 78
Setting detail: OUTPATIENT SURGERY
Discharge: HOME | End: 2023-04-20
Attending: PHYSICAL MEDICINE & REHABILITATION | Admitting: PHYSICAL MEDICINE & REHABILITATION
Payer: MEDICARE

## 2023-04-20 ENCOUNTER — HOSPITAL ENCOUNTER (OUTPATIENT)
Dept: OPERATING ROOM | Age: 78
Setting detail: OUTPATIENT SURGERY
Discharge: HOME | End: 2023-04-20
Attending: PHYSICAL MEDICINE & REHABILITATION
Payer: MEDICARE

## 2023-04-20 VITALS
WEIGHT: 176 LBS | TEMPERATURE: 97.4 F | SYSTOLIC BLOOD PRESSURE: 130 MMHG | HEART RATE: 71 BPM | OXYGEN SATURATION: 97 % | DIASTOLIC BLOOD PRESSURE: 73 MMHG | HEIGHT: 69 IN | RESPIRATION RATE: 18 BRPM | BODY MASS INDEX: 26.07 KG/M2

## 2023-04-20 DIAGNOSIS — M47.896 OTHER OSTEOARTHRITIS OF SPINE, LUMBAR REGION: ICD-10-CM

## 2023-04-20 PROBLEM — M47.816 LUMBAR SPONDYLOSIS: Status: ACTIVE | Noted: 2023-04-20

## 2023-04-20 PROCEDURE — 2500000003 HC RX 250 WO HCPCS: Performed by: PHYSICAL MEDICINE & REHABILITATION

## 2023-04-20 PROCEDURE — 3600000015 HC SURGERY LEVEL 5 ADDTL 15MIN: Performed by: PHYSICAL MEDICINE & REHABILITATION

## 2023-04-20 PROCEDURE — 3209999900 FLUORO FOR SURGICAL PROCEDURES

## 2023-04-20 PROCEDURE — 7100000010 HC PHASE II RECOVERY - FIRST 15 MIN: Performed by: PHYSICAL MEDICINE & REHABILITATION

## 2023-04-20 PROCEDURE — 3600000005 HC SURGERY LEVEL 5 BASE: Performed by: PHYSICAL MEDICINE & REHABILITATION

## 2023-04-20 PROCEDURE — 2709999900 HC NON-CHARGEABLE SUPPLY: Performed by: PHYSICAL MEDICINE & REHABILITATION

## 2023-04-20 PROCEDURE — 7100000011 HC PHASE II RECOVERY - ADDTL 15 MIN: Performed by: PHYSICAL MEDICINE & REHABILITATION

## 2023-04-20 RX ORDER — LIDOCAINE HYDROCHLORIDE 20 MG/ML
INJECTION, SOLUTION EPIDURAL; INFILTRATION; INTRACAUDAL; PERINEURAL PRN
Status: DISCONTINUED | OUTPATIENT
Start: 2023-04-20 | End: 2023-04-20 | Stop reason: ALTCHOICE

## 2023-04-20 RX ORDER — SODIUM CHLORIDE 9 MG/ML
INJECTION, SOLUTION INTRAVENOUS PRN
Status: DISCONTINUED | OUTPATIENT
Start: 2023-04-20 | End: 2023-04-20 | Stop reason: HOSPADM

## 2023-04-20 RX ORDER — LIDOCAINE HYDROCHLORIDE 10 MG/ML
INJECTION, SOLUTION EPIDURAL; INFILTRATION; INTRACAUDAL; PERINEURAL PRN
Status: DISCONTINUED | OUTPATIENT
Start: 2023-04-20 | End: 2023-04-20 | Stop reason: ALTCHOICE

## 2023-04-20 RX ORDER — SODIUM CHLORIDE 0.9 % (FLUSH) 0.9 %
5-40 SYRINGE (ML) INJECTION EVERY 12 HOURS SCHEDULED
Status: DISCONTINUED | OUTPATIENT
Start: 2023-04-20 | End: 2023-04-20 | Stop reason: HOSPADM

## 2023-04-20 RX ORDER — SODIUM CHLORIDE 0.9 % (FLUSH) 0.9 %
5-40 SYRINGE (ML) INJECTION PRN
Status: DISCONTINUED | OUTPATIENT
Start: 2023-04-20 | End: 2023-04-20 | Stop reason: HOSPADM

## 2023-04-20 ASSESSMENT — PAIN DESCRIPTION - DESCRIPTORS: DESCRIPTORS: ACHING;BURNING;NAGGING

## 2023-04-20 NOTE — OP NOTE
was then performed at 80 degree Celsius for 90 seconds. Salazar Alvarado was comfortable throughout the ablation. No complications were noted. In summary, medial branch neurolysis were performed at the following levels; bilateral L5-S1. Negative aspiration was noted prior to each injection. The needle was removed intact and dressings were applied. Salazar Alvarado was transferred to the recovery area. He was monitored, reassessed and discharged after an appropriate observatory period. No complications were noted. Salazar Alvarado will follow up in the office as scheduled. He was encouraged to call with questions, concerns or if worsening of symptoms occurs.       Kalli Rodriguez DO, [facility]   Board Certified Physical Medicine and Rehabilitation

## 2023-04-20 NOTE — DISCHARGE INSTRUCTIONS
away.  Follow-up care is a key part of your treatment and safety. Be sure to make and go to all appointments, and call your doctor if you are having problems. It's also a good idea to know your test results and keep a list of the medicines you take. How can you care for yourself at home? Make sure your surgeon knows about the infection, especially if you saw another doctor about your symptoms. If your doctor prescribed antibiotics, take them as directed. Do not stop taking them just because you feel better. You need to take the full course of antibiotics. Ask your doctor if you can take an over-the-counter pain medicine, such as acetaminophen (Tylenol), ibuprofen (Advil, Motrin), or naproxen (Aleve). Be safe with medicines. Read and follow all instructions on the label. Do not take two or more pain medicines at the same time unless the doctor told you to. Many pain medicines have acetaminophen, which is Tylenol. Too much acetaminophen (Tylenol) can be harmful. Prop up the area on a pillow anytime you sit or lie down during the next 3 days. Try to keep it above the level of your heart. This will help reduce swelling. Keep the skin clean and dry. You may have a bandage over the cut (incision). A bandage helps the incision heal and protects it. Your doctor will tell you how to take care of this. Keep it clean and dry. You may have drainage from the wound. If your doctor told you how to care for your incision, follow your doctor's instructions. If you did not get instructions, follow this general advice:  Wash around the incision with clean water 2 times a day. Don't use hydrogen peroxide or alcohol, which can slow healing. When should you call for help? Call your doctor now or seek immediate medical care if:    You have signs that your infection is getting worse, such as: Increased pain, swelling, warmth, or redness in the area. Red streaks leading from the area. Pus draining from the wound.   A new or

## 2023-04-20 NOTE — H&P
Exam:   Blood pressure 135/79, pulse 71, temperature 97.4 °F (36.3 °C), resp. rate 20, height 5' 9\" (1.753 m), weight 176 lb (79.8 kg), SpO2 96 %. General: well developed and well nourished in no acute distress  Resp: symmetrical chest expansion, unlabored breathing, respirations unlabored. CV: Heart rate is regular. Peripheral pulses are palpable  Skin: No rashes or ecchymosis. Normal turgor.    MSK: ttp lumbar psps          Impression:     Lumbar spondylosis      Plan:   Injection as planned today           Teri Guy DO, [address] [place] [address]   Board Certified Physical Medicine and Rehabilitation

## 2023-05-07 ENCOUNTER — HOSPITAL ENCOUNTER (EMERGENCY)
Age: 78
Discharge: HOME | End: 2023-05-07
Attending: EMERGENCY MEDICINE
Payer: MEDICARE

## 2023-05-07 VITALS
OXYGEN SATURATION: 95 % | DIASTOLIC BLOOD PRESSURE: 83 MMHG | HEART RATE: 61 BPM | SYSTOLIC BLOOD PRESSURE: 145 MMHG | RESPIRATION RATE: 18 BRPM | TEMPERATURE: 98.1 F

## 2023-05-07 DIAGNOSIS — G89.29 ACUTE EXACERBATION OF CHRONIC LOW BACK PAIN: Primary | ICD-10-CM

## 2023-05-07 DIAGNOSIS — M54.50 ACUTE EXACERBATION OF CHRONIC LOW BACK PAIN: Primary | ICD-10-CM

## 2023-05-07 PROCEDURE — 96372 THER/PROPH/DIAG INJ SC/IM: CPT

## 2023-05-07 PROCEDURE — 99284 EMERGENCY DEPT VISIT MOD MDM: CPT

## 2023-05-07 PROCEDURE — 6360000002 HC RX W HCPCS

## 2023-05-07 RX ORDER — MORPHINE SULFATE 4 MG/ML
4 INJECTION, SOLUTION INTRAMUSCULAR; INTRAVENOUS ONCE
Status: COMPLETED | OUTPATIENT
Start: 2023-05-07 | End: 2023-05-07

## 2023-05-07 RX ORDER — MORPHINE SULFATE 4 MG/ML
4 INJECTION, SOLUTION INTRAMUSCULAR; INTRAVENOUS ONCE
Status: DISCONTINUED | OUTPATIENT
Start: 2023-05-07 | End: 2023-05-07

## 2023-05-07 RX ADMIN — MORPHINE SULFATE 4 MG: 4 INJECTION, SOLUTION INTRAMUSCULAR; INTRAVENOUS at 07:13

## 2023-05-07 ASSESSMENT — PAIN - FUNCTIONAL ASSESSMENT: PAIN_FUNCTIONAL_ASSESSMENT: 0-10

## 2023-05-07 ASSESSMENT — LIFESTYLE VARIABLES: HOW OFTEN DO YOU HAVE A DRINK CONTAINING ALCOHOL: NEVER

## 2023-05-07 ASSESSMENT — PAIN DESCRIPTION - LOCATION: LOCATION: BACK

## 2023-05-08 ENCOUNTER — OFFICE VISIT (OUTPATIENT)
Dept: PHYSICAL MEDICINE AND REHAB | Age: 78
End: 2023-05-08
Payer: MEDICARE

## 2023-05-08 VITALS
WEIGHT: 176 LBS | SYSTOLIC BLOOD PRESSURE: 128 MMHG | DIASTOLIC BLOOD PRESSURE: 74 MMHG | HEIGHT: 69 IN | BODY MASS INDEX: 26.07 KG/M2 | HEART RATE: 68 BPM

## 2023-05-08 DIAGNOSIS — M48.061 LUMBAR FORAMINAL STENOSIS: ICD-10-CM

## 2023-05-08 DIAGNOSIS — E11.42 TYPE 2 DIABETES MELLITUS WITH DIABETIC POLYNEUROPATHY, WITHOUT LONG-TERM CURRENT USE OF INSULIN (HCC): ICD-10-CM

## 2023-05-08 DIAGNOSIS — G62.9 POLYNEUROPATHY: Primary | ICD-10-CM

## 2023-05-08 DIAGNOSIS — R53.82 CHRONIC FATIGUE: ICD-10-CM

## 2023-05-08 LAB
ALBUMIN SERPL-MCNC: 4.4 G/DL (ref 3.5–5.2)
ALP SERPL-CCNC: 87 U/L (ref 40–129)
ALT SERPL-CCNC: 23 U/L (ref 0–40)
ANION GAP SERPL CALCULATED.3IONS-SCNC: 11 MMOL/L (ref 7–16)
AST SERPL-CCNC: 29 U/L (ref 0–39)
BASOPHILS # BLD: 0.04 E9/L (ref 0–0.2)
BASOPHILS NFR BLD: 0.4 % (ref 0–2)
BILIRUB SERPL-MCNC: 0.6 MG/DL (ref 0–1.2)
BUN SERPL-MCNC: 12 MG/DL (ref 6–23)
CALCIUM SERPL-MCNC: 10.5 MG/DL (ref 8.6–10.2)
CHLORIDE SERPL-SCNC: 97 MMOL/L (ref 98–107)
CO2 SERPL-SCNC: 26 MMOL/L (ref 22–29)
CREAT SERPL-MCNC: 0.9 MG/DL (ref 0.7–1.2)
CREAT UR-MCNC: 31 MG/DL (ref 40–278)
EOSINOPHIL # BLD: 0.07 E9/L (ref 0.05–0.5)
EOSINOPHIL NFR BLD: 0.6 % (ref 0–6)
ERYTHROCYTE [DISTWIDTH] IN BLOOD BY AUTOMATED COUNT: 13.5 FL (ref 11.5–15)
GLUCOSE SERPL-MCNC: 121 MG/DL (ref 74–99)
HBA1C MFR BLD: 6.4 % (ref 4–5.6)
HCT VFR BLD AUTO: 48.9 % (ref 37–54)
HGB BLD-MCNC: 16.2 G/DL (ref 12.5–16.5)
IMM GRANULOCYTES # BLD: 0.09 E9/L
IMM GRANULOCYTES NFR BLD: 0.8 % (ref 0–5)
LYMPHOCYTES # BLD: 3.11 E9/L (ref 1.5–4)
LYMPHOCYTES NFR BLD: 27.6 % (ref 20–42)
MCH RBC QN AUTO: 30.2 PG (ref 26–35)
MCHC RBC AUTO-ENTMCNC: 33.1 % (ref 32–34.5)
MCV RBC AUTO: 91.2 FL (ref 80–99.9)
MICROALBUMIN UR-MCNC: <12 MG/L
MICROALBUMIN/CREAT UR-RTO: ABNORMAL (ref 0–30)
MONOCYTES # BLD: 0.9 E9/L (ref 0.1–0.95)
MONOCYTES NFR BLD: 8 % (ref 2–12)
NEUTROPHILS # BLD: 7.06 E9/L (ref 1.8–7.3)
NEUTS SEG NFR BLD: 62.6 % (ref 43–80)
PLATELET # BLD AUTO: 218 E9/L (ref 130–450)
PMV BLD AUTO: 10 FL (ref 7–12)
POTASSIUM SERPL-SCNC: 4.9 MMOL/L (ref 3.5–5)
PROT SERPL-MCNC: 7.5 G/DL (ref 6.4–8.3)
RBC # BLD AUTO: 5.36 E12/L (ref 3.8–5.8)
SODIUM SERPL-SCNC: 134 MMOL/L (ref 132–146)
TSH SERPL-MCNC: 3.18 UIU/ML (ref 0.27–4.2)
WBC # BLD: 11.3 E9/L (ref 4.5–11.5)

## 2023-05-08 PROCEDURE — G8427 DOCREV CUR MEDS BY ELIG CLIN: HCPCS | Performed by: PHYSICAL MEDICINE & REHABILITATION

## 2023-05-08 PROCEDURE — 1123F ACP DISCUSS/DSCN MKR DOCD: CPT | Performed by: PHYSICAL MEDICINE & REHABILITATION

## 2023-05-08 PROCEDURE — 99214 OFFICE O/P EST MOD 30 MIN: CPT | Performed by: PHYSICAL MEDICINE & REHABILITATION

## 2023-05-08 PROCEDURE — 3078F DIAST BP <80 MM HG: CPT | Performed by: PHYSICAL MEDICINE & REHABILITATION

## 2023-05-08 PROCEDURE — 3074F SYST BP LT 130 MM HG: CPT | Performed by: PHYSICAL MEDICINE & REHABILITATION

## 2023-05-08 PROCEDURE — G8417 CALC BMI ABV UP PARAM F/U: HCPCS | Performed by: PHYSICAL MEDICINE & REHABILITATION

## 2023-05-08 PROCEDURE — 1036F TOBACCO NON-USER: CPT | Performed by: PHYSICAL MEDICINE & REHABILITATION

## 2023-05-08 NOTE — PROGRESS NOTES
] Financial   [  ] Transportation   [  ] No insurance   [  ] Housing   [  ] Substance abuse   [  ] Lack of support   Stephanie.Bran  ] BMI was elevated today, and weight loss plan recommended is : conventional weight loss. .  [  ]   Tobacco Use: Medium Risk    Smoking Tobacco Use: Former    Smokeless Tobacco Use: Never    Passive Exposure: Not on file     [  ] affect on work ability: not applicable. Follow up after RFA. David Bruce D.O., P.T.   Board Certified Physical Medicine and Rehabilitation  Board Certified Electrodiagnostic Medicine

## 2023-05-09 ENCOUNTER — TELEPHONE (OUTPATIENT)
Dept: PHYSICAL MEDICINE AND REHAB | Age: 78
End: 2023-05-09

## 2023-05-09 RX ORDER — BUPRENORPHINE 5 UG/H
1 PATCH TRANSDERMAL WEEKLY
Qty: 4 PATCH | Refills: 0 | Status: SHIPPED | OUTPATIENT
Start: 2023-05-09 | End: 2023-06-08

## 2023-05-11 ENCOUNTER — TELEPHONE (OUTPATIENT)
Dept: PHYSICAL MEDICINE AND REHAB | Age: 78
End: 2023-05-11

## 2023-05-17 ENCOUNTER — TELEPHONE (OUTPATIENT)
Dept: PHYSICAL MEDICINE AND REHAB | Age: 78
End: 2023-05-17

## 2023-05-17 NOTE — TELEPHONE ENCOUNTER
----- Message from Marjan Rodriguez DO sent at 5/17/2023  1:45 PM EDT -----  Reviewed test abnormal, inform patient that it showed arthritis, bulging discs and stenosis. I placed an order for MARIXA as I had discussed with him at last visit.

## 2023-05-17 NOTE — TELEPHONE ENCOUNTER
Called and spoke with the patient and informed him of results of MRI and that we need to get svetlana for him to stop his eliquis 3 days prior to procedure and once we get the svetlana for Dr Wendi Kaplan. Patient is aware and voiced understanding.

## 2023-05-18 ENCOUNTER — OFFICE VISIT (OUTPATIENT)
Dept: FAMILY MEDICINE CLINIC | Age: 78
End: 2023-05-18

## 2023-05-18 VITALS
HEART RATE: 65 BPM | DIASTOLIC BLOOD PRESSURE: 80 MMHG | WEIGHT: 174 LBS | OXYGEN SATURATION: 96 % | BODY MASS INDEX: 25.77 KG/M2 | RESPIRATION RATE: 18 BRPM | HEIGHT: 69 IN | SYSTOLIC BLOOD PRESSURE: 122 MMHG

## 2023-05-18 DIAGNOSIS — D68.69 SECONDARY HYPERCOAGULABLE STATE (HCC): ICD-10-CM

## 2023-05-18 DIAGNOSIS — R53.82 CHRONIC FATIGUE: Primary | ICD-10-CM

## 2023-05-18 DIAGNOSIS — M48.061 NEURAL FORAMINAL STENOSIS OF LUMBAR SPINE: Chronic | ICD-10-CM

## 2023-05-18 DIAGNOSIS — E11.65 TYPE 2 DIABETES MELLITUS WITH HYPERGLYCEMIA, WITHOUT LONG-TERM CURRENT USE OF INSULIN (HCC): ICD-10-CM

## 2023-05-18 DIAGNOSIS — M51.16 LUMBAR DISC DISEASE WITH RADICULOPATHY: ICD-10-CM

## 2023-05-18 DIAGNOSIS — K21.9 GASTROESOPHAGEAL REFLUX DISEASE WITHOUT ESOPHAGITIS: ICD-10-CM

## 2023-05-18 DIAGNOSIS — I48.0 PAROXYSMAL ATRIAL FIBRILLATION (HCC): ICD-10-CM

## 2023-05-18 DIAGNOSIS — E11.42 TYPE 2 DIABETES MELLITUS WITH DIABETIC POLYNEUROPATHY, WITHOUT LONG-TERM CURRENT USE OF INSULIN (HCC): ICD-10-CM

## 2023-05-18 DIAGNOSIS — M51.36 DDD (DEGENERATIVE DISC DISEASE), LUMBAR: Chronic | ICD-10-CM

## 2023-05-18 DIAGNOSIS — E78.2 MIXED HYPERLIPIDEMIA: ICD-10-CM

## 2023-05-18 DIAGNOSIS — R20.2 PARESTHESIA OF BOTH LOWER EXTREMITIES: ICD-10-CM

## 2023-05-18 DIAGNOSIS — M96.1 FAILED BACK SYNDROME OF LUMBAR SPINE: ICD-10-CM

## 2023-05-18 RX ORDER — PANTOPRAZOLE SODIUM 40 MG/1
40 TABLET, DELAYED RELEASE ORAL DAILY PRN
Qty: 90 TABLET | Refills: 5 | Status: SHIPPED | OUTPATIENT
Start: 2023-05-18

## 2023-05-18 RX ORDER — GLIMEPIRIDE 4 MG/1
4 TABLET ORAL 2 TIMES DAILY WITH MEALS
Qty: 180 TABLET | Refills: 5 | Status: SHIPPED | OUTPATIENT
Start: 2023-05-18

## 2023-05-18 RX ORDER — PRAVASTATIN SODIUM 10 MG
TABLET ORAL
Qty: 90 TABLET | Refills: 5 | Status: SHIPPED | OUTPATIENT
Start: 2023-05-18

## 2023-05-18 RX ORDER — TRAMADOL HYDROCHLORIDE 50 MG/1
50 TABLET ORAL EVERY 6 HOURS PRN
Qty: 120 TABLET | Refills: 5 | Status: SHIPPED | OUTPATIENT
Start: 2023-05-18 | End: 2023-05-19

## 2023-05-18 NOTE — PROGRESS NOTES
Danielle Delgado is a 76 y.o. male  . Subjective:      Has MRI lumbar. Causing spinal stenosis. They are going to do an epidural. No claudication. Discussed blood work. Cannot take Butrans and tramadol at same time. He knows this. Will hold prescription until after epidural and stopping patch. Discussed blood work we will also repeat this again in another 3 months. Review of Systems   Constitutional:  Negative for activity change, appetite change, chills, diaphoresis, fatigue, fever and unexpected weight change. HENT:  Negative for congestion, dental problem, drooling, ear discharge, ear pain, facial swelling, hearing loss, mouth sores, nosebleeds, postnasal drip, rhinorrhea, sinus pressure, sneezing, sore throat, tinnitus, trouble swallowing and voice change. Eyes:  Negative for photophobia, pain, discharge, redness, itching and visual disturbance. Respiratory:  Negative for apnea, cough, choking, chest tightness, shortness of breath, wheezing and stridor. Cardiovascular:  Negative for chest pain, palpitations and leg swelling. Gastrointestinal:  Negative for abdominal distention, abdominal pain, anal bleeding, blood in stool, constipation, diarrhea, nausea, rectal pain and vomiting. Endocrine: Negative for cold intolerance, heat intolerance, polydipsia, polyphagia and polyuria. Genitourinary:  Negative for decreased urine volume, difficulty urinating, dysuria, enuresis, flank pain, frequency, genital sores, hematuria, penile discharge, penile pain, penile swelling, scrotal swelling, testicular pain and urgency. Musculoskeletal:  Negative for arthralgias, back pain, gait problem, joint swelling, myalgias, neck pain and neck stiffness. Skin:  Negative for color change, pallor, rash and wound. Allergic/Immunologic: Negative for environmental allergies, food allergies and immunocompromised state.    Neurological:  Negative for dizziness, tremors, seizures, syncope, facial asymmetry, speech

## 2023-05-19 ENCOUNTER — PREP FOR PROCEDURE (OUTPATIENT)
Dept: PHYSICAL MEDICINE AND REHAB | Age: 78
End: 2023-05-19

## 2023-05-19 NOTE — TELEPHONE ENCOUNTER
Called and spoke with the patient and informed him that we received the svetlana for him to stop his eliquis 3 days prior to procedure. I informed him that he is scheduled for 5-25-23 and the surgery center will call him after 3 pm on 5-24-23. Patient is aware and voiced understanding.

## 2023-05-21 PROBLEM — D68.69 SECONDARY HYPERCOAGULABLE STATE (HCC): Status: ACTIVE | Noted: 2023-05-21

## 2023-05-21 ASSESSMENT — ENCOUNTER SYMPTOMS
SINUS PRESSURE: 0
VOICE CHANGE: 0
FACIAL SWELLING: 0
EYE DISCHARGE: 0
VOMITING: 0
CHOKING: 0
APNEA: 0
NAUSEA: 0
ABDOMINAL PAIN: 0
PHOTOPHOBIA: 0
DIARRHEA: 0
STRIDOR: 0
EYE REDNESS: 0
BLOOD IN STOOL: 0
RECTAL PAIN: 0
WHEEZING: 0
COUGH: 0
BACK PAIN: 0
EYE ITCHING: 0
COLOR CHANGE: 0
EYE PAIN: 0
CONSTIPATION: 0
ABDOMINAL DISTENTION: 0
SHORTNESS OF BREATH: 0
RHINORRHEA: 0
ANAL BLEEDING: 0
TROUBLE SWALLOWING: 0
SORE THROAT: 0
CHEST TIGHTNESS: 0

## 2023-05-23 RX ORDER — SODIUM CHLORIDE 0.9 % (FLUSH) 0.9 %
5-40 SYRINGE (ML) INJECTION PRN
Status: CANCELLED | OUTPATIENT
Start: 2023-05-23

## 2023-05-23 RX ORDER — SODIUM CHLORIDE 9 MG/ML
INJECTION, SOLUTION INTRAVENOUS PRN
Status: CANCELLED | OUTPATIENT
Start: 2023-05-23

## 2023-05-23 RX ORDER — SODIUM CHLORIDE 0.9 % (FLUSH) 0.9 %
5-40 SYRINGE (ML) INJECTION EVERY 12 HOURS SCHEDULED
Status: CANCELLED | OUTPATIENT
Start: 2023-05-23

## 2023-05-25 ENCOUNTER — HOSPITAL ENCOUNTER (OUTPATIENT)
Age: 78
Setting detail: OUTPATIENT SURGERY
Discharge: HOME | End: 2023-05-25
Attending: PHYSICAL MEDICINE & REHABILITATION | Admitting: PHYSICAL MEDICINE & REHABILITATION
Payer: MEDICARE

## 2023-05-25 ENCOUNTER — HOSPITAL ENCOUNTER (OUTPATIENT)
Dept: OPERATING ROOM | Age: 78
Setting detail: OUTPATIENT SURGERY
Discharge: HOME | End: 2023-05-25
Attending: PHYSICAL MEDICINE & REHABILITATION
Payer: MEDICARE

## 2023-05-25 VITALS
OXYGEN SATURATION: 98 % | SYSTOLIC BLOOD PRESSURE: 143 MMHG | DIASTOLIC BLOOD PRESSURE: 62 MMHG | TEMPERATURE: 98 F | BODY MASS INDEX: 25.18 KG/M2 | WEIGHT: 170 LBS | RESPIRATION RATE: 16 BRPM | HEART RATE: 55 BPM | HEIGHT: 69 IN

## 2023-05-25 DIAGNOSIS — M54.16 LUMBAR RADICULOPATHY: ICD-10-CM

## 2023-05-25 PROBLEM — M48.062 LUMBAR STENOSIS WITH NEUROGENIC CLAUDICATION: Status: ACTIVE | Noted: 2023-05-25

## 2023-05-25 PROCEDURE — 3600000005 HC SURGERY LEVEL 5 BASE: Performed by: PHYSICAL MEDICINE & REHABILITATION

## 2023-05-25 PROCEDURE — 7100000011 HC PHASE II RECOVERY - ADDTL 15 MIN: Performed by: PHYSICAL MEDICINE & REHABILITATION

## 2023-05-25 PROCEDURE — 3209999900 FLUORO FOR SURGICAL PROCEDURES

## 2023-05-25 PROCEDURE — 2709999900 HC NON-CHARGEABLE SUPPLY: Performed by: PHYSICAL MEDICINE & REHABILITATION

## 2023-05-25 PROCEDURE — 7100000010 HC PHASE II RECOVERY - FIRST 15 MIN: Performed by: PHYSICAL MEDICINE & REHABILITATION

## 2023-05-25 PROCEDURE — 2580000003 HC RX 258: Performed by: PHYSICAL MEDICINE & REHABILITATION

## 2023-05-25 PROCEDURE — 6360000002 HC RX W HCPCS: Performed by: PHYSICAL MEDICINE & REHABILITATION

## 2023-05-25 PROCEDURE — A4216 STERILE WATER/SALINE, 10 ML: HCPCS | Performed by: PHYSICAL MEDICINE & REHABILITATION

## 2023-05-25 PROCEDURE — 6360000004 HC RX CONTRAST MEDICATION: Performed by: PHYSICAL MEDICINE & REHABILITATION

## 2023-05-25 PROCEDURE — 2500000003 HC RX 250 WO HCPCS: Performed by: PHYSICAL MEDICINE & REHABILITATION

## 2023-05-25 RX ORDER — LIDOCAINE HYDROCHLORIDE 10 MG/ML
INJECTION, SOLUTION EPIDURAL; INFILTRATION; INTRACAUDAL; PERINEURAL PRN
Status: DISCONTINUED | OUTPATIENT
Start: 2023-05-25 | End: 2023-05-25 | Stop reason: ALTCHOICE

## 2023-05-25 RX ORDER — SODIUM CHLORIDE 0.9 % (FLUSH) 0.9 %
5-40 SYRINGE (ML) INJECTION PRN
Status: DISCONTINUED | OUTPATIENT
Start: 2023-05-25 | End: 2023-05-25 | Stop reason: HOSPADM

## 2023-05-25 RX ORDER — SODIUM CHLORIDE 9 MG/ML
INJECTION, SOLUTION INTRAVENOUS PRN
Status: DISCONTINUED | OUTPATIENT
Start: 2023-05-25 | End: 2023-05-25 | Stop reason: HOSPADM

## 2023-05-25 RX ORDER — SODIUM CHLORIDE 0.9 % (FLUSH) 0.9 %
5-40 SYRINGE (ML) INJECTION EVERY 12 HOURS SCHEDULED
Status: DISCONTINUED | OUTPATIENT
Start: 2023-05-25 | End: 2023-05-25 | Stop reason: HOSPADM

## 2023-05-25 ASSESSMENT — PAIN SCALES - GENERAL
PAINLEVEL_OUTOF10: 0
PAINLEVEL_OUTOF10: 0

## 2023-05-25 ASSESSMENT — PAIN - FUNCTIONAL ASSESSMENT: PAIN_FUNCTIONAL_ASSESSMENT: 0-10

## 2023-05-25 ASSESSMENT — PAIN DESCRIPTION - DESCRIPTORS: DESCRIPTORS: ACHING

## 2023-05-25 NOTE — DISCHARGE INSTRUCTIONS
Post-op instruction Block  Dr. Irma Vo  [phone]      Rest 12-24 hours following procedure. DO NOT DRIVE till the following day. Keep dressing clean and dry. Do not bathe, shower, or sit in hot tub the day of procedure . You may remove the dressing following A.M. You may return to work/school tomorrow. Drink extra fluids for the next 24 hours. Resume your regular diet. Resume previously prescribed medications. Resume Coumadin, Plavix, NSAIDS, Ibuprofen products 24 hours after procedure. You need to have a responsible adult stay with you this evening. If you experience any discomfort relating to this procedure, you may take Tylenol 2 tablets every 4 hrs as needed for pain and/or apply ice to the affected area. Please follow up with Dr. Irma Vo or Dr. Rebecca Yan. If you were a hip injection follow up with your orthopedic Doctor. If you experience any unusual symptoms or problems, call your physicians answering service at [phone] or go directly to [facility] [place]. [facility] Emergency Department.

## 2023-05-25 NOTE — OP NOTE
EPIDURAL STEROID INJECTION, INTERLAMINAR APPROACH      WITH FLUOROSCOPIC GUIDANCE      Patient: Rahat Hill              MRN#: [id number]  : 1945            Date of procedure: 2023      Physician Performing Procedure:  Timur Espinosa DO     Diagnosis: lumbar stenosis     Injectate: A total of 5 cc; consisting of 1cc of Dexamethasone (10mg/cc), with the remainder of normal saline. Levels Treated: L3-4    Approach:  interlaminar      Comments:  none     Pre-procedural evaluation: The patient was examined today just prior to performing the procedure listed above. The patient's heart rate was normal and lungs were clear to auscultation bilaterally. Procedure: The patient was prepped and draped in a sterile fashion in the prone position after informed consent was signed and all the patient's questions were answered including the risks, benefits, alternative treatment options, and prognosis. The risks include - but are not limited to - infection, allergic reaction, increased pain, lack of therapeutic benefit, steroid reaction, nerve damage, paralysis, stroke, epidural hematoma, syncope, headache, respiratory or cardiac arrest, pneumothorax, and scar formation. Using a (paramedian approach from the side mentioned above/midline approach), the region overlying the inferior lamina was localized under fluoroscopic visualization and the soft tissues overlying this structure were infiltrated with 4 cc. of 1% buffered Lidocaine without Epinephrine. A 18 gauge, 3.5 inch Tuohy needle was inserted into the epidural space using the approach mentioned above. The epidural space was localized using loss of resistance after negative aspirate for air, blood, and CSF. A 2 cc. volume of Isoview was injected into the epidural space and the flow of contrast was observed to be epidural.  Radiographs were obtained for documentation purposes. The Injectate was administered into the level noted above.

## 2023-05-25 NOTE — H&P
Lissett Shah, [address] [facility] Physical Medicine and Rehabilitation  [address] [facility] [place]  Phone: [phone]  Fax: [phone]    PCP: Yumiko Pond DO  Date of visit: 5/25/2023    CC: low back pain       Caroline Morales is a 76 y.o. male who presents today for lumbar epidural steroid injection. Patient complains of low back pain. No red flag symptoms. Consents to proceed with procedure. Allergies   Allergen Reactions    Imdur [Isosorbide Nitrate] Other (See Comments)     headache    Oxycodone Rash    Simvastatin Other (See Comments)     Muscle weakness    Amoxicillin Nausea And Vomiting    Bactrim [Sulfamethoxazole-Trimethoprim] Nausea Only    Ciprofloxacin Hcl Other (See Comments)     \"Heel/ tendon pain\"    Gabapentin Other (See Comments)     Constipation      Hydrocodone Nausea Only    Norco [Hydrocodone-Acetaminophen] Nausea Only and Other (See Comments)     Causes sick feeling in head         Current Facility-Administered Medications   Medication Dose Route Frequency Provider Last Rate Last Admin    sodium chloride flush 0.9 % injection 5-40 mL  5-40 mL IntraVENous 2 times per day Opal Watson, DO        sodium chloride flush 0.9 % injection 5-40 mL  5-40 mL IntraVENous PRN Opal Watson, DO        0.9 % sodium chloride infusion   IntraVENous PRN Opal Watson, DO           Past Medical History:   Diagnosis Date    Abnormal computed tomography angiography of heart 07/19/2019    Calcified plaque proximal RCA with 50-70% stenosis, proximal LAD 50% stenosis, mild LAD 30-50% stenosis, groundglass infiltrates, area of consoldidation left lung base posteriorly.       Arthritis     Atrial fibrillation ([facility])     Burning pain     CAD (coronary artery disease)     Degenerative lumbar disc     Diabetes ([facility] [address]     DVT (deep venous thrombosis) ([facility])     Dysphagia     Edentulous     upper denture    H/O cardiovascular stress test 02/19/2020

## 2023-06-01 ENCOUNTER — OFFICE VISIT (OUTPATIENT)
Dept: VASCULAR SURGERY | Age: 78
End: 2023-06-01
Payer: MEDICARE

## 2023-06-01 ENCOUNTER — HOSPITAL ENCOUNTER (OUTPATIENT)
Dept: CARDIOLOGY | Age: 78
Discharge: HOME | End: 2023-06-01
Payer: MEDICARE

## 2023-06-01 DIAGNOSIS — M79.89 LEG SWELLING: ICD-10-CM

## 2023-06-01 DIAGNOSIS — Z86.718 HISTORY OF DEEP VEIN THROMBOSIS: ICD-10-CM

## 2023-06-01 PROCEDURE — G8417 CALC BMI ABV UP PARAM F/U: HCPCS | Performed by: SURGERY

## 2023-06-01 PROCEDURE — G8427 DOCREV CUR MEDS BY ELIG CLIN: HCPCS | Performed by: SURGERY

## 2023-06-01 PROCEDURE — 1036F TOBACCO NON-USER: CPT | Performed by: SURGERY

## 2023-06-01 PROCEDURE — 1123F ACP DISCUSS/DSCN MKR DOCD: CPT | Performed by: SURGERY

## 2023-06-01 PROCEDURE — 99214 OFFICE O/P EST MOD 30 MIN: CPT | Performed by: SURGERY

## 2023-06-01 PROCEDURE — 93971 EXTREMITY STUDY: CPT

## 2023-06-01 NOTE — PROGRESS NOTES
[place] [facility] - [facility]    This is a pre read worksheet - prior to official physician interpretationMarkel Santiago    1945  Date of study: 6/1/23    Indication for study:  Leg pain and swelling  Study : Right Lower Extremity Venous Duplex Examination    Duplex examination of the common, deep, superficial femoral, and the popliteal veins of the RIGHT lower extremity identifies spontaneous flow. All scanned veins are compressible and free of echogenic densities. Additional comments: Negative DVT. No evidence of thrombus in visualized calf veins.

## 2023-06-01 NOTE — PROGRESS NOTES
CARDIAC CATH LAB PROCEDURE      ENDOSCOPY, COLON, DIAGNOSTIC      EPIDURAL STEROID INJECTION N/A 12/11/2018    C7-T1 EPIDURAL STEROID INJECTION performed by Tonny Quach DO at [address] 1/8/2019    C7 - T1 EPIDURAL STEROID INJECTION #2 performed by Tonny Quach DO at [address]  2008    LAMINECTOMY N/A 6/5/2020    LUMBAR LAMINECTOMY L3-4 , L5-S1, HARDWARE REMOVAL L4-5, DURAL REPAIR performed by Miko [place] DO at [address] [facility] N/A 8/7/2020    LUMBAR DECOMPRESSION L4-S1; IRRIGATION AND DEBRIDEMENT OF LUMBAR HEMATOMA [facility]; REMOVAL SYNOVIAL CYST L5-S1 LEFT performed by Miko [place] DO at [address]  03/06/2017    NASAL SINUS SURGERY  12/07/2017    Submucosal Resection of Inferior Turbinate    NERVE BLOCK Bilateral 10 12 2015    bilateral sacroiliac joint injection #1    NERVE BLOCK Bilateral 10/22/15    sacroiliac joint #2    NERVE BLOCK Bilateral 11 02 2015    Sacroiliac joint bilateral #3    NERVE BLOCK Left 12/2/15    lumbar transforaminal #1    NERVE BLOCK Left 12 16 15    NERVE BLOCK Left 12 28 2015    transforaminal nerve block left lumbar #3    NERVE BLOCK Left 1 20 16    radiofrequency     NERVE BLOCK  12/11/2018    C7-T1 epidural    NERVE BLOCK N/A 01/08/2019    cervical epidural steroid injection    NERVE BLOCK Bilateral 3/30/2023    BILATERAL INTRA-ARTICULAR FACET JOINT INJECTION WITH FLUOROSCOPIC GUIDANCE AT L5-S1 performed by Chante Young DO at [address] [facility]    had a feeding tube as an infant, scar    PAIN MANAGEMENT PROCEDURE Bilateral 4/20/2023    Bilateral Radiofrequency Ablation L5-S1 Facet joint performed by Chante Young DO at [address] [facility] N/A 5/25/2023    INTERLAMINAR EPIDURAL STEROID INJECTION AT L3-4 performed by Chante Young DO at [address] [place] [address]

## 2023-06-02 ENCOUNTER — OFFICE VISIT (OUTPATIENT)
Dept: FAMILY MEDICINE CLINIC | Age: 78
End: 2023-06-02

## 2023-06-02 VITALS
OXYGEN SATURATION: 98 % | SYSTOLIC BLOOD PRESSURE: 148 MMHG | TEMPERATURE: 97.9 F | DIASTOLIC BLOOD PRESSURE: 87 MMHG | HEART RATE: 73 BPM | BODY MASS INDEX: 25.18 KG/M2 | RESPIRATION RATE: 18 BRPM | HEIGHT: 69 IN | WEIGHT: 170 LBS

## 2023-06-02 DIAGNOSIS — J01.01 ACUTE RECURRENT MAXILLARY SINUSITIS: Primary | ICD-10-CM

## 2023-06-02 RX ORDER — AMOXICILLIN 875 MG/1
875 TABLET, COATED ORAL 2 TIMES DAILY
Qty: 20 TABLET | Refills: 0 | Status: SHIPPED | OUTPATIENT
Start: 2023-06-02 | End: 2023-06-12

## 2023-06-02 NOTE — PROGRESS NOTES
23  Shamar Bryan : 1945 Sex: male  Age 76 y.o. Subjective:  Chief Complaint   Patient presents with    Sinus Problem     Congestion, drainage x5 days ago. HPI:   Shamar Bryan , 76 y.o. male presents to the clinic for evaluation of sinus congestion x 5 days. The patient also reports sinus drainage. The patient has taken saline spray and flonase for symptoms. The patient reports worsening in symptoms over time. The patient denies ill exposure. The patient denies hx of COVID-19. The patient denies acute loss of taste and smell, headache, cough, sore throat, rash, and fever. The patient also denies chest pain, abdominal pain, shortness of breath, wheezing, and nausea / vomiting / diarrhea. ROS:   Unless otherwise stated in this report the patient's positive and negative responses for review of systems for constitutional, eyes, ENT, cardiovascular, respiratory, gastrointestinal, neurological, , musculoskeletal, and integument systems and related systems to the presenting problem are either stated in the history of present illness or were not pertinent or were negative for the symptoms and/or complaints related to the presenting medical problem. Positives and pertinent negatives as per HPI. All others reviewed and are negative. PMH:     Past Medical History:   Diagnosis Date    Abnormal computed tomography angiography of heart 2019    Calcified plaque proximal RCA with 50-70% stenosis, proximal LAD 50% stenosis, mild LAD 30-50% stenosis, groundglass infiltrates, area of consoldidation left lung base posteriorly.       Arthritis     Atrial fibrillation ([facility])     Burning pain     CAD (coronary artery disease)     Degenerative lumbar disc     Diabetes ([address]     DVT (deep venous thrombosis) ([facility])     Dysphagia     Edentulous     upper denture    H/O cardiovascular stress test 2020    Lexiscan    Herniated cervical disc     History of echocardiogram 2017    EF 60-65%

## 2023-06-09 ENCOUNTER — OFFICE VISIT (OUTPATIENT)
Dept: PHYSICAL MEDICINE AND REHAB | Age: 78
End: 2023-06-09
Payer: MEDICARE

## 2023-06-09 VITALS
BODY MASS INDEX: 26.22 KG/M2 | SYSTOLIC BLOOD PRESSURE: 155 MMHG | HEIGHT: 69 IN | DIASTOLIC BLOOD PRESSURE: 74 MMHG | HEART RATE: 73 BPM | WEIGHT: 177 LBS

## 2023-06-09 DIAGNOSIS — M48.061 LUMBAR FORAMINAL STENOSIS: Primary | ICD-10-CM

## 2023-06-09 DIAGNOSIS — M48.061 SPINAL STENOSIS OF LUMBAR REGION, UNSPECIFIED WHETHER NEUROGENIC CLAUDICATION PRESENT: ICD-10-CM

## 2023-06-09 DIAGNOSIS — M47.816 LUMBAR FACET ARTHROPATHY: ICD-10-CM

## 2023-06-09 DIAGNOSIS — G62.9 POLYNEUROPATHY: ICD-10-CM

## 2023-06-09 PROCEDURE — 3078F DIAST BP <80 MM HG: CPT | Performed by: PHYSICAL MEDICINE & REHABILITATION

## 2023-06-09 PROCEDURE — 3077F SYST BP >= 140 MM HG: CPT | Performed by: PHYSICAL MEDICINE & REHABILITATION

## 2023-06-09 PROCEDURE — 99214 OFFICE O/P EST MOD 30 MIN: CPT | Performed by: PHYSICAL MEDICINE & REHABILITATION

## 2023-06-09 PROCEDURE — 1036F TOBACCO NON-USER: CPT | Performed by: PHYSICAL MEDICINE & REHABILITATION

## 2023-06-09 PROCEDURE — G8427 DOCREV CUR MEDS BY ELIG CLIN: HCPCS | Performed by: PHYSICAL MEDICINE & REHABILITATION

## 2023-06-09 PROCEDURE — 1123F ACP DISCUSS/DSCN MKR DOCD: CPT | Performed by: PHYSICAL MEDICINE & REHABILITATION

## 2023-06-09 PROCEDURE — G8417 CALC BMI ABV UP PARAM F/U: HCPCS | Performed by: PHYSICAL MEDICINE & REHABILITATION

## 2023-06-09 RX ORDER — BUPRENORPHINE 5 UG/H
1 PATCH TRANSDERMAL WEEKLY
Qty: 4 PATCH | Refills: 2 | Status: SHIPPED | OUTPATIENT
Start: 2023-06-09 | End: 2023-09-01

## 2023-06-09 NOTE — PROGRESS NOTES
modifications discussed and recommended    Diagnosis and treatment were significantly limited by the following social determinants of health:  [  ] Financial   [  ] Transportation   [  ] No insurance   [  ] Housing   [  ] Substance abuse   [  ] Lack of support   Esme  ] BMI was elevated today, and weight loss plan recommended is : conventional weight loss. .  [  ]   Tobacco Use: Medium Risk    Smoking Tobacco Use: Former    Smokeless Tobacco Use: Never    Passive Exposure: Not on file     [  ] affect on work ability: not applicable. Follow up after RFA. Will Boland D.O., P.T.   Board Certified Physical Medicine and Rehabilitation  Board Certified Electrodiagnostic Medicine

## 2023-06-13 ENCOUNTER — OFFICE VISIT (OUTPATIENT)
Dept: CARDIOLOGY CLINIC | Age: 78
End: 2023-06-13
Payer: MEDICARE

## 2023-06-13 VITALS
WEIGHT: 180 LBS | DIASTOLIC BLOOD PRESSURE: 68 MMHG | BODY MASS INDEX: 26.66 KG/M2 | RESPIRATION RATE: 16 BRPM | SYSTOLIC BLOOD PRESSURE: 124 MMHG | HEIGHT: 69 IN | HEART RATE: 67 BPM

## 2023-06-13 DIAGNOSIS — I48.91 ATRIAL FIBRILLATION, UNSPECIFIED TYPE (HCC): Primary | ICD-10-CM

## 2023-06-13 PROCEDURE — 93000 ELECTROCARDIOGRAM COMPLETE: CPT | Performed by: INTERNAL MEDICINE

## 2023-06-13 PROCEDURE — 1123F ACP DISCUSS/DSCN MKR DOCD: CPT | Performed by: INTERNAL MEDICINE

## 2023-06-13 PROCEDURE — 99214 OFFICE O/P EST MOD 30 MIN: CPT | Performed by: INTERNAL MEDICINE

## 2023-06-13 PROCEDURE — 1036F TOBACCO NON-USER: CPT | Performed by: INTERNAL MEDICINE

## 2023-06-13 PROCEDURE — 3074F SYST BP LT 130 MM HG: CPT | Performed by: INTERNAL MEDICINE

## 2023-06-13 PROCEDURE — G8417 CALC BMI ABV UP PARAM F/U: HCPCS | Performed by: INTERNAL MEDICINE

## 2023-06-13 PROCEDURE — G8427 DOCREV CUR MEDS BY ELIG CLIN: HCPCS | Performed by: INTERNAL MEDICINE

## 2023-06-13 PROCEDURE — 3078F DIAST BP <80 MM HG: CPT | Performed by: INTERNAL MEDICINE

## 2023-06-14 ENCOUNTER — PREP FOR PROCEDURE (OUTPATIENT)
Dept: PHYSICAL MEDICINE AND REHAB | Age: 78
End: 2023-06-14

## 2023-06-21 RX ORDER — SODIUM CHLORIDE 9 MG/ML
INJECTION, SOLUTION INTRAVENOUS PRN
Status: CANCELLED | OUTPATIENT
Start: 2023-06-21

## 2023-06-21 RX ORDER — SODIUM CHLORIDE 0.9 % (FLUSH) 0.9 %
5-40 SYRINGE (ML) INJECTION PRN
Status: CANCELLED | OUTPATIENT
Start: 2023-06-21

## 2023-06-21 RX ORDER — SODIUM CHLORIDE 0.9 % (FLUSH) 0.9 %
5-40 SYRINGE (ML) INJECTION EVERY 12 HOURS SCHEDULED
Status: CANCELLED | OUTPATIENT
Start: 2023-06-21

## 2023-06-22 ENCOUNTER — HOSPITAL ENCOUNTER (OUTPATIENT)
Age: 78
Setting detail: OUTPATIENT SURGERY
Discharge: HOME | End: 2023-06-22
Attending: PHYSICAL MEDICINE & REHABILITATION | Admitting: PHYSICAL MEDICINE & REHABILITATION
Payer: MEDICARE

## 2023-06-22 ENCOUNTER — HOSPITAL ENCOUNTER (OUTPATIENT)
Dept: OPERATING ROOM | Age: 78
Setting detail: OUTPATIENT SURGERY
Discharge: HOME | End: 2023-06-22
Attending: PHYSICAL MEDICINE & REHABILITATION
Payer: MEDICARE

## 2023-06-22 VITALS
DIASTOLIC BLOOD PRESSURE: 71 MMHG | OXYGEN SATURATION: 97 % | HEART RATE: 61 BPM | HEIGHT: 69 IN | SYSTOLIC BLOOD PRESSURE: 156 MMHG | WEIGHT: 170 LBS | BODY MASS INDEX: 25.18 KG/M2 | RESPIRATION RATE: 16 BRPM | TEMPERATURE: 97.8 F

## 2023-06-22 DIAGNOSIS — M54.16 LUMBAR RADICULOPATHY: ICD-10-CM

## 2023-06-22 PROCEDURE — 3600000005 HC SURGERY LEVEL 5 BASE: Performed by: PHYSICAL MEDICINE & REHABILITATION

## 2023-06-22 PROCEDURE — A4216 STERILE WATER/SALINE, 10 ML: HCPCS | Performed by: PHYSICAL MEDICINE & REHABILITATION

## 2023-06-22 PROCEDURE — 6360000004 HC RX CONTRAST MEDICATION: Performed by: PHYSICAL MEDICINE & REHABILITATION

## 2023-06-22 PROCEDURE — 2500000003 HC RX 250 WO HCPCS: Performed by: PHYSICAL MEDICINE & REHABILITATION

## 2023-06-22 PROCEDURE — 7100000010 HC PHASE II RECOVERY - FIRST 15 MIN: Performed by: PHYSICAL MEDICINE & REHABILITATION

## 2023-06-22 PROCEDURE — 3209999900 FLUORO FOR SURGICAL PROCEDURES

## 2023-06-22 PROCEDURE — 2580000003 HC RX 258: Performed by: PHYSICAL MEDICINE & REHABILITATION

## 2023-06-22 PROCEDURE — 7100000011 HC PHASE II RECOVERY - ADDTL 15 MIN: Performed by: PHYSICAL MEDICINE & REHABILITATION

## 2023-06-22 PROCEDURE — 6360000002 HC RX W HCPCS: Performed by: PHYSICAL MEDICINE & REHABILITATION

## 2023-06-22 PROCEDURE — 2709999900 HC NON-CHARGEABLE SUPPLY: Performed by: PHYSICAL MEDICINE & REHABILITATION

## 2023-06-22 RX ORDER — LIDOCAINE HYDROCHLORIDE 10 MG/ML
INJECTION, SOLUTION EPIDURAL; INFILTRATION; INTRACAUDAL; PERINEURAL PRN
Status: DISCONTINUED | OUTPATIENT
Start: 2023-06-22 | End: 2023-06-22 | Stop reason: ALTCHOICE

## 2023-06-22 RX ORDER — SODIUM CHLORIDE 9 MG/ML
INJECTION, SOLUTION INTRAVENOUS PRN
Status: DISCONTINUED | OUTPATIENT
Start: 2023-06-22 | End: 2023-06-22 | Stop reason: HOSPADM

## 2023-06-22 RX ORDER — SODIUM CHLORIDE 0.9 % (FLUSH) 0.9 %
5-40 SYRINGE (ML) INJECTION EVERY 12 HOURS SCHEDULED
Status: DISCONTINUED | OUTPATIENT
Start: 2023-06-22 | End: 2023-06-22 | Stop reason: HOSPADM

## 2023-06-22 RX ORDER — SODIUM CHLORIDE 0.9 % (FLUSH) 0.9 %
5-40 SYRINGE (ML) INJECTION PRN
Status: DISCONTINUED | OUTPATIENT
Start: 2023-06-22 | End: 2023-06-22 | Stop reason: HOSPADM

## 2023-06-22 ASSESSMENT — PAIN - FUNCTIONAL ASSESSMENT: PAIN_FUNCTIONAL_ASSESSMENT: 0-10

## 2023-06-22 NOTE — DISCHARGE INSTRUCTIONS
Post-op instruction Block  Dr. Kasey Ramesh  [phone]      Rest 12-24 hours following procedure. DO NOT DRIVE till the following day. Keep dressing clean and dry. Do not bathe, shower, or sit in hot tub the day of procedure . You may remove the dressing following A.M. You may return to work/school tomorrow. Drink extra fluids for the next 24 hours. Resume your regular diet. Resume previously prescribed medications. Resume Coumadin, Plavix, NSAIDS, Ibuprofen products 24 hours after procedure. You need to have a responsible adult stay with you this evening. If you experience any discomfort relating to this procedure, you may take Tylenol 2 tablets every 4 hrs as needed for pain and/or apply ice to the affected area. Please follow up with Dr. Kasey Ramesh or Dr. Tam Holguin. If you were a hip injection follow up with your orthopedic Doctor. If you experience any unusual symptoms or problems, call your physicians answering service at [phone] or go directly to [facility]. [facility] Emergency Department. Infection After Surgery: Care Instructions  Overview  After surgery, an infection is always possible. It doesn't mean that the surgery didn't go well. Because an infection can be serious, your doctor has taken steps to manage it. Your doctor checked the infection and cleaned it if necessary. Your doctor may have made an opening in the area so that the pus can drain out. You may have gauze in the cut so that the area will stay open and keep draining. You may need antibiotics. You will need to follow up with your doctor to make sure the infection has gone away. Follow-up care is a key part of your treatment and safety. Be sure to make and go to all appointments, and call your doctor if you are having problems. It's also a good idea to know your test results and keep a list of the medicines you take. How can you care for yourself at home?   Make sure

## 2023-06-22 NOTE — OP NOTE
The patient tolerated the procedure well and was discharged after an appropriate period of observation. If there are any complications, the patient was instructed to call us. The patient is to follow-up with the requesting physician after the injection, preferably within two weeks.     Eli Rodriges DO, [facility] [place]   Board Certified Physical Medicine and Rehabilitation

## 2023-06-22 NOTE — H&P
Camden Toussaint, [address] [facility] Physical Medicine and Rehabilitation  [address] [facility] [place]  Phone: [phone]  Fax: [phone]    PCP: Laney Nelson DO  Date of visit: 6/22/2023    CC: low back pain       Anthony Coughlin is a 76 y.o. male who presents today for lumbar epidural steroid injection. Patient complains of low back pain. No red flag symptoms. Consents to proceed with procedure. Allergies   Allergen Reactions    Imdur [Isosorbide Nitrate] Other (See Comments)     headache    Oxycodone Rash    Simvastatin Other (See Comments)     Muscle weakness    Amoxicillin Nausea And Vomiting    Bactrim [Sulfamethoxazole-Trimethoprim] Nausea Only    Ciprofloxacin Hcl Other (See Comments)     \"Heel/ tendon pain\"    Gabapentin Other (See Comments)     Constipation      Hydrocodone Nausea Only    Norco [Hydrocodone-Acetaminophen] Nausea Only and Other (See Comments)     Causes sick feeling in head         Current Facility-Administered Medications   Medication Dose Route Frequency Provider Last Rate Last Admin    sodium chloride flush 0.9 % injection 5-40 mL  5-40 mL IntraVENous 2 times per day Opal Watson, DO        sodium chloride flush 0.9 % injection 5-40 mL  5-40 mL IntraVENous PRN Opal Watson, DO        0.9 % sodium chloride infusion   IntraVENous PRN Opal Watson, DO           Past Medical History:   Diagnosis Date    Abnormal computed tomography angiography of heart 07/19/2019    Calcified plaque proximal RCA with 50-70% stenosis, proximal LAD 50% stenosis, mild LAD 30-50% stenosis, groundglass infiltrates, area of consoldidation left lung base posteriorly.       Arthritis     Atrial fibrillation ([facility])     Burning pain     CAD (coronary artery disease)     Degenerative lumbar disc     Diabetes ([facility] [address]     DVT (deep venous thrombosis) ([facility])     Dysphagia     Edentulous     upper denture    H/O cardiovascular stress test 02/19/2020

## 2023-06-27 ENCOUNTER — PREP FOR PROCEDURE (OUTPATIENT)
Dept: ORTHOPEDIC SURGERY | Age: 78
End: 2023-06-27

## 2023-06-27 DIAGNOSIS — M48.061 LUMBAR SPINAL STENOSIS DUE TO ADJACENT SEGMENT DISEASE AFTER FUSION PROCEDURE: ICD-10-CM

## 2023-06-27 DIAGNOSIS — M47.816 LUMBAR SPONDYLOSIS: Primary | ICD-10-CM

## 2023-06-27 DIAGNOSIS — M51.36 LUMBAR SPINAL STENOSIS DUE TO ADJACENT SEGMENT DISEASE AFTER FUSION PROCEDURE: ICD-10-CM

## 2023-06-27 DIAGNOSIS — M47.816 FACET ARTHROPATHY, LUMBAR: ICD-10-CM

## 2023-06-27 DIAGNOSIS — M48.061 LUMBAR FORAMINAL STENOSIS: ICD-10-CM

## 2023-06-27 PROBLEM — Z98.1 S/P SPINAL FUSION: Status: ACTIVE | Noted: 2023-06-27

## 2023-06-27 PROBLEM — M47.819 FACET ARTHROPATHY: Status: ACTIVE | Noted: 2023-06-27

## 2023-06-27 PROBLEM — M54.16 LUMBAR RADICULOPATHY: Status: ACTIVE | Noted: 2023-06-27

## 2023-06-27 RX ORDER — SODIUM CHLORIDE 9 MG/ML
INJECTION, SOLUTION INTRAVENOUS CONTINUOUS
Status: CANCELLED | OUTPATIENT
Start: 2023-06-27

## 2023-06-27 RX ORDER — SODIUM CHLORIDE 9 MG/ML
INJECTION, SOLUTION INTRAVENOUS PRN
Status: CANCELLED | OUTPATIENT
Start: 2023-06-27

## 2023-06-27 RX ORDER — SODIUM CHLORIDE 0.9 % (FLUSH) 0.9 %
5-40 SYRINGE (ML) INJECTION PRN
Status: CANCELLED | OUTPATIENT
Start: 2023-06-27

## 2023-06-27 RX ORDER — SODIUM CHLORIDE 0.9 % (FLUSH) 0.9 %
5-40 SYRINGE (ML) INJECTION EVERY 12 HOURS SCHEDULED
Status: CANCELLED | OUTPATIENT
Start: 2023-06-27

## 2023-06-29 ENCOUNTER — TELEPHONE (OUTPATIENT)
Dept: FAMILY MEDICINE CLINIC | Age: 78
End: 2023-06-29

## 2023-06-29 ENCOUNTER — ANESTHESIA EVENT (OUTPATIENT)
Dept: OPERATING ROOM | Age: 78
DRG: 552 | End: 2023-06-29
Payer: MEDICARE

## 2023-06-30 ENCOUNTER — TELEPHONE (OUTPATIENT)
Dept: CARDIOLOGY CLINIC | Age: 78
End: 2023-06-30

## 2023-07-05 ENCOUNTER — HOSPITAL ENCOUNTER (OUTPATIENT)
Dept: MRI IMAGING | Age: 78
Discharge: HOME | End: 2023-07-07
Payer: MEDICARE

## 2023-07-05 DIAGNOSIS — Z98.1 ARTHRODESIS STATUS: ICD-10-CM

## 2023-07-05 DIAGNOSIS — M47.819 SPONDYLOSIS WITHOUT MYELOPATHY OR RADICULOPATHY: ICD-10-CM

## 2023-07-05 DIAGNOSIS — M51.36 DEGENERATION OF LUMBAR INTERVERTEBRAL DISC: ICD-10-CM

## 2023-07-05 DIAGNOSIS — M54.9 DORSALGIA: ICD-10-CM

## 2023-07-05 DIAGNOSIS — M47.9 SPONDYLARTHROSIS: ICD-10-CM

## 2023-07-05 PROCEDURE — 72146 MRI CHEST SPINE W/O DYE: CPT

## 2023-07-08 ENCOUNTER — APPOINTMENT (OUTPATIENT)
Dept: CT IMAGING | Age: 78
End: 2023-07-08
Payer: MEDICARE

## 2023-07-08 ENCOUNTER — HOSPITAL ENCOUNTER (EMERGENCY)
Age: 78
Discharge: HOME | End: 2023-07-08
Attending: EMERGENCY MEDICINE
Payer: MEDICARE

## 2023-07-08 VITALS
OXYGEN SATURATION: 98 % | HEART RATE: 72 BPM | RESPIRATION RATE: 18 BRPM | SYSTOLIC BLOOD PRESSURE: 192 MMHG | TEMPERATURE: 98.2 F | DIASTOLIC BLOOD PRESSURE: 91 MMHG

## 2023-07-08 DIAGNOSIS — K59.00 CONSTIPATION, UNSPECIFIED CONSTIPATION TYPE: Primary | ICD-10-CM

## 2023-07-08 DIAGNOSIS — R33.9 URINARY RETENTION: ICD-10-CM

## 2023-07-08 LAB
ANION GAP SERPL CALCULATED.3IONS-SCNC: 13 MMOL/L (ref 7–16)
BASOPHILS # BLD: 0.04 E9/L (ref 0–0.2)
BASOPHILS NFR BLD: 0.6 % (ref 0–2)
BILIRUB UR QL STRIP: NEGATIVE
BUN SERPL-MCNC: 9 MG/DL (ref 6–23)
CALCIUM SERPL-MCNC: 9.3 MG/DL (ref 8.6–10.2)
CHLORIDE SERPL-SCNC: 98 MMOL/L (ref 98–107)
CLARITY UR: CLEAR
CO2 SERPL-SCNC: 22 MMOL/L (ref 22–29)
COLOR UR: NORMAL
CREAT SERPL-MCNC: 0.8 MG/DL (ref 0.7–1.2)
EOSINOPHIL # BLD: 0.09 E9/L (ref 0.05–0.5)
EOSINOPHIL NFR BLD: 1.4 % (ref 0–6)
ERYTHROCYTE [DISTWIDTH] IN BLOOD BY AUTOMATED COUNT: 13 FL (ref 11.5–15)
GLUCOSE SERPL-MCNC: 197 MG/DL (ref 74–99)
GLUCOSE UR STRIP-MCNC: NEGATIVE MG/DL
HCT VFR BLD AUTO: 41.5 % (ref 37–54)
HGB BLD-MCNC: 14.2 G/DL (ref 12.5–16.5)
HGB UR QL STRIP: NEGATIVE
IMM GRANULOCYTES # BLD: 0.03 E9/L
IMM GRANULOCYTES NFR BLD: 0.5 % (ref 0–5)
KETONES UR STRIP-MCNC: NEGATIVE MG/DL
LEUKOCYTE ESTERASE UR QL STRIP: NEGATIVE
LYMPHOCYTES # BLD: 2.05 E9/L (ref 1.5–4)
LYMPHOCYTES NFR BLD: 32.7 % (ref 20–42)
MCH RBC QN AUTO: 31.2 PG (ref 26–35)
MCHC RBC AUTO-ENTMCNC: 34.2 % (ref 32–34.5)
MCV RBC AUTO: 91.2 FL (ref 80–99.9)
MONOCYTES # BLD: 0.47 E9/L (ref 0.1–0.95)
MONOCYTES NFR BLD: 7.5 % (ref 2–12)
NEUTROPHILS # BLD: 3.59 E9/L (ref 1.8–7.3)
NEUTS SEG NFR BLD: 57.3 % (ref 43–80)
NITRITE UR QL STRIP: NEGATIVE
PH UR STRIP: 5.5 [PH] (ref 5–9)
PLATELET # BLD AUTO: 169 E9/L (ref 130–450)
PMV BLD AUTO: 10.3 FL (ref 7–12)
POTASSIUM SERPL-SCNC: 4.7 MMOL/L (ref 3.5–5)
PROT UR STRIP-MCNC: NEGATIVE MG/DL
RBC # BLD AUTO: 4.55 E12/L (ref 3.8–5.8)
SODIUM SERPL-SCNC: 133 MMOL/L (ref 132–146)
SP GR UR STRIP: 1.01 (ref 1–1.03)
UROBILINOGEN UR STRIP-ACNC: 0.2 E.U./DL
WBC # BLD: 6.3 E9/L (ref 4.5–11.5)

## 2023-07-08 PROCEDURE — 99284 EMERGENCY DEPT VISIT MOD MDM: CPT

## 2023-07-08 PROCEDURE — 74176 CT ABD & PELVIS W/O CONTRAST: CPT

## 2023-07-08 PROCEDURE — 80048 BASIC METABOLIC PNL TOTAL CA: CPT

## 2023-07-08 PROCEDURE — 6370000000 HC RX 637 (ALT 250 FOR IP): Performed by: EMERGENCY MEDICINE

## 2023-07-08 PROCEDURE — 81003 URINALYSIS AUTO W/O SCOPE: CPT

## 2023-07-08 PROCEDURE — 85025 COMPLETE CBC W/AUTO DIFF WBC: CPT

## 2023-07-08 RX ORDER — DOCUSATE SODIUM 100 MG/1
100 CAPSULE, LIQUID FILLED ORAL 2 TIMES DAILY PRN
Qty: 20 CAPSULE | Refills: 0 | Status: SHIPPED | OUTPATIENT
Start: 2023-07-08

## 2023-07-08 RX ORDER — LIDOCAINE HYDROCHLORIDE 20 MG/ML
JELLY TOPICAL PRN
Status: DISCONTINUED | OUTPATIENT
Start: 2023-07-08 | End: 2023-07-08 | Stop reason: HOSPADM

## 2023-07-08 RX ORDER — TAMSULOSIN HYDROCHLORIDE 0.4 MG/1
0.4 CAPSULE ORAL DAILY
Qty: 30 CAPSULE | Refills: 0 | Status: SHIPPED | OUTPATIENT
Start: 2023-07-08

## 2023-07-08 RX ORDER — POLYETHYLENE GLYCOL 3350 17 G/17G
17 POWDER, FOR SOLUTION ORAL 2 TIMES DAILY PRN
Qty: 527 G | Refills: 0 | Status: SHIPPED | OUTPATIENT
Start: 2023-07-08 | End: 2023-08-07

## 2023-07-08 RX ADMIN — LIDOCAINE HYDROCHLORIDE: 20 JELLY TOPICAL at 13:47

## 2023-07-08 ASSESSMENT — ENCOUNTER SYMPTOMS
SINUS PRESSURE: 0
COUGH: 0
VOMITING: 0
SHORTNESS OF BREATH: 0
NAUSEA: 0
BACK PAIN: 1
SORE THROAT: 0
WHEEZING: 0
CHEST TIGHTNESS: 0
DIARRHEA: 0
ABDOMINAL PAIN: 0

## 2023-07-08 NOTE — ED PROVIDER NOTES
Cardiovascular:      Rate and Rhythm: Normal rate and regular rhythm. Heart sounds: Normal heart sounds. No murmur heard. Pulmonary:      Effort: Pulmonary effort is normal. No respiratory distress. Breath sounds: Normal breath sounds. No stridor, decreased air movement or transmitted upper airway sounds. No decreased breath sounds, wheezing, rhonchi or rales. Chest:      Chest wall: No tenderness. Abdominal:      General: Bowel sounds are normal. There is no distension. Palpations: Abdomen is soft. Tenderness: There is no abdominal tenderness. There is no right CVA tenderness, left CVA tenderness, guarding or rebound. Musculoskeletal:         General: No swelling, tenderness, deformity or signs of injury. Cervical back: Full passive range of motion without pain, normal range of motion and neck supple. No signs of trauma or rigidity. No spinous process tenderness or muscular tenderness. Normal range of motion. Right lower leg: No edema. Left lower leg: No edema. Comments: No midline cervical, thoracic or lumbar spine tenderness. Arms and legs are neurovascular intact with no signs of acute bony or joint injury. No pretibial edema or calf pain. Skin:     General: Skin is warm and dry. Coloration: Skin is not cyanotic, jaundiced, mottled or pale. Findings: No bruising, erythema or rash. Neurological:      General: No focal deficit present. Mental Status: He is alert and oriented to person, place, and time. GCS: GCS eye subscore is 4. GCS verbal subscore is 5. GCS motor subscore is 6. Cranial Nerves: Cranial nerves 2-12 are intact. No cranial nerve deficit. Sensory: Sensation is intact. Motor: Motor function is intact. Coordination: Coordination is intact. Coordination normal.   Psychiatric:         Behavior: Behavior is cooperative. Procedures     MDM     History provided by:  The patient    Patient here complaining of

## 2023-07-08 NOTE — ED NOTES
RN attempted to place aguilar catheter with no success, physician made aware.      Luis Enrique Lo RN  07/08/23 [id number]

## 2023-07-20 NOTE — PROGRESS NOTES
[address]   PRE-ADMISSION TESTING GENERAL INSTRUCTIONS  [facility] Phone Number: [phone]      GENERAL INSTRUCTIONS:    [x] Antibacterial Soap Shower Night before surgery. [x] CHG Wipes instruction sheet and wipes given. [x] Do not wear lotions, powders, deodorant. [x] Nothing by mouth after midnight; including gum, candy, mints, or water. [x] You may brush your teeth, gargle, but do NOT swallow water. [x] No tobacco products, illegal drugs, or alcohol within 24 hours of your surgery. [x] Jewelry or valuables should not be brought to the hospital. All body and/or tongue piercing's must be removed prior to arriving to hospital. No contact lens or hair pins. [x] Arrange transportation with a responsible adult  to and from the hospital.          -Who will be your  for transportation? Ponce Dukes  [x] Bring insurance card and photo ID. [x] Transfusion Robbin Fujita) Bracelet: Please bring with you to hospital, day of surgery. PARKING INSTRUCTIONS:     [x] ARRIVAL DATE & TIME:  8/1 at 5:15 am  [x] Times are subject to change. We will contact you the business day before surgery if that were to occur. [x] Enter into the [employer]. Two people may accompany you. Masks are not required. [x] Parking Lot \"I\" is where you will park. It is located on the corner of [address] [place] [address] and [address] [place] [address]. The entrance is on [address] [place] [address]. Only one vehicle - per patient, is permitted in parking lot. Upon entering the parking lot, a voucher ticket will print. EDUCATION INSTRUCTIONS:        [x] Pre-admission Testing educational folder given  [x] Incentive Spirometry,coughing & deep breathing exercises reviewed. [x] Fluoroscopy-Xray used in surgery reviewed with patient. Educational pamphlet placed in chart. [x] Pain: Post-op pain is normal and to be expected. You will be asked to rate your pain from 0-10.       MEDICATION INSTRUCTIONS:    [x] Bring a complete

## 2023-07-26 ENCOUNTER — HOSPITAL ENCOUNTER (OUTPATIENT)
Dept: GENERAL RADIOLOGY | Age: 78
Discharge: HOME | End: 2023-07-28
Payer: MEDICARE

## 2023-07-26 ENCOUNTER — HOSPITAL ENCOUNTER (OUTPATIENT)
Dept: PREADMISSION TESTING | Age: 78
Discharge: HOME | End: 2023-07-26
Payer: MEDICARE

## 2023-07-26 VITALS
HEIGHT: 69 IN | SYSTOLIC BLOOD PRESSURE: 189 MMHG | RESPIRATION RATE: 16 BRPM | BODY MASS INDEX: 26.99 KG/M2 | WEIGHT: 182.25 LBS | OXYGEN SATURATION: 97 % | HEART RATE: 66 BPM | DIASTOLIC BLOOD PRESSURE: 86 MMHG | TEMPERATURE: 98.1 F

## 2023-07-26 DIAGNOSIS — Z01.812 PRE-OPERATIVE LABORATORY EXAMINATION: Primary | ICD-10-CM

## 2023-07-26 DIAGNOSIS — M51.36 LUMBAR SPINAL STENOSIS DUE TO ADJACENT SEGMENT DISEASE AFTER FUSION PROCEDURE: ICD-10-CM

## 2023-07-26 DIAGNOSIS — M48.061 LUMBAR SPINAL STENOSIS DUE TO ADJACENT SEGMENT DISEASE AFTER FUSION PROCEDURE: ICD-10-CM

## 2023-07-26 DIAGNOSIS — M47.816 LUMBAR SPONDYLOSIS: ICD-10-CM

## 2023-07-26 DIAGNOSIS — M47.816 FACET ARTHROPATHY, LUMBAR: ICD-10-CM

## 2023-07-26 DIAGNOSIS — M48.061 LUMBAR FORAMINAL STENOSIS: ICD-10-CM

## 2023-07-26 LAB
ABO + RH BLD: NORMAL
ANION GAP SERPL CALCULATED.3IONS-SCNC: 12 MMOL/L (ref 7–16)
ARM BAND NUMBER: NORMAL
BILIRUB UR QL STRIP: NEGATIVE
BLOOD BANK SAMPLE EXPIRATION: NORMAL
BLOOD GROUP ANTIBODIES SERPL: NEGATIVE
BUN SERPL-MCNC: 11 MG/DL (ref 6–23)
CALCIUM SERPL-MCNC: 9.5 MG/DL (ref 8.6–10.2)
CHLORIDE SERPL-SCNC: 102 MMOL/L (ref 98–107)
CLARITY UR: CLEAR
CO2 SERPL-SCNC: 23 MMOL/L (ref 22–29)
COLOR UR: YELLOW
COMMENT: ABNORMAL
CREAT SERPL-MCNC: 0.9 MG/DL (ref 0.7–1.2)
GFR SERPL CREATININE-BSD FRML MDRD: >60 ML/MIN/1.73M2
GLUCOSE SERPL-MCNC: 112 MG/DL (ref 74–99)
GLUCOSE UR STRIP-MCNC: NEGATIVE MG/DL
HGB UR QL STRIP.AUTO: NEGATIVE
INR PPP: 1
KETONES UR STRIP-MCNC: NEGATIVE MG/DL
LEUKOCYTE ESTERASE UR QL STRIP: NEGATIVE
NITRITE UR QL STRIP: NEGATIVE
PH UR STRIP: 6.5 [PH] (ref 5–9)
POTASSIUM SERPL-SCNC: 4.4 MMOL/L (ref 3.5–5)
PROT UR STRIP-MCNC: NEGATIVE MG/DL
PROTHROMBIN TIME: 10.6 SEC (ref 9.3–12.4)
SODIUM SERPL-SCNC: 137 MMOL/L (ref 132–146)
SP GR UR STRIP: <1.005 (ref 1–1.03)
UROBILINOGEN UR STRIP-ACNC: 0.2 EU/DL (ref 0–1)

## 2023-07-26 PROCEDURE — 86850 RBC ANTIBODY SCREEN: CPT

## 2023-07-26 PROCEDURE — 86900 BLOOD TYPING SEROLOGIC ABO: CPT

## 2023-07-26 PROCEDURE — 87086 URINE CULTURE/COLONY COUNT: CPT

## 2023-07-26 PROCEDURE — 36415 COLL VENOUS BLD VENIPUNCTURE: CPT

## 2023-07-26 PROCEDURE — 80048 BASIC METABOLIC PNL TOTAL CA: CPT

## 2023-07-26 PROCEDURE — 85027 COMPLETE CBC AUTOMATED: CPT

## 2023-07-26 PROCEDURE — 81003 URINALYSIS AUTO W/O SCOPE: CPT

## 2023-07-26 PROCEDURE — 86901 BLOOD TYPING SEROLOGIC RH(D): CPT

## 2023-07-26 PROCEDURE — 71046 X-RAY EXAM CHEST 2 VIEWS: CPT

## 2023-07-26 PROCEDURE — 87081 CULTURE SCREEN ONLY: CPT

## 2023-07-26 PROCEDURE — 85610 PROTHROMBIN TIME: CPT

## 2023-07-26 RX ORDER — PROPOFOL 10 MG/ML
INJECTION, EMULSION INTRAVENOUS
Status: DISPENSED
Start: 2023-07-26 | End: 2023-07-27

## 2023-07-26 RX ORDER — ALBUMIN, HUMAN INJ 5% 5 %
SOLUTION INTRAVENOUS
Status: DISPENSED
Start: 2023-07-26 | End: 2023-07-27

## 2023-07-26 ASSESSMENT — PAIN DESCRIPTION - PAIN TYPE: TYPE: CHRONIC PAIN

## 2023-07-26 ASSESSMENT — PAIN DESCRIPTION - LOCATION: LOCATION: BACK

## 2023-07-26 ASSESSMENT — PAIN DESCRIPTION - ORIENTATION: ORIENTATION: LOWER;RIGHT;LEFT

## 2023-07-26 ASSESSMENT — PAIN DESCRIPTION - DESCRIPTORS: DESCRIPTORS: BURNING;SHARP

## 2023-07-26 ASSESSMENT — PAIN SCALES - GENERAL: PAINLEVEL_OUTOF10: 8

## 2023-07-26 ASSESSMENT — PAIN DESCRIPTION - FREQUENCY: FREQUENCY: CONTINUOUS

## 2023-07-27 LAB
MICROORGANISM SPEC CULT: NO GROWTH
MICROORGANISM SPEC CULT: NORMAL
SPECIMEN DESCRIPTION: NORMAL
SPECIMEN DESCRIPTION: NORMAL

## 2023-07-28 LAB
BASOPHILS # BLD: 0.05 K/UL (ref 0–0.2)
BASOPHILS NFR BLD: 1 % (ref 0–2)
EOSINOPHIL # BLD: 0.1 K/UL (ref 0.05–0.5)
EOSINOPHILS RELATIVE PERCENT: 2 % (ref 0–6)
ERYTHROCYTE [DISTWIDTH] IN BLOOD BY AUTOMATED COUNT: 12.9 % (ref 11.5–15)
HCT VFR BLD AUTO: 47.6 % (ref 37–54)
HGB BLD-MCNC: 15.7 G/DL (ref 12.5–16.5)
IMM GRANULOCYTES # BLD AUTO: <0.03 K/UL (ref 0–0.58)
IMM GRANULOCYTES NFR BLD: 0 % (ref 0–5)
LYMPHOCYTES NFR BLD: 2.4 K/UL (ref 1.5–4)
LYMPHOCYTES RELATIVE PERCENT: 44 % (ref 20–42)
MCH RBC QN AUTO: 31.4 PG (ref 26–35)
MCHC RBC AUTO-ENTMCNC: 33 G/DL (ref 32–34.5)
MCV RBC AUTO: 95.2 FL (ref 80–99.9)
MONOCYTES NFR BLD: 0.43 K/UL (ref 0.1–0.95)
MONOCYTES NFR BLD: 8 % (ref 2–12)
NEUTROPHILS NFR BLD: 45 % (ref 43–80)
NEUTS SEG NFR BLD: 2.46 K/UL (ref 1.8–7.3)
PLATELET # BLD AUTO: 163 K/UL (ref 130–450)
PMV BLD AUTO: 10.6 FL (ref 7–12)
RBC # BLD AUTO: 5 M/UL (ref 3.8–5.8)
WBC OTHER # BLD: 5.5 K/UL (ref 4.5–11.5)

## 2023-08-01 ENCOUNTER — HOSPITAL ENCOUNTER (INPATIENT)
Age: 78
LOS: 1 days | Discharge: HOME | DRG: 552 | End: 2023-08-01
Attending: STUDENT IN AN ORGANIZED HEALTH CARE EDUCATION/TRAINING PROGRAM | Admitting: STUDENT IN AN ORGANIZED HEALTH CARE EDUCATION/TRAINING PROGRAM
Payer: MEDICARE

## 2023-08-01 ENCOUNTER — TELEPHONE (OUTPATIENT)
Dept: CARDIOLOGY CLINIC | Age: 78
End: 2023-08-01

## 2023-08-01 VITALS
SYSTOLIC BLOOD PRESSURE: 166 MMHG | OXYGEN SATURATION: 96 % | HEART RATE: 63 BPM | HEIGHT: 69 IN | RESPIRATION RATE: 20 BRPM | TEMPERATURE: 97 F | DIASTOLIC BLOOD PRESSURE: 80 MMHG | WEIGHT: 182.25 LBS | BODY MASS INDEX: 26.99 KG/M2

## 2023-08-01 DIAGNOSIS — Z98.1 S/P SPINAL FUSION: ICD-10-CM

## 2023-08-01 DIAGNOSIS — Z01.812 PRE-OPERATIVE LABORATORY EXAMINATION: ICD-10-CM

## 2023-08-01 DIAGNOSIS — M54.16 LUMBAR RADICULOPATHY: ICD-10-CM

## 2023-08-01 DIAGNOSIS — M47.819 FACET ARTHROPATHY: ICD-10-CM

## 2023-08-01 PROBLEM — M51.369 LUMBAR SPINAL STENOSIS DUE TO ADJACENT SEGMENT DISEASE AFTER FUSION PROCEDURE: Status: ACTIVE | Noted: 2023-08-01

## 2023-08-01 PROBLEM — M48.061 LUMBAR SPINAL STENOSIS DUE TO ADJACENT SEGMENT DISEASE AFTER FUSION PROCEDURE: Status: ACTIVE | Noted: 2023-08-01

## 2023-08-01 PROBLEM — M51.36 LUMBAR SPINAL STENOSIS DUE TO ADJACENT SEGMENT DISEASE AFTER FUSION PROCEDURE: Status: ACTIVE | Noted: 2023-08-01

## 2023-08-01 LAB — METER GLUCOSE: 107 MG/DL (ref 74–99)

## 2023-08-01 PROCEDURE — 6360000002 HC RX W HCPCS: Performed by: STUDENT IN AN ORGANIZED HEALTH CARE EDUCATION/TRAINING PROGRAM

## 2023-08-01 PROCEDURE — 1200000000 HC SEMI PRIVATE

## 2023-08-01 PROCEDURE — 82947 ASSAY GLUCOSE BLOOD QUANT: CPT

## 2023-08-01 PROCEDURE — 2580000003 HC RX 258: Performed by: STUDENT IN AN ORGANIZED HEALTH CARE EDUCATION/TRAINING PROGRAM

## 2023-08-01 RX ORDER — SODIUM CHLORIDE 0.9 % (FLUSH) 0.9 %
5-40 SYRINGE (ML) INJECTION EVERY 12 HOURS SCHEDULED
Status: DISCONTINUED | OUTPATIENT
Start: 2023-08-01 | End: 2023-08-01 | Stop reason: HOSPADM

## 2023-08-01 RX ORDER — SODIUM CHLORIDE 9 MG/ML
INJECTION, SOLUTION INTRAVENOUS PRN
Status: DISCONTINUED | OUTPATIENT
Start: 2023-08-01 | End: 2023-08-01 | Stop reason: HOSPADM

## 2023-08-01 RX ORDER — SODIUM CHLORIDE 0.9 % (FLUSH) 0.9 %
5-40 SYRINGE (ML) INJECTION PRN
Status: DISCONTINUED | OUTPATIENT
Start: 2023-08-01 | End: 2023-08-01 | Stop reason: HOSPADM

## 2023-08-01 RX ORDER — DEXAMETHASONE SODIUM PHOSPHATE 10 MG/ML
8 INJECTION INTRAMUSCULAR; INTRAVENOUS ONCE
Status: COMPLETED | OUTPATIENT
Start: 2023-08-01 | End: 2023-08-01

## 2023-08-01 RX ORDER — SODIUM CHLORIDE 9 MG/ML
INJECTION, SOLUTION INTRAVENOUS CONTINUOUS
Status: DISCONTINUED | OUTPATIENT
Start: 2023-08-01 | End: 2023-08-01 | Stop reason: HOSPADM

## 2023-08-01 RX ADMIN — DEXAMETHASONE SODIUM PHOSPHATE 8 MG: 10 INJECTION INTRAMUSCULAR; INTRAVENOUS at 05:34

## 2023-08-01 RX ADMIN — SODIUM CHLORIDE: 9 INJECTION, SOLUTION INTRAVENOUS at 05:40

## 2023-08-01 ASSESSMENT — PAIN - FUNCTIONAL ASSESSMENT: PAIN_FUNCTIONAL_ASSESSMENT: NONE - DENIES PAIN

## 2023-08-01 NOTE — PROGRESS NOTES
Discussed with patient and anesthesia. Patient stopped Aspirin on his own over a month ago and did not tell his cardiologist. Patient has an extensive medical history. Reviewed with him risks outweigh benefits at this time. Recommend contacting his cardiologist and will reschedule case.        Raymon Anderson DO

## 2023-08-01 NOTE — TELEPHONE ENCOUNTER
Pt came in the office. He states that he can not have back surgery because he has not been taking baby asprin. He would like to know how many days or weeks he should take baby asprin before his surgery. Please advice.

## 2023-08-01 NOTE — H&P
Department of Orthopedic Surgery  Ortho Spine H&P        HPI: Patient is a 66yoM who presents with low back pain and bilateral thigh pain. He has had previous L4-L5 PSDF, SRINIVAS L4-L5 with laminectomies L3-S1. He admits to numbness and burning to both lower extremities especially the bottom of both feet. He has a history of neuropathy. He has a history of esophageal strictures and DVT. Review of Systems: Chart review of systems was reviewed prior to the interview. A subset of that information is presented below. Medications:   Anticoagulants: Plavix      Physical Exam:     General:  Vitals    No acute distress     Psych:   A+Ox3, Affect is normal      Peripheral Vascular:   LE swelling-No  Fingers/toes warm, well perfused. DP/PT palpable, capillary refill < 2 seconds    Spine:  Lumbar spine is tender to palpation    Range of motion of the lumbar spine is limited in flexion, extension, lateral bending, and rotation  with pain.      Prior surgical scars posterior lumbar      Neuro:        Lower Extremity Strength:    Right Left   Hip Flexors L2 5/5 5/5   Quadriceps L3 5/5 5/5   Anterior Tibialis L4 5/5 5/5   EHL L5 5/5 5/5   Hamstrings 5/5 5/5   Gastrocsoleus S1 5/5 5/5   Peroneals 5/5 5/5     Reflexes:    Right Left                  Patellar L4 2+ 2+   Achilles S1 2+ 2+     Clonus (R/L): 0/0    Straight Leg Raise (R/L): -/-    Sensation to light touch in the lower extremities: altered to bilateral plantar feet and distal aspects of bilateral lower extremities        Pulses:   2    Abnormal Exam findings:  none    LABS:    HgB:    Lab Results   Component Value Date    HGB 12.2 4/5/2012     INR:    No components found with this basename: PTPATIENT, PTINR     CBC:   Lab Results   Component Value Date/Time    WBC 5.5 07/26/2023 09:40 AM    RBC 5.00 07/26/2023 09:40 AM    HGB 15.7 07/26/2023 09:40 AM    HCT 47.6 07/26/2023 09:40 AM    MCV 95.2 07/26/2023 09:40 AM    MCH 31.4 07/26/2023 09:40 AM    MCHC 33.0 07/26/2023

## 2023-08-03 NOTE — TELEPHONE ENCOUNTER
Pt stated the surgeon would not do the surgery because pt is not on a Asprin. He want the pt to start taking Asprin before the surgery. Is this ok? If so how long should he take it before the surgery and when should he stop.  Please advise

## 2023-08-03 NOTE — TELEPHONE ENCOUNTER
I believe there is some confusion here, aspirin should be stopped before surgery?,  Will double check with him again please

## 2023-08-04 ENCOUNTER — ANESTHESIA (OUTPATIENT)
Dept: OPERATING ROOM | Age: 78
DRG: 552 | End: 2023-08-04
Payer: MEDICARE

## 2023-08-10 DIAGNOSIS — M48.061 LUMBAR FORAMINAL STENOSIS: ICD-10-CM

## 2023-08-10 DIAGNOSIS — G62.9 POLYNEUROPATHY: ICD-10-CM

## 2023-08-10 RX ORDER — BUPRENORPHINE 5 UG/H
1 PATCH TRANSDERMAL WEEKLY
Qty: 4 PATCH | Refills: 2 | Status: SHIPPED | OUTPATIENT
Start: 2023-08-10 | End: 2023-11-02

## 2023-08-10 NOTE — TELEPHONE ENCOUNTER
Patient is at acceptable risk for perioperative cardiovascular event for the planned procedure (back surgery), patient may proceed without any further cardiac testing.   May hold Plavix 5 days prior to the procedure, aspirin 7 days prior to the procedure, to be resumed postoperatively when deemed safe from surgical standpoint  Please feel free to call for any further information

## 2023-08-10 NOTE — TELEPHONE ENCOUNTER
Patient came in the office to request a refill on Butrans 5 mcg 1 patch weekly sent 6-9-23 #4 2 refills. His surgery got pushed back and the surgeon told him to ask us for another refill. Patient last visit 6-9-23 no return visit. The OARRS report was obtained and provided for the physician to review today. It was Appropriate and revealed appropriate prescriptions without multiple providers, altered prescriptions, or early refills. Please advise.

## 2023-08-20 ENCOUNTER — HOSPITAL ENCOUNTER (EMERGENCY)
Age: 78
Discharge: HOME | End: 2023-08-20
Attending: EMERGENCY MEDICINE
Payer: MEDICARE

## 2023-08-20 ENCOUNTER — APPOINTMENT (OUTPATIENT)
Dept: GENERAL RADIOLOGY | Age: 78
End: 2023-08-20
Payer: MEDICARE

## 2023-08-20 ENCOUNTER — APPOINTMENT (OUTPATIENT)
Dept: CT IMAGING | Age: 78
End: 2023-08-20
Payer: MEDICARE

## 2023-08-20 VITALS
BODY MASS INDEX: 26.29 KG/M2 | DIASTOLIC BLOOD PRESSURE: 84 MMHG | OXYGEN SATURATION: 97 % | RESPIRATION RATE: 20 BRPM | SYSTOLIC BLOOD PRESSURE: 185 MMHG | TEMPERATURE: 98 F | HEART RATE: 80 BPM | WEIGHT: 178 LBS

## 2023-08-20 DIAGNOSIS — J98.11 ATELECTASIS: ICD-10-CM

## 2023-08-20 DIAGNOSIS — R06.02 SHORTNESS OF BREATH: Primary | ICD-10-CM

## 2023-08-20 LAB
ALBUMIN SERPL-MCNC: 4.7 G/DL (ref 3.5–5.2)
ALP SERPL-CCNC: 80 U/L (ref 40–129)
ALT SERPL-CCNC: 17 U/L (ref 0–40)
ANION GAP SERPL CALCULATED.3IONS-SCNC: 12 MMOL/L (ref 7–16)
AST SERPL-CCNC: 27 U/L (ref 0–39)
BASOPHILS # BLD: 0.02 K/UL (ref 0–0.2)
BASOPHILS NFR BLD: 0 % (ref 0–2)
BILIRUB SERPL-MCNC: 0.4 MG/DL (ref 0–1.2)
BNP SERPL-MCNC: 199 PG/ML (ref 0–450)
BUN SERPL-MCNC: 10 MG/DL (ref 6–23)
CALCIUM SERPL-MCNC: 10.2 MG/DL (ref 8.6–10.2)
CHLORIDE SERPL-SCNC: 98 MMOL/L (ref 98–107)
CO2 SERPL-SCNC: 24 MMOL/L (ref 22–29)
CREAT SERPL-MCNC: 0.9 MG/DL (ref 0.7–1.2)
EOSINOPHIL # BLD: 0.06 K/UL (ref 0.05–0.5)
EOSINOPHILS RELATIVE PERCENT: 1 % (ref 0–6)
ERYTHROCYTE [DISTWIDTH] IN BLOOD BY AUTOMATED COUNT: 12.5 % (ref 11.5–15)
GFR SERPL CREATININE-BSD FRML MDRD: >60 ML/MIN/1.73M2
GLUCOSE SERPL-MCNC: 152 MG/DL (ref 74–99)
HCT VFR BLD AUTO: 45.7 % (ref 37–54)
HGB BLD-MCNC: 15.8 G/DL (ref 12.5–16.5)
IMM GRANULOCYTES # BLD AUTO: 0.03 K/UL (ref 0–0.58)
IMM GRANULOCYTES NFR BLD: 1 % (ref 0–5)
LYMPHOCYTES NFR BLD: 2.46 K/UL (ref 1.5–4)
LYMPHOCYTES RELATIVE PERCENT: 41 % (ref 20–42)
MCH RBC QN AUTO: 30.9 PG (ref 26–35)
MCHC RBC AUTO-ENTMCNC: 34.6 G/DL (ref 32–34.5)
MCV RBC AUTO: 89.3 FL (ref 80–99.9)
MONOCYTES NFR BLD: 0.38 K/UL (ref 0.1–0.95)
MONOCYTES NFR BLD: 6 % (ref 2–12)
NEUTROPHILS NFR BLD: 51 % (ref 43–80)
NEUTS SEG NFR BLD: 3.09 K/UL (ref 1.8–7.3)
PLATELET # BLD AUTO: 172 K/UL (ref 130–450)
PMV BLD AUTO: 10 FL (ref 7–12)
POTASSIUM SERPL-SCNC: 4.3 MMOL/L (ref 3.5–5)
PROT SERPL-MCNC: 7.6 G/DL (ref 6.4–8.3)
RBC # BLD AUTO: 5.12 M/UL (ref 3.8–5.8)
SODIUM SERPL-SCNC: 134 MMOL/L (ref 132–146)
TROPONIN I SERPL HS-MCNC: 12 NG/L (ref 0–11)
TROPONIN I SERPL HS-MCNC: 14 NG/L (ref 0–11)
WBC OTHER # BLD: 6 K/UL (ref 4.5–11.5)

## 2023-08-20 PROCEDURE — 93005 ELECTROCARDIOGRAM TRACING: CPT | Performed by: EMERGENCY MEDICINE

## 2023-08-20 PROCEDURE — 71275 CT ANGIOGRAPHY CHEST: CPT

## 2023-08-20 PROCEDURE — 84484 ASSAY OF TROPONIN QUANT: CPT

## 2023-08-20 PROCEDURE — 85025 COMPLETE CBC W/AUTO DIFF WBC: CPT

## 2023-08-20 PROCEDURE — 71046 X-RAY EXAM CHEST 2 VIEWS: CPT

## 2023-08-20 PROCEDURE — 6370000000 HC RX 637 (ALT 250 FOR IP): Performed by: EMERGENCY MEDICINE

## 2023-08-20 PROCEDURE — 80053 COMPREHEN METABOLIC PANEL: CPT

## 2023-08-20 PROCEDURE — 83880 ASSAY OF NATRIURETIC PEPTIDE: CPT

## 2023-08-20 PROCEDURE — 99285 EMERGENCY DEPT VISIT HI MDM: CPT

## 2023-08-20 PROCEDURE — 6360000004 HC RX CONTRAST MEDICATION: Performed by: RADIOLOGY

## 2023-08-20 PROCEDURE — 94640 AIRWAY INHALATION TREATMENT: CPT

## 2023-08-20 PROCEDURE — 94664 DEMO&/EVAL PT USE INHALER: CPT

## 2023-08-20 RX ORDER — IPRATROPIUM BROMIDE AND ALBUTEROL SULFATE 2.5; .5 MG/3ML; MG/3ML
1 SOLUTION RESPIRATORY (INHALATION) ONCE
Status: COMPLETED | OUTPATIENT
Start: 2023-08-20 | End: 2023-08-20

## 2023-08-20 RX ADMIN — IPRATROPIUM BROMIDE AND ALBUTEROL SULFATE 1 DOSE: .5; 2.5 SOLUTION RESPIRATORY (INHALATION) at 17:33

## 2023-08-20 RX ADMIN — IPRATROPIUM BROMIDE AND ALBUTEROL SULFATE 1 DOSE: .5; 3 SOLUTION RESPIRATORY (INHALATION) at 13:07

## 2023-08-20 RX ADMIN — IOPAMIDOL 75 ML: 755 INJECTION, SOLUTION INTRAVENOUS at 16:16

## 2023-08-20 ASSESSMENT — ENCOUNTER SYMPTOMS
SHORTNESS OF BREATH: 1
ABDOMINAL PAIN: 0

## 2023-08-20 ASSESSMENT — LIFESTYLE VARIABLES: HOW OFTEN DO YOU HAVE A DRINK CONTAINING ALCOHOL: NEVER

## 2023-08-20 NOTE — ED NOTES
Patient discharged, all questions answered, all medications and pain management discussed. Patient will return if any problems arise.         Angela Reveles RN  08/20/23 9521

## 2023-08-20 NOTE — ED NOTES
Walk test performed SpO2 %, pt states that he feels like theres phlegm in his throat and is having coughing spells.       Susanne Rodriguez RN  08/20/23 2338

## 2023-08-20 NOTE — ED PROVIDER NOTES
respiratory distress. He is awake and alert and oriented, no signs of difficulty breathing. Social Determinants affecting Dx or Tx: Stress    Chronic Conditions: Chronic neck and back pain, history of A-fib    Records Reviewed: 8/1/2023, lumbar spinal stenosis following effusion, spine surgery with Dr. Adam Buckley. ED Course as of 08/22/23 0831   Sun Aug 20, 2023   1345 EKG: Interpret by me  Sinus rhythm, rate 72, leftward axis, right bundle branch block, no ST elevations or depressions, no T wave abnormalities outside of the right bundle branch block T wave versions. [SO]   1601 Patient ambulated without any problems or difficulties, no hypoxia or respiratory distress. [SO]      ED Course User Index  [SO] Shelbi Pierre DO          ED Course as of 08/22/23 0831   Sun Aug 20, 2023   1345 EKG: Interpret by me  Sinus rhythm, rate 72, leftward axis, right bundle branch block, no ST elevations or depressions, no T wave abnormalities outside of the right bundle branch block T wave versions. [SO]   1601 Patient ambulated without any problems or difficulties, no hypoxia or respiratory distress. [SO]      ED Course User Index  [SO] Shelbi Pierre DO       --------------------------------------------- PAST HISTORY ---------------------------------------------  Past Medical History:  has a past medical history of Abnormal computed tomography angiography of heart, Arthritis, Atrial fibrillation (HCC), Burning pain, CAD (coronary artery disease), Degenerative lumbar disc, Diabetes ([facility]), DVT (deep venous thrombosis) ([facility]), Dysphagia, Edentulous, H/O cardiovascular stress test, Herniated cervical disc, History of echocardiogram, Hx of blood clots, Hyperlipidemia, Hypertension, Stented coronary artery, Tinnitus, and Urinary retention. Past Surgical History:  has a past surgical history that includes colectomy (2010 sigmoid); Hemorrhoid surgery (2008); Prostate surgery (2017); Colonoscopy (5/2018 [facility]);  Nerve Block

## 2023-08-21 LAB
EKG ATRIAL RATE: 72 BPM
EKG P AXIS: 68 DEGREES
EKG P-R INTERVAL: 172 MS
EKG Q-T INTERVAL: 428 MS
EKG QRS DURATION: 134 MS
EKG QTC CALCULATION (BAZETT): 468 MS
EKG R AXIS: -75 DEGREES
EKG T AXIS: 68 DEGREES
EKG VENTRICULAR RATE: 72 BPM

## 2023-08-21 PROCEDURE — 93010 ELECTROCARDIOGRAM REPORT: CPT | Performed by: INTERNAL MEDICINE

## 2023-08-23 ENCOUNTER — APPOINTMENT (OUTPATIENT)
Dept: GENERAL RADIOLOGY | Age: 78
End: 2023-08-23
Payer: MEDICARE

## 2023-08-23 ENCOUNTER — HOSPITAL ENCOUNTER (EMERGENCY)
Age: 78
Discharge: HOME | End: 2023-08-23
Attending: EMERGENCY MEDICINE
Payer: MEDICARE

## 2023-08-23 VITALS
WEIGHT: 178 LBS | BODY MASS INDEX: 26.29 KG/M2 | HEART RATE: 81 BPM | TEMPERATURE: 98.3 F | DIASTOLIC BLOOD PRESSURE: 81 MMHG | OXYGEN SATURATION: 97 % | RESPIRATION RATE: 20 BRPM | SYSTOLIC BLOOD PRESSURE: 175 MMHG

## 2023-08-23 DIAGNOSIS — K22.2 ESOPHAGEAL STRICTURE: Primary | ICD-10-CM

## 2023-08-23 PROCEDURE — 2500000003 HC RX 250 WO HCPCS

## 2023-08-23 PROCEDURE — 99284 EMERGENCY DEPT VISIT MOD MDM: CPT

## 2023-08-23 PROCEDURE — 6360000002 HC RX W HCPCS

## 2023-08-23 PROCEDURE — 96374 THER/PROPH/DIAG INJ IV PUSH: CPT

## 2023-08-23 PROCEDURE — 74220 X-RAY XM ESOPHAGUS 1CNTRST: CPT

## 2023-08-23 RX ADMIN — BARIUM SULFATE 176 G: 960 POWDER, FOR SUSPENSION ORAL at 12:44

## 2023-08-23 RX ADMIN — GLUCAGON HYDROCHLORIDE 1 MG: KIT at 11:51

## 2023-08-23 ASSESSMENT — PAIN - FUNCTIONAL ASSESSMENT: PAIN_FUNCTIONAL_ASSESSMENT: 0-10

## 2023-08-23 ASSESSMENT — PAIN DESCRIPTION - LOCATION: LOCATION: THROAT

## 2023-08-23 NOTE — ED PROVIDER NOTES
70-year-old male history of dysphagia presents to the emergency department for \"    7:00 in the morning. He feels it is stuck in his throat and not going down. Patient is able to tolerate p.o.with few sips of  water and his saliva. Patient has not tried any alleviating and aggravating factors. Patient stated he had similar episode before and presented to ED and was given glucagon which helped. Patient denies throat pain. Patient also denies the following, chest pain, chest pressure, abdominal pain, nausea, vomiting, denies diplopia denies dysarthria    Chief Complaint   Patient presents with    Foreign Body     Pc bread stuck in throat today// sipping water and passes       Review of Systems   Constitutional:  Negative for chills, fatigue and fever. HENT:  Negative for congestion, rhinorrhea, trouble swallowing and voice change. Eyes:  Negative for photophobia and visual disturbance. Respiratory:  Negative for cough, choking, shortness of breath and wheezing. Cardiovascular:  Negative for palpitations and leg swelling. Gastrointestinal:  Negative for abdominal pain, diarrhea, nausea and vomiting. Genitourinary:  Negative for dysuria, frequency and hematuria. Musculoskeletal:  Negative for back pain, neck pain and neck stiffness. Skin:  Negative for wound. Neurological:  Negative for dizziness, syncope, facial asymmetry, weakness and numbness. Psychiatric/Behavioral:  Negative for behavioral problems and confusion. Physical Exam  Vitals reviewed. Constitutional:       General: He is not in acute distress. Appearance: He is not ill-appearing. HENT:      Head: Normocephalic. Right Ear: External ear normal.      Left Ear: External ear normal.      Nose: Nose normal.      Mouth/Throat:      Mouth: Mucous membranes are moist.   Eyes:      General:         Right eye: No discharge. Left eye: No discharge.       Conjunctiva/sclera: Conjunctivae normal.   Cardiovascular:

## 2023-08-23 NOTE — DISCHARGE INSTRUCTIONS
Please keep with a liquid diet. Follow-up with Dr. Meme Galindo for further evaluation as you may require stretching of your esophagus. Return if you have any worsening symptoms.

## 2023-08-23 NOTE — ED NOTES
Discharge instructions given. No further questions at this time.       Rossana NielsonsSUYAPA  08/23/23 8002

## 2023-08-26 ENCOUNTER — APPOINTMENT (OUTPATIENT)
Dept: GENERAL RADIOLOGY | Age: 78
End: 2023-08-26
Payer: MEDICARE

## 2023-08-26 ENCOUNTER — HOSPITAL ENCOUNTER (EMERGENCY)
Age: 78
Discharge: HOME | End: 2023-08-26
Attending: EMERGENCY MEDICINE
Payer: MEDICARE

## 2023-08-26 VITALS
SYSTOLIC BLOOD PRESSURE: 199 MMHG | RESPIRATION RATE: 18 BRPM | DIASTOLIC BLOOD PRESSURE: 76 MMHG | HEART RATE: 70 BPM | OXYGEN SATURATION: 96 % | TEMPERATURE: 97.3 F

## 2023-08-26 DIAGNOSIS — R13.10 DYSPHAGIA, UNSPECIFIED TYPE: ICD-10-CM

## 2023-08-26 DIAGNOSIS — M54.2 NECK PAIN: Primary | ICD-10-CM

## 2023-08-26 PROCEDURE — 71045 X-RAY EXAM CHEST 1 VIEW: CPT

## 2023-08-26 PROCEDURE — 99283 EMERGENCY DEPT VISIT LOW MDM: CPT

## 2023-08-26 ASSESSMENT — PAIN SCALES - GENERAL: PAINLEVEL_OUTOF10: 10

## 2023-08-26 ASSESSMENT — PAIN - FUNCTIONAL ASSESSMENT: PAIN_FUNCTIONAL_ASSESSMENT: 0-10

## 2023-08-26 ASSESSMENT — PAIN DESCRIPTION - LOCATION: LOCATION: NECK

## 2023-08-26 NOTE — DISCHARGE INSTRUCTIONS
Return to the emergency department for new or worsening symptoms including inability to tolerate liquids, worsening shortness of breath. Continue home medications as prescribed.

## 2023-08-26 NOTE — ED PROVIDER NOTES
fusion pushing on his esophagus. He has an upcoming appointment with gastroenterology in several days. He presents here today requesting that he undergo esophageal dilation here today. His chart has been reviewed. Just had an esophagram done 2 days ago which failed to demonstrate any esophageal stricture or acute abnormality. My findings: Gilford Rao is a 76 y.o. male whom is in no distress. Physical exam reveals he is alert and oriented. Heart is regular, lungs are clear. Abdomen is soft and nontender. Extremities are intact without edema. Skin is warm and dry. He has no difficulty handling secretions. My plan: Symptomatic and supportive care. Electronically signed by Karlos Hobbs DO on 8/26/23 at 9:28 AM EDT       [TG]   0668 Patient tolerated p.o. challenge with liquids. He is seen resting comfortably no acute distress. Patient felt to be stable for discharge home. We discussed importance of following up with his orthopedic surgeon and gastroenterologist for follow-up. Return precautions were discussed including inability to tolerate liquids. [TO]      ED Course User Index  [TG] Karlos Hobbs DO  [TO] Dell Hanna DO        Medical Decision Making  Problems Addressed:  Dysphagia, unspecified type: chronic illness or injury    Amount and/or Complexity of Data Reviewed  Radiology: ordered. Patient is a 76 y.o. male who presents with complaint of neck pain and dysphagia. Patient seen in no acute distress with elevated blood pressure and otherwise stable vital signs on room air. He is protecting his airway. No meningeal signs. Esophagram 8/23/2023 reviewed. There is no evidence of leakage or obstruction. Differential includes, but not limited to dysphagia, neck strain, sprain, aspiration pneumonia. Chest x-ray was performed and showed no acute process as interpreted by me. Patient tolerated a p.o. challenge with liquid.   Given the chronicity of patient's symptoms, he is felt to be stable for discharge home with instructions to follow-up with gastroenterology and orthopedic surgery for further evaluation and management. CONSULTS:   None        I am the Primary Clinician of Record. FINAL IMPRESSION      1. Neck pain    2.  Dysphagia, unspecified type          DISPOSITION/PLAN     DISPOSITION Decision To Discharge 08/26/2023 10:44:27 AM      PATIENT REFERRED TO:  Dea Aiken[address]  [phone]    Schedule an appointment as soon as possible for a visit       Boni Brower MD  [facility] [phone]    Schedule an appointment as soon as possible for a visit       Zac Howard DO  [address] [place] [address] [phone]    Schedule an appointment as soon as possible for a visit         DISCHARGE MEDICATIONS:  New Prescriptions    No medications on file       DISCONTINUED MEDICATIONS:  Discontinued Medications    No medications on file              (Please note that portions of this note were completed with a voice recognition program.  Efforts were made to edit the dictations but occasionally words are mis-transcribed.)    Kunal Ron DO (electronically signed)           Karen Waite DO  Resident  08/26/23 7999

## 2023-08-28 ENCOUNTER — HOSPITAL ENCOUNTER (EMERGENCY)
Age: 78
Discharge: HOME | End: 2023-08-28
Attending: EMERGENCY MEDICINE
Payer: MEDICARE

## 2023-08-28 ENCOUNTER — APPOINTMENT (OUTPATIENT)
Dept: CT IMAGING | Age: 78
End: 2023-08-28
Payer: MEDICARE

## 2023-08-28 VITALS
OXYGEN SATURATION: 99 % | TEMPERATURE: 98.3 F | RESPIRATION RATE: 18 BRPM | SYSTOLIC BLOOD PRESSURE: 163 MMHG | HEART RATE: 88 BPM | BODY MASS INDEX: 26.29 KG/M2 | WEIGHT: 178 LBS | DIASTOLIC BLOOD PRESSURE: 75 MMHG

## 2023-08-28 DIAGNOSIS — M47.812 OSTEOARTHRITIS OF CERVICAL SPINE, UNSPECIFIED SPINAL OSTEOARTHRITIS COMPLICATION STATUS: Primary | ICD-10-CM

## 2023-08-28 PROCEDURE — 6370000000 HC RX 637 (ALT 250 FOR IP): Performed by: EMERGENCY MEDICINE

## 2023-08-28 PROCEDURE — 72125 CT NECK SPINE W/O DYE: CPT

## 2023-08-28 PROCEDURE — 99284 EMERGENCY DEPT VISIT MOD MDM: CPT

## 2023-08-28 RX ORDER — ACETAMINOPHEN 325 MG/1
650 TABLET ORAL ONCE
Status: COMPLETED | OUTPATIENT
Start: 2023-08-28 | End: 2023-08-28

## 2023-08-28 RX ADMIN — ACETAMINOPHEN 650 MG: 325 TABLET ORAL at 08:37

## 2023-08-28 ASSESSMENT — ENCOUNTER SYMPTOMS
NAUSEA: 0
SORE THROAT: 1
BLOOD IN STOOL: 0
ABDOMINAL PAIN: 0
SHORTNESS OF BREATH: 0
TROUBLE SWALLOWING: 1
CONSTIPATION: 0
VOMITING: 0

## 2023-08-28 NOTE — ED PROVIDER NOTES
Patient presents with complaint of posterior neck pain. He has history of cervical disc issues and orthopedic surgery with Dr Edith Mejía. He has also recently been seen in the ED for dysphagia and had a swallowing study. He had a food bolus that passed with glucagon and his swallowing series. He has an appointment with Dr Cornelio Silva later today. He denies worsening difficulty swallowing this morning but states he has continued discomfort and its radiating to his neck. He has a pain patch on but states it is not helping his discomfort. He denies numbness or weakness of the extremities. The history is provided by the patient. Review of Systems   Constitutional:  Negative for activity change, appetite change, chills, fatigue and fever. HENT:  Positive for congestion, postnasal drip, sore throat and trouble swallowing. Eyes:  Negative for visual disturbance. Respiratory:  Negative for shortness of breath. Cardiovascular:  Negative for chest pain. Gastrointestinal:  Negative for abdominal pain, blood in stool, constipation, nausea and vomiting. Musculoskeletal:  Positive for neck pain. Negative for arthralgias, gait problem and neck stiffness. Skin: Negative. Neurological:  Negative for weakness, light-headedness, numbness and headaches. Psychiatric/Behavioral: Negative. Physical Exam  Vitals and nursing note reviewed. Constitutional:       General: He is not in acute distress. Appearance: Normal appearance. He is well-developed and normal weight. He is not ill-appearing, toxic-appearing or diaphoretic. HENT:      Head: Normocephalic and atraumatic. Nose: Nose normal.      Mouth/Throat:      Mouth: Mucous membranes are moist.      Pharynx: Oropharynx is clear. Eyes:      Extraocular Movements: Extraocular movements intact. Conjunctiva/sclera: Conjunctivae normal.      Pupils: Pupils are equal, round, and reactive to light.    Cardiovascular:      Rate and Rhythm:

## 2023-08-31 ENCOUNTER — HOSPITAL ENCOUNTER (EMERGENCY)
Age: 78
Discharge: HOME | End: 2023-08-31
Attending: EMERGENCY MEDICINE
Payer: MEDICARE

## 2023-08-31 ENCOUNTER — APPOINTMENT (OUTPATIENT)
Dept: GENERAL RADIOLOGY | Age: 78
End: 2023-08-31
Payer: MEDICARE

## 2023-08-31 VITALS
TEMPERATURE: 97.5 F | WEIGHT: 175 LBS | OXYGEN SATURATION: 98 % | SYSTOLIC BLOOD PRESSURE: 124 MMHG | HEART RATE: 69 BPM | BODY MASS INDEX: 25.84 KG/M2 | DIASTOLIC BLOOD PRESSURE: 57 MMHG | RESPIRATION RATE: 18 BRPM

## 2023-08-31 DIAGNOSIS — R30.0 DYSURIA: Primary | ICD-10-CM

## 2023-08-31 LAB
BILIRUB UR QL STRIP: NEGATIVE
BILIRUB UR QL STRIP: NEGATIVE
CLARITY UR: CLEAR
CLARITY UR: CLEAR
COLOR UR: YELLOW
COLOR UR: YELLOW
COMMENT: ABNORMAL
GLUCOSE UR STRIP-MCNC: NEGATIVE MG/DL
GLUCOSE UR STRIP-MCNC: NEGATIVE MG/DL
HGB UR QL STRIP.AUTO: NEGATIVE
HGB UR QL STRIP.AUTO: NEGATIVE
KETONES UR STRIP-MCNC: NEGATIVE MG/DL
KETONES UR STRIP-MCNC: NEGATIVE MG/DL
LEUKOCYTE ESTERASE UR QL STRIP: NEGATIVE
LEUKOCYTE ESTERASE UR QL STRIP: NEGATIVE
NITRITE UR QL STRIP: NEGATIVE
NITRITE UR QL STRIP: NEGATIVE
PH UR STRIP: 5.5 [PH] (ref 5–9)
PH UR STRIP: 7 [PH] (ref 5–9)
PROT UR STRIP-MCNC: NEGATIVE MG/DL
PROT UR STRIP-MCNC: NEGATIVE MG/DL
RBC #/AREA URNS HPF: ABNORMAL /HPF
SP GR UR STRIP: <1.005 (ref 1–1.03)
SP GR UR STRIP: <1.005 (ref 1–1.03)
UROBILINOGEN UR STRIP-ACNC: 0.2 EU/DL (ref 0–1)
UROBILINOGEN UR STRIP-ACNC: 0.2 EU/DL (ref 0–1)
WBC #/AREA URNS HPF: ABNORMAL /HPF

## 2023-08-31 PROCEDURE — 6370000000 HC RX 637 (ALT 250 FOR IP): Performed by: EMERGENCY MEDICINE

## 2023-08-31 PROCEDURE — 99284 EMERGENCY DEPT VISIT MOD MDM: CPT

## 2023-08-31 PROCEDURE — 81003 URINALYSIS AUTO W/O SCOPE: CPT

## 2023-08-31 PROCEDURE — 81001 URINALYSIS AUTO W/SCOPE: CPT

## 2023-08-31 PROCEDURE — 70360 X-RAY EXAM OF NECK: CPT

## 2023-08-31 RX ORDER — PHENAZOPYRIDINE HYDROCHLORIDE 100 MG/1
200 TABLET, FILM COATED ORAL ONCE
Status: COMPLETED | OUTPATIENT
Start: 2023-08-31 | End: 2023-08-31

## 2023-08-31 RX ORDER — PHENAZOPYRIDINE HYDROCHLORIDE 100 MG/1
100 TABLET, FILM COATED ORAL 3 TIMES DAILY PRN
Qty: 9 TABLET | Refills: 0 | Status: SHIPPED | OUTPATIENT
Start: 2023-08-31 | End: 2023-09-03

## 2023-08-31 RX ADMIN — PHENAZOPYRIDINE 200 MG: 100 TABLET ORAL at 03:06

## 2023-08-31 ASSESSMENT — PAIN SCALES - GENERAL
PAINLEVEL_OUTOF10: 10
PAINLEVEL_OUTOF10: 7

## 2023-08-31 ASSESSMENT — PAIN - FUNCTIONAL ASSESSMENT: PAIN_FUNCTIONAL_ASSESSMENT: 0-10

## 2023-08-31 ASSESSMENT — PAIN DESCRIPTION - LOCATION: LOCATION: PENIS

## 2023-08-31 ASSESSMENT — PAIN DESCRIPTION - DESCRIPTORS: DESCRIPTORS: BURNING

## 2023-09-10 ENCOUNTER — HOSPITAL ENCOUNTER (EMERGENCY)
Age: 78
Discharge: HOME | End: 2023-09-10
Attending: EMERGENCY MEDICINE
Payer: MEDICARE

## 2023-09-10 VITALS
OXYGEN SATURATION: 98 % | WEIGHT: 175 LBS | DIASTOLIC BLOOD PRESSURE: 88 MMHG | HEART RATE: 71 BPM | SYSTOLIC BLOOD PRESSURE: 190 MMHG | BODY MASS INDEX: 25.84 KG/M2 | TEMPERATURE: 97.8 F | RESPIRATION RATE: 16 BRPM

## 2023-09-10 DIAGNOSIS — R30.0 DYSURIA: Primary | ICD-10-CM

## 2023-09-10 LAB
BILIRUB UR QL STRIP: NEGATIVE
CLARITY UR: CLEAR
COLOR UR: YELLOW
GLUCOSE UR STRIP-MCNC: NEGATIVE MG/DL
HGB UR QL STRIP.AUTO: NEGATIVE
KETONES UR STRIP-MCNC: NEGATIVE MG/DL
LEUKOCYTE ESTERASE UR QL STRIP: ABNORMAL
NITRITE UR QL STRIP: NEGATIVE
PH UR STRIP: 7 [PH] (ref 5–9)
PROT UR STRIP-MCNC: NEGATIVE MG/DL
RBC #/AREA URNS HPF: NORMAL /HPF
SP GR UR STRIP: 1.01 (ref 1–1.03)
UROBILINOGEN UR STRIP-ACNC: 0.2 EU/DL (ref 0–1)
WBC #/AREA URNS HPF: NORMAL /HPF

## 2023-09-10 PROCEDURE — 81001 URINALYSIS AUTO W/SCOPE: CPT

## 2023-09-10 PROCEDURE — 99283 EMERGENCY DEPT VISIT LOW MDM: CPT

## 2023-09-10 PROCEDURE — 87086 URINE CULTURE/COLONY COUNT: CPT

## 2023-09-10 RX ORDER — CEFDINIR 300 MG/1
300 CAPSULE ORAL 2 TIMES DAILY
Qty: 10 CAPSULE | Refills: 0 | Status: SHIPPED | OUTPATIENT
Start: 2023-09-10 | End: 2023-09-15 | Stop reason: HOSPADM

## 2023-09-10 RX ORDER — CEFDINIR 300 MG/1
300 CAPSULE ORAL 2 TIMES DAILY
Qty: 10 CAPSULE | Refills: 0 | Status: SHIPPED | OUTPATIENT
Start: 2023-09-10 | End: 2023-09-10 | Stop reason: SDUPTHER

## 2023-09-10 ASSESSMENT — PAIN SCALES - GENERAL: PAINLEVEL_OUTOF10: 8

## 2023-09-10 ASSESSMENT — LIFESTYLE VARIABLES: HOW OFTEN DO YOU HAVE A DRINK CONTAINING ALCOHOL: NEVER

## 2023-09-10 ASSESSMENT — PAIN - FUNCTIONAL ASSESSMENT: PAIN_FUNCTIONAL_ASSESSMENT: 0-10

## 2023-09-10 NOTE — ED PROVIDER NOTES
Department of Emergency Medicine     Written by: Chela Marquez MD  Patient Name: Buddy Geller  Admit Date: 9/10/2023  6:54 AM  MRN: [id number]                   : 1945      HPI  Chief Complaint   Patient presents with    Dysuria     Painful/burning with urination Hx of BPH had a cytoscopy  and was placed on ATB 23 at [facility]. Buddy Geller is a 76 y.o. male that presents to the ED with complaints of dysuria. He has a history of BPH, following with Dr. Janyth Schwab from urology and [facility]. He was seen in [place] on 2023 for similar symptoms. At the time he had urinary tension due to his BPH. He said that the retention lasted about 10 hours prior to his decision to go to the ER. When the drain his bladder, they drained 1400 cc of urine. The patient says that he has been urinating since then, however he was placed on Keflex due to UTI versus prostatitis. He says that with him being on Keflex his symptoms improved, however he finished his Keflex and now he is having dysuria again. No fevers or chills. He says that he primarily came here and for the purpose of finding a new urologist.    Review of systems:    Pertinent positives and negatives mentioned in the HPI/MDM.    ------------------------- PAST HISTORY -------------------------    I personally reviewed the following history:    Past Medical History:  has a past medical history of Abnormal computed tomography angiography of heart, Arthritis, Atrial fibrillation (HCC), Burning pain, CAD (coronary artery disease), Degenerative lumbar disc, Diabetes ([address]), DVT (deep venous thrombosis) ([address]), Dysphagia, Edentulous, H/O cardiovascular stress test, Herniated cervical disc, History of echocardiogram, Hx of blood clots, Hyperlipidemia, Hypertension, Stented coronary artery, Tinnitus, and Urinary retention. Past Surgical History:  has a past surgical history that includes colectomy ( sigmoid); Hemorrhoid surgery ();

## 2023-09-10 NOTE — DISCHARGE INSTRUCTIONS
Please return to the ED if symptoms worsen, persist, recur. Please take all your medications as prescribed. Please follow-up with PCP and urology  as discussed.

## 2023-09-10 NOTE — ED PROVIDER NOTES
ATTENDING PROVIDER ATTESTATION:     Favio Haider presented to the emergency department for evaluation of Dysuria (Painful/burning with urination Hx of BPH had a cytoscopy  and was placed on ATB 9/1/23 at [facility] )   and was initially evaluated by the Medical Resident. See Original ED Note for H&P and ED course above. I have reviewed and discussed the case, including pertinent history (medical, surgical, family and social) and exam findings with the Medical Resident assigned to Orbeatriscarmen Haider. I have personally performed and/or participated in the history, exam, medical decision making, and procedures and agree with all pertinent clinical information and any additional changes or corrections are noted below in my assessment and plan. I have discussed this patient in detail with the resident, and provided the instruction and education,       I have reviewed my findings and recommendations with the assigned Medical Resident, Miladiscarmen Haider and members of family present at the time of disposition. I have performed a history and physical examination of this patient and directly supervised the resident caring for this patient              [address] [place] [address] Name: Favio Haider  MRN: [id number]  [facility] 1945  Date of evaluation: 9/10/2023  Provider: Sebastien Lopez MD  PCP: Adelina Mojica DO  Note Started: 7:16 AM EDT 9/10/23    CHIEF COMPLAINT       Chief Complaint   Patient presents with    Dysuria     Painful/burning with urination Hx of BPH had a cytoscopy  and was placed on ATB 9/1/23 at [facility]. HISTORY OF PRESENT ILLNESS: 1 or more Elements     Limitations to history : None    Favio Haider is a 76 y.o. male who presents for dysuria and urinary frequency.   He reports the last week he had difficulty urinating, he went to [facility] as his urologist goes there, he had a Maddox

## 2023-09-11 ENCOUNTER — HOSPITAL ENCOUNTER (EMERGENCY)
Age: 78
Discharge: HOME | End: 2023-09-11
Attending: EMERGENCY MEDICINE
Payer: MEDICARE

## 2023-09-11 VITALS
DIASTOLIC BLOOD PRESSURE: 77 MMHG | HEART RATE: 65 BPM | SYSTOLIC BLOOD PRESSURE: 173 MMHG | TEMPERATURE: 98 F | RESPIRATION RATE: 18 BRPM | OXYGEN SATURATION: 98 %

## 2023-09-11 DIAGNOSIS — R30.0 DYSURIA: Primary | ICD-10-CM

## 2023-09-11 LAB
ALBUMIN SERPL-MCNC: 5.1 G/DL (ref 3.5–5.2)
ALP SERPL-CCNC: 74 U/L (ref 40–129)
ALT SERPL-CCNC: 26 U/L (ref 0–40)
ANION GAP SERPL CALCULATED.3IONS-SCNC: 13 MMOL/L (ref 7–16)
AST SERPL-CCNC: 45 U/L (ref 0–39)
BASOPHILS # BLD: 0.02 K/UL (ref 0–0.2)
BASOPHILS NFR BLD: 0 % (ref 0–2)
BILIRUB SERPL-MCNC: 0.8 MG/DL (ref 0–1.2)
BILIRUB UR QL STRIP: NEGATIVE
BUN SERPL-MCNC: 7 MG/DL (ref 6–23)
CALCIUM SERPL-MCNC: 10.1 MG/DL (ref 8.6–10.2)
CHLORIDE SERPL-SCNC: 89 MMOL/L (ref 98–107)
CLARITY UR: CLEAR
CO2 SERPL-SCNC: 26 MMOL/L (ref 22–29)
COLOR UR: YELLOW
CREAT SERPL-MCNC: 0.8 MG/DL (ref 0.7–1.2)
EOSINOPHIL # BLD: 0.01 K/UL (ref 0.05–0.5)
EOSINOPHILS RELATIVE PERCENT: 0 % (ref 0–6)
ERYTHROCYTE [DISTWIDTH] IN BLOOD BY AUTOMATED COUNT: 12 % (ref 11.5–15)
GFR SERPL CREATININE-BSD FRML MDRD: >60 ML/MIN/1.73M2
GLUCOSE SERPL-MCNC: 134 MG/DL (ref 74–99)
GLUCOSE UR STRIP-MCNC: NEGATIVE MG/DL
HCT VFR BLD AUTO: 45.3 % (ref 37–54)
HGB BLD-MCNC: 16 G/DL (ref 12.5–16.5)
HGB UR QL STRIP.AUTO: ABNORMAL
IMM GRANULOCYTES # BLD AUTO: <0.03 K/UL (ref 0–0.58)
IMM GRANULOCYTES NFR BLD: 0 % (ref 0–5)
KETONES UR STRIP-MCNC: NEGATIVE MG/DL
LEUKOCYTE ESTERASE UR QL STRIP: NEGATIVE
LYMPHOCYTES NFR BLD: 1.9 K/UL (ref 1.5–4)
LYMPHOCYTES RELATIVE PERCENT: 27 % (ref 20–42)
MCH RBC QN AUTO: 31.2 PG (ref 26–35)
MCHC RBC AUTO-ENTMCNC: 35.3 G/DL (ref 32–34.5)
MCV RBC AUTO: 88.3 FL (ref 80–99.9)
MICROORGANISM SPEC CULT: NO GROWTH
MONOCYTES NFR BLD: 0.65 K/UL (ref 0.1–0.95)
MONOCYTES NFR BLD: 9 % (ref 2–12)
NEUTROPHILS NFR BLD: 63 % (ref 43–80)
NEUTS SEG NFR BLD: 4.38 K/UL (ref 1.8–7.3)
NITRITE UR QL STRIP: NEGATIVE
PH UR STRIP: 6 [PH] (ref 5–9)
PLATELET # BLD AUTO: 234 K/UL (ref 130–450)
PMV BLD AUTO: 9.5 FL (ref 7–12)
POTASSIUM SERPL-SCNC: 4.3 MMOL/L (ref 3.5–5)
PROT SERPL-MCNC: 8.2 G/DL (ref 6.4–8.3)
PROT UR STRIP-MCNC: NEGATIVE MG/DL
RBC # BLD AUTO: 5.13 M/UL (ref 3.8–5.8)
RBC #/AREA URNS HPF: NORMAL /HPF
SODIUM SERPL-SCNC: 128 MMOL/L (ref 132–146)
SP GR UR STRIP: <1.005 (ref 1–1.03)
SPECIMEN DESCRIPTION: NORMAL
UROBILINOGEN UR STRIP-ACNC: 0.2 EU/DL (ref 0–1)
WBC #/AREA URNS HPF: NORMAL /HPF
WBC OTHER # BLD: 7 K/UL (ref 4.5–11.5)

## 2023-09-11 PROCEDURE — 80053 COMPREHEN METABOLIC PANEL: CPT

## 2023-09-11 PROCEDURE — 2580000003 HC RX 258

## 2023-09-11 PROCEDURE — 96360 HYDRATION IV INFUSION INIT: CPT

## 2023-09-11 PROCEDURE — 99284 EMERGENCY DEPT VISIT MOD MDM: CPT

## 2023-09-11 PROCEDURE — 85025 COMPLETE CBC W/AUTO DIFF WBC: CPT

## 2023-09-11 PROCEDURE — 81001 URINALYSIS AUTO W/SCOPE: CPT

## 2023-09-11 PROCEDURE — 51798 US URINE CAPACITY MEASURE: CPT

## 2023-09-11 RX ORDER — 0.9 % SODIUM CHLORIDE 0.9 %
1000 INTRAVENOUS SOLUTION INTRAVENOUS ONCE
Status: COMPLETED | OUTPATIENT
Start: 2023-09-11 | End: 2023-09-11

## 2023-09-11 RX ADMIN — SODIUM CHLORIDE 1000 ML: 9 INJECTION, SOLUTION INTRAVENOUS at 17:44

## 2023-09-11 NOTE — ED NOTES
Bladder scan completed via ultrasound. 170 ml urine remaining in bladder.      Christian Germain RN  09/11/23 8409

## 2023-09-12 ENCOUNTER — HOSPITAL ENCOUNTER (EMERGENCY)
Age: 78
Discharge: HOME | End: 2023-09-13
Attending: STUDENT IN AN ORGANIZED HEALTH CARE EDUCATION/TRAINING PROGRAM
Payer: MEDICARE

## 2023-09-12 VITALS
HEART RATE: 84 BPM | TEMPERATURE: 98 F | HEIGHT: 69 IN | OXYGEN SATURATION: 98 % | RESPIRATION RATE: 17 BRPM | DIASTOLIC BLOOD PRESSURE: 65 MMHG | BODY MASS INDEX: 25.18 KG/M2 | SYSTOLIC BLOOD PRESSURE: 145 MMHG | WEIGHT: 170 LBS

## 2023-09-12 DIAGNOSIS — R33.9 URINARY RETENTION: Primary | ICD-10-CM

## 2023-09-12 PROCEDURE — 99283 EMERGENCY DEPT VISIT LOW MDM: CPT

## 2023-09-12 ASSESSMENT — PAIN - FUNCTIONAL ASSESSMENT: PAIN_FUNCTIONAL_ASSESSMENT: 0-10

## 2023-09-12 ASSESSMENT — PAIN SCALES - GENERAL: PAINLEVEL_OUTOF10: 9

## 2023-09-13 ENCOUNTER — HOSPITAL ENCOUNTER (EMERGENCY)
Age: 78
Discharge: HOME | End: 2023-09-13
Attending: EMERGENCY MEDICINE
Payer: MEDICARE

## 2023-09-13 VITALS
OXYGEN SATURATION: 98 % | DIASTOLIC BLOOD PRESSURE: 84 MMHG | SYSTOLIC BLOOD PRESSURE: 143 MMHG | TEMPERATURE: 98.1 F | HEART RATE: 98 BPM | RESPIRATION RATE: 16 BRPM

## 2023-09-13 DIAGNOSIS — R30.0 DYSURIA: Primary | ICD-10-CM

## 2023-09-13 LAB
ANION GAP SERPL CALCULATED.3IONS-SCNC: 10 MMOL/L (ref 7–16)
BASOPHILS # BLD: 0.03 K/UL (ref 0–0.2)
BASOPHILS NFR BLD: 0 % (ref 0–2)
BILIRUB UR QL STRIP: NEGATIVE
BUN SERPL-MCNC: 6 MG/DL (ref 6–23)
CALCIUM SERPL-MCNC: 9.9 MG/DL (ref 8.6–10.2)
CHLORIDE SERPL-SCNC: 93 MMOL/L (ref 98–107)
CLARITY UR: CLEAR
CO2 SERPL-SCNC: 26 MMOL/L (ref 22–29)
COLOR UR: YELLOW
CREAT SERPL-MCNC: 0.8 MG/DL (ref 0.7–1.2)
EOSINOPHIL # BLD: 0.06 K/UL (ref 0.05–0.5)
EOSINOPHILS RELATIVE PERCENT: 1 % (ref 0–6)
ERYTHROCYTE [DISTWIDTH] IN BLOOD BY AUTOMATED COUNT: 12 % (ref 11.5–15)
GFR SERPL CREATININE-BSD FRML MDRD: >60 ML/MIN/1.73M2
GLUCOSE SERPL-MCNC: 123 MG/DL (ref 74–99)
GLUCOSE UR STRIP-MCNC: NEGATIVE MG/DL
HCT VFR BLD AUTO: 44.1 % (ref 37–54)
HGB BLD-MCNC: 15.6 G/DL (ref 12.5–16.5)
HGB UR QL STRIP.AUTO: NEGATIVE
IMM GRANULOCYTES # BLD AUTO: 0.03 K/UL (ref 0–0.58)
IMM GRANULOCYTES NFR BLD: 0 % (ref 0–5)
KETONES UR STRIP-MCNC: NEGATIVE MG/DL
LEUKOCYTE ESTERASE UR QL STRIP: NEGATIVE
LYMPHOCYTES NFR BLD: 2.77 K/UL (ref 1.5–4)
LYMPHOCYTES RELATIVE PERCENT: 41 % (ref 20–42)
MCH RBC QN AUTO: 31.3 PG (ref 26–35)
MCHC RBC AUTO-ENTMCNC: 35.4 G/DL (ref 32–34.5)
MCV RBC AUTO: 88.4 FL (ref 80–99.9)
MONOCYTES NFR BLD: 0.65 K/UL (ref 0.1–0.95)
MONOCYTES NFR BLD: 10 % (ref 2–12)
NEUTROPHILS NFR BLD: 48 % (ref 43–80)
NEUTS SEG NFR BLD: 3.23 K/UL (ref 1.8–7.3)
NITRITE UR QL STRIP: NEGATIVE
PH UR STRIP: 7 [PH] (ref 5–9)
PLATELET # BLD AUTO: 211 K/UL (ref 130–450)
PMV BLD AUTO: 9.5 FL (ref 7–12)
POTASSIUM SERPL-SCNC: 4.5 MMOL/L (ref 3.5–5)
PROT UR STRIP-MCNC: NEGATIVE MG/DL
RBC # BLD AUTO: 4.99 M/UL (ref 3.8–5.8)
RBC #/AREA URNS HPF: ABNORMAL /HPF
SODIUM SERPL-SCNC: 129 MMOL/L (ref 132–146)
SP GR UR STRIP: <1.005 (ref 1–1.03)
UROBILINOGEN UR STRIP-ACNC: 0.2 EU/DL (ref 0–1)
WBC #/AREA URNS HPF: ABNORMAL /HPF
WBC OTHER # BLD: 6.8 K/UL (ref 4.5–11.5)

## 2023-09-13 PROCEDURE — 6370000000 HC RX 637 (ALT 250 FOR IP): Performed by: EMERGENCY MEDICINE

## 2023-09-13 PROCEDURE — 85025 COMPLETE CBC W/AUTO DIFF WBC: CPT

## 2023-09-13 PROCEDURE — 99283 EMERGENCY DEPT VISIT LOW MDM: CPT

## 2023-09-13 PROCEDURE — 80048 BASIC METABOLIC PNL TOTAL CA: CPT

## 2023-09-13 PROCEDURE — 81001 URINALYSIS AUTO W/SCOPE: CPT

## 2023-09-13 PROCEDURE — 36415 COLL VENOUS BLD VENIPUNCTURE: CPT

## 2023-09-13 RX ORDER — LIDOCAINE HYDROCHLORIDE 20 MG/ML
JELLY TOPICAL PRN
Status: DISCONTINUED | OUTPATIENT
Start: 2023-09-13 | End: 2023-09-13 | Stop reason: HOSPADM

## 2023-09-13 RX ADMIN — LIDOCAINE HYDROCHLORIDE: 20 JELLY TOPICAL at 08:33

## 2023-09-13 ASSESSMENT — ENCOUNTER SYMPTOMS
DIARRHEA: 0
STRIDOR: 0
EYE PAIN: 0
APNEA: 0
WHEEZING: 0
COUGH: 0
NAUSEA: 0
SHORTNESS OF BREATH: 0
EYE DISCHARGE: 0
ABDOMINAL DISTENTION: 0
CHEST TIGHTNESS: 0
VOMITING: 0
SORE THROAT: 0
ABDOMINAL PAIN: 0
SINUS PRESSURE: 0
ABDOMINAL PAIN: 0
WHEEZING: 0
BACK PAIN: 0
EYE REDNESS: 0
SHORTNESS OF BREATH: 0

## 2023-09-13 NOTE — ED PROVIDER NOTES
HPI   Patient presented to emergency department for evaluation of difficulty with urination. He notes history of enlarged prostate and states last week he had Maddox catheter in place. He was seen here yesterday and discharged. He states that since being discharged he has been having more trouble with urinating. He states he can get a little bit of urine out but feels like he still has to urinate more. He denies any trauma to the back. No saddle paresthesias or anesthesias. No numbness or weakness in the extremities. He denies any fevers or chills. No flank pain. No nausea or vomiting. Review of Systems   Constitutional:  Negative for chills and fever. HENT:  Negative for ear pain, sinus pressure and sore throat. Eyes:  Negative for pain, discharge and redness. Respiratory:  Negative for cough, shortness of breath and wheezing. Cardiovascular:  Negative for chest pain. Gastrointestinal:  Negative for abdominal pain, diarrhea, nausea and vomiting. Genitourinary:  Negative for dysuria and frequency. Urinary retention. Mild suprapubic pain     Musculoskeletal:  Negative for arthralgias and back pain. Skin:  Negative for rash and wound. Neurological:  Negative for weakness and headaches. Hematological:  Negative for adenopathy. All other systems reviewed and are negative. Physical Exam  Vitals and nursing note reviewed. Constitutional:       Appearance: He is well-developed. HENT:      Head: Normocephalic and atraumatic. Eyes:      Conjunctiva/sclera: Conjunctivae normal.   Cardiovascular:      Rate and Rhythm: Normal rate and regular rhythm. Heart sounds: Normal heart sounds. No murmur heard. Pulmonary:      Effort: Pulmonary effort is normal. No respiratory distress. Breath sounds: Normal breath sounds. No wheezing or rales. Abdominal:      General: Bowel sounds are normal.      Palpations: Abdomen is soft. Tenderness:  There is abdominal

## 2023-09-13 NOTE — ED PROVIDER NOTES
[facility]  Department of Emergency Medicine   EM Physician H&P                  Jay Do 76 y.o. male PHMx of urinary retention, degenerative disc disease, anemia, hypothyroidism, type 2 diabetes, essential hypertension, A-fib, UTIs presents to the ED c/o dysuria/burning sensation with Maddox catheter in place. Patient has been to the ER and other ERs multiple times, over 10 times, in the past 3 weeks. Similar complaint each time with dysuria and difficulty urinating. He reports again this morning when he was just discharged around 2 AM this morning for dysuria with a Maddox catheter in place. Patient's main complaint is that he wants a burning sensation to stop. . Onset: 2 AM this morning after discharge with the Maddox catheter. Location/Radiation: Pelvic/. Duration: Constant. Characterization: Burning sensation. Aggravating Factors: UTI, Maddox catheter placement. Relieving Factors: None none. Severity: Mild to moderate. Patient reports he is concerned about the burning and wants it to stop. Reports that he feels like he cannot have this go on much longer. He is also requesting urology follow-up to be expedited. .       Assx Sxs: Dysuria, difficulty urinating, Maddox catheter placement. Review of Systems   Constitutional:  Negative for activity change and appetite change. Respiratory:  Negative for apnea, chest tightness, shortness of breath, wheezing and stridor. Cardiovascular:  Negative for chest pain, palpitations and leg swelling. Gastrointestinal:  Negative for abdominal distention and abdominal pain. Genitourinary:  Positive for difficulty urinating and dysuria. Maddox catheter        Physical Exam  Vitals reviewed. Constitutional:       General: He is not in acute distress. Appearance: Normal appearance. He is normal weight. He is not ill-appearing or diaphoretic. HENT:      Head: Normocephalic and atraumatic.       Right Ear: External ear

## 2023-09-15 ENCOUNTER — HOSPITAL ENCOUNTER (EMERGENCY)
Age: 78
Discharge: HOME | End: 2023-09-15
Attending: STUDENT IN AN ORGANIZED HEALTH CARE EDUCATION/TRAINING PROGRAM
Payer: MEDICARE

## 2023-09-15 VITALS
OXYGEN SATURATION: 100 % | TEMPERATURE: 98 F | DIASTOLIC BLOOD PRESSURE: 80 MMHG | SYSTOLIC BLOOD PRESSURE: 163 MMHG | RESPIRATION RATE: 20 BRPM | HEART RATE: 100 BPM

## 2023-09-15 DIAGNOSIS — N39.0 URINARY TRACT INFECTION WITHOUT HEMATURIA, SITE UNSPECIFIED: ICD-10-CM

## 2023-09-15 DIAGNOSIS — T83.9XXA PROBLEM WITH FOLEY CATHETER, INITIAL ENCOUNTER (HCC): Primary | ICD-10-CM

## 2023-09-15 LAB
ALBUMIN SERPL-MCNC: 4.4 G/DL (ref 3.5–5.2)
ALP SERPL-CCNC: 66 U/L (ref 40–129)
ALT SERPL-CCNC: 31 U/L (ref 0–40)
ANION GAP SERPL CALCULATED.3IONS-SCNC: 15 MMOL/L (ref 7–16)
AST SERPL-CCNC: 50 U/L (ref 0–39)
BACTERIA URNS QL MICRO: ABNORMAL
BASOPHILS # BLD: 0.03 K/UL (ref 0–0.2)
BASOPHILS NFR BLD: 0 % (ref 0–2)
BILIRUB SERPL-MCNC: 0.7 MG/DL (ref 0–1.2)
BILIRUB UR QL STRIP: NEGATIVE
BUN SERPL-MCNC: 8 MG/DL (ref 6–23)
CALCIUM SERPL-MCNC: 9.8 MG/DL (ref 8.6–10.2)
CHLORIDE SERPL-SCNC: 93 MMOL/L (ref 98–107)
CLARITY UR: CLEAR
CO2 SERPL-SCNC: 23 MMOL/L (ref 22–29)
COLOR UR: YELLOW
CREAT SERPL-MCNC: 0.8 MG/DL (ref 0.7–1.2)
EOSINOPHIL # BLD: 0.03 K/UL (ref 0.05–0.5)
EOSINOPHILS RELATIVE PERCENT: 0 % (ref 0–6)
ERYTHROCYTE [DISTWIDTH] IN BLOOD BY AUTOMATED COUNT: 11.9 % (ref 11.5–15)
GFR SERPL CREATININE-BSD FRML MDRD: >60 ML/MIN/1.73M2
GLUCOSE SERPL-MCNC: 136 MG/DL (ref 74–99)
GLUCOSE UR STRIP-MCNC: NEGATIVE MG/DL
HCT VFR BLD AUTO: 45.3 % (ref 37–54)
HGB BLD-MCNC: 15.8 G/DL (ref 12.5–16.5)
HGB UR QL STRIP.AUTO: ABNORMAL
IMM GRANULOCYTES # BLD AUTO: 0.04 K/UL (ref 0–0.58)
IMM GRANULOCYTES NFR BLD: 1 % (ref 0–5)
KETONES UR STRIP-MCNC: 15 MG/DL
LEUKOCYTE ESTERASE UR QL STRIP: ABNORMAL
LYMPHOCYTES NFR BLD: 2.24 K/UL (ref 1.5–4)
LYMPHOCYTES RELATIVE PERCENT: 27 % (ref 20–42)
MCH RBC QN AUTO: 31 PG (ref 26–35)
MCHC RBC AUTO-ENTMCNC: 34.9 G/DL (ref 32–34.5)
MCV RBC AUTO: 89 FL (ref 80–99.9)
MONOCYTES NFR BLD: 0.64 K/UL (ref 0.1–0.95)
MONOCYTES NFR BLD: 8 % (ref 2–12)
NEUTROPHILS NFR BLD: 64 % (ref 43–80)
NEUTS SEG NFR BLD: 5.2 K/UL (ref 1.8–7.3)
NITRITE UR QL STRIP: NEGATIVE
PH UR STRIP: 5.5 [PH] (ref 5–9)
PLATELET # BLD AUTO: 223 K/UL (ref 130–450)
PMV BLD AUTO: 10 FL (ref 7–12)
POTASSIUM SERPL-SCNC: 5.2 MMOL/L (ref 3.5–5)
PROT SERPL-MCNC: 7.6 G/DL (ref 6.4–8.3)
PROT UR STRIP-MCNC: NEGATIVE MG/DL
RBC # BLD AUTO: 5.09 M/UL (ref 3.8–5.8)
RBC #/AREA URNS HPF: ABNORMAL /HPF
SODIUM SERPL-SCNC: 131 MMOL/L (ref 132–146)
SP GR UR STRIP: <1.005 (ref 1–1.03)
UROBILINOGEN UR STRIP-ACNC: 0.2 EU/DL (ref 0–1)
WBC #/AREA URNS HPF: ABNORMAL /HPF
WBC OTHER # BLD: 8.2 K/UL (ref 4.5–11.5)

## 2023-09-15 PROCEDURE — 80053 COMPREHEN METABOLIC PANEL: CPT

## 2023-09-15 PROCEDURE — 99283 EMERGENCY DEPT VISIT LOW MDM: CPT

## 2023-09-15 PROCEDURE — 81001 URINALYSIS AUTO W/SCOPE: CPT

## 2023-09-15 PROCEDURE — 85025 COMPLETE CBC W/AUTO DIFF WBC: CPT

## 2023-09-15 RX ORDER — CEFDINIR 300 MG/1
300 CAPSULE ORAL 2 TIMES DAILY
Qty: 14 CAPSULE | Refills: 0 | Status: SHIPPED | OUTPATIENT
Start: 2023-09-15 | End: 2023-09-22

## 2023-09-15 RX ORDER — PHENAZOPYRIDINE HYDROCHLORIDE 200 MG/1
200 TABLET, FILM COATED ORAL 3 TIMES DAILY PRN
Qty: 12 TABLET | Refills: 0 | Status: SHIPPED | OUTPATIENT
Start: 2023-09-15 | End: 2023-09-19

## 2023-09-15 NOTE — CONSULTS
9/15/2023 2:32 PM  Davon Eagle  [id number]     Chief Complaint:    Urinary retention      History of Present Illness: The patient is a 76 y.o. male patient who presented to the hospital with complaints of discomfort with his aguilar catheter. He has had issues with retention requiring aguilar catheterization in the last month requiring multiple ER visits. He is a previous patient of Dr. Pavithra Elkins. He has a history of BPH and states he underwent a TURP about a year ago. Slowly after this he was having trouble urinating again. He describes weak stream and straining. He was seen at [facility] in the ER for retention in August. He states the aguilar was placed for 1400ml. He is now wishing to switch to our office. He has an appointment with us on the 26th in [place]       Past Medical History:   Diagnosis Date    Abnormal computed tomography angiography of heart 07/19/2019    Calcified plaque proximal RCA with 50-70% stenosis, proximal LAD 50% stenosis, mild LAD 30-50% stenosis, groundglass infiltrates, area of consoldidation left lung base posteriorly.       Arthritis     Atrial fibrillation (HCC)     Burning pain     CAD (coronary artery disease)     Degenerative lumbar disc     Diabetes ([address])     DVT (deep venous thrombosis) ([facility])     Dysphagia     Edentulous     upper denture    H/O cardiovascular stress test 02/19/2020    Lexiscan    Herniated cervical disc     History of echocardiogram 03/16/2017    EF 60-65%    Hx of blood clots     Hyperlipidemia     Hypertension     Stented coronary artery     Tinnitus     Urinary retention 03/17/2017         Past Surgical History:   Procedure Laterality Date    ABLATION OF DYSRHYTHMIC FOCUS  1980    APPROX 30-35 YRS AGO    BACK SURGERY      CARDIAC CATHETERIZATION  02/20/2020    Dr Hellen Cross N/A 10/9/2020    ANTERIOR CERVICAL FUSION  C3-C6, PARTIAL CORPECTOMY C3-C6, ANTERIOR OSTEOPHYTECTOMY C5-T1 ---AUDIOLOGY, SPINAL ELEMENTS performed by Diana Olivas DO N/A 5/25/2023    INTERLAMINAR EPIDURAL STEROID INJECTION AT L3-4 performed by Paula Stubbs DO at [address] N/A 6/22/2023    L3-4 Interlaminar EPIDURAL STEROID INJECTION performed by Paula Stubbs DO at [address] [facility]  2017    PTCA  09/30/2019    2.5 x 38 mm Resolute [place] RAFI mid OM1, 2.25 x 8 mm Resolute [place] RAFI ostium of dx    UPPER GASTROINTESTINAL ENDOSCOPY  5/2018 [facility]    UPPER GASTROINTESTINAL ENDOSCOPY N/A 6/4/2019    EGD BIOPSY performed by Fam Madrigal MD at [address] [place] [address] E N/A 6/15/2022    EGD ESOPHAGOGASTRODUODENOSCOPY WITH DILATION performed by Fam Madrigal MD at [address] [place] [address] E  6/15/2022    EGD DILATION SAVORY performed by Fam Madrigal MD at [facility]       Medications Prior to Admission:    Not in a hospital admission. Allergies:    Imdur [isosorbide nitrate], Oxycodone, Simvastatin, Amoxicillin, Bactrim [sulfamethoxazole-trimethoprim], Ciprofloxacin hcl, Gabapentin, Hydrocodone, and Norco [hydrocodone-acetaminophen]    Social History:    reports that he quit smoking about 46 years ago. His smoking use included cigarettes. He has never used smokeless tobacco. He reports that he does not drink alcohol and does not use drugs. Family History:   Non-contributory to this Urological problem  family history includes Arrhythmia in his brother; Cancer in his brother; Diabetes in an other family member; Hypertension in an other family member; Kidney Disease in his brother and sister; Other in his father; Stroke in his mother.     Review of Systems:  Constitutional: No fever or chills   Respiratory: negative for cough and hemoptysis  Cardiovascular: negative for chest pain and dyspnea  Gastrointestinal: negative for abdominal pain, diarrhea, nausea and vomiting   Derm: negative for rash and skin lesion(s)  Neurological: negative for seizures

## 2023-09-15 NOTE — ED PROVIDER NOTES
[address]    Pt Name: Lorena Montoya  MRN: [id number]  [address] [facility] [place] 1945  Date of evaluation: 9/15/2023  Provider: Ericka Locke MD  PCP: Jen Harman DO  Note Started: 1:03 PM EDT 9/15/23    HPI     Patient is a 76 y.o. male presents with a chief complaint of   Chief Complaint   Patient presents with    Other     Per pt, urologist told him to come in and get admitted for an enlarged prostate   . Patient presents because he was \"told by the urologist to come in to get be admitted. \"  Patient stated that he has been dealing with an enlarged prostate. Patient denies any fevers or chills. No chest pain or shortness of breath. Patient states that he has lower back and pain around his catheter. States that this has just been consistent. Nursing Notes were all reviewed and agreed with or any disagreements were addressed in the HPI. History From: Patient    Review of Systems   Pertinent positives and negatives as per HPI. Physical Exam  Vitals and nursing note reviewed. Constitutional:       Appearance: He is well-developed. HENT:      Head: Normocephalic and atraumatic. Eyes:      Conjunctiva/sclera: Conjunctivae normal.   Cardiovascular:      Rate and Rhythm: Normal rate and regular rhythm. Heart sounds: Normal heart sounds. No murmur heard. Pulmonary:      Effort: Pulmonary effort is normal. No respiratory distress. Breath sounds: Normal breath sounds. No wheezing or rales. Abdominal:      General: Bowel sounds are normal.      Palpations: Abdomen is soft. Tenderness: There is no abdominal tenderness. There is no guarding or rebound. Musculoskeletal:         General: No tenderness or deformity. Cervical back: Normal range of motion and neck supple. Skin:     General: Skin is warm and dry. Neurological:      Mental Status: He is alert and oriented to person, place, and time. preliminarily interpreted by the ED Provider with the below findings:      Interpretation per the Radiologist below, if available at the time of this note:    No orders to display     No results found. No results found. Tonja Wilcox MD            ED Course as of 09/15/23 2124   Fri Sep 15, 2023   1310 Discussed case with urology NP Anthony Aviles stated that patient was not sent in to be admitted and they have had multiple conversations with him about this in the past and he can go to [place] for a earlier procedure. States that she will come down and speak with the patient regarding going home and following up as an outpatient. [JM]      ED Course User Index  [JM] Tonja Wilcox MD       --------------------------------------------- PAST HISTORY ---------------------------------------------  Past Medical History:  has a past medical history of Abnormal computed tomography angiography of heart, Arthritis, Atrial fibrillation ([address]), Burning pain, CAD (coronary artery disease), Degenerative lumbar disc, Diabetes ([address]), DVT (deep venous thrombosis) ([address]), Dysphagia, Edentulous, H/O cardiovascular stress test, Herniated cervical disc, History of echocardiogram, Hx of blood clots, Hyperlipidemia, Hypertension, Stented coronary artery, Tinnitus, and Urinary retention. Past Surgical History:  has a past surgical history that includes colectomy (2010 sigmoid); Hemorrhoid surgery (2008); Prostate surgery (2017); Colonoscopy (5/2018 [facility]); Nerve Block (Bilateral, 10 12 2015); Nerve Block (Bilateral, 10/22/15); Nerve Block (Bilateral, 11 02 2015); Nerve Block (Left, 12/2/15); Nerve Block (Left, 12 16 15); Nerve Block (Left, 12 28 2015); Nerve Block (Left, 1 20 16); ablation of dysrhythmic focus (1980); lumbar fusion (03/06/2017); Nasal sinus surgery (12/07/2017); Upper gastrointestinal endoscopy (5/2018 [facility]); Nerve Block (12/11/2018); epidural steroid injection (N/A, 12/11/2018);  Nerve Block (N/A, 01/08/2019);

## 2023-09-15 NOTE — DISCHARGE INSTRUCTIONS
Return if any new or worsening symptoms. Return if any chest pain or shortness of breath. Follow-up with your primary care provider. Place the patients aguilar to leg bag on discharge from the hospital.  Please give the patient a night bag (large bag) and aguilar instructions upon discharge. Please have the patient contact the office for aguilar removal instructions. The catheter may go red (hematuria), may go clear, and may go red. This is a normal process, as long as the catheter is draining. Call the office if any concerns should arise for further instructions, [phone]. Call upon discharge to schedule a follow-up visit with Pauly Ellis/Maryse/Bailey ([facility]) at [phone] About Indwelling Urinary Catheter Care to Prevent Infection  Overview     A urinary catheter is a flexible plastic tube that's used to drain urine from your bladder when you can't urinate on your own. The catheter allows urine to drain from the bladder into a bag. Two types of drainage bags may be used with a urinary catheter. A bedside bag is a large bag that you can hang on the side of your bed or on a chair. You can use it overnight or anytime you will be sitting or lying down for a long time. A leg bag is a small bag that you can use during the day. It is usually attached to your thigh or calf and hidden under your clothes. Having a urinary catheter increases your risk of getting a urinary tract infection. Germs may get on the catheter and cause an infection in your bladder or kidneys. The longer you have a catheter, the more likely it is that you will get an infection. You can help prevent this problem with good hygiene and careful handling of your catheter and drainage bags. How can you help prevent infection? Take care to stay clean  Always wash your hands well before and after you handle your catheter. Clean the skin around the catheter daily using soap and water.  Dry with a clean towel

## 2023-10-09 ENCOUNTER — OFFICE VISIT (OUTPATIENT)
Dept: FAMILY MEDICINE CLINIC | Age: 78
End: 2023-10-09
Payer: MEDICARE

## 2023-10-09 VITALS
SYSTOLIC BLOOD PRESSURE: 136 MMHG | HEART RATE: 68 BPM | DIASTOLIC BLOOD PRESSURE: 80 MMHG | WEIGHT: 171 LBS | OXYGEN SATURATION: 98 % | BODY MASS INDEX: 25.33 KG/M2 | RESPIRATION RATE: 18 BRPM | HEIGHT: 69 IN

## 2023-10-09 DIAGNOSIS — Z12.5 SCREENING FOR MALIGNANT NEOPLASM OF PROSTATE: ICD-10-CM

## 2023-10-09 DIAGNOSIS — E11.42 TYPE 2 DIABETES MELLITUS WITH DIABETIC POLYNEUROPATHY, WITHOUT LONG-TERM CURRENT USE OF INSULIN (HCC): Primary | ICD-10-CM

## 2023-10-09 DIAGNOSIS — E78.2 MIXED HYPERLIPIDEMIA: ICD-10-CM

## 2023-10-09 DIAGNOSIS — I48.91 ATRIAL FIBRILLATION, UNSPECIFIED TYPE (HCC): ICD-10-CM

## 2023-10-09 DIAGNOSIS — R19.5 MUCUS IN STOOL: ICD-10-CM

## 2023-10-09 DIAGNOSIS — R13.19 ESOPHAGEAL DYSPHAGIA: ICD-10-CM

## 2023-10-09 DIAGNOSIS — I48.0 PAROXYSMAL ATRIAL FIBRILLATION (HCC): ICD-10-CM

## 2023-10-09 DIAGNOSIS — E03.9 ACQUIRED HYPOTHYROIDISM: ICD-10-CM

## 2023-10-09 DIAGNOSIS — Q39.6 ESOPHAGEAL DIVERTICULUM: ICD-10-CM

## 2023-10-09 DIAGNOSIS — R53.82 CHRONIC FATIGUE: ICD-10-CM

## 2023-10-09 DIAGNOSIS — E11.65 TYPE 2 DIABETES MELLITUS WITH HYPERGLYCEMIA, WITHOUT LONG-TERM CURRENT USE OF INSULIN (HCC): ICD-10-CM

## 2023-10-09 DIAGNOSIS — K59.03 DRUG-INDUCED CONSTIPATION: ICD-10-CM

## 2023-10-09 DIAGNOSIS — I10 ESSENTIAL HYPERTENSION: ICD-10-CM

## 2023-10-09 PROCEDURE — 3074F SYST BP LT 130 MM HG: CPT | Performed by: FAMILY MEDICINE

## 2023-10-09 PROCEDURE — G8427 DOCREV CUR MEDS BY ELIG CLIN: HCPCS | Performed by: FAMILY MEDICINE

## 2023-10-09 PROCEDURE — 1036F TOBACCO NON-USER: CPT | Performed by: FAMILY MEDICINE

## 2023-10-09 PROCEDURE — G8417 CALC BMI ABV UP PARAM F/U: HCPCS | Performed by: FAMILY MEDICINE

## 2023-10-09 PROCEDURE — 99214 OFFICE O/P EST MOD 30 MIN: CPT | Performed by: FAMILY MEDICINE

## 2023-10-09 PROCEDURE — 3078F DIAST BP <80 MM HG: CPT | Performed by: FAMILY MEDICINE

## 2023-10-09 PROCEDURE — 1123F ACP DISCUSS/DSCN MKR DOCD: CPT | Performed by: FAMILY MEDICINE

## 2023-10-09 PROCEDURE — G8484 FLU IMMUNIZE NO ADMIN: HCPCS | Performed by: FAMILY MEDICINE

## 2023-10-09 PROCEDURE — 3044F HG A1C LEVEL LT 7.0%: CPT | Performed by: FAMILY MEDICINE

## 2023-10-09 RX ORDER — HYDRALAZINE HYDROCHLORIDE 25 MG/1
25 TABLET, FILM COATED ORAL 4 TIMES DAILY
Qty: 270 TABLET | Refills: 3 | Status: SHIPPED | OUTPATIENT
Start: 2023-10-09

## 2023-10-09 RX ORDER — PRAVASTATIN SODIUM 10 MG
TABLET ORAL
Qty: 90 TABLET | Refills: 5 | Status: SHIPPED | OUTPATIENT
Start: 2023-10-09

## 2023-10-09 RX ORDER — DOCUSATE SODIUM 100 MG/1
100 CAPSULE, LIQUID FILLED ORAL 2 TIMES DAILY PRN
Qty: 20 CAPSULE | Refills: 0 | Status: SHIPPED | OUTPATIENT
Start: 2023-10-09

## 2023-10-09 RX ORDER — METOPROLOL SUCCINATE 50 MG/1
50 TABLET, EXTENDED RELEASE ORAL 2 TIMES DAILY
Qty: 180 TABLET | Refills: 3 | Status: SHIPPED | OUTPATIENT
Start: 2023-10-09

## 2023-10-09 RX ORDER — GLIMEPIRIDE 4 MG/1
4 TABLET ORAL 2 TIMES DAILY WITH MEALS
Qty: 180 TABLET | Refills: 5 | Status: SHIPPED | OUTPATIENT
Start: 2023-10-09

## 2023-10-09 RX ORDER — CLOPIDOGREL BISULFATE 75 MG/1
75 TABLET ORAL DAILY
Qty: 90 TABLET | Refills: 3 | Status: SHIPPED | OUTPATIENT
Start: 2023-10-09

## 2023-10-09 RX ORDER — GLYCOPYRROLATE 1 MG/1
1 TABLET ORAL 3 TIMES DAILY
Qty: 270 TABLET | Refills: 3 | Status: SHIPPED | OUTPATIENT
Start: 2023-10-09 | End: 2024-10-08

## 2023-10-09 NOTE — PROGRESS NOTES
Buddy Geller is a 78 y.o. male  . Subjective:      States that he cant really eat anything more than Baby food or soft diet. Has had multiple studies. Swallowing study showed a esophageal diverticulum but evidently the upper endoscopy did not. There is some question that his hardware from his cervical disc disease surgery was causing issues. We need to set him up with gastroenterology for another opinion. Patient following up with physical medicine for back pain and medication working well. Byron Rios is to work very well without any side effects. Patient has had issues with urinary retention that is currently being worked up he currently has a catheter placement. Has follow-up with urology next week. Patient have a low threshold return ER if has any issues. When a repeat blood work at urology I will add my blood work to see where his sugars are although numbers she is getting at home seem to be pretty good. Recheck cholesterol as well. No episodes of hypoglycemia, so we will keep his medications at same        Review of Systems   Constitutional:  Positive for fatigue. Negative for activity change, appetite change, chills, diaphoresis, fever and unexpected weight change. HENT:  Negative for congestion, dental problem, drooling, ear discharge, ear pain, facial swelling, hearing loss, mouth sores, nosebleeds, postnasal drip, rhinorrhea, sinus pressure, sneezing, sore throat, tinnitus, trouble swallowing and voice change. Eyes:  Negative for photophobia, pain, discharge, redness, itching and visual disturbance. Respiratory:  Negative for apnea, cough, choking, chest tightness, shortness of breath, wheezing and stridor. Cardiovascular:  Negative for chest pain, palpitations and leg swelling. Gastrointestinal:  Negative for abdominal distention, abdominal pain, anal bleeding, blood in stool, constipation, diarrhea, nausea, rectal pain and vomiting.    Endocrine: Negative for cold intolerance, heat

## 2023-10-10 ASSESSMENT — ENCOUNTER SYMPTOMS
COLOR CHANGE: 0
EYE ITCHING: 0
SINUS PRESSURE: 0
STRIDOR: 0
APNEA: 0
CONSTIPATION: 0
VOMITING: 0
VOICE CHANGE: 0
CHEST TIGHTNESS: 0
EYE PAIN: 0
EYE DISCHARGE: 0
BACK PAIN: 1
EYE REDNESS: 0
WHEEZING: 0
NAUSEA: 0
DIARRHEA: 0
CHOKING: 0
RECTAL PAIN: 0
BLOOD IN STOOL: 0
COUGH: 0
TROUBLE SWALLOWING: 0
SHORTNESS OF BREATH: 0
FACIAL SWELLING: 0
PHOTOPHOBIA: 0
ANAL BLEEDING: 0
RHINORRHEA: 0
ABDOMINAL PAIN: 0
ABDOMINAL DISTENTION: 0
SORE THROAT: 0

## 2023-10-26 ENCOUNTER — OFFICE VISIT (OUTPATIENT)
Dept: PHYSICAL MEDICINE AND REHAB | Age: 78
End: 2023-10-26
Payer: MEDICARE

## 2023-10-26 ENCOUNTER — TELEPHONE (OUTPATIENT)
Dept: PHYSICAL MEDICINE AND REHAB | Age: 78
End: 2023-10-26

## 2023-10-26 VITALS
HEIGHT: 69 IN | SYSTOLIC BLOOD PRESSURE: 140 MMHG | BODY MASS INDEX: 26.66 KG/M2 | WEIGHT: 180 LBS | DIASTOLIC BLOOD PRESSURE: 80 MMHG | HEART RATE: 73 BPM

## 2023-10-26 DIAGNOSIS — M48.061 LUMBAR FORAMINAL STENOSIS: ICD-10-CM

## 2023-10-26 DIAGNOSIS — G62.9 POLYNEUROPATHY: ICD-10-CM

## 2023-10-26 PROCEDURE — 99214 OFFICE O/P EST MOD 30 MIN: CPT | Performed by: PHYSICAL MEDICINE & REHABILITATION

## 2023-10-26 PROCEDURE — 1036F TOBACCO NON-USER: CPT | Performed by: PHYSICAL MEDICINE & REHABILITATION

## 2023-10-26 PROCEDURE — 3078F DIAST BP <80 MM HG: CPT | Performed by: PHYSICAL MEDICINE & REHABILITATION

## 2023-10-26 PROCEDURE — G8484 FLU IMMUNIZE NO ADMIN: HCPCS | Performed by: PHYSICAL MEDICINE & REHABILITATION

## 2023-10-26 PROCEDURE — G8427 DOCREV CUR MEDS BY ELIG CLIN: HCPCS | Performed by: PHYSICAL MEDICINE & REHABILITATION

## 2023-10-26 PROCEDURE — G8417 CALC BMI ABV UP PARAM F/U: HCPCS | Performed by: PHYSICAL MEDICINE & REHABILITATION

## 2023-10-26 PROCEDURE — 1123F ACP DISCUSS/DSCN MKR DOCD: CPT | Performed by: PHYSICAL MEDICINE & REHABILITATION

## 2023-10-26 PROCEDURE — 3074F SYST BP LT 130 MM HG: CPT | Performed by: PHYSICAL MEDICINE & REHABILITATION

## 2023-10-26 RX ORDER — DOXYCYCLINE HYCLATE 100 MG/1
100 CAPSULE ORAL 2 TIMES DAILY
Qty: 20 CAPSULE | Refills: 0 | COMMUNITY
Start: 2023-10-21 | End: 2023-10-31

## 2023-10-26 RX ORDER — ALFUZOSIN HYDROCHLORIDE 10 MG/1
10 TABLET, EXTENDED RELEASE ORAL DAILY
COMMUNITY

## 2023-10-26 NOTE — TELEPHONE ENCOUNTER
----- Message from Cornelious Daily, DO sent at 10/26/2023 10:29 AM EDT -----  Please request office note from Dr. Jacques Franklin

## 2023-10-26 NOTE — TELEPHONE ENCOUNTER
Called and spoke with the office staff from Dr Sav Phipps to get his last office note faxed to our office. They will fax it over today.

## 2023-10-27 RX ORDER — BUPRENORPHINE 5 UG/H
1 PATCH TRANSDERMAL WEEKLY
Qty: 4 PATCH | Refills: 5 | Status: SHIPPED | OUTPATIENT
Start: 2023-10-27 | End: 2024-04-12

## 2023-10-30 DIAGNOSIS — G62.9 POLYNEUROPATHY: ICD-10-CM

## 2023-10-30 DIAGNOSIS — M48.061 LUMBAR FORAMINAL STENOSIS: ICD-10-CM

## 2023-10-30 RX ORDER — BUPRENORPHINE 5 UG/H
1 PATCH TRANSDERMAL WEEKLY
Qty: 4 PATCH | Refills: 5 | Status: SHIPPED | OUTPATIENT
Start: 2023-11-17 | End: 2024-05-03

## 2023-10-30 NOTE — TELEPHONE ENCOUNTER
[facility] called regarding the patient Angela Cervantes patch he just filled his last refill from the script from august and he would not be able to be refilled until November 17th. Michael Brewer stated you can sent the the script just put a start date on 11-17-23. Will key up the medication again with start date of 11-17-23. Please advise.

## 2023-12-27 ENCOUNTER — TELEPHONE (OUTPATIENT)
Dept: FAMILY MEDICINE CLINIC | Age: 78
End: 2023-12-27

## 2023-12-27 NOTE — TELEPHONE ENCOUNTER
Patient called for an appt as soon as possible for upper respiratory symptoms. I informed patient that Dr. Jeanmarie Palomino is not in the ofc til 1/4/24 and advised patient to go to the [facility].       Last seen 10/9/2023  Next appt 1/15/2024

## 2023-12-28 ENCOUNTER — APPOINTMENT (OUTPATIENT)
Dept: GENERAL RADIOLOGY | Age: 78
End: 2023-12-28
Payer: MEDICARE

## 2023-12-28 ENCOUNTER — HOSPITAL ENCOUNTER (EMERGENCY)
Age: 78
Discharge: HOME | End: 2023-12-28
Attending: EMERGENCY MEDICINE
Payer: MEDICARE

## 2023-12-28 VITALS
DIASTOLIC BLOOD PRESSURE: 56 MMHG | TEMPERATURE: 98.2 F | BODY MASS INDEX: 26.58 KG/M2 | OXYGEN SATURATION: 95 % | HEART RATE: 77 BPM | WEIGHT: 180 LBS | SYSTOLIC BLOOD PRESSURE: 122 MMHG | RESPIRATION RATE: 18 BRPM

## 2023-12-28 DIAGNOSIS — J18.9 COMMUNITY ACQUIRED PNEUMONIA OF LEFT LOWER LOBE OF LUNG: ICD-10-CM

## 2023-12-28 DIAGNOSIS — Z76.0 ENCOUNTER FOR MEDICATION REFILL: ICD-10-CM

## 2023-12-28 DIAGNOSIS — U07.1 COVID-19 VIRUS INFECTION: Primary | ICD-10-CM

## 2023-12-28 LAB
INFLUENZA A BY PCR: NOT DETECTED
INFLUENZA B BY PCR: NOT DETECTED
SARS-COV-2 RDRP RESP QL NAA+PROBE: DETECTED
SPECIMEN DESCRIPTION: ABNORMAL

## 2023-12-28 PROCEDURE — 87635 SARS-COV-2 COVID-19 AMP PRB: CPT

## 2023-12-28 PROCEDURE — 71046 X-RAY EXAM CHEST 2 VIEWS: CPT

## 2023-12-28 PROCEDURE — 99284 EMERGENCY DEPT VISIT MOD MDM: CPT

## 2023-12-28 PROCEDURE — 87502 INFLUENZA DNA AMP PROBE: CPT

## 2023-12-28 RX ORDER — FINASTERIDE 5 MG/1
5 TABLET, FILM COATED ORAL DAILY
Qty: 30 TABLET | Refills: 0 | Status: SHIPPED | OUTPATIENT
Start: 2023-12-28

## 2023-12-28 RX ORDER — DOXYCYCLINE HYCLATE 100 MG
100 TABLET ORAL 2 TIMES DAILY
Qty: 20 TABLET | Refills: 0 | Status: SHIPPED | OUTPATIENT
Start: 2023-12-28 | End: 2024-01-07

## 2024-01-06 ENCOUNTER — APPOINTMENT (OUTPATIENT)
Dept: GENERAL RADIOLOGY | Age: 79
End: 2024-01-06
Payer: MEDICARE

## 2024-01-06 ENCOUNTER — APPOINTMENT (OUTPATIENT)
Dept: CT IMAGING | Age: 79
End: 2024-01-06
Payer: MEDICARE

## 2024-01-06 ENCOUNTER — HOSPITAL ENCOUNTER (EMERGENCY)
Age: 79
Discharge: HOME | End: 2024-01-07
Attending: EMERGENCY MEDICINE
Payer: MEDICARE

## 2024-01-06 VITALS
OXYGEN SATURATION: 98 % | WEIGHT: 180 LBS | RESPIRATION RATE: 16 BRPM | HEIGHT: 69 IN | DIASTOLIC BLOOD PRESSURE: 77 MMHG | TEMPERATURE: 98.2 F | BODY MASS INDEX: 26.66 KG/M2 | SYSTOLIC BLOOD PRESSURE: 179 MMHG | HEART RATE: 62 BPM

## 2024-01-06 DIAGNOSIS — R42 DIZZINESS: ICD-10-CM

## 2024-01-06 DIAGNOSIS — M54.2 NECK PAIN: Primary | ICD-10-CM

## 2024-01-06 LAB
ALBUMIN SERPL-MCNC: 4.1 G/DL (ref 3.5–5.2)
ALP SERPL-CCNC: 88 U/L (ref 40–129)
ALT SERPL-CCNC: 18 U/L (ref 0–40)
ANION GAP SERPL CALCULATED.3IONS-SCNC: 13 MMOL/L (ref 7–16)
AST SERPL-CCNC: 29 U/L (ref 0–39)
BASOPHILS # BLD: 0.02 K/UL (ref 0–0.2)
BASOPHILS NFR BLD: 0 % (ref 0–2)
BILIRUB SERPL-MCNC: 0.4 MG/DL (ref 0–1.2)
BUN SERPL-MCNC: 12 MG/DL (ref 6–23)
CALCIUM SERPL-MCNC: 9.5 MG/DL (ref 8.6–10.2)
CHLORIDE SERPL-SCNC: 102 MMOL/L (ref 98–107)
CO2 SERPL-SCNC: 21 MMOL/L (ref 22–29)
CREAT SERPL-MCNC: 0.8 MG/DL (ref 0.7–1.2)
EOSINOPHIL # BLD: 0.06 K/UL (ref 0.05–0.5)
EOSINOPHILS RELATIVE PERCENT: 1 % (ref 0–6)
ERYTHROCYTE [DISTWIDTH] IN BLOOD BY AUTOMATED COUNT: 12.3 % (ref 11.5–15)
GFR SERPL CREATININE-BSD FRML MDRD: >60 ML/MIN/1.73M2
GLUCOSE SERPL-MCNC: 96 MG/DL (ref 74–99)
HCT VFR BLD AUTO: 41.5 % (ref 37–54)
HGB BLD-MCNC: 14.6 G/DL (ref 12.5–16.5)
IMM GRANULOCYTES # BLD AUTO: 0.05 K/UL (ref 0–0.58)
IMM GRANULOCYTES NFR BLD: 1 % (ref 0–5)
LYMPHOCYTES NFR BLD: 2.36 K/UL (ref 1.5–4)
LYMPHOCYTES RELATIVE PERCENT: 37 % (ref 20–42)
MCH RBC QN AUTO: 30.4 PG (ref 26–35)
MCHC RBC AUTO-ENTMCNC: 35.2 G/DL (ref 32–34.5)
MCV RBC AUTO: 86.3 FL (ref 80–99.9)
MONOCYTES NFR BLD: 0.65 K/UL (ref 0.1–0.95)
MONOCYTES NFR BLD: 10 % (ref 2–12)
NEUTROPHILS NFR BLD: 51 % (ref 43–80)
NEUTS SEG NFR BLD: 3.24 K/UL (ref 1.8–7.3)
PLATELET # BLD AUTO: 214 K/UL (ref 130–450)
PMV BLD AUTO: 10.1 FL (ref 7–12)
POTASSIUM SERPL-SCNC: 4 MMOL/L (ref 3.5–5)
PROT SERPL-MCNC: 7.2 G/DL (ref 6.4–8.3)
RBC # BLD AUTO: 4.81 M/UL (ref 3.8–5.8)
SODIUM SERPL-SCNC: 136 MMOL/L (ref 132–146)
TROPONIN I SERPL HS-MCNC: 16 NG/L (ref 0–11)
WBC OTHER # BLD: 6.4 K/UL (ref 4.5–11.5)

## 2024-01-06 PROCEDURE — 6370000000 HC RX 637 (ALT 250 FOR IP): Performed by: STUDENT IN AN ORGANIZED HEALTH CARE EDUCATION/TRAINING PROGRAM

## 2024-01-06 PROCEDURE — 93005 ELECTROCARDIOGRAM TRACING: CPT | Performed by: EMERGENCY MEDICINE

## 2024-01-06 PROCEDURE — 2580000003 HC RX 258: Performed by: STUDENT IN AN ORGANIZED HEALTH CARE EDUCATION/TRAINING PROGRAM

## 2024-01-06 PROCEDURE — 85025 COMPLETE CBC W/AUTO DIFF WBC: CPT

## 2024-01-06 PROCEDURE — 84484 ASSAY OF TROPONIN QUANT: CPT

## 2024-01-06 PROCEDURE — 70496 CT ANGIOGRAPHY HEAD: CPT

## 2024-01-06 PROCEDURE — 71045 X-RAY EXAM CHEST 1 VIEW: CPT

## 2024-01-06 PROCEDURE — 99285 EMERGENCY DEPT VISIT HI MDM: CPT | Performed by: STUDENT IN AN ORGANIZED HEALTH CARE EDUCATION/TRAINING PROGRAM

## 2024-01-06 PROCEDURE — 70498 CT ANGIOGRAPHY NECK: CPT

## 2024-01-06 PROCEDURE — 6360000004 HC RX CONTRAST MEDICATION: Performed by: RADIOLOGY

## 2024-01-06 PROCEDURE — 80053 COMPREHEN METABOLIC PANEL: CPT

## 2024-01-06 RX ORDER — MECLIZINE HCL 12.5 MG/1
12.5 TABLET ORAL ONCE
Status: COMPLETED | OUTPATIENT
Start: 2024-01-06 | End: 2024-01-06

## 2024-01-06 RX ORDER — 0.9 % SODIUM CHLORIDE 0.9 %
1000 INTRAVENOUS SOLUTION INTRAVENOUS ONCE
Status: COMPLETED | OUTPATIENT
Start: 2024-01-06 | End: 2024-01-06

## 2024-01-06 RX ADMIN — IOPAMIDOL 75 ML: 755 INJECTION, SOLUTION INTRAVENOUS at 21:55

## 2024-01-06 RX ADMIN — SODIUM CHLORIDE 1000 ML: 9 INJECTION, SOLUTION INTRAVENOUS at 21:26

## 2024-01-06 RX ADMIN — MECLIZINE 12.5 MG: 12.5 TABLET ORAL at 21:27

## 2024-01-06 ASSESSMENT — PAIN DESCRIPTION - DESCRIPTORS: DESCRIPTORS: ACHING

## 2024-01-06 ASSESSMENT — PAIN SCALES - GENERAL: PAINLEVEL_OUTOF10: 7

## 2024-01-06 ASSESSMENT — PAIN DESCRIPTION - ONSET: ONSET: SUDDEN

## 2024-01-06 ASSESSMENT — PAIN - FUNCTIONAL ASSESSMENT: PAIN_FUNCTIONAL_ASSESSMENT: 0-10

## 2024-01-06 ASSESSMENT — PAIN DESCRIPTION - PAIN TYPE: TYPE: ACUTE PAIN

## 2024-01-06 ASSESSMENT — PAIN DESCRIPTION - FREQUENCY: FREQUENCY: CONTINUOUS

## 2024-01-07 LAB
EKG ATRIAL RATE: 65 BPM
EKG P AXIS: 43 DEGREES
EKG P-R INTERVAL: 168 MS
EKG Q-T INTERVAL: 466 MS
EKG QRS DURATION: 152 MS
EKG QTC CALCULATION (BAZETT): 484 MS
EKG R AXIS: -78 DEGREES
EKG T AXIS: 77 DEGREES
EKG VENTRICULAR RATE: 65 BPM
TROPONIN I SERPL HS-MCNC: 15 NG/L (ref 0–11)

## 2024-01-07 PROCEDURE — 36415 COLL VENOUS BLD VENIPUNCTURE: CPT

## 2024-01-07 PROCEDURE — 93010 ELECTROCARDIOGRAM REPORT: CPT | Performed by: INTERNAL MEDICINE

## 2024-01-07 PROCEDURE — 84484 ASSAY OF TROPONIN QUANT: CPT

## 2024-01-07 RX ORDER — TIZANIDINE 2 MG/1
2 TABLET ORAL 3 TIMES DAILY PRN
Qty: 15 TABLET | Refills: 0 | Status: SHIPPED | OUTPATIENT
Start: 2024-01-07 | End: 2024-01-12

## 2024-01-07 NOTE — ED PROVIDER NOTES
(HCC)     Dysphagia     Edentulous     upper denture    H/O cardiovascular stress test 02/19/2020    Lexiscan    Herniated cervical disc     History of echocardiogram 03/16/2017    EF 60-65%    Hx of blood clots     Hyperlipidemia     Hypertension     Stented coronary artery     Tinnitus     Urinary retention 03/17/2017       CONSULTS: (Who and What was discussed)  None    Discussion with Other Profesionals : None    Social Determinants : None    Records Reviewed : None    CC/HPI Summary, DDx, ED Course, and Reassessment: Patient is placed on cardiac monitor and continuous pulse ox for monitoring. EKG is ordered to evaluate patient's current cardiac rhythm, and to interpret QT interval prior to administering medications. CBC is ordered to evaluate for any signs of infection or inflammation by obtaining a WBC count, or any signs of acute anemia by interpreting hemoglobin. CMP was ordered to evaluate for any electrolyte imbalances, kidney function, hepatic injury or any elevations in anion gap. Troponin ordered to evaluate for possible cardiac etiology of symptoms including but not limited to STEMI or NSTEMI. Chest x-ray is ordered to evaluate for any possible signs of, but without limitation to, pneumonia, pleural effusions, cardiomegaly, pneumothorax, atelectasis, rib or sternal abnormalities including fractures. CTA head and neck is ordered to evaluate for findings including but not limited to hemorrhage, fractures, subluxation or displacement.  Patient is given a dose of meclizine and 1 L normal saline bolus.  CBC and CMP were benign within normal limits.  Initial troponin was 16 with a repeat of 15.  CT of the head was unremarkable.  CTA of the neck revealed proximal left and right internal carotid stenosis no greater than 60%.  There is no other additional stenosis or dissection or evidence of stenosis of the vertebral arteries with a previous anterior spinal fusion from C3-C6 with intact components.  Chest  no

## 2024-01-08 DIAGNOSIS — Z12.5 SCREENING FOR MALIGNANT NEOPLASM OF PROSTATE: ICD-10-CM

## 2024-01-08 DIAGNOSIS — E03.9 ACQUIRED HYPOTHYROIDISM: ICD-10-CM

## 2024-01-08 DIAGNOSIS — E78.2 MIXED HYPERLIPIDEMIA: ICD-10-CM

## 2024-01-08 DIAGNOSIS — R53.82 CHRONIC FATIGUE: ICD-10-CM

## 2024-01-08 DIAGNOSIS — E11.42 TYPE 2 DIABETES MELLITUS WITH DIABETIC POLYNEUROPATHY, WITHOUT LONG-TERM CURRENT USE OF INSULIN (HCC): ICD-10-CM

## 2024-01-08 LAB
ABSOLUTE IMMATURE GRANULOCYTE: 0.06 K/UL (ref 0–0.58)
ALBUMIN SERPL-MCNC: 4 G/DL (ref 3.5–5.2)
ALP BLD-CCNC: 79 U/L (ref 40–129)
ALT SERPL-CCNC: 13 U/L (ref 0–40)
ANION GAP SERPL CALCULATED.3IONS-SCNC: 12 MMOL/L (ref 7–16)
AST SERPL-CCNC: 27 U/L (ref 0–39)
BASOPHILS ABSOLUTE: 0.02 K/UL (ref 0–0.2)
BASOPHILS RELATIVE PERCENT: 0 % (ref 0–2)
BILIRUB SERPL-MCNC: 0.6 MG/DL (ref 0–1.2)
BUN BLDV-MCNC: 10 MG/DL (ref 6–23)
CALCIUM SERPL-MCNC: 9.5 MG/DL (ref 8.6–10.2)
CHLORIDE BLD-SCNC: 102 MMOL/L (ref 98–107)
CHOLESTEROL: 119 MG/DL
CO2: 25 MMOL/L (ref 22–29)
CREAT SERPL-MCNC: 1 MG/DL (ref 0.7–1.2)
EOSINOPHILS ABSOLUTE: 0.05 K/UL (ref 0.05–0.5)
EOSINOPHILS RELATIVE PERCENT: 1 % (ref 0–6)
GFR SERPL CREATININE-BSD FRML MDRD: >60 ML/MIN/1.73M2
GLUCOSE BLD-MCNC: 98 MG/DL (ref 74–99)
HBA1C MFR BLD: 6.3 % (ref 4–5.6)
HCT VFR BLD CALC: 41.2 % (ref 37–54)
HDLC SERPL-MCNC: 42 MG/DL
HEMOGLOBIN: 13.7 G/DL (ref 12.5–16.5)
IMMATURE GRANULOCYTES: 1 % (ref 0–5)
LDL CHOLESTEROL: 52 MG/DL
LYMPHOCYTES ABSOLUTE: 2.62 K/UL (ref 1.5–4)
LYMPHOCYTES RELATIVE PERCENT: 31 % (ref 20–42)
MCH RBC QN AUTO: 30.4 PG (ref 26–35)
MCHC RBC AUTO-ENTMCNC: 33.3 G/DL (ref 32–34.5)
MCV RBC AUTO: 91.6 FL (ref 80–99.9)
MONOCYTES ABSOLUTE: 0.83 K/UL (ref 0.1–0.95)
MONOCYTES RELATIVE PERCENT: 10 % (ref 2–12)
NEUTROPHILS ABSOLUTE: 4.88 K/UL (ref 1.8–7.3)
NEUTROPHILS RELATIVE PERCENT: 58 % (ref 43–80)
PDW BLD-RTO: 12.9 % (ref 11.5–15)
PLATELET # BLD: 222 K/UL (ref 130–450)
PMV BLD AUTO: 10.3 FL (ref 7–12)
POTASSIUM SERPL-SCNC: 4.5 MMOL/L (ref 3.5–5)
PROSTATE SPECIFIC ANTIGEN: 0.96 NG/ML (ref 0–4)
RBC # BLD: 4.5 M/UL (ref 3.8–5.8)
SODIUM BLD-SCNC: 139 MMOL/L (ref 132–146)
T4 FREE: 1.1 NG/DL (ref 0.9–1.7)
TOTAL PROTEIN: 6.8 G/DL (ref 6.4–8.3)
TRIGL SERPL-MCNC: 123 MG/DL
TSH SERPL DL<=0.05 MIU/L-ACNC: 2.43 UIU/ML (ref 0.27–4.2)
VLDLC SERPL CALC-MCNC: 25 MG/DL
WBC # BLD: 8.5 K/UL (ref 4.5–11.5)

## 2024-01-15 ENCOUNTER — OFFICE VISIT (OUTPATIENT)
Dept: FAMILY MEDICINE CLINIC | Age: 79
End: 2024-01-15
Payer: MEDICARE

## 2024-01-15 VITALS
RESPIRATION RATE: 18 BRPM | WEIGHT: 183 LBS | DIASTOLIC BLOOD PRESSURE: 80 MMHG | SYSTOLIC BLOOD PRESSURE: 126 MMHG | OXYGEN SATURATION: 98 % | BODY MASS INDEX: 27.11 KG/M2 | HEIGHT: 69 IN | HEART RATE: 53 BPM

## 2024-01-15 DIAGNOSIS — Z00.00 MEDICARE ANNUAL WELLNESS VISIT, SUBSEQUENT: ICD-10-CM

## 2024-01-15 DIAGNOSIS — H60.542 ACUTE ECZEMATOID OTITIS EXTERNA OF LEFT EAR: ICD-10-CM

## 2024-01-15 DIAGNOSIS — E03.9 ACQUIRED HYPOTHYROIDISM: ICD-10-CM

## 2024-01-15 DIAGNOSIS — R13.19 ESOPHAGEAL DYSPHAGIA: ICD-10-CM

## 2024-01-15 DIAGNOSIS — N40.1 BENIGN PROSTATIC HYPERPLASIA WITH URINARY FREQUENCY: ICD-10-CM

## 2024-01-15 DIAGNOSIS — I65.21 CAROTID STENOSIS, RIGHT: ICD-10-CM

## 2024-01-15 DIAGNOSIS — E11.42 TYPE 2 DIABETES MELLITUS WITH DIABETIC POLYNEUROPATHY, WITHOUT LONG-TERM CURRENT USE OF INSULIN (HCC): Primary | ICD-10-CM

## 2024-01-15 DIAGNOSIS — R35.0 BENIGN PROSTATIC HYPERPLASIA WITH URINARY FREQUENCY: ICD-10-CM

## 2024-01-15 PROBLEM — D64.9 ANEMIA: Status: RESOLVED | Noted: 2019-10-31 | Resolved: 2024-01-15

## 2024-01-15 PROBLEM — R07.9 CHEST PAIN WITH MODERATE RISK FOR CARDIAC ETIOLOGY: Status: RESOLVED | Noted: 2020-02-29 | Resolved: 2024-01-15

## 2024-01-15 PROBLEM — D68.69 SECONDARY HYPERCOAGULABLE STATE (HCC): Status: RESOLVED | Noted: 2023-05-21 | Resolved: 2024-01-15

## 2024-01-15 PROCEDURE — 1123F ACP DISCUSS/DSCN MKR DOCD: CPT | Performed by: FAMILY MEDICINE

## 2024-01-15 PROCEDURE — G0439 PPPS, SUBSEQ VISIT: HCPCS | Performed by: FAMILY MEDICINE

## 2024-01-15 PROCEDURE — 3044F HG A1C LEVEL LT 7.0%: CPT | Performed by: FAMILY MEDICINE

## 2024-01-15 PROCEDURE — 3079F DIAST BP 80-89 MM HG: CPT | Performed by: FAMILY MEDICINE

## 2024-01-15 PROCEDURE — G8484 FLU IMMUNIZE NO ADMIN: HCPCS | Performed by: FAMILY MEDICINE

## 2024-01-15 PROCEDURE — 3074F SYST BP LT 130 MM HG: CPT | Performed by: FAMILY MEDICINE

## 2024-01-15 RX ORDER — TRIAMCINOLONE ACETONIDE 5 MG/G
CREAM TOPICAL
Qty: 15 G | Refills: 2 | Status: SHIPPED | OUTPATIENT
Start: 2024-01-15 | End: 2024-01-22

## 2024-01-15 RX ORDER — FINASTERIDE 5 MG/1
5 TABLET, FILM COATED ORAL DAILY
Qty: 90 TABLET | Refills: 3 | Status: SHIPPED | OUTPATIENT
Start: 2024-01-15

## 2024-01-15 ASSESSMENT — PATIENT HEALTH QUESTIONNAIRE - PHQ9
SUM OF ALL RESPONSES TO PHQ QUESTIONS 1-9: 0
1. LITTLE INTEREST OR PLEASURE IN DOING THINGS: 0
SUM OF ALL RESPONSES TO PHQ QUESTIONS 1-9: 0
SUM OF ALL RESPONSES TO PHQ9 QUESTIONS 1 & 2: 0
2. FEELING DOWN, DEPRESSED OR HOPELESS: 0

## 2024-01-15 ASSESSMENT — LIFESTYLE VARIABLES: HOW OFTEN DO YOU HAVE A DRINK CONTAINING ALCOHOL: NEVER

## 2024-01-15 NOTE — PROGRESS NOTES
Markel Mills is a 78 y.o. male  .  Subjective:      Had urinary flow issues.had procedure Now doing well. Complains of left ear pain.  Has appointment with surgery to discuss Dysphagia. Discussed blood work. Following with urology. Will recheck carotids in another year. Had CTA. We have removed A fib from history has been determined to not have A fib. Had CTA, shows carotid stenosis at 60 % on right. Will need to repeat in a year        Review of Systems    Past Medical History:   Diagnosis Date    Abnormal computed tomography angiography of heart 2019    Calcified plaque proximal RCA with 50-70% stenosis, proximal LAD 50% stenosis, mild LAD 30-50% stenosis, groundglass infiltrates, area of consoldidation left lung base posteriorly.      Arthritis     Atrial fibrillation (HCC)     Burning pain     CAD (coronary artery disease)     Degenerative lumbar disc     Diabetes (HCC)     DVT (deep venous thrombosis) (HCC)     Dysphagia     Edentulous     upper denture    H/O cardiovascular stress test 2020    Lexiscan    Herniated cervical disc     History of echocardiogram 2017    EF 60-65%    Hx of blood clots     Hyperlipidemia     Hypertension     Stented coronary artery     Tinnitus     Urinary retention 2017       Social History     Socioeconomic History    Marital status:      Spouse name: Not on file    Number of children: Not on file    Years of education: Not on file    Highest education level: Not on file   Occupational History    Not on file   Tobacco Use    Smoking status: Former     Current packs/day: 0.00     Types: Cigarettes     Quit date: 1961     Years since quittin.0    Smokeless tobacco: Never    Tobacco comments:     quit in    Vaping Use    Vaping Use: Never used   Substance and Sexual Activity    Alcohol use: No     Alcohol/week: 0.0 standard drinks of alcohol     Comment: 1-2 cups coffee daily    Drug use: No    Sexual activity: Never   Other Topics

## 2024-01-16 NOTE — PATIENT INSTRUCTIONS
Eating Healthy Foods: Care Instructions  With every meal, you can make healthy food choices. Try to eat a variety of fruits, vegetables, whole grains, lean proteins, and low-fat dairy products. This can help you get the right balance of nutrients, including vitamins and minerals. Small changes add up over time. You can start by adding one healthy food to your meals each day.    Try to make half your plate fruits and vegetables, one-fourth whole grains, and one-fourth lean proteins. Try including dairy with your meals.   Eat more fruits and vegetables. Try to have them with most meals and snacks.   Foods for healthy eating    Fruits    These can be fresh, frozen, canned, or dried.  Try to choose whole fruit rather than fruit juice.  Eat a variety of colors.    Vegetables    These can be fresh, frozen, canned, or dried.  Beans, peas, and lentils count too.    Whole grains    Choose whole-grain breads, cereals, and noodles.  Try brown rice.    Lean proteins    These can include lean meat, poultry, fish, and eggs.  You can also have tofu, beans, peas, lentils, nuts, and seeds.    Dairy    Try milk, yogurt, and cheese.  Choose low-fat or fat-free when you can.  If you need to, use lactose-free milk or fortified plant-based milk products, such as soy milk.    Water    Drink water when you're thirsty.  Limit sugar-sweetened drinks, including soda, fruit drinks, and sports drinks.  Where can you learn more?  Go to https://www.[employer].net/patientEd and enter T756 to learn more about \"Eating Healthy Foods: Care Instructions.\"  Current as of: September 20, 2023               Content Version: 13.9  © 3160-8024 [employer].   Care instructions adapted under license by [employer]. If you have questions about a medical condition or this instruction, always ask your healthcare professional. [employer] disclaims any warranty or liability for your use of this information.           A Healthy

## 2024-01-22 ENCOUNTER — HOSPITAL ENCOUNTER (OUTPATIENT)
Age: 79
Discharge: HOME | End: 2024-01-22
Payer: MEDICARE

## 2024-01-22 LAB
BILIRUB UR QL STRIP: NEGATIVE
CLARITY UR: CLEAR
COLOR UR: YELLOW
COMMENT: ABNORMAL
GLUCOSE UR STRIP-MCNC: NEGATIVE MG/DL
HGB UR QL STRIP.AUTO: NEGATIVE
KETONES UR STRIP-MCNC: NEGATIVE MG/DL
LEUKOCYTE ESTERASE UR QL STRIP: NEGATIVE
NITRITE UR QL STRIP: NEGATIVE
PH UR STRIP: 6 [PH] (ref 5–9)
PROT UR STRIP-MCNC: NEGATIVE MG/DL
SP GR UR STRIP: <1.005 (ref 1–1.03)
UROBILINOGEN UR STRIP-ACNC: 0.2 EU/DL (ref 0–1)

## 2024-01-22 PROCEDURE — 81003 URINALYSIS AUTO W/O SCOPE: CPT

## 2024-01-22 PROCEDURE — 87086 URINE CULTURE/COLONY COUNT: CPT

## 2024-01-23 LAB
MICROORGANISM SPEC CULT: NO GROWTH
SPECIMEN DESCRIPTION: NORMAL

## 2024-01-25 ENCOUNTER — OFFICE VISIT (OUTPATIENT)
Dept: FAMILY MEDICINE CLINIC | Age: 79
End: 2024-01-25

## 2024-01-25 VITALS
RESPIRATION RATE: 20 BRPM | HEIGHT: 69 IN | OXYGEN SATURATION: 96 % | BODY MASS INDEX: 27.11 KG/M2 | WEIGHT: 183 LBS | HEART RATE: 67 BPM | SYSTOLIC BLOOD PRESSURE: 124 MMHG | DIASTOLIC BLOOD PRESSURE: 64 MMHG

## 2024-01-25 DIAGNOSIS — N41.0 ACUTE PROSTATITIS: Primary | ICD-10-CM

## 2024-01-25 LAB
BILIRUBIN, POC: NORMAL
BLOOD URINE, POC: NORMAL
CLARITY, POC: CLEAR
COLOR, POC: NORMAL
GLUCOSE URINE, POC: NORMAL
KETONES, POC: NORMAL
LEUKOCYTE EST, POC: NORMAL
NITRITE, POC: NORMAL
PH, POC: 6
PROTEIN, POC: NORMAL
SPECIFIC GRAVITY, POC: 1.01
UROBILINOGEN, POC: 0.2

## 2024-01-25 RX ORDER — DOXYCYCLINE HYCLATE 100 MG
100 TABLET ORAL 2 TIMES DAILY
Qty: 28 TABLET | Refills: 0 | Status: SHIPPED | OUTPATIENT
Start: 2024-01-25 | End: 2024-02-08

## 2024-01-25 ASSESSMENT — ENCOUNTER SYMPTOMS
VOMITING: 0
NAUSEA: 0

## 2024-01-25 NOTE — PROGRESS NOTES
[address], [address]   [place] [address]   [phone]   Souleymane Wells MD     Patient: Markel Mills  Dateof Birth: 1945  Visit Date: 1/25/24    Markel is a 78 y.o. year old male here today for   Chief Complaint   Patient presents with    Urinary Frequency     Wants results of urine test on Monday, has  a other issue that makes him burn  and hurts       HPI  Urinary Frequency   This is a new problem. The quality of the pain is described as burning. There has been no fever. There is A history of pyelonephritis (has history of urethral blockage). Associated symptoms include frequency and urgency. Pertinent negatives include no chills, flank pain, hematuria, nausea or vomiting.   Also has frequent nocturia that just started.  Has history of right testicle removal and prostate surgery.  Patient also has sciatic nerve symptoms that are hard to distinguish. Patient sees Dr. Avalos for urology.  States he had severe urethral adhesions and required surgical dilatation a few months ago    Recent lab results reviewed, including CMP, CBC, TSH, and lipid panel which are not remarkable.        Review of Systems   Constitutional:  Negative for chills.   Gastrointestinal:  Negative for nausea and vomiting.   Genitourinary:  Positive for frequency and urgency. Negative for flank pain and hematuria.       Past medical, surgical, social and/or family historyreviewed, updated as needed, and are non-contributory (unless otherwise stated).  Medications, allergies, and problem list also reviewed and updated as needed in patient's record.     Current Outpatient Medications   Medication Sig Dispense Refill    doxycycline hyclate (VIBRA-TABS) 100 MG tablet Take 1 tablet by mouth 2 times daily for 14 days 28 tablet 0    finasteride (PROSCAR) 5 MG tablet Take 1 tablet by mouth daily 90 tablet 3    buprenorphine (BUTRANS) 5 MCG/HR PTWK Place 1 patch onto the skin once a week for 168  Dupixent Pregnancy And Lactation Text: This medication likely crosses the placenta but the risk for the fetus is uncertain. This medication is excreted in breast milk.

## 2024-02-01 ENCOUNTER — TELEPHONE (OUTPATIENT)
Dept: FAMILY MEDICINE CLINIC | Age: 79
End: 2024-02-01

## 2024-02-01 NOTE — TELEPHONE ENCOUNTER
I rec'd a call on the Nurse Triage Line informing patient is c/o symptoms that feel like he has a broken neck and difficulty breathing.  The call was warm transferred.  Patient informed me he has plates in his neck and his neck feels like it's broken and phlegm is building up and he's having difficulty breathing.  I advised patient to go to the ED.  Patient stated he was just at the ED a couple of wks ago.  I asked him if he went to an ED since 1/6/24 and he informed me, no.  He stated he had a CT done of his neck and was told to see his doctor.  Patient stated, the ED doesn't do anything for me.  Patient also stated that his condition has worsened.   I spoke w/Mikayla FAITH MA who informed me that the advisement stands to go to the ED.  She also stated patient had an appt yesterday and left before he was seen.  I informed patient that MA was agreeable for patient to go to the ED.  Patient verbalized understanding and was agreeable.     Last seen 1/25/2024  Next appt 7/18/2024

## 2024-02-08 ENCOUNTER — TELEPHONE (OUTPATIENT)
Dept: CARDIOLOGY CLINIC | Age: 79
End: 2024-02-08

## 2024-02-08 NOTE — TELEPHONE ENCOUNTER
Patient needs cardiac clearance for ostephyectomy. Date of procedure unknown. Last seen in office 6/13/2023. Wants to hold plavix 7 days prior to procedure.     Please advise   [phone]

## 2024-02-13 NOTE — TELEPHONE ENCOUNTER
Patient is at acceptable risk for perioperative cardiovascular event for the planned procedure (osteophytectomy), patient may proceed without any further cardiac testing.  May hold the Plavix 5 to 7 days, aspirin 7 to 10 days prior to the procedure, to be resumed postprocedure when deemed safe from surgical standpoint  Please feel free to call for any further information

## 2024-02-23 ENCOUNTER — HOSPITAL ENCOUNTER (OUTPATIENT)
Age: 79
Discharge: HOME | End: 2024-02-23

## 2024-02-23 ENCOUNTER — HOSPITAL ENCOUNTER (OUTPATIENT)
Age: 79
Discharge: HOME | End: 2024-02-25

## 2024-02-23 LAB
ABO + RH BLD: NORMAL
ARM BAND NUMBER: NORMAL
BLOOD BANK SAMPLE EXPIRATION: NORMAL
BLOOD GROUP ANTIBODIES SERPL: NEGATIVE

## 2024-02-23 PROCEDURE — 87081 CULTURE SCREEN ONLY: CPT

## 2024-02-23 PROCEDURE — 86901 BLOOD TYPING SEROLOGIC RH(D): CPT

## 2024-02-23 PROCEDURE — 86850 RBC ANTIBODY SCREEN: CPT

## 2024-02-23 PROCEDURE — 86900 BLOOD TYPING SEROLOGIC ABO: CPT

## 2024-02-25 LAB
MICROORGANISM SPEC CULT: NORMAL
SPECIMEN DESCRIPTION: NORMAL

## 2024-03-05 ENCOUNTER — HOSPITAL ENCOUNTER (OUTPATIENT)
Age: 79
Discharge: HOME | End: 2024-03-07
Payer: MEDICARE

## 2024-03-05 LAB
ANION GAP SERPL CALCULATED.3IONS-SCNC: 12 MMOL/L (ref 7–16)
BUN SERPL-MCNC: 13 MG/DL (ref 6–23)
CALCIUM SERPL-MCNC: 8.7 MG/DL (ref 8.6–10.2)
CHLORIDE SERPL-SCNC: 102 MMOL/L (ref 98–107)
CO2 SERPL-SCNC: 22 MMOL/L (ref 22–29)
CREAT SERPL-MCNC: 0.9 MG/DL (ref 0.7–1.2)
ERYTHROCYTE [DISTWIDTH] IN BLOOD BY AUTOMATED COUNT: 13 % (ref 11.5–15)
GFR SERPL CREATININE-BSD FRML MDRD: >60 ML/MIN/1.73M2
GLUCOSE SERPL-MCNC: 240 MG/DL (ref 74–99)
HCT VFR BLD AUTO: 44.6 % (ref 37–54)
HGB BLD-MCNC: 14.9 G/DL (ref 12.5–16.5)
MCH RBC QN AUTO: 30.2 PG (ref 26–35)
MCHC RBC AUTO-ENTMCNC: 33.4 G/DL (ref 32–34.5)
MCV RBC AUTO: 90.3 FL (ref 80–99.9)
PLATELET # BLD AUTO: 147 K/UL (ref 130–450)
PMV BLD AUTO: 11 FL (ref 7–12)
POTASSIUM SERPL-SCNC: 3.8 MMOL/L (ref 3.5–5)
RBC # BLD AUTO: 4.94 M/UL (ref 3.8–5.8)
SODIUM SERPL-SCNC: 136 MMOL/L (ref 132–146)
WBC OTHER # BLD: 9 K/UL (ref 4.5–11.5)

## 2024-03-05 PROCEDURE — 85027 COMPLETE CBC AUTOMATED: CPT

## 2024-03-05 PROCEDURE — 80048 BASIC METABOLIC PNL TOTAL CA: CPT

## 2024-03-07 ENCOUNTER — TELEPHONE (OUTPATIENT)
Dept: FAMILY MEDICINE CLINIC | Age: 79
End: 2024-03-07

## 2024-03-07 RX ORDER — PANTOPRAZOLE SODIUM 40 MG/1
40 TABLET, DELAYED RELEASE ORAL
Qty: 90 TABLET | Refills: 3 | Status: SHIPPED | OUTPATIENT
Start: 2024-03-07

## 2024-03-07 NOTE — TELEPHONE ENCOUNTER
Patient states he just got out of the hospital and having really bad reflux to wear its making his tongue peel and is script from protonix is  because he hasn't need it in a long time and would like a script called in for it.

## 2024-03-15 LAB — PSA SERPL-MCNC: 0.94 NG/ML (ref 0–4)

## 2024-03-19 ENCOUNTER — HOSPITAL ENCOUNTER (OUTPATIENT)
Age: 79
Discharge: HOME | End: 2024-03-21

## 2024-03-19 PROCEDURE — 88342 IMHCHEM/IMCYTCHM 1ST ANTB: CPT

## 2024-03-19 PROCEDURE — 88305 TISSUE EXAM BY PATHOLOGIST: CPT

## 2024-03-21 LAB — SURGICAL PATHOLOGY REPORT: NORMAL

## 2024-04-03 ENCOUNTER — TELEPHONE (OUTPATIENT)
Dept: CARDIOLOGY CLINIC | Age: 79
End: 2024-04-03

## 2024-04-03 NOTE — TELEPHONE ENCOUNTER
Patient need cardiac clearance for cystoscopy urethral dilation possible direct vision internal urethrotomy. Date of surgery 4/15/2024. If any medications need help prior to surgery. Last seen in office 6/13/2024.       Please advise   [phone]

## 2024-04-04 NOTE — TELEPHONE ENCOUNTER
Patient is at acceptable risk for perioperative cardiovascular event for the planned procedure (cystoscopy urethral dilation possible direct vision internal urethrotomy), patient may proceed without any further cardiac testing.  Recommend continuing Aspirin perioperatively, diastolic possible, may hold aspirin 5 to 7 days prior to the procedure to be resumed postprocedure when deemed safe from urology standpoint  Please feel free to call for any further information

## 2024-04-18 NOTE — PROGRESS NOTES
Belinda Zhang D.O.  [facility]  [address] [place] [address]  Phone: [phone]  Fax: [phone]    Chief Complaint   Patient presents with    Back Pain     6 month follow up        An independent historian was not needed.    Interval history: He had cervical spine surgery with Dr. Nolen and he has hoarseness since then.   He has been using Butrans and feels like it is helping his pain significantly.     Today, the location of pain is in the lumbar spine bilaterally and the severity is Pain Score:   7.     The quality is aching, burning.      The frequency is constant daily.     Alleviating factors include: medications.    Exacerbating factors include:  activity    Functional status:   ADL: Self-care  Mobility: independence Device: None    Exercise: never    Red flags: Not present: history of cancer, pain awakening patient from sleep, night sweats, fevers, unintentional weight loss, immunospuression, saddle anesthesia, new bowel or bladder dysfunction, and osteoporosis.       Associated symptoms: Not present: falls, subjective weakness, hematuria, nausea, vomiting or rash. Present: paresthesias in feet    Data reviewed/prior work up:   Review of labs:   Lab Results   Component Value Date     03/05/2024    K 3.8 03/05/2024     03/05/2024    CO2 22 03/05/2024    BUN 13 03/05/2024    CREATININE 0.9 03/05/2024    GLUCOSE 240 (H) 03/05/2024    CALCIUM 8.7 03/05/2024    PROT 6.8 01/08/2024    BILITOT 0.6 01/08/2024    ALKPHOS 79 01/08/2024    AST 27 01/08/2024    ALT 13 01/08/2024    LABGLOM >60 03/05/2024    GFRAA >60 08/24/2022     Past medical, surgical, family, social history, allergies and medications were otherwise reviewed in Epic.      Physical Exam:   Blood pressure (!) 144/76, pulse 64, height 1.753 m (5' 9\"), weight 83 kg (183 lb).   General: No apparent distress.   Habitus: Body mass index is 27.02 kg/m². Overweight (BMI = 25.0-29.9)

## 2024-04-19 ENCOUNTER — PREP FOR PROCEDURE (OUTPATIENT)
Dept: UROLOGY | Age: 79
End: 2024-04-19

## 2024-04-19 ENCOUNTER — HOSPITAL ENCOUNTER (EMERGENCY)
Age: 79
Discharge: HOME | End: 2024-04-19
Attending: EMERGENCY MEDICINE
Payer: MEDICARE

## 2024-04-19 VITALS
SYSTOLIC BLOOD PRESSURE: 169 MMHG | BODY MASS INDEX: 26.58 KG/M2 | RESPIRATION RATE: 18 BRPM | HEART RATE: 63 BPM | DIASTOLIC BLOOD PRESSURE: 75 MMHG | TEMPERATURE: 97.3 F | WEIGHT: 180 LBS | OXYGEN SATURATION: 95 %

## 2024-04-19 DIAGNOSIS — Z46.6 ENCOUNTER FOR FOLEY CATHETER REMOVAL: Primary | ICD-10-CM

## 2024-04-19 PROCEDURE — 99282 EMERGENCY DEPT VISIT SF MDM: CPT

## 2024-04-19 NOTE — ED NOTES
Patient arrived with indwelling cath inserted by urology group. Ok to remove.   Removed w/o difficulty.

## 2024-04-19 NOTE — ED PROVIDER NOTES
laminectomy (N/A, 6/5/2020); laminectomy (N/A, 8/7/2020); cervical fusion (N/A, 10/9/2020); back surgery; Upper gastrointestinal endoscopy (N/A, 6/15/2022); Upper gastrointestinal endoscopy (6/15/2022); Nerve Block (Bilateral, 3/30/2023); Pain management procedure (Bilateral, 4/20/2023); Pain management procedure (N/A, 5/25/2023); and Pain management procedure (N/A, 6/22/2023).    Social History:  reports that he quit smoking about 63 years ago. His smoking use included cigarettes. He has never used smokeless tobacco. He reports that he does not drink alcohol and does not use drugs.    Family History: family history includes Arrhythmia in his brother; Cancer in his brother; Diabetes in an other family member; Hypertension in an other family member; Kidney Disease in his brother and sister; Other in his father; Stroke in his mother.     The patient’s home medications have been reviewed.    Allergies: Imdur [isosorbide nitrate], Oxycodone, Simvastatin, Amoxicillin, Bactrim [sulfamethoxazole-trimethoprim], Ciprofloxacin hcl, Gabapentin, Hydrocodone, and Norco [hydrocodone-acetaminophen]    -------------------------------------------------- RESULTS -------------------------------------------------  Labs:  No results found for this visit on 04/19/24.    Radiology:  No orders to display       ------------------------- NURSING NOTES AND VITALS REVIEWED ---------------------------  Date / Time Roomed:  4/19/2024  9:35 AM  ED Bed Assignment:  12/12    The nursing notes within the ED encounter and vital signs as below have been reviewed.   BP (!) 169/75   Pulse 63   Temp 97.3 °F (36.3 °C) (Temporal)   Resp 18   Wt 81.6 kg (180 lb)   SpO2 95%   BMI 26.58 kg/m²   Oxygen Saturation Interpretation: Normal      ------------------------------------------ PROGRESS NOTES ------------------------------------------  I have spoken with the patient and discussed today’s results, in addition to providing specific details for the

## 2024-04-24 ENCOUNTER — TELEPHONE (OUTPATIENT)
Dept: ADMINISTRATIVE | Age: 79
End: 2024-04-24

## 2024-04-24 NOTE — TELEPHONE ENCOUNTER
Patient was last seen 2/1/23 and did not have a follow up appointment with Dr. Jiménez scheduled.

## 2024-04-24 NOTE — TELEPHONE ENCOUNTER
Pt states he left a message and was to be called back within 24 hours.  He has decided he does not want to be seen and does not want to be called back.

## 2024-04-26 ENCOUNTER — OFFICE VISIT (OUTPATIENT)
Dept: PHYSICAL MEDICINE AND REHAB | Age: 79
End: 2024-04-26
Payer: MEDICARE

## 2024-04-26 VITALS
DIASTOLIC BLOOD PRESSURE: 76 MMHG | BODY MASS INDEX: 27.11 KG/M2 | SYSTOLIC BLOOD PRESSURE: 144 MMHG | HEART RATE: 64 BPM | HEIGHT: 69 IN | WEIGHT: 183 LBS

## 2024-04-26 DIAGNOSIS — I10 ESSENTIAL HYPERTENSION: ICD-10-CM

## 2024-04-26 DIAGNOSIS — M48.061 LUMBAR FORAMINAL STENOSIS: ICD-10-CM

## 2024-04-26 DIAGNOSIS — G62.9 POLYNEUROPATHY: ICD-10-CM

## 2024-04-26 PROCEDURE — 1036F TOBACCO NON-USER: CPT | Performed by: PHYSICAL MEDICINE & REHABILITATION

## 2024-04-26 PROCEDURE — G8427 DOCREV CUR MEDS BY ELIG CLIN: HCPCS | Performed by: PHYSICAL MEDICINE & REHABILITATION

## 2024-04-26 PROCEDURE — 3078F DIAST BP <80 MM HG: CPT | Performed by: PHYSICAL MEDICINE & REHABILITATION

## 2024-04-26 PROCEDURE — 1123F ACP DISCUSS/DSCN MKR DOCD: CPT | Performed by: PHYSICAL MEDICINE & REHABILITATION

## 2024-04-26 PROCEDURE — 3077F SYST BP >= 140 MM HG: CPT | Performed by: PHYSICAL MEDICINE & REHABILITATION

## 2024-04-26 PROCEDURE — G8417 CALC BMI ABV UP PARAM F/U: HCPCS | Performed by: PHYSICAL MEDICINE & REHABILITATION

## 2024-04-26 PROCEDURE — 99214 OFFICE O/P EST MOD 30 MIN: CPT | Performed by: PHYSICAL MEDICINE & REHABILITATION

## 2024-04-26 RX ORDER — BUPRENORPHINE 5 UG/H
1 PATCH TRANSDERMAL WEEKLY
Qty: 4 PATCH | Refills: 5 | Status: SHIPPED | OUTPATIENT
Start: 2024-04-26 | End: 2024-10-11

## 2024-04-26 RX ORDER — HYDRALAZINE HYDROCHLORIDE 25 MG/1
25 TABLET, FILM COATED ORAL 4 TIMES DAILY
Qty: 270 TABLET | Refills: 3 | Status: SHIPPED | OUTPATIENT
Start: 2024-04-26

## 2024-05-28 ENCOUNTER — TELEPHONE (OUTPATIENT)
Dept: FAMILY MEDICINE CLINIC | Age: 79
End: 2024-05-28

## 2024-05-28 DIAGNOSIS — E11.42 TYPE 2 DIABETES MELLITUS WITH DIABETIC POLYNEUROPATHY, WITHOUT LONG-TERM CURRENT USE OF INSULIN (HCC): Primary | ICD-10-CM

## 2024-05-28 NOTE — TELEPHONE ENCOUNTER
Aleyda from [facility] is calling to get an order for the pt to receive diabetes liquid diet instruction. He recently had esophagus surgery and is unable to eat solids.  She said to include ICD 10 code so that insurance will cover. Order can be faxed to [phone]    Last seen 1/25/2024  Next appt 7/18/2024

## 2024-05-31 NOTE — TELEPHONE ENCOUNTER
Aleyda/[facility] called to inform that she has not rec'd order.  I informed her that it was faxed on 5/29/24.  She requested I fax it again.  Order faxed, as requested, to [phone].

## 2024-07-11 NOTE — TELEPHONE ENCOUNTER
----- Message from Michael Kumar MD sent at 7/10/2024  3:49 PM CDT -----  Regarding: RE: script  Stop the celebrex while taking diclofenac  Dr Rodriguez  ----- Message -----  From: Kimberlyn Zavala MA  Sent: 7/9/2024   1:01 PM CDT  To: Michael Kumar MD  Subject: script                                           Pt is having some concerns because pharmacy told them taking the Celebrex and diclofenac together would be to much together that the pt could possibly have a bleed.    Pt also wanting to know if you wanted her to change dose on elavil and Lipitor? Pt states after visit summary says to change how you take.    Please advise.  Thank you   Pt stopped in the office and said he'd like the nebulizer ordered and will pay out of pocket. Please send order to [facility].

## 2024-08-05 ENCOUNTER — TELEPHONE (OUTPATIENT)
Dept: FAMILY MEDICINE CLINIC | Age: 79
End: 2024-08-05

## 2024-08-05 NOTE — TELEPHONE ENCOUNTER
Patient called asking for an appt today or tomorrow w/Dr. Rogers stating he is having rectal bleeding and has been feeling very tired, sleeping 8-9 hrs a night.  Patient informed me that he had a colon resection several months ago and was having issues with going to the bathroom, moving his bowels.  Patient informed me that he's been bleeding for awhile, but it's increased and he's seeing more on toilet paper.  Patient informed me that the last time he bled was 3 days ago.    I spoke w/Mikayla FAITH MA and informed her.  MA advised that patient either call gastro or go to the ED.  I advised patient, who informed me that he's been to Dr. Wilkes and will call his ofc.    Msg routed, for informational purposes.    Last seen 1/25/2024  Next appt 8/23/2024    Requested Prescriptions      No prescriptions requested or ordered in this encounter

## 2024-08-23 ENCOUNTER — OFFICE VISIT (OUTPATIENT)
Dept: FAMILY MEDICINE CLINIC | Age: 79
End: 2024-08-23

## 2024-08-23 VITALS
HEART RATE: 64 BPM | HEIGHT: 69 IN | DIASTOLIC BLOOD PRESSURE: 80 MMHG | RESPIRATION RATE: 18 BRPM | SYSTOLIC BLOOD PRESSURE: 132 MMHG | WEIGHT: 193 LBS | OXYGEN SATURATION: 97 % | BODY MASS INDEX: 28.58 KG/M2

## 2024-08-23 DIAGNOSIS — E78.2 MIXED HYPERLIPIDEMIA: ICD-10-CM

## 2024-08-23 DIAGNOSIS — E03.9 ACQUIRED HYPOTHYROIDISM: ICD-10-CM

## 2024-08-23 DIAGNOSIS — E03.9 ACQUIRED HYPOTHYROIDISM: Primary | ICD-10-CM

## 2024-08-23 DIAGNOSIS — E55.9 VITAMIN D DEFICIENCY: ICD-10-CM

## 2024-08-23 DIAGNOSIS — R53.82 CHRONIC FATIGUE: ICD-10-CM

## 2024-08-23 DIAGNOSIS — D35.1 PARATHYROID ADENOMA: ICD-10-CM

## 2024-08-23 DIAGNOSIS — E11.65 TYPE 2 DIABETES MELLITUS WITH HYPERGLYCEMIA, WITHOUT LONG-TERM CURRENT USE OF INSULIN (HCC): ICD-10-CM

## 2024-08-23 DIAGNOSIS — E53.8 FOLIC ACID DEFICIENCY: ICD-10-CM

## 2024-08-23 DIAGNOSIS — E11.42 TYPE 2 DIABETES MELLITUS WITH DIABETIC POLYNEUROPATHY, WITHOUT LONG-TERM CURRENT USE OF INSULIN (HCC): ICD-10-CM

## 2024-08-23 DIAGNOSIS — R19.5 MUCUS IN STOOL: ICD-10-CM

## 2024-08-23 DIAGNOSIS — I48.91 ATRIAL FIBRILLATION, UNSPECIFIED TYPE (HCC): ICD-10-CM

## 2024-08-23 DIAGNOSIS — I10 ESSENTIAL HYPERTENSION: ICD-10-CM

## 2024-08-23 LAB
BASOPHILS ABSOLUTE: 0.05 K/UL (ref 0–0.2)
BASOPHILS RELATIVE PERCENT: 1 % (ref 0–2)
EOSINOPHILS ABSOLUTE: 0.16 K/UL (ref 0.05–0.5)
EOSINOPHILS RELATIVE PERCENT: 2 % (ref 0–6)
HCT VFR BLD CALC: 46.5 % (ref 37–54)
HEMOGLOBIN: 15.4 G/DL (ref 12.5–16.5)
IMMATURE GRANULOCYTES %: 0 % (ref 0–5)
IMMATURE GRANULOCYTES ABSOLUTE: 0.03 K/UL (ref 0–0.58)
LYMPHOCYTES ABSOLUTE: 3.01 K/UL (ref 1.5–4)
LYMPHOCYTES RELATIVE PERCENT: 45 % (ref 20–42)
MCH RBC QN AUTO: 29.8 PG (ref 26–35)
MCHC RBC AUTO-ENTMCNC: 33.1 G/DL (ref 32–34.5)
MCV RBC AUTO: 89.9 FL (ref 80–99.9)
MONOCYTES ABSOLUTE: 0.54 K/UL (ref 0.1–0.95)
MONOCYTES RELATIVE PERCENT: 8 % (ref 2–12)
NEUTROPHILS ABSOLUTE: 2.92 K/UL (ref 1.8–7.3)
NEUTROPHILS RELATIVE PERCENT: 44 % (ref 43–80)
PDW BLD-RTO: 13.1 % (ref 11.5–15)
PLATELET # BLD: 173 K/UL (ref 130–450)
PMV BLD AUTO: 10.7 FL (ref 7–12)
RBC # BLD: 5.17 M/UL (ref 3.8–5.8)
WBC # BLD: 6.7 K/UL (ref 4.5–11.5)

## 2024-08-23 RX ORDER — GLIMEPIRIDE 4 MG/1
4 TABLET ORAL 2 TIMES DAILY WITH MEALS
Qty: 180 TABLET | Refills: 5 | Status: SHIPPED | OUTPATIENT
Start: 2024-08-23

## 2024-08-23 RX ORDER — CLOPIDOGREL BISULFATE 75 MG/1
75 TABLET ORAL DAILY
Qty: 90 TABLET | Refills: 3 | Status: SHIPPED | OUTPATIENT
Start: 2024-08-23

## 2024-08-23 RX ORDER — HYDRALAZINE HYDROCHLORIDE 25 MG/1
25 TABLET, FILM COATED ORAL 4 TIMES DAILY
Qty: 270 TABLET | Refills: 3 | Status: SHIPPED | OUTPATIENT
Start: 2024-08-23

## 2024-08-23 RX ORDER — PRAVASTATIN SODIUM 10 MG
TABLET ORAL
Qty: 90 TABLET | Refills: 5 | Status: SHIPPED | OUTPATIENT
Start: 2024-08-23

## 2024-08-23 RX ORDER — GLYCOPYRROLATE 2 MG/1
2 TABLET ORAL 3 TIMES DAILY
Qty: 90 TABLET | Refills: 3 | Status: SHIPPED | OUTPATIENT
Start: 2024-08-23

## 2024-08-23 RX ORDER — METOPROLOL SUCCINATE 50 MG/1
50 TABLET, EXTENDED RELEASE ORAL 2 TIMES DAILY
Qty: 180 TABLET | Refills: 3 | Status: SHIPPED | OUTPATIENT
Start: 2024-08-23

## 2024-08-23 RX ORDER — GLYCOPYRROLATE 1 MG/1
1 TABLET ORAL 3 TIMES DAILY
Qty: 270 TABLET | Refills: 3 | Status: CANCELLED | OUTPATIENT
Start: 2024-08-23 | End: 2025-08-23

## 2024-08-23 SDOH — ECONOMIC STABILITY: INCOME INSECURITY: HOW HARD IS IT FOR YOU TO PAY FOR THE VERY BASICS LIKE FOOD, HOUSING, MEDICAL CARE, AND HEATING?: PATIENT DECLINED

## 2024-08-23 SDOH — ECONOMIC STABILITY: FOOD INSECURITY: WITHIN THE PAST 12 MONTHS, THE FOOD YOU BOUGHT JUST DIDN'T LAST AND YOU DIDN'T HAVE MONEY TO GET MORE.: PATIENT DECLINED

## 2024-08-23 SDOH — ECONOMIC STABILITY: FOOD INSECURITY: WITHIN THE PAST 12 MONTHS, YOU WORRIED THAT YOUR FOOD WOULD RUN OUT BEFORE YOU GOT MONEY TO BUY MORE.: PATIENT DECLINED

## 2024-08-23 ASSESSMENT — ENCOUNTER SYMPTOMS
TROUBLE SWALLOWING: 0
ANAL BLEEDING: 0
EYE DISCHARGE: 0
APNEA: 0
CHOKING: 0
NAUSEA: 0
COLOR CHANGE: 0
CONSTIPATION: 0
DIARRHEA: 0
EYE ITCHING: 0
VOICE CHANGE: 0
SINUS PAIN: 0
VOMITING: 0
RHINORRHEA: 0
ABDOMINAL DISTENTION: 0
SHORTNESS OF BREATH: 0
BACK PAIN: 0
ABDOMINAL PAIN: 0
BLOOD IN STOOL: 0
STRIDOR: 0
SINUS PRESSURE: 0
RECTAL PAIN: 0
PHOTOPHOBIA: 0
EYE PAIN: 0
FACIAL SWELLING: 0
EYE REDNESS: 0
COUGH: 1
CHEST TIGHTNESS: 0
WHEEZING: 0
SORE THROAT: 0

## 2024-08-23 NOTE — PROGRESS NOTES
insulin (HCC)  -     glimepiride (AMARYL) 4 MG tablet; Take 1 tablet by mouth 2 times daily (with meals)    Mixed hyperlipidemia  -     pravastatin (PRAVACHOL) 10 MG tablet; TAKE 1 TABLET BY MOUTH DAILY  -     Lipid Panel; Future    Essential hypertension  -     hydrALAZINE (APRESOLINE) 25 MG tablet; Take 1 tablet by mouth 4 times daily    Mucus in stool  -     glycopyrrolate (ROBINUL) 2 MG tablet; Take 1 tablet by mouth 3 times daily    Folic acid deficiency  -     Vitamin B12 & Folate; Future    Chronic fatigue  -     CBC with Auto Differential; Future  -     Comprehensive Metabolic Panel; Future    Vitamin D deficiency  -     Vitamin D 25 Hydroxy; Future    Parathyroid adenoma  -     PTH, Intact; Future        Reviewed healthmaintenance report. Patient is aware of deficiencies and suggested preventative tests.

## 2024-08-24 LAB
ALBUMIN: 4.6 G/DL (ref 3.5–5.2)
ALP BLD-CCNC: 103 U/L (ref 40–129)
ALT SERPL-CCNC: 16 U/L (ref 0–40)
ANION GAP SERPL CALCULATED.3IONS-SCNC: 17 MMOL/L (ref 7–16)
AST SERPL-CCNC: 27 U/L (ref 0–39)
BILIRUB SERPL-MCNC: 0.4 MG/DL (ref 0–1.2)
BUN BLDV-MCNC: 23 MG/DL (ref 6–23)
CALCIUM SERPL-MCNC: 9.7 MG/DL (ref 8.6–10.2)
CHLORIDE BLD-SCNC: 98 MMOL/L (ref 98–107)
CHOLESTEROL, TOTAL: 236 MG/DL
CO2: 24 MMOL/L (ref 22–29)
CREAT SERPL-MCNC: 0.9 MG/DL (ref 0.7–1.2)
FOLATE: NORMAL NG/ML
GFR, ESTIMATED: 88 ML/MIN/1.73M2
GLUCOSE BLD-MCNC: 150 MG/DL (ref 74–99)
HBA1C MFR BLD: 8.1 % (ref 4–5.6)
HDLC SERPL-MCNC: 45 MG/DL
LDL CHOLESTEROL: 125 MG/DL
POTASSIUM SERPL-SCNC: 4.7 MMOL/L (ref 3.5–5)
PTH INTACT: 31.1 PG/ML (ref 15–65)
SODIUM BLD-SCNC: 139 MMOL/L (ref 132–146)
T4 FREE: 0.9 NG/DL (ref 0.9–1.7)
TOTAL PROTEIN: 8 G/DL (ref 6.4–8.3)
TRIGL SERPL-MCNC: 331 MG/DL
VITAMIN B-12: NORMAL PG/ML (ref 232–1245)
VLDLC SERPL CALC-MCNC: 66 MG/DL

## 2024-08-25 LAB
TSH SERPL DL<=0.05 MIU/L-ACNC: 2.85 UIU/ML (ref 0.27–4.2)
VITAMIN D 25-HYDROXY: 29 NG/ML (ref 30–100)

## 2024-08-27 LAB
FOLATE: 19.1 NG/ML (ref 4.8–24.2)
VITAMIN B-12: 602 PG/ML (ref 211–946)

## 2024-09-07 NOTE — ED PROVIDER NOTES
66-year-old male presenting with back pain. He says has been chronic and ongoing for many years. He has had multiple surgeries on his back. He is set to see pain management on Monday but has not seen them yet. His family doctor does not provide pain medications for him. Upon arrival he states that it is worse on the right side today, he was here about 5 days ago seen for his bilateral low back pain. He does not have urinary retention, no loss of bowel or bladder control, no saddle anesthesia, he is able to walk and get around but just has worsening pain with certain positions of sitting or standing. He has no tearing or ripping or abdominal or chest or upper back pain, he is not having any fevers or chills or nausea or vomiting currently. This a chronic problem for him, he presents asking for pain control.      Family History   Problem Relation Age of Onset    Stroke Mother     Other Father     Kidney Disease Brother     Arrhythmia Brother     Cancer Brother     Kidney Disease Sister     Hypertension Other     Diabetes Other      Past Surgical History:   Procedure Laterality Date    ABLATION OF DYSRHYTHMIC FOCUS  1980    APPROX 30-35 YRS AGO    BACK SURGERY      CARDIAC CATHETERIZATION  02/20/2020    Dr Lianne Luna N/A 10/9/2020    ANTERIOR CERVICAL FUSION  C3-C6, PARTIAL CORPECTOMY C3-C6, ANTERIOR OSTEOPHYTECTOMY C5-T1 ---AUDIOLOGY, SPINAL ELEMENTS performed by Glory Guevara DO at [facility]  2010 sigmoid    COLONOSCOPY  5/2018 [facility]    CORONARY ANGIOPLASTY WITH STENT PLACEMENT      DIAGNOSTIC CARDIAC CATH LAB PROCEDURE      ENDOSCOPY, COLON, DIAGNOSTIC      EPIDURAL STEROID INJECTION N/A 12/11/2018    C7-T1 EPIDURAL STEROID INJECTION performed by Deshawn Longo DO at [address] 1/8/2019    C7 - T1 EPIDURAL STEROID INJECTION #2 performed by Deshawn Longo DO at [address]      zachery lense implants    [address] 2008    LAMINECTOMY N/A 6/5/2020    LUMBAR LAMINECTOMY L3-4 , L5-S1, HARDWARE REMOVAL L4-5, DURAL REPAIR performed by Richar Nolen DO at [facility]    LAMINECTOMY N/A 8/7/2020    LUMBAR DECOMPRESSION L4-S1; IRRIGATION AND DEBRIDEMENT OF LUMBAR HEMATOMA /SEROMA; REMOVAL SYNOVIAL CYST L5-S1 LEFT performed by Richar Nolen DO at [facility]    LUMBAR FUSION  03/06/2017    NASAL SINUS SURGERY  12/07/2017    Submucosal Resection of Inferior Turbinate    NERVE BLOCK Bilateral 10 12 2015    bilateral sacroiliac joint injection #1    NERVE BLOCK Bilateral 10/22/15    sacroiliac joint #2    NERVE BLOCK Bilateral 11 02 2015    Sacroiliac joint bilateral #3    NERVE BLOCK Left 12/2/15    lumbar transforaminal #1    NERVE BLOCK Left 12 16 15    NERVE BLOCK Left 12 28 2015    transforaminal nerve block left lumbar #3    NERVE BLOCK Left 1 20 16    radiofrequency     NERVE BLOCK  12/11/2018    C7-T1 epidural    NERVE BLOCK N/A 01/08/2019    cervical epidural steroid injection    OTHER SURGICAL HISTORY  1943    had a feeding tube as an infant, scar    PROSTATE SURGERY  2017    PTCA  09/30/2019    2.5 x 38 mm Resolute [place] RAFI mid OM1, 2.25 x 8 mm Resolute [place] RAFI ostium of dx    UPPER GASTROINTESTINAL ENDOSCOPY  5/2018 [facility]    UPPER GASTROINTESTINAL ENDOSCOPY N/A 6/4/2019    EGD BIOPSY performed by Dmitri Wilkes MD at [facility] ENDOSCOPY    UPPER GASTROINTESTINAL ENDOSCOPY N/A 6/15/2022    EGD ESOPHAGOGASTRODUODENOSCOPY WITH DILATION performed by Dmitri Wilkes MD at [facility] ENDOSCOPY    UPPER GASTROINTESTINAL ENDOSCOPY  6/15/2022    EGD DILATION SAVORY performed by Dmitri Wilkes MD at [facility] ENDOSCOPY       Review of Systems   Constitutional:  Negative for chills and fever.   Respiratory:  Negative for chest tightness and shortness of breath.    Cardiovascular:  Negative for chest pain.   Gastrointestinal:  Negative for abdominal pain.   Musculoskeletal:  Positive for back pain.      Physical Exam  Constitutional:        General: He is not in acute distress. Appearance: He is well-developed. HENT:      Head: Normocephalic and atraumatic. Eyes:      Pupils: Pupils are equal, round, and reactive to light. Neck:      Thyroid: No thyromegaly. Cardiovascular:      Rate and Rhythm: Normal rate and regular rhythm. Pulmonary:      Effort: Pulmonary effort is normal. No respiratory distress. Breath sounds: Normal breath sounds. No wheezing. Abdominal:      General: There is no distension. Palpations: Abdomen is soft. There is no mass. Tenderness: There is no abdominal tenderness. There is no guarding or rebound. Musculoskeletal:         General: No tenderness. Normal range of motion. Cervical back: Normal range of motion and neck supple. Comments: Wearing a back brace currently   Skin:     General: Skin is warm and dry. Coloration: Skin is not jaundiced or pale. Findings: No erythema. Neurological:      Mental Status: He is alert and oriented to person, place, and time. Cranial Nerves: No cranial nerve deficit. Comments: Moves his arms and leg without problem, the equal in strength   Psychiatric:         Mood and Affect: Mood normal.        Procedures     MDM       History From: Patient    CC/HPI Summary, DDx, ED Course, Reassessment, Tests Considered, Patient expectation:   Chronic back pain, multiple surgeries previously, had hardware initially placed and then removed by 2 different surgeons. Set to see pain management on Monday, no current medications for the pain. Social Determinants affecting Dx or Tx: Stress, chronic pain    Chronic Conditions: Chronic pain, hypertension    Records Reviewed: Family medicine note from Dr. Torres Sender from 2/16/2023, patient currently on Eliquis for a DVT that was seen by vascular surgery the end of last year. Patient also diagnosed with irritant rhinitis.     CT lumbar spine from 3/14/2023 reviewed, no acute fracture or subluxation, degenerative disc disease is present, previous interbody and posterior lateral fusion with laminectomy at L4 and L5.             --------------------------------------------- PAST HISTORY ---------------------------------------------  Past Medical History:  has a past medical history of Abnormal computed tomography angiography of heart, Arthritis, Atrial fibrillation (HCC), Burning pain, CAD (coronary artery disease), Degenerative lumbar disc, Diabetes ([facility] [address] DVT (deep venous thrombosis) ([facility] [address] Dysphagia, Edentulous, H/O cardiovascular stress test, Herniated cervical disc, History of echocardiogram, Hx of blood clots, Hyperlipidemia, Hypertension, Stented coronary artery, Tinnitus, and Urinary retention. Past Surgical History:  has a past surgical history that includes colectomy (2010 sigmoid); Hemorrhoid surgery (2008); Prostate surgery (2017); Colonoscopy (5/2018 [facility]); Nerve Block (Bilateral, 10 12 2015); Nerve Block (Bilateral, 10/22/15); Nerve Block (Bilateral, 11 02 2015); Nerve Block (Left, 12/2/15); Nerve Block (Left, 12 16 15); Nerve Block (Left, 12 28 2015); Nerve Block (Left, 1 20 16); ablation of dysrhythmic focus (1980); lumbar fusion (03/06/2017); Nasal sinus surgery (12/07/2017); Upper gastrointestinal endoscopy (5/2018 [facility]); Nerve Block (12/11/2018); epidural steroid injection (N/A, 12/11/2018); Nerve Block (N/A, 01/08/2019); epidural steroid injection (N/A, 1/8/2019); Endoscopy, colon, diagnostic; other surgical history (1943); Upper gastrointestinal endoscopy (N/A, 6/4/2019); Diagnostic Cardiac Cath Lab Procedure; Percutaneous Transluminal Coronary Angio (09/30/2019); Coronary angioplasty with stent; Cardiac catheterization (02/20/2020); eye surgery; laminectomy (N/A, 6/5/2020); laminectomy (N/A, 8/7/2020); cervical fusion (N/A, 10/9/2020); back surgery; Upper gastrointestinal endoscopy (N/A, 6/15/2022); and Upper gastrointestinal endoscopy (6/15/2022).     Social History:  reports that he quit smoking about 46 years ago. His smoking use included cigarettes. He has never used smokeless tobacco. He reports that he does not drink alcohol and does not use drugs. Family History: family history includes Arrhythmia in his brother; Cancer in his brother; Diabetes in an other family member; Hypertension in an other family member; Kidney Disease in his brother and sister; Other in his father; Stroke in his mother. The patients home medications have been reviewed. Allergies: Imdur [isosorbide nitrate], Oxycodone, Simvastatin, Bactrim [sulfamethoxazole-trimethoprim], Ciprofloxacin hcl, Gabapentin, Hydrocodone, and Norco [hydrocodone-acetaminophen]    -------------------------------------------------- RESULTS -------------------------------------------------  Labs:  No results found for this visit on 03/18/23. Radiology:  No orders to display       ------------------------- NURSING NOTES AND VITALS REVIEWED ---------------------------  Date / Time Roomed:  3/18/2023  2:50 PM  ED Bed Assignment:  13/13    The nursing notes within the ED encounter and vital signs as below have been reviewed. BP (!) 174/94   Pulse 67   Temp 98.4 °F (36.9 °C) (Oral)   Resp 18   Ht 5' 9\" (1.753 m)   Wt 168 lb (76.2 kg)   SpO2 98%   BMI 24.81 kg/m²   Oxygen Saturation Interpretation: Normal      ------------------------------------------ PROGRESS NOTES ------------------------------------------  I have spoken with the patient and discussed todays results, in addition to providing specific details for the plan of care and counseling regarding the diagnosis and prognosis. Their questions are answered at this time and they are agreeable with the plan. I discussed at length with them reasons for immediate return here for re evaluation. They will followup with primary care by calling their office tomorrow.       --------------------------------- ADDITIONAL PROVIDER NOTES ---------------------------------  At this time the patient is without objective evidence of an acute process requiring hospitalization or inpatient management. They have remained hemodynamically stable throughout their entire ED visit and are stable for discharge with outpatient follow-up. The plan has been discussed in detail and they are aware of the specific conditions for emergent return, as well as the importance of follow-up. Discharge Medication List as of 3/18/2023  5:45 PM        START taking these medications    Details   methylPREDNISolone (MEDROL, UCHE,) 4 MG tablet USE AS DIRECTED DISPENSE ONE PACK NO REFILLS, Disp-1 kit, R-0Normal             Diagnosis:  1. Acute exacerbation of chronic low back pain        Disposition:  Patient's disposition: Discharge to home  Patient's condition is stable.               Tho Ludwig DO  03/19/23 0587 No

## 2024-10-08 NOTE — PROGRESS NOTES
28.94 kg/m². Overweight (BMI = 25.0-29.9)   MSK: There is no joint effusion, deformity, instability, swelling, erythema or warmth.  AROM is 60* flexion, 20* extension, 20* bilateral lateral flexion Spinal curvatures are normal except lumbar lordosis is decreased.     SLR is negative. Tender to palpation bilateral lumbar paraspinals. AROM is painful.   Neurologic:  Distal sensorimotor deficit.  Reflexes 2+ and symmetric except achilles 1+bilaterally.  Gait is antalgic.    Impression:   1. Lumbar foraminal stenosis    2. Polyneuropathy      The patient's condition is unstable and currently Acute on Chronic.     Prognosis: Good    Plan:   Education regarding the risks, benefits and alternatives of treatment were discussed including:   Tylenol not to exceed 2000 mg in 24 hours.      Orders Placed This Encounter   Medications    buprenorphine (BUTRANS) 5 MCG/HR PTWK     Sig: Place 1 patch onto the skin once a week for 168 days. Max Daily Amount: 1 patch     Dispense:  4 patch     Refill:  5     Do not fill till 8-28-23       Controlled Substance Monitoring:    Acute and Chronic Pain Monitoring:   RX Monitoring Periodic Controlled Substance Monitoring   10/21/2024  11:34 AM No signs of potential drug abuse or diversion identified.     Medications prescribed require monitoring for toxicity:no     No orders of the defined types were placed in this encounter.    Exercise options discussed and encouraged    Activity modifications discussed and recommended    Diagnosis and treatment were significantly limited by the following social determinants of health:  [  ] Financial   [  ] Transportation   [  ] No insurance   [  ] Housing   [  ] Substance abuse   [  ] Lack of support   [X  ] BMI was elevated today, and weight loss plan recommended is : conventional weight loss..  [  ]   Tobacco Use: Medium Risk (10/21/2024)    Patient History     Smoking Tobacco Use: Former     Smokeless Tobacco Use: Never     Passive Exposure: Not on

## 2024-10-21 ENCOUNTER — OFFICE VISIT (OUTPATIENT)
Dept: PHYSICAL MEDICINE AND REHAB | Age: 79
End: 2024-10-21

## 2024-10-21 VITALS
HEIGHT: 69 IN | HEART RATE: 73 BPM | BODY MASS INDEX: 29.03 KG/M2 | DIASTOLIC BLOOD PRESSURE: 87 MMHG | SYSTOLIC BLOOD PRESSURE: 136 MMHG | WEIGHT: 196 LBS

## 2024-10-21 DIAGNOSIS — G62.9 POLYNEUROPATHY: ICD-10-CM

## 2024-10-21 DIAGNOSIS — M48.061 LUMBAR FORAMINAL STENOSIS: ICD-10-CM

## 2024-10-21 RX ORDER — BUPRENORPHINE 5 UG/H
1 PATCH TRANSDERMAL WEEKLY
Qty: 4 PATCH | Refills: 5 | Status: SHIPPED | OUTPATIENT
Start: 2024-10-21 | End: 2025-04-07

## 2024-11-21 ENCOUNTER — OFFICE VISIT (OUTPATIENT)
Dept: FAMILY MEDICINE CLINIC | Age: 79
End: 2024-11-21

## 2024-11-21 VITALS
DIASTOLIC BLOOD PRESSURE: 80 MMHG | OXYGEN SATURATION: 96 % | HEIGHT: 69 IN | WEIGHT: 198 LBS | RESPIRATION RATE: 18 BRPM | BODY MASS INDEX: 29.33 KG/M2 | SYSTOLIC BLOOD PRESSURE: 132 MMHG | HEART RATE: 73 BPM

## 2024-11-21 DIAGNOSIS — B37.81 THRUSH OF MOUTH AND ESOPHAGUS (HCC): ICD-10-CM

## 2024-11-21 DIAGNOSIS — B37.0 THRUSH OF MOUTH AND ESOPHAGUS (HCC): ICD-10-CM

## 2024-11-21 DIAGNOSIS — R19.5 MUCUS IN STOOL: ICD-10-CM

## 2024-11-21 DIAGNOSIS — H60.542 ACUTE ECZEMATOID OTITIS EXTERNA OF LEFT EAR: ICD-10-CM

## 2024-11-21 DIAGNOSIS — E78.2 MIXED HYPERLIPIDEMIA: ICD-10-CM

## 2024-11-21 DIAGNOSIS — E11.42 TYPE 2 DIABETES MELLITUS WITH DIABETIC POLYNEUROPATHY, WITHOUT LONG-TERM CURRENT USE OF INSULIN (HCC): ICD-10-CM

## 2024-11-21 DIAGNOSIS — B35.4 TINEA CORPORIS: Primary | ICD-10-CM

## 2024-11-21 DIAGNOSIS — R53.82 CHRONIC FATIGUE: ICD-10-CM

## 2024-11-21 DIAGNOSIS — J01.90 ACUTE BACTERIAL SINUSITIS: ICD-10-CM

## 2024-11-21 DIAGNOSIS — E03.9 ACQUIRED HYPOTHYROIDISM: ICD-10-CM

## 2024-11-21 DIAGNOSIS — B96.89 ACUTE BACTERIAL SINUSITIS: ICD-10-CM

## 2024-11-21 RX ORDER — TRIAMCINOLONE ACETONIDE 5 MG/G
CREAM TOPICAL
Qty: 30 G | Refills: 2 | Status: SHIPPED | OUTPATIENT
Start: 2024-11-21 | End: 2024-11-28

## 2024-11-21 RX ORDER — CEFDINIR 250 MG/5ML
250 POWDER, FOR SUSPENSION ORAL 2 TIMES DAILY
Qty: 100 ML | Refills: 0 | Status: SHIPPED | OUTPATIENT
Start: 2024-11-21 | End: 2024-12-01

## 2024-11-21 RX ORDER — CICLOPIROX OLAMINE 7.7 MG/G
CREAM TOPICAL
Qty: 30 G | Refills: 2 | Status: SHIPPED | OUTPATIENT
Start: 2024-11-21

## 2024-11-21 RX ORDER — FLUCONAZOLE 100 MG/1
100 TABLET ORAL DAILY
Qty: 10 TABLET | Refills: 0 | Status: SHIPPED | OUTPATIENT
Start: 2024-11-21 | End: 2024-12-01

## 2024-11-21 RX ORDER — NYSTATIN 100000 [USP'U]/ML
5 SUSPENSION ORAL 3 TIMES DAILY
Qty: 210 ML | Refills: 0 | Status: SHIPPED | OUTPATIENT
Start: 2024-11-21 | End: 2024-12-05

## 2024-11-21 RX ORDER — GLYCOPYRROLATE 1 MG/1
2 TABLET ORAL 3 TIMES DAILY
Qty: 270 TABLET | Refills: 3 | Status: SHIPPED | OUTPATIENT
Start: 2024-11-21

## 2024-11-21 ASSESSMENT — ENCOUNTER SYMPTOMS
EYE ITCHING: 0
WHEEZING: 0
CONSTIPATION: 0
SHORTNESS OF BREATH: 0
EYE PAIN: 0
VOMITING: 0
SINUS PRESSURE: 1
EYE REDNESS: 0
SINUS PAIN: 1
CHOKING: 0
FACIAL SWELLING: 0
RHINORRHEA: 1
NAUSEA: 0
EYE DISCHARGE: 0
PHOTOPHOBIA: 0
RECTAL PAIN: 0
BACK PAIN: 1
DIARRHEA: 0
ANAL BLEEDING: 0
VOICE CHANGE: 0
TROUBLE SWALLOWING: 0
BLOOD IN STOOL: 0
SORE THROAT: 0
COLOR CHANGE: 0
CHEST TIGHTNESS: 0
ABDOMINAL PAIN: 0
ABDOMINAL DISTENTION: 0
APNEA: 0
COUGH: 0
STRIDOR: 0

## 2024-11-21 ASSESSMENT — PATIENT HEALTH QUESTIONNAIRE - PHQ9
SUM OF ALL RESPONSES TO PHQ QUESTIONS 1-9: 0
SUM OF ALL RESPONSES TO PHQ QUESTIONS 1-9: 0
SUM OF ALL RESPONSES TO PHQ9 QUESTIONS 1 & 2: 0
SUM OF ALL RESPONSES TO PHQ QUESTIONS 1-9: 0
2. FEELING DOWN, DEPRESSED OR HOPELESS: NOT AT ALL
SUM OF ALL RESPONSES TO PHQ QUESTIONS 1-9: 0
1. LITTLE INTEREST OR PLEASURE IN DOING THINGS: NOT AT ALL

## 2024-11-21 NOTE — PROGRESS NOTES
diabetes mellitus with diabetic polyneuropathy, without long-term current use of insulin (HCC)  -     Hemoglobin A1C; Future    Acquired hypothyroidism    Mixed hyperlipidemia  -     Lipid Panel; Future    Chronic fatigue  -     CBC with Auto Differential; Future  -     Comprehensive Metabolic Panel; Future  -     TSH; Future  -     T4, Free; Future    Thrush of mouth and esophagus (HCC)  -     nystatin (MYCOSTATIN) 652383 UNIT/ML suspension; Take 5 mLs by mouth in the morning, at noon, and at bedtime for 14 days  -     fluconazole (DIFLUCAN) 100 MG tablet; Take 1 tablet by mouth daily for 10 days    Acute bacterial sinusitis  -     cefdinir (OMNICEF) 250 MG/5ML suspension; Take 5 mLs by mouth 2 times daily for 10 days        Reviewed healthmaintenance report. Patient is aware of deficiencies and suggested preventative tests.

## 2024-12-02 ENCOUNTER — TELEPHONE (OUTPATIENT)
Dept: FAMILY MEDICINE CLINIC | Age: 79
End: 2024-12-02

## 2024-12-02 DIAGNOSIS — B37.0 THRUSH: ICD-10-CM

## 2024-12-02 DIAGNOSIS — J01.00 ACUTE NON-RECURRENT MAXILLARY SINUSITIS: Primary | ICD-10-CM

## 2024-12-02 RX ORDER — NYSTATIN 100000 [USP'U]/ML
5 SUSPENSION ORAL 4 TIMES DAILY
Qty: 280 ML | Refills: 0 | Status: SHIPPED | OUTPATIENT
Start: 2024-12-02 | End: 2024-12-16

## 2024-12-02 RX ORDER — CEFDINIR 250 MG/5ML
300 POWDER, FOR SUSPENSION ORAL 2 TIMES DAILY
Qty: 120 ML | Refills: 0 | Status: SHIPPED | OUTPATIENT
Start: 2024-12-02 | End: 2024-12-12

## 2024-12-02 RX ORDER — BROMPHENIRAMINE MALEATE, PSEUDOEPHEDRINE HYDROCHLORIDE, AND DEXTROMETHORPHAN HYDROBROMIDE 2; 30; 10 MG/5ML; MG/5ML; MG/5ML
5 SYRUP ORAL 4 TIMES DAILY PRN
Qty: 180 ML | Refills: 0 | Status: SHIPPED | OUTPATIENT
Start: 2024-12-02

## 2024-12-02 RX ORDER — FLUCONAZOLE 100 MG/1
200 TABLET ORAL DAILY
Qty: 14 TABLET | Refills: 0 | Status: SHIPPED | OUTPATIENT
Start: 2024-12-02 | End: 2024-12-09

## 2024-12-02 RX ORDER — IPRATROPIUM BROMIDE 42 UG/1
2 SPRAY, METERED NASAL 4 TIMES DAILY
Qty: 15 ML | Refills: 3 | Status: SHIPPED | OUTPATIENT
Start: 2024-12-02

## 2024-12-02 NOTE — TELEPHONE ENCOUNTER
Patient stopped in stating fluconazole did not help. Still not feeling well and that you told him if didn't to let you know and would try something else.

## 2025-01-13 DIAGNOSIS — E11.42 TYPE 2 DIABETES MELLITUS WITH DIABETIC POLYNEUROPATHY, WITHOUT LONG-TERM CURRENT USE OF INSULIN (HCC): ICD-10-CM

## 2025-01-13 DIAGNOSIS — R53.82 CHRONIC FATIGUE: ICD-10-CM

## 2025-01-13 DIAGNOSIS — E78.2 MIXED HYPERLIPIDEMIA: ICD-10-CM

## 2025-01-13 LAB
ALBUMIN: 4.3 G/DL (ref 3.5–5.2)
ALP BLD-CCNC: 113 U/L (ref 40–129)
ALT SERPL-CCNC: 13 U/L (ref 0–40)
ANION GAP SERPL CALCULATED.3IONS-SCNC: 12 MMOL/L (ref 7–16)
AST SERPL-CCNC: 22 U/L (ref 0–39)
BASOPHILS ABSOLUTE: 0.04 K/UL (ref 0–0.2)
BASOPHILS RELATIVE PERCENT: 1 % (ref 0–2)
BILIRUB SERPL-MCNC: 0.5 MG/DL (ref 0–1.2)
BUN BLDV-MCNC: 19 MG/DL (ref 6–23)
CALCIUM SERPL-MCNC: 9.8 MG/DL (ref 8.6–10.2)
CHLORIDE BLD-SCNC: 102 MMOL/L (ref 98–107)
CHOLESTEROL, TOTAL: 156 MG/DL
CO2: 25 MMOL/L (ref 22–29)
CREAT SERPL-MCNC: 0.9 MG/DL (ref 0.7–1.2)
EOSINOPHILS ABSOLUTE: 0.1 K/UL (ref 0.05–0.5)
EOSINOPHILS RELATIVE PERCENT: 2 % (ref 0–6)
GFR, ESTIMATED: 86 ML/MIN/1.73M2
GLUCOSE BLD-MCNC: 222 MG/DL (ref 74–99)
HBA1C MFR BLD: 12.4 % (ref 4–5.6)
HCT VFR BLD CALC: 49.3 % (ref 37–54)
HDLC SERPL-MCNC: 42 MG/DL
HEMOGLOBIN: 16.3 G/DL (ref 12.5–16.5)
IMMATURE GRANULOCYTES %: 0 % (ref 0–5)
IMMATURE GRANULOCYTES ABSOLUTE: <0.03 K/UL (ref 0–0.58)
LDL CHOLESTEROL: 76 MG/DL
LYMPHOCYTES ABSOLUTE: 2.68 K/UL (ref 1.5–4)
LYMPHOCYTES RELATIVE PERCENT: 48 % (ref 20–42)
MCH RBC QN AUTO: 30.2 PG (ref 26–35)
MCHC RBC AUTO-ENTMCNC: 33.1 G/DL (ref 32–34.5)
MCV RBC AUTO: 91.3 FL (ref 80–99.9)
MONOCYTES ABSOLUTE: 0.47 K/UL (ref 0.1–0.95)
MONOCYTES RELATIVE PERCENT: 9 % (ref 2–12)
NEUTROPHILS ABSOLUTE: 2.23 K/UL (ref 1.8–7.3)
NEUTROPHILS RELATIVE PERCENT: 40 % (ref 43–80)
PDW BLD-RTO: 12.9 % (ref 11.5–15)
PLATELET # BLD: 191 K/UL (ref 130–450)
PMV BLD AUTO: 10.7 FL (ref 7–12)
POTASSIUM SERPL-SCNC: 5.2 MMOL/L (ref 3.5–5)
RBC # BLD: 5.4 M/UL (ref 3.8–5.8)
SODIUM BLD-SCNC: 139 MMOL/L (ref 132–146)
T4 FREE: 1.2 NG/DL (ref 0.9–1.7)
TOTAL PROTEIN: 7.6 G/DL (ref 6.4–8.3)
TRIGL SERPL-MCNC: 189 MG/DL
TSH SERPL DL<=0.05 MIU/L-ACNC: 3.68 UIU/ML (ref 0.27–4.2)
VLDLC SERPL CALC-MCNC: 38 MG/DL
WBC # BLD: 5.5 K/UL (ref 4.5–11.5)

## 2025-01-28 DIAGNOSIS — R19.5 MUCUS IN STOOL: ICD-10-CM

## 2025-01-30 RX ORDER — GLYCOPYRROLATE 1 MG/1
1 TABLET ORAL 3 TIMES DAILY
Qty: 270 TABLET | Refills: 3 | Status: SHIPPED | OUTPATIENT
Start: 2025-01-30

## 2025-02-11 ENCOUNTER — OFFICE VISIT (OUTPATIENT)
Dept: FAMILY MEDICINE CLINIC | Age: 80
End: 2025-02-11

## 2025-02-11 VITALS
SYSTOLIC BLOOD PRESSURE: 126 MMHG | BODY MASS INDEX: 27.55 KG/M2 | DIASTOLIC BLOOD PRESSURE: 78 MMHG | RESPIRATION RATE: 18 BRPM | WEIGHT: 186 LBS | OXYGEN SATURATION: 98 % | HEART RATE: 76 BPM | HEIGHT: 69 IN

## 2025-02-11 DIAGNOSIS — Z00.00 MEDICARE ANNUAL WELLNESS VISIT, SUBSEQUENT: Primary | ICD-10-CM

## 2025-02-11 DIAGNOSIS — R35.0 BENIGN PROSTATIC HYPERPLASIA WITH URINARY FREQUENCY: ICD-10-CM

## 2025-02-11 DIAGNOSIS — N40.1 BENIGN PROSTATIC HYPERPLASIA WITH URINARY FREQUENCY: ICD-10-CM

## 2025-02-11 DIAGNOSIS — E11.42 TYPE 2 DIABETES MELLITUS WITH DIABETIC POLYNEUROPATHY, WITHOUT LONG-TERM CURRENT USE OF INSULIN (HCC): ICD-10-CM

## 2025-02-11 PROBLEM — M71.38 SYNOVIAL CYST OF LUMBAR SPINE: Status: RESOLVED | Noted: 2020-08-07 | Resolved: 2025-02-11

## 2025-02-11 PROBLEM — Z86.718 HISTORY OF DEEP VEIN THROMBOSIS: Status: RESOLVED | Noted: 2022-12-15 | Resolved: 2025-02-11

## 2025-02-11 PROBLEM — E03.9 HYPOTHYROIDISM: Status: RESOLVED | Noted: 2019-10-31 | Resolved: 2025-02-11

## 2025-02-11 SDOH — ECONOMIC STABILITY: FOOD INSECURITY: WITHIN THE PAST 12 MONTHS, YOU WORRIED THAT YOUR FOOD WOULD RUN OUT BEFORE YOU GOT MONEY TO BUY MORE.: PATIENT DECLINED

## 2025-02-11 SDOH — ECONOMIC STABILITY: FOOD INSECURITY: WITHIN THE PAST 12 MONTHS, THE FOOD YOU BOUGHT JUST DIDN'T LAST AND YOU DIDN'T HAVE MONEY TO GET MORE.: PATIENT DECLINED

## 2025-02-11 ASSESSMENT — PATIENT HEALTH QUESTIONNAIRE - PHQ9
SUM OF ALL RESPONSES TO PHQ QUESTIONS 1-9: 0
1. LITTLE INTEREST OR PLEASURE IN DOING THINGS: NOT AT ALL
SUM OF ALL RESPONSES TO PHQ QUESTIONS 1-9: 0
SUM OF ALL RESPONSES TO PHQ9 QUESTIONS 1 & 2: 0
SUM OF ALL RESPONSES TO PHQ QUESTIONS 1-9: 0
2. FEELING DOWN, DEPRESSED OR HOPELESS: NOT AT ALL
SUM OF ALL RESPONSES TO PHQ QUESTIONS 1-9: 0

## 2025-02-11 ASSESSMENT — LIFESTYLE VARIABLES: HOW OFTEN DO YOU HAVE A DRINK CONTAINING ALCOHOL: NEVER

## 2025-02-11 NOTE — PROGRESS NOTES
02/11/25 0926   BP: 126/78   Pulse: 76   Resp: 18   SpO2: 98%   Weight: 84.4 kg (186 lb)   Height: 1.753 m (5' 9\")      Body mass index is 27.47 kg/m².                    Allergies   Allergen Reactions    Imdur [Isosorbide Nitrate] Other (See Comments)     headache    Oxycodone Rash    Simvastatin Other (See Comments)     Muscle weakness    Amoxicillin Nausea And Vomiting    Bactrim [Sulfamethoxazole-Trimethoprim] Nausea Only    Ciprofloxacin Hcl Other (See Comments)     \"Heel/ tendon pain\"    Gabapentin Other (See Comments)     Constipation      Hydrocodone Nausea Only    Norco [Hydrocodone-Acetaminophen] Nausea Only and Other (See Comments)     Causes sick feeling in head       Prior to Visit Medications    Medication Sig Taking? Authorizing Provider   finasteride (PROSCAR) 5 MG tablet Take 1 tablet by mouth daily Yes Markel Rogers DO   pantoprazole (PROTONIX) 40 MG tablet Take 1 tablet by mouth every morning (before breakfast) Yes Markel Rogers DO   pioglitazone (ACTOS) 30 MG tablet Take 1 tablet by mouth daily Yes Markel Rogers DO   glycopyrrolate (ROBINUL) 1 MG tablet TAKE 1 TABLET BY MOUTH 3 TIMES DAILY Yes Markel Rogers DO   brompheniramine-pseudoephedrine-DM 2-30-10 MG/5ML syrup Take 5 mLs by mouth 4 times daily as needed for Congestion or Cough Yes Markel Rogers DO   ipratropium (ATROVENT) 0.06 % nasal spray 2 sprays by Each Nostril route 4 times daily Yes Markel Rogers DO   ciclopirox (LOPROX) 0.77 % cream Apply to area of face twice a day Yes Markel Rogers DO   buprenorphine (BUTRANS) 5 MCG/HR PTWK Place 1 patch onto the skin once a week for 168 days. Max Daily Amount: 1 patch Yes Belinda Zhang, DO   clopidogrel (PLAVIX) 75 MG tablet Take 1 tablet by mouth daily Yes Markel Rogers DO   glimepiride (AMARYL) 4 MG tablet Take 1 tablet by mouth 2 times daily (with meals) Yes Markel Rogers DO   pravastatin (PRAVACHOL) 10 MG tablet TAKE 1 TABLET BY MOUTH DAILY Yes

## 2025-02-12 NOTE — PROGRESS NOTES
Belinda Zhang D.O.  [facility]  [address] [place] [address]  Phone: [phone]  Fax: [phone]    Chief Complaint   Patient presents with    Back Pain     5 month follow up        An independent historian was not needed.    Interval history: Patient has been well controlled on his current medication regimen.      Today, the location of pain is in the lumbar spine bilaterally and the severity is Pain Score:   6.     The quality is aching, burning.      The frequency is constant daily.     Alleviating factors include: medications.    Exacerbating factors include:  activity    Functional status:   ADL: Self-care  Mobility: independence Device: None    Exercise: never    Red flags: Not present: history of cancer, pain awakening patient from sleep, night sweats, fevers, unintentional weight loss, immunospuression, saddle anesthesia, new bowel or bladder dysfunction, and osteoporosis.       Associated symptoms: Not present: falls, subjective weakness, hematuria, nausea, vomiting or rash. Present: paresthesias in feet    Data reviewed/prior work up:   Review of labs:   Lab Results   Component Value Date     01/13/2025    K 5.2 (H) 01/13/2025     01/13/2025    CO2 25 01/13/2025    BUN 19 01/13/2025    CREATININE 0.9 01/13/2025    GLUCOSE 222 (H) 01/13/2025    CALCIUM 9.8 01/13/2025    BILITOT 0.5 01/13/2025    ALKPHOS 113 01/13/2025    AST 22 01/13/2025    ALT 13 01/13/2025    LABGLOM 86 01/13/2025    GFRAA >60 08/24/2022     Past medical, surgical, family, social history, allergies and medications were otherwise reviewed in Epic.      Physical Exam:   Blood pressure (!) 142/72, height 1.753 m (5' 9\"), weight 86.6 kg (191 lb).   General: No apparent distress.   Habitus: Body mass index is 28.21 kg/m². Overweight (BMI = 25.0-29.9)   MSK: There is no joint effusion, deformity, instability, swelling, erythema or warmth.  AROM is 60* flexion, 20*

## 2025-02-15 RX ORDER — FINASTERIDE 5 MG/1
5 TABLET, FILM COATED ORAL DAILY
Qty: 90 TABLET | Refills: 3 | Status: SHIPPED | OUTPATIENT
Start: 2025-02-15

## 2025-02-15 RX ORDER — PANTOPRAZOLE SODIUM 40 MG/1
40 TABLET, DELAYED RELEASE ORAL
Qty: 90 TABLET | Refills: 3 | Status: SHIPPED | OUTPATIENT
Start: 2025-02-15

## 2025-02-15 RX ORDER — PIOGLITAZONE 30 MG/1
30 TABLET ORAL DAILY
Qty: 90 TABLET | Refills: 3 | Status: SHIPPED | OUTPATIENT
Start: 2025-02-15

## 2025-02-27 ENCOUNTER — OFFICE VISIT (OUTPATIENT)
Dept: PHYSICAL MEDICINE AND REHAB | Age: 80
End: 2025-02-27
Payer: MEDICARE

## 2025-02-27 VITALS
BODY MASS INDEX: 28.29 KG/M2 | DIASTOLIC BLOOD PRESSURE: 72 MMHG | SYSTOLIC BLOOD PRESSURE: 142 MMHG | WEIGHT: 191 LBS | HEIGHT: 69 IN

## 2025-02-27 DIAGNOSIS — M48.061 LUMBAR FORAMINAL STENOSIS: ICD-10-CM

## 2025-02-27 DIAGNOSIS — G62.9 POLYNEUROPATHY: ICD-10-CM

## 2025-02-27 PROCEDURE — 3077F SYST BP >= 140 MM HG: CPT | Performed by: PHYSICAL MEDICINE & REHABILITATION

## 2025-02-27 PROCEDURE — 1159F MED LIST DOCD IN RCRD: CPT | Performed by: PHYSICAL MEDICINE & REHABILITATION

## 2025-02-27 PROCEDURE — 1123F ACP DISCUSS/DSCN MKR DOCD: CPT | Performed by: PHYSICAL MEDICINE & REHABILITATION

## 2025-02-27 PROCEDURE — 1036F TOBACCO NON-USER: CPT | Performed by: PHYSICAL MEDICINE & REHABILITATION

## 2025-02-27 PROCEDURE — 1125F AMNT PAIN NOTED PAIN PRSNT: CPT | Performed by: PHYSICAL MEDICINE & REHABILITATION

## 2025-02-27 PROCEDURE — 99214 OFFICE O/P EST MOD 30 MIN: CPT | Performed by: PHYSICAL MEDICINE & REHABILITATION

## 2025-02-27 PROCEDURE — G8417 CALC BMI ABV UP PARAM F/U: HCPCS | Performed by: PHYSICAL MEDICINE & REHABILITATION

## 2025-02-27 PROCEDURE — 3078F DIAST BP <80 MM HG: CPT | Performed by: PHYSICAL MEDICINE & REHABILITATION

## 2025-02-27 PROCEDURE — G8427 DOCREV CUR MEDS BY ELIG CLIN: HCPCS | Performed by: PHYSICAL MEDICINE & REHABILITATION

## 2025-02-27 RX ORDER — BUPRENORPHINE 5 UG/H
1 PATCH TRANSDERMAL WEEKLY
Qty: 4 PATCH | Refills: 5 | Status: SHIPPED | OUTPATIENT
Start: 2025-02-27 | End: 2025-08-14

## 2025-04-29 ENCOUNTER — OFFICE VISIT (OUTPATIENT)
Dept: FAMILY MEDICINE CLINIC | Age: 80
End: 2025-04-29

## 2025-04-29 VITALS
RESPIRATION RATE: 16 BRPM | OXYGEN SATURATION: 96 % | SYSTOLIC BLOOD PRESSURE: 122 MMHG | BODY MASS INDEX: 29.77 KG/M2 | HEART RATE: 62 BPM | DIASTOLIC BLOOD PRESSURE: 80 MMHG | WEIGHT: 201 LBS | HEIGHT: 69 IN

## 2025-04-29 DIAGNOSIS — E11.65 TYPE 2 DIABETES MELLITUS WITH HYPERGLYCEMIA, WITHOUT LONG-TERM CURRENT USE OF INSULIN (HCC): ICD-10-CM

## 2025-04-29 DIAGNOSIS — J01.00 ACUTE NON-RECURRENT MAXILLARY SINUSITIS: ICD-10-CM

## 2025-04-29 DIAGNOSIS — M47.816 LUMBAR SPONDYLOSIS: ICD-10-CM

## 2025-04-29 DIAGNOSIS — R13.19 ESOPHAGEAL DYSPHAGIA: ICD-10-CM

## 2025-04-29 DIAGNOSIS — E78.2 MIXED HYPERLIPIDEMIA: ICD-10-CM

## 2025-04-29 DIAGNOSIS — E11.42 TYPE 2 DIABETES MELLITUS WITH DIABETIC POLYNEUROPATHY, WITHOUT LONG-TERM CURRENT USE OF INSULIN (HCC): Primary | ICD-10-CM

## 2025-04-29 LAB — HBA1C MFR BLD: 7.1 %

## 2025-04-29 NOTE — PROGRESS NOTES
tablet 3    ipratropium (ATROVENT) 0.06 % nasal spray 2 sprays by Each Nostril route 4 times daily 15 mL 3    ciclopirox (LOPROX) 0.77 % cream Apply to area of face twice a day 30 g 2    clopidogrel (PLAVIX) 75 MG tablet Take 1 tablet by mouth daily 90 tablet 3    glimepiride (AMARYL) 4 MG tablet Take 1 tablet by mouth 2 times daily (with meals) 180 tablet 5    pravastatin (PRAVACHOL) 10 MG tablet TAKE 1 TABLET BY MOUTH DAILY 90 tablet 5    metoprolol succinate (TOPROL XL) 50 MG extended release tablet Take 1 tablet by mouth 2 times daily 180 tablet 3    hydrALAZINE (APRESOLINE) 25 MG tablet Take 1 tablet by mouth 4 times daily 270 tablet 3     No current facility-administered medications on file prior to visit.       Allergies   Allergen Reactions    Imdur [Isosorbide Nitrate] Other (See Comments)     headache    Oxycodone Rash    Simvastatin Other (See Comments)     Muscle weakness    Amoxicillin Nausea And Vomiting    Bactrim [Sulfamethoxazole-Trimethoprim] Nausea Only    Ciprofloxacin Hcl Other (See Comments)     \"Heel/ tendon pain\"    Gabapentin Other (See Comments)     Constipation      Hydrocodone Nausea Only    Norco [Hydrocodone-Acetaminophen] Nausea Only and Other (See Comments)     Causes sick feeling in head         I have reviewed his allergies, medications, problem list, medical,social and family history and have updated as needed in the electronic medical record.    Objective:     Physical Exam  Vitals and nursing note reviewed.   Constitutional:       General: He is not in acute distress.     Appearance: He is well-developed. He is not diaphoretic.   HENT:      Head: Normocephalic and atraumatic.      Right Ear: External ear normal.      Left Ear: External ear normal.      Nose: Nose normal.      Mouth/Throat:      Pharynx: No oropharyngeal exudate.   Eyes:      General: No scleral icterus.        Right eye: No discharge.         Left eye: No discharge.      Conjunctiva/sclera: Conjunctivae normal.

## 2025-05-02 RX ORDER — GLIMEPIRIDE 4 MG/1
4 TABLET ORAL 2 TIMES DAILY WITH MEALS
Qty: 180 TABLET | Refills: 5 | Status: SHIPPED | OUTPATIENT
Start: 2025-05-02

## 2025-05-02 RX ORDER — PIOGLITAZONE 30 MG/1
30 TABLET ORAL DAILY
Qty: 90 TABLET | Refills: 3 | Status: SHIPPED | OUTPATIENT
Start: 2025-05-02

## 2025-05-02 ASSESSMENT — ENCOUNTER SYMPTOMS
BACK PAIN: 0
PHOTOPHOBIA: 0
CHOKING: 0
RECTAL PAIN: 0
CHEST TIGHTNESS: 0
BLOOD IN STOOL: 0
RHINORRHEA: 0
EYE ITCHING: 0
TROUBLE SWALLOWING: 0
VOMITING: 0
ANAL BLEEDING: 0
COLOR CHANGE: 0
EYE PAIN: 0
ABDOMINAL DISTENTION: 0
WHEEZING: 0
ABDOMINAL PAIN: 0
VOICE CHANGE: 0
COUGH: 0
EYE REDNESS: 0
EYE DISCHARGE: 0
CONSTIPATION: 0
SINUS PRESSURE: 0
DIARRHEA: 0
APNEA: 0
NAUSEA: 0
SHORTNESS OF BREATH: 0
SORE THROAT: 0
FACIAL SWELLING: 0
STRIDOR: 0

## 2025-06-03 DIAGNOSIS — I10 ESSENTIAL HYPERTENSION: ICD-10-CM

## 2025-06-03 RX ORDER — HYDRALAZINE HYDROCHLORIDE 25 MG/1
25 TABLET, FILM COATED ORAL 4 TIMES DAILY
Qty: 360 TABLET | Refills: 3 | Status: SHIPPED | OUTPATIENT
Start: 2025-06-03

## 2025-06-03 NOTE — TELEPHONE ENCOUNTER
Patient called for a refill and stated that his pharmacy told him that his RX is .   I called [facility], spoke w/Vale and she confirmed that RX is  since patient had refills on prior RX.    Name of Medication(s) Requested:  Requested Prescriptions     Pending Prescriptions Disp Refills    hydrALAZINE (APRESOLINE) 25 MG tablet 270 tablet 3     Sig: Take 1 tablet by mouth 4 times daily       Medication is on current medication list Yes    Dosage and directions were verified? Yes    Quantity verified: 90 day supply     Pharmacy Verified?  Yes    Last Appointment:  2025    Future appts:  Future Appointments   Date Time Provider Department Center   2025  9:30 AM Belinda Zhang DO [facility]   10/29/2025  9:00 AM Markel Rogers DO [place] [facility]   3/9/2026  9:00 AM Markel Rogers, DO [facility]        (If no appt send self scheduling link. .REFILLAPPT)  Scheduling request sent?     [] Yes  [x] No    Does patient need updated?  [] Yes  [x] No

## 2025-08-05 ENCOUNTER — OFFICE VISIT (OUTPATIENT)
Dept: PHYSICAL MEDICINE AND REHAB | Age: 80
End: 2025-08-05
Payer: MEDICARE

## 2025-08-05 VITALS
HEART RATE: 68 BPM | SYSTOLIC BLOOD PRESSURE: 144 MMHG | HEIGHT: 69 IN | WEIGHT: 190 LBS | DIASTOLIC BLOOD PRESSURE: 82 MMHG | BODY MASS INDEX: 28.14 KG/M2

## 2025-08-05 DIAGNOSIS — M48.061 LUMBAR FORAMINAL STENOSIS: ICD-10-CM

## 2025-08-05 DIAGNOSIS — G62.9 POLYNEUROPATHY: ICD-10-CM

## 2025-08-05 PROCEDURE — G8428 CUR MEDS NOT DOCUMENT: HCPCS | Performed by: PHYSICAL MEDICINE & REHABILITATION

## 2025-08-05 PROCEDURE — 1123F ACP DISCUSS/DSCN MKR DOCD: CPT | Performed by: PHYSICAL MEDICINE & REHABILITATION

## 2025-08-05 PROCEDURE — 3077F SYST BP >= 140 MM HG: CPT | Performed by: PHYSICAL MEDICINE & REHABILITATION

## 2025-08-05 PROCEDURE — 99214 OFFICE O/P EST MOD 30 MIN: CPT | Performed by: PHYSICAL MEDICINE & REHABILITATION

## 2025-08-05 PROCEDURE — 3079F DIAST BP 80-89 MM HG: CPT | Performed by: PHYSICAL MEDICINE & REHABILITATION

## 2025-08-05 PROCEDURE — 1125F AMNT PAIN NOTED PAIN PRSNT: CPT | Performed by: PHYSICAL MEDICINE & REHABILITATION

## 2025-08-05 PROCEDURE — G8417 CALC BMI ABV UP PARAM F/U: HCPCS | Performed by: PHYSICAL MEDICINE & REHABILITATION

## 2025-08-05 PROCEDURE — 1036F TOBACCO NON-USER: CPT | Performed by: PHYSICAL MEDICINE & REHABILITATION

## 2025-08-05 RX ORDER — BUPRENORPHINE 5 UG/H
1 PATCH TRANSDERMAL WEEKLY
Qty: 4 PATCH | Refills: 5 | Status: SHIPPED | OUTPATIENT
Start: 2025-08-05 | End: 2026-01-20

## (undated) DEVICE — CASPAR DISTR PIN12MMSTER: Brand: AESCULAP

## (undated) DEVICE — STRIP,CLOSURE,WOUND,MEDI-STRIP,1/2X4: Brand: MEDLINE

## (undated) DEVICE — WILSON FRAME KIT: Brand: MEDLINE INDUSTRIES, INC.

## (undated) DEVICE — JACKSON TABLE POSITIONER KIT: Brand: MEDLINE INDUSTRIES, INC.

## (undated) DEVICE — GLOVE SURG SZ 65 THK91MIL LTX FREE SYN POLYISOPRENE

## (undated) DEVICE — APPLICATOR TISS TIP BIOGLU SYR 10CC 10CM

## (undated) DEVICE — SURGICAL PROCEDURE PACK LAMINECTOMY LUMBAR

## (undated) DEVICE — Device

## (undated) DEVICE — 6 X 9  1.75MIL 4-WALL LABGUARD: Brand: MINIGRIP COMMERCIAL LLC

## (undated) DEVICE — BANDAGE ADH W1XL3IN NAT FAB WVN FLX DURABLE N ADH PD SEAL

## (undated) DEVICE — 3 ML SYRINGE LUER-LOCK TIP: Brand: MONOJECT

## (undated) DEVICE — GAUZE,SPONGE,4"X4",12PLY,STERILE,LF,2'S: Brand: MEDLINE

## (undated) DEVICE — NON-DEHP CATHETER EXTENSION SET, MALE LUER LOCK ADAPTER

## (undated) DEVICE — PACK,LAPAROTOMY,NO GOWNS: Brand: MEDLINE

## (undated) DEVICE — SYRINGE 20ML LL S/C 50

## (undated) DEVICE — Z DISCONTINUED APPLICATOR SURG PREP 0.35OZ 2% CHG 70% ISO ALC W/ HI LT

## (undated) DEVICE — NEEDLE SPNL L3.5IN PNK HUB S STL REG WALL FIT STYL W/ QNCKE

## (undated) DEVICE — 1000 S-DRAPE TOWEL DRAPE 10/BX: Brand: STERI-DRAPE™

## (undated) DEVICE — KENDALL 450 SERIES MONITORING FOAM ELECTRODE - RECTANGULAR SHAPE ( 3/PK): Brand: KENDALL

## (undated) DEVICE — MARKER,SKIN,WI/RULER AND LABELS: Brand: MEDLINE

## (undated) DEVICE — COVER,LIGHT HANDLE,FLX,2/PK: Brand: MEDLINE INDUSTRIES, INC.

## (undated) DEVICE — GOWN ISOLATN REG YEL M WT MULTIPLY SIDETIE LEV 2

## (undated) DEVICE — GOWN,BREATHABLE SLV,AURORA,XLG,STRL: Brand: MEDLINE

## (undated) DEVICE — GOWN,AURORA,BRTHSLV,2XL,18/CS: Brand: MEDLINE

## (undated) DEVICE — NEEDLE SPNL WEISS LNG 18 GAX5 IN MOD TUOHY PT TW PERISAFE

## (undated) DEVICE — GLOVE ORANGE PI 8 1/2   MSG9085

## (undated) DEVICE — GLOVE SURG 8.5 PF POLYMER WHT STRL SIGN LTX ESSENTIAL LTX

## (undated) DEVICE — 3M(TM) MEDIPORE(TM) +PAD SOFT CLOTH ADHESIVE WOUND DRESSING 3569: Brand: 3M™ MEDIPORE™

## (undated) DEVICE — HANDPIECE SET WITH BONE CLEANING TIP AND SUCTION TUBE: Brand: INTERPULSE

## (undated) DEVICE — 3M™ RED DOT™ MONITORING ELECTRODE WITH FOAM TAPE AND STICKY GEL 2560, 50/BAG, 20/CASE, 72/PLT: Brand: RED DOT™

## (undated) DEVICE — TUBING, SUCTION, 3/16" X 12', STRAIGHT: Brand: MEDLINE

## (undated) DEVICE — NEEDLE HYPO 25GA L1.5IN BLU POLYPR HUB S STL REG BVL STR

## (undated) DEVICE — BLADE CLIPPER GEN PURP NS

## (undated) DEVICE — 12 ML SYRINGE,LUER-LOCK TIP: Brand: MONOJECT

## (undated) DEVICE — YANKAUER,OPEN TIP,W/O VENT,STERILE: Brand: MEDLINE INDUSTRIES, INC.

## (undated) DEVICE — DRESSING PETRO W3XL8IN OIL EMUL N ADH GZ KNIT IMPREG CELOS

## (undated) DEVICE — ENCORE® LATEX TEXTURED SIZE 6.5, STERILE LATEX POWDER-FREE SURGICAL GLOVE: Brand: ENCORE

## (undated) DEVICE — ELECTRODE SURG MPLR NEUT SELF ADH PT PLT MULTIGEN

## (undated) DEVICE — MEDI-VAC YANKAUER SUCTION HANDLE W/BULBOUS TIP: Brand: CARDINAL HEALTH

## (undated) DEVICE — 3M™ IOBAN™ 2 ANTIMICROBIAL INCISE DRAPE 6650EZ: Brand: IOBAN™ 2

## (undated) DEVICE — TOWEL OR BLUEE 16X26IN ST 8 PACK ORB08 16X26ORTWL

## (undated) DEVICE — CVD CANNULA

## (undated) DEVICE — 6 ML SYRINGE LUER-LOCK TIP: Brand: MONOJECT

## (undated) DEVICE — DOUBLE BASIN SET: Brand: MEDLINE INDUSTRIES, INC.

## (undated) DEVICE — LUMBAR LAMINECTOMY: Brand: MEDLINE INDUSTRIES, INC.

## (undated) DEVICE — FORCEPS BX L240CM JAW DIA2.8MM L CAP W/ NDL MIC MESH TOOTH

## (undated) DEVICE — 3M™ IOBAN™ 2 ANTIMICROBIAL INCISE DRAPE 6640EZ: Brand: IOBAN™ 2

## (undated) DEVICE — NEEDLE HYPO 18GA L1.5IN PNK POLYPR HUB S STL THN WALL FILL

## (undated) DEVICE — STAPLER SKIN L39MM DIA0.53MM CRWN 5.7MM S STL FIX HD PROX

## (undated) DEVICE — VALVE SUCTION AIR H2O HYDR H2O JET CONN STRL ORCA POD + DISP

## (undated) DEVICE — LUBRICANT SURG JELLY ST BACTER TUBE 4.25OZ

## (undated) DEVICE — 5.5MM EGG

## (undated) DEVICE — BLADE ES L6IN ELASTOMERIC COAT EXT DURABLE BEND UPTO 90DEG

## (undated) DEVICE — MEDI-VAC NON-CONDUCTIVE SUCTION TUBING: Brand: CARDINAL HEALTH

## (undated) DEVICE — SET TRNQT W/ STD 7IN 12FR 5 TB 1 SNR DLP

## (undated) DEVICE — KIT BEDSIDE REVITAL OX 500ML

## (undated) DEVICE — APPLICATOR PREP 26ML 0.7% IOD POVACRYLEX 74% ISO ALC ST

## (undated) DEVICE — GLOVE SURG SZ 85 L12IN FNGR THK13MIL WHT ISOLEX POLYISOPRENE

## (undated) DEVICE — NEEDLE HYPO 18GA L1.5IN PNK POLYPR HUB S STL REG BVL STR

## (undated) DEVICE — BLOCK BITE 60FR CAREGUARD

## (undated) DEVICE — REGULAR KERRISONS

## (undated) DEVICE — ELECTRODE PT RET AD L9FT HI MOIST COND ADH HYDRGEL CORDED

## (undated) DEVICE — NEEDLE EPI 18GA L3.5IN S STL MOD TUOHY PNT THN WALL W/O WNG

## (undated) DEVICE — SPONGE,DISSECTOR,K,XRAY,9/16"X1/4",STRL: Brand: MEDLINE

## (undated) DEVICE — KIT EVAC 400CC DIA1/8IN H PAT 12.5IN 3 SPR RND SHP PVC DRN

## (undated) DEVICE — MASK,FACE,MAXFLUIDPROTECT,SHIELD/ERLPS: Brand: MEDLINE

## (undated) DEVICE — LIQUIBAND RAPID ADHESIVE 36/CS 0.8ML: Brand: MEDLINE

## (undated) DEVICE — RETRACTOR AFC SHADOW LINE

## (undated) DEVICE — INTENDED FOR TISSUE SEPARATION, AND OTHER PROCEDURES THAT REQUIRE A SHARP SURGICAL BLADE TO PUNCTURE OR CUT.: Brand: BARD-PARKER ® STAINLESS STEEL BLADES

## (undated) DEVICE — CODMAN® SURGICAL PATTIES 1/2" X 1" (1.27CM X 2.54CM): Brand: CODMAN®

## (undated) DEVICE — COVER,TABLE,60X90,STERILE: Brand: MEDLINE

## (undated) DEVICE — 4.0MM EGG

## (undated) DEVICE — 5.0MM PRECISION ROUND

## (undated) DEVICE — RESERVOIR CARDOTMY C4L HARDSHELL W/ 40UM FLTR EL SER 4 PRT

## (undated) DEVICE — BRUNNS CURRETTES

## (undated) DEVICE — CONTAINER SPEC COLL 960ML POLYPR TRIANG GRAD INTAKE/OUTPUT

## (undated) DEVICE — INSTRUMENT EXTRAS ACF SINGLE ORANGE OR W

## (undated) DEVICE — DRAPE SHEET, X-LARGE: Brand: CONVERTORS

## (undated) DEVICE — TOWEL,OR,DSP,ST,BLUE,STD,6/PK,12PK/CS: Brand: MEDLINE

## (undated) DEVICE — HARNESS CERV VIS 920877000000] ALIMED INC]

## (undated) DEVICE — SWABSTICK SURG PREP BENZOIN TINCTURE SINGLE ST

## (undated) DEVICE — Device: Brand: DEFENDO VALVE AND CONNECTOR KIT

## (undated) DEVICE — ELECTRODE BLDE L6.5IN CAUT EXT DISP

## (undated) DEVICE — DRAIN SURG W7MMXL20CM SIL FULL PERF HUBLESS FLAT RADPQ STRP

## (undated) DEVICE — DRESSING TRNSPAR W2XL2.75IN FLM SHT SEMIPERMEABLE WIND

## (undated) DEVICE — SPONGE GZ 4IN 4IN 4 PLY N WVN AVANT

## (undated) DEVICE — HYPODERMIC SAFETY NEEDLE: Brand: MAGELLAN

## (undated) DEVICE — TRAY EPI SGL DOSE 18GA NDL CUST AULTMAN HOSP

## (undated) DEVICE — PREMIUM WET SKIN PREP TRAY: Brand: MEDLINE INDUSTRIES, INC.

## (undated) DEVICE — GLOVE ORANGE PI 8   MSG9080

## (undated) DEVICE — TUBING SUCT 12FR MAL ALUM SHFT FN CAP VENT UNIV CONN W/ OBT

## (undated) DEVICE — CATHETER IV 14 GA TRPL BVL LUERLOCK TIP RADIOPAQUE ANGIOCATH

## (undated) DEVICE — DRAPE,REIN 53X77,STERILE: Brand: MEDLINE

## (undated) DEVICE — E-Z CLEAN, NON-STICK, PTFE COATED, ELECTROSURGICAL BLADE ELECTRODE, MODIFIED EXTENDED INSULATION, 2.5 INCH (6.35 CM): Brand: MEGADYNE

## (undated) DEVICE — MINOR PROCEDURE DRAPE: Brand: CONVERTORS

## (undated) DEVICE — BAG TRNSF AUTOLGS SUCT AND ANTICOAG LN AUTOLOG

## (undated) DEVICE — 3M™ TEGADERM™ TRANSPARENT FILM DRESSING FRAME STYLE, 1626W, 4 IN X 4-3/4 IN (10 CM X 12 CM), 50/CT 4CT/CASE: Brand: 3M™ TEGADERM™

## (undated) DEVICE — BANDAGE WND ADH LF FLX CURAD 3/4X3IN

## (undated) DEVICE — MAGNETIC INSTR DRAPE 20X16: Brand: MEDLINE INDUSTRIES, INC.

## (undated) DEVICE — NEEDLE SPNL 22GA L3.5IN BLK HUB S STL REG WALL FIT STYL W/

## (undated) DEVICE — DRAPE C ARM W41XL74IN UNIV MOB W RUBBERBAND CLP

## (undated) DEVICE — NDL, WHITACRE SPINAL, 22GX3.5": Brand: MEDLINE